# Patient Record
Sex: MALE | Race: WHITE | NOT HISPANIC OR LATINO | Employment: OTHER | URBAN - METROPOLITAN AREA
[De-identification: names, ages, dates, MRNs, and addresses within clinical notes are randomized per-mention and may not be internally consistent; named-entity substitution may affect disease eponyms.]

---

## 2018-01-10 NOTE — PROGRESS NOTES
Assessment    1  Blood tests for routine general physical examination (V72 62) (Z00 00)   2  Prostate cancer screening (V76 44) (Z12 5)   3  Annual physical exam (V70 0) (Z00 00)    Plan  Annual physical exam    · Follow-up visit in 1 year Evaluation and Treatment  Follow-up  Status: Hold For -  Scheduling  Requested for: [id number]  Blood tests for routine general physical examination    · (Q) VAP(TM) CHOLESTEROL TEST; Status:Active; Requested for:01Feb2016;   Blood tests for routine general physical examination, Prostate cancer screening    · (Q) CBC (INCLUDES DIFF/PLT) (REFL); Status:Active; Requested for:01Feb2016;    · (Q) COMPREHENSIVE METABOLIC PNL W/ADJUSTED CALCIUM; Status:Active; Requested for:01Feb2016;    · (Q) LIPID PANEL WITH DIRECT LDL; Status:Active; Requested for:01Feb2016;    · (Q) PSA, TOTAL WITH REFLEX TO PSA, FREE; Status:Active; Requested  for:01Feb2016;    · (Q) TSH, 3RD GENERATION; Status:Active; Requested for:01Feb2016;    · (Q) URINALYSIS REFLEX; Status:Active; Requested for:01Feb2016;    · *(Q) VITAMIN D, 25-HYDROXY, LC/MS/MS; Status:Active; Requested for:01Feb2016;   Colon cancer screening    · 1 - Savanna Moser MD, Eve Alejandre (Gastroenterology) Physician Referral  Consult  Status: Active   Requested for: [id number]  Care Summary provided  : Yes   · COLONOSCOPY; Status:Active; Requested for:01Feb2016;         A/P  1  Physical exam: we have conducted this for you and have given you slips for blood work  We will be in touch with you with the results  we have given you information for the colonoscopy and encourage you to get this done   Chief Complaint  Patient presents to the office today for general PE  Colonoscopy is due and patient would like a referral/order for this  Flu shot not done; but we are out of regular dose flu shots  Patient takes several health supplements but they are not listed in EMR  History of Present Illness  HPI: Here today for general pe     Is taking niacin and wants to get his labs done  Had colonoscopy at 49 and is due again this this year  requests screening labs and understands insurance may not pay             Review of Systems    Constitutional: No fever or chills, feels well, no tiredness, no recent weight gain or weight loss  ENT: no complaints of earache, no hearing loss, no nosebleeds, no nasal discharge, no sore throat, no hoarseness  Cardiovascular: No complaints of slow heart rate, no fast heart rate, no chest pain, no palpitations, no leg claudication, no lower extremity  Respiratory: No complaints of shortness of breath, no wheezing, no cough, no SOB on exertion, no orthopnea or PND  Gastrointestinal: No complaints of abdominal pain, no constipation, no nausea or vomiting, no diarrhea or bloody stools  Genitourinary: No complaints of dysuria, no incontinence, no hesitancy, no nocturia, no genital lesion, no testicular pain  Musculoskeletal: No complaints of arthralgia, no myalgias, no joint swelling or stiffness, no limb pain or swelling  Integumentary: No complaints of skin rash or skin lesions, no itching, no skin wound, no dry skin  Neurological: No compliants of headache, no confusion, no convulsions, no numbness or tingling, no dizziness or fainting, no limb weakness, no difficulty walking  Psychiatric: Is not suicidal, no sleep disturbances, no anxiety or depression, no change in personality, no emotional problems  Active Problems    1  Basal cell carcinoma of skin (173 91) (C44 91)   2   Vitamin D deficiency (268 9) (E55 9)    Surgical History    · History of Mohs Micrographic Surgery Head   · History of Rhinoplasty    Family History    · Family history of    · Family history of malignant neoplasm of breast (V16 3) (Z80 3)    · Family history of    · Family history of Myocardial infarction involving other coronary artery    Social History    · Alcohol use (V49 89) (F10 99)   · 2-3 beers weekends   · Always uses seat belt   · Daily caffeine consumption, 2-3 servings a day   · Has smoke detectors   · Never a smoker   · No drug use   · Occasional alcohol consumption (V49 89) (Z78 9)    Current Meds   1  Niacin  MG CPCR; Take 1 capsule twice daily; Therapy: (Recorded:01Feb2016) to Recorded    Allergies    1  No Known Drug Allergies    2  Shellfish    Vitals   Recorded: [id number] 08:45AM   Heart Rate 94   Respiration 16   Systolic 802   Diastolic 80   Height 5 ft 9 in   Weight 184 lb 6 08 oz   BMI Calculated 27 23   BSA Calculated 1 99   O2 Saturation 97     Physical Exam    Constitutional   General appearance: No acute distress, well appearing and well nourished  Eyes   Conjunctiva and lids: No erythema, swelling or discharge  Pupils and irises: Equal, round, reactive to light  Ears, Nose, Mouth, and Throat   Nasal mucosa, septum, and turbinates: Normal without edema or erythema  Lips, teeth, and gums: Normal, good dentition  Oropharynx: Normal with no erythema, edema, exudate or lesions  Neck   Neck: Supple, symmetric, trachea midline, no masses  Pulmonary   Auscultation of lungs: Clear to auscultation  Cardiovascular   Auscultation of heart: Normal rate and rhythm, normal S1 and S2, no murmurs  Carotid pulses: 2+ bilaterally  Abdomen   Abdomen: Non-tender, no masses  Liver and spleen: No hepatomegaly or splenomegaly  Lymphatic   Palpation of lymph nodes in neck: No lymphadenopathy  Musculoskeletal   Gait and station: Normal     Range of motion: Normal     Skin   Skin and subcutaneous tissue: Normal without rashes or lesions  Neurologic   Reflexes: 2+ and symmetric      Psychiatric   Orientation to person, place and time: Normal     Mood and affect: Normal        Results/Data  PHQ-2 Adult Depression Screening [id number] 08:50AM User, [employer]     Test Name Result Flag Reference   PHQ-2 Adult Depression Score 0     Q1: 0, Q2: 0   PHQ-2 Adult Depression Screening Negative Signatures   Electronically signed by : Shane Edge; Feb 1 2016  9:25AM EST                       (Author)    Electronically signed by : TA Cade ; Feb 1 2016  9:30AM EST                       (Author)

## 2018-10-27 ENCOUNTER — HOSPITAL ENCOUNTER (OUTPATIENT)
Dept: RADIOLOGY | Facility: HOSPITAL | Age: 63
Discharge: HOME | End: 2018-10-27
Attending: FAMILY MEDICINE
Payer: COMMERCIAL

## 2018-10-27 ENCOUNTER — APPOINTMENT (OUTPATIENT)
Dept: RADIOLOGY | Facility: CLINIC | Age: 63
End: 2018-10-27
Payer: COMMERCIAL

## 2018-10-27 ENCOUNTER — OFFICE VISIT (OUTPATIENT)
Dept: URGENT CARE | Facility: CLINIC | Age: 63
End: 2018-10-27
Payer: COMMERCIAL

## 2018-10-27 VITALS
WEIGHT: 175 LBS | DIASTOLIC BLOOD PRESSURE: 80 MMHG | HEART RATE: 90 BPM | HEIGHT: 70 IN | SYSTOLIC BLOOD PRESSURE: 128 MMHG | RESPIRATION RATE: 18 BRPM | TEMPERATURE: 98.4 F | OXYGEN SATURATION: 98 % | BODY MASS INDEX: 25.05 KG/M2

## 2018-10-27 DIAGNOSIS — M25.512 ACUTE PAIN OF LEFT SHOULDER: ICD-10-CM

## 2018-10-27 DIAGNOSIS — M25.512 ACUTE PAIN OF LEFT SHOULDER: Primary | ICD-10-CM

## 2018-10-27 PROCEDURE — G0382 LEV 3 HOSP TYPE B ED VISIT: HCPCS | Performed by: FAMILY MEDICINE

## 2018-10-27 PROCEDURE — 73030 X-RAY EXAM OF SHOULDER: CPT

## 2018-10-27 PROCEDURE — S9083 URGENT CARE CENTER GLOBAL: HCPCS | Performed by: FAMILY MEDICINE

## 2018-10-27 RX ORDER — LISINOPRIL 10 MG/1
10 TABLET ORAL
COMMUNITY
Start: 2018-10-19

## 2018-10-27 RX ORDER — CHLORTHALIDONE 25 MG/1
TABLET ORAL
COMMUNITY
Start: 2018-07-23 | End: 2019-05-13 | Stop reason: ALTCHOICE

## 2018-10-27 RX ORDER — AMLODIPINE BESYLATE 5 MG/1
5 TABLET ORAL
COMMUNITY
Start: 2018-10-19

## 2018-10-27 NOTE — PROGRESS NOTES
[facility]        NAME: Momo Hdz is a 59 y o  male  : 1955    MRN: [id number]  DATE: 2018  TIME: 9:31 AM    Assessment and Plan   Acute pain of left shoulder [M25 512]  1  Acute pain of left shoulder  XR shoulder 2+ vw left    Ambulatory referral to Physical Therapy     Left shoulder pain concerning for rotator cuff tear  X-ray without any obvious signs of fractures; no subacromial bone spur  Left shoulder sling applied  Patient instructed to apply ice to the left shoulder frequently and may continue with ibuprofen for pain control as needed  Taking vitamin C or milk may help to reduce the risk of NSAID associated dyspepsia  Referred to physical therapy for shoulder strengthening exercises  May eventually require MRI imaging with referral to Orthopedics  Of note, patient reports having an $8000 deductible on his health insurance and was apprehensive about obtaining a shoulder x-ray due to cost (especially if an MRI may be needed in the near future)  Patient Instructions     Follow up with PCP in 3-5 days  Proceed to  ER if symptoms worsen  Chief Complaint     Chief Complaint   Patient presents with    Shoulder Pain     Left shoulder pain x ~5 days s/p "hurt it golfing"         History of Present Illness     69-year-old male with pertinent childhood history of chronic shoulder subluxation who presents today due to acute onset left shoulder pain sustained after playing golf  Played a round of golf on Monday (5 days ago) and noticed some pain and stiffness in the left shoulder the next day  Since then his pain progressively worsened  Describes the pain as a sharp knife-like pain when moving the arm with an intensity of 10/10  However when sitting without moving his arm, it is described as dull and a 1/10 in intensity  Denies any fevers, chills or fingertip paresthesias    Took ibuprofen 600 mg q 6 hours times 24 hr yesterday without any pain relief  Review of Systems   Review of Systems   Constitutional: Negative for chills and fever  Respiratory: Negative for shortness of breath  Cardiovascular: Negative for chest pain  Gastrointestinal: Negative for abdominal pain  Musculoskeletal: Positive for arthralgias  Neurological: Negative for weakness and numbness  Current Medications       Current Outpatient Prescriptions:     amLODIPine (NORVASC) 5 mg tablet, , Disp: , Rfl:     aspirin 81 MG tablet, Take 81 mg by mouth daily  , Disp: , Rfl:     chlorthalidone 25 mg tablet, , Disp: , Rfl:     lisinopril (ZESTRIL) 10 mg tablet, , Disp: , Rfl:     niacin (NIASPAN) 500 mg CR tablet, Take 500 mg by mouth daily Indications: takes 500 mg bid , Disp: , Rfl:     Probiotic Product (PROBIOTIC & ACIDOPHILUS EX ST) CAPS, Take 3 capsules by mouth daily  , Disp: , Rfl:     VITAMIN D, CHOLECALCIFEROL, PO, Take 5,000 mg by mouth daily Indications: with k 2 , Disp: , Rfl:     Current Allergies     Allergies as of 10/27/2018 - Reviewed 10/27/2018   Allergen Reaction Noted    Bee venom  10/23/2016    Shellfish allergy Edema 01/06/2016    Shellfish-derived products  03/18/2016            The following portions of the patient's history were reviewed and updated as appropriate: allergies, current medications, past family history, past medical history, past social history, past surgical history and problem list      Past Medical History:   Diagnosis Date    Facial basal cell cancer     Vitamin D deficiency        Past Surgical History:   Procedure Laterality Date    COLONOSCOPY N/A 3/22/2016    Procedure: COLONOSCOPY;  Surgeon: Robbie Sullivan MD;  Location: [facility]; Service:     RHINOPLASTY         No family history on file  Medications have been verified          Objective   /80   Pulse 90   Temp 98 4 °F (36 9 °C)   Resp 18   Ht 5' 10" (1 778 m)   Wt 79 4 kg (175 lb)   SpO2 98%   BMI 25 11 kg/m²        Physical Exam Physical Exam   Constitutional: He appears well-developed and well-nourished  He appears distressed (Holding left arm close to the body to protect against shoulder pain )  HENT:   Head: Normocephalic and atraumatic  Eyes: Conjunctivae are normal    Cardiovascular: Normal rate and normal heart sounds  No murmur heard  Pulmonary/Chest: Effort normal and breath sounds normal  No respiratory distress  He has no wheezes  He has no rales  Musculoskeletal: He exhibits tenderness  He exhibits no edema or deformity  Shoulders appear symmetric while sitting  No overlying skin changes  Exquisite point tenderness over the lateral aspect of the shoulder just lateral to the acromion/superior deltoid  Unable to abduct the left shoulder  Hold arm in adduction  Passive ROM limited due to pain  Skin: Skin is warm  He is not diaphoretic  No erythema  Psychiatric: He has a normal mood and affect   His behavior is normal  Judgment and thought content normal

## 2018-10-31 ENCOUNTER — EVALUATION (OUTPATIENT)
Dept: PHYSICAL THERAPY | Facility: CLINIC | Age: 63
End: 2018-10-31
Payer: COMMERCIAL

## 2018-10-31 DIAGNOSIS — M25.512 ACUTE PAIN OF LEFT SHOULDER: ICD-10-CM

## 2018-10-31 PROCEDURE — 97162 PT EVAL MOD COMPLEX 30 MIN: CPT | Performed by: PHYSICAL THERAPIST

## 2018-10-31 PROCEDURE — G8984 CARRY CURRENT STATUS: HCPCS | Performed by: PHYSICAL THERAPIST

## 2018-10-31 PROCEDURE — G8985 CARRY GOAL STATUS: HCPCS | Performed by: PHYSICAL THERAPIST

## 2018-10-31 NOTE — PROGRESS NOTES
Physical Therapy Initial Evaluation    Today's date: 10/31/2018  Patient name: Chani Hook Page  : 1955  MRN: [id number]  Referring provider: Jeanette Deleon MD  Dx:   Encounter Diagnosis     ICD-10-CM    1  Acute pain of left shoulder M25 512 Ambulatory referral to Physical Therapy                  Assessment  Impairments: abnormal or restricted ROM, abnormal movement, activity intolerance, impaired physical strength, pain with function, poor posture  and poor body mechanics    Assessment details: Pt's problem list: c/o pain in L UE with movement, decreased ROM/MMT in L UE, positive tenderness in L shoulder RTC region, poor posture and L shoulder mm atrophies present  Understanding of Dx/Px/POC: excellent   Prognosis: good    Goals  STG's (1-3 weeks)  1  S with all HEP  2  S with postural education  3  L UE ROM increase in flexion and AB by 10-15 degrees to improve functional use with ADL's    LTG's (4-6 weeks)  1  Independent with all HEP techniques  2  Independent with all self postural correction techniques  3  L UE AROM WFL t/o - no limitations with use of UE   4  L UE MMT improve at least by 1 grade t/o  Plan  Patient would benefit from: skilled physical therapy  Planned modality interventions: low level laser therapy  Planned therapy interventions: manual therapy, joint mobilization, postural training, strengthening, stretching, therapeutic activities, therapeutic exercise, therapeutic training, home exercise program, functional ROM exercises, flexibility and body mechanics training  Frequency: 2x week  Duration in weeks: 6  Treatment plan discussed with: patient        Subjective Evaluation    History of Present Illness  Date of onset: 10/22/2018  Mechanism of injury: Pt  is 58 y/o male c/o acute L shoulder pain after playing golf and lifting heavy object on 10/22/18  Pt  noted slight improvement in pain management, but still c/o not being able to lift his L arm up above head      Hx  of B/L multiple shoulder subluxations in the past (young age) - as per pt  Pt's functional limitations at this point - reaching above head ADL's (dressing, bathing,  ), not able to sleep at night without pain meds (no position of comfort), not able to lift L UE with various ADL's at home/work  Recurrent probem    Quality of life: good    Pain  Current pain ratin  At best pain ratin  At worst pain rating: 10  Location: L shoulder region  Quality: sharp  Relieving factors: medications and relaxation  Aggravating factors: overhead activity and lifting  Progression: improved    Social Support  Steps to enter house: yes (1 step)  Stairs in house: yes (1 flight, u/l HR)   Lives in: multiple-level home  Lives with: spouse    Employment status: working (printing business, [employer] work)  Hand dominance: right      Diagnostic Tests  X-ray: abnormal  Treatments  Previous treatment: physical therapy  Current treatment: medication  Patient Goals  Patient goals for therapy: decreased pain, increased motion, increased strength and return to sport/leisure activities          Objective     Static Posture     Head  Forward  Shoulders  Rounded  Scapulae  Left protracted  Thoracic Spine  Flattened thoracic spine  Comments  L shoulder also depressed and posititive muscular atrophies noted in and deltoid mm  Postural Observations  Seated posture: poor  Standing posture: poor  Correction of posture: has no consistent effect        Palpation   Left   No palpable tenderness to the cervical paraspinals, rhomboids, triceps and upper trapezius  Tenderness of the anterior deltoid, biceps, deltoid, middle deltoid, middle trapezius, pectoralis major and pectoralis minor  Active Range of Motion   Left Shoulder   Flexion: Left shoulder active forward flexion: 0-90  with pain  Abduction: Left shoulder active abduction: 0-95   with pain  External rotation 45°: Left shoulder active external rotation at 45 degrees: 0-55  with pain  Internal rotation 45°: Left shoulder active internal rotation at 45 degrees: 0-70  with pain    Right Shoulder   Normal active range of motion  Flexion: Right shoulder active forward flexion: 0-155  Abduction: Right shoulder active abduction: 0-175  External rotation 45°: Right shoulder active external rotation at 45 degrees: 0-80  Internal rotation 45°: Right shoulder active internal rotation at 45 degrees: 0-90  Strength/Myotome Testing     Left Shoulder     Planes of Motion   Flexion: 2   Extension: 3   Abduction: 2   Adduction: 3   External rotation at 45°: 2   Internal rotation at 45°: 2     Isolated Muscles   Biceps: 2   Triceps: 2     Right Shoulder     Planes of Motion   Flexion: 5   Abduction: 4   Adduction: 5   External rotation at 45°: 5   Internal rotation at 45°: 5     Isolated Muscles   Biceps: 5   Triceps: 5     Tests     Left Shoulder   Positive drop arm, empty can and painful arc  Precautions: not any given, tx  only in pain-free range at this point      Daily Treatment Diary     Manual  10/31            PROM/stretch L UE                                                                     Exercise Diary  10/31            Pendulum circles 3'            Pendulum side-to-side 3'                                                                                                                                                                                                                                                          Modalities  10/31            DEVIKA HERNANDEZ

## 2018-11-02 ENCOUNTER — OFFICE VISIT (OUTPATIENT)
Dept: PHYSICAL THERAPY | Facility: CLINIC | Age: 63
End: 2018-11-02
Payer: COMMERCIAL

## 2018-11-02 DIAGNOSIS — M25.512 ACUTE PAIN OF LEFT SHOULDER: Primary | ICD-10-CM

## 2018-11-02 PROCEDURE — 97110 THERAPEUTIC EXERCISES: CPT | Performed by: PHYSICAL THERAPIST

## 2018-11-02 PROCEDURE — 97140 MANUAL THERAPY 1/> REGIONS: CPT | Performed by: PHYSICAL THERAPIST

## 2018-11-02 NOTE — PROGRESS NOTES
Daily Note     Today's date: 2018  Patient name: Colleen Dillon Page  : 1955  MRN: [id number]  Referring provider: Jay Cain MD  Dx:   Encounter Diagnosis     ICD-10-CM    1  Acute pain of left shoulder M25 512                   Subjective: "If I don't move my arm it doesn't hurt "      Objective: See treatment diary below        Precautions: not any given, tx  only in pain-free range at this point      Daily Treatment Diary      Manual  10/31  11/2                   PROM/stretch L UE    10'                                                                                                                         Exercise Diary  10/31  11/2                   Pendulum circles 3'  3'                   Pendulum side-to-side 3'  3'                    UBE/b'kwrd/ff    10' PROM                    Pulleys     5' PROM                    log rolling stretch flex, AB    ea 10x20"                                                                                                                                                                                                                                                                                                                                                                                                 Modalities  10/31  11/2                   CP    7'                   LA                        MH    8'                                Assessment: Tolerated treatment well  Patient is working on L UE ex in pain free range and PROM/stretch  Educated on updated HEP  Plan: Continue PT as per POC

## 2018-11-06 ENCOUNTER — OFFICE VISIT (OUTPATIENT)
Dept: PHYSICAL THERAPY | Facility: CLINIC | Age: 63
End: 2018-11-06
Payer: COMMERCIAL

## 2018-11-06 DIAGNOSIS — M25.512 ACUTE PAIN OF LEFT SHOULDER: Primary | ICD-10-CM

## 2018-11-06 PROCEDURE — 97140 MANUAL THERAPY 1/> REGIONS: CPT | Performed by: PHYSICAL THERAPIST

## 2018-11-06 PROCEDURE — 97110 THERAPEUTIC EXERCISES: CPT | Performed by: PHYSICAL THERAPIST

## 2018-11-06 NOTE — PROGRESS NOTES
Daily Note     Today's date: 2018  Patient name: Delmar Gilbert Page  : 1955  MRN: [id number]  Referring provider: Rica Carey MD  Dx:   Encounter Diagnosis     ICD-10-CM    1  Acute pain of left shoulder M25 512                   Subjective: "I dont have any pain at this moment  Maybe it is getting little better, but I can still feel it in certain positions "      Objective: See treatment diary below  Precautions: not any given, tx  only in pain-free range at this point      Daily Treatment Diary      Manual  10/31  11/2  11/6                 PROM/stretch L UE    10'  10'                                                                                                                       Exercise Diary  10/31  11/2  11/6                 Pendulum circles 3'  3'  HEP                 Pendulum side-to-side 3'  3'  HEP                  UBE/b'kwrd/ff    10' PROM  10' at 110 flex                   Pulleys     5' PROM  5' ER/IR                  log rolling stretch flex, AB    ea 10x20"                    supine wand B UE flexion (pain free)      10x2                  corner stretch      10x15"                  "sink stretch"      10x15"                                          HEP review      3'                                                                                                                                                                                                                                                                       Modalities  10/31  11/2  11/6                 CP    7'                   LA                        MH    8'  8'                                    Assessment: Tolerated treatment well  Patient is able to perform AROM in supine with a wand up to about 165 degrees  No worsening of pain after tx  Educated on importance daily HEP  Plan: Continue PT as per POC

## 2018-11-08 ENCOUNTER — OFFICE VISIT (OUTPATIENT)
Dept: PHYSICAL THERAPY | Facility: CLINIC | Age: 63
End: 2018-11-08
Payer: COMMERCIAL

## 2018-11-08 DIAGNOSIS — M25.512 ACUTE PAIN OF LEFT SHOULDER: Primary | ICD-10-CM

## 2018-11-08 PROCEDURE — 97110 THERAPEUTIC EXERCISES: CPT | Performed by: PHYSICAL THERAPIST

## 2018-11-08 PROCEDURE — 97140 MANUAL THERAPY 1/> REGIONS: CPT | Performed by: PHYSICAL THERAPIST

## 2018-11-08 NOTE — PROGRESS NOTES
Daily Note     Today's date: 2018  Patient name: Arleth Quiñones Page  : 1955  MRN: [id number]  Referring provider: Vira Serrano MD  Dx:   Encounter Diagnosis     ICD-10-CM    1  Acute pain of left shoulder M25 512                   Subjective: "I have no pain if I don't move it certain direction "    Objective: See treatment diary below    Precautions: not any given, tx  only in pain-free range at this point      Daily Treatment Diary      Manual  10/31  11/2  11/6  11/8               PROM/stretch L UE    10'  10'  10'                                                                                                                     Exercise Diary  10/31  11/2  11/6  11/8               Pendulum circles 3'  3'  HEP                 Pendulum side-to-side 3'  3'  HEP                  UBE/b'kwrd/ff    10' PROM  10' at 110 flex   10' 1120 flex                Pulleys     5' PROM  5' ER/IR  5' ER/IR                log rolling stretch flex, AB    ea 10x20"                    supine wand B UE flexion (pain free)      10x2                  corner stretch      10x15"  10x15"                "sink stretch"      10x15"  10x15"                                        HEP review      3'  3'                                        Apollo B scap retr         L1, 20x                Apollo B scap protr         L1,  20x                                        T-ball flexion stretch       10x15"                                                                                                                                             Modalities  10/31  11/2  11/6  11/8               CP    7'                   LA                        MH    8'  8'  8'                          Assessment: Tolerated treatment very well - all therex in pain free range  Patient started gentle UE strengthening in phyllis range only  Some verbal and tactile cues needed to maintain correct posture with ex  Plan: Continue PT as per POC

## 2018-11-13 ENCOUNTER — OFFICE VISIT (OUTPATIENT)
Dept: PHYSICAL THERAPY | Facility: CLINIC | Age: 63
End: 2018-11-13
Payer: COMMERCIAL

## 2018-11-13 DIAGNOSIS — M25.512 ACUTE PAIN OF LEFT SHOULDER: Primary | ICD-10-CM

## 2018-11-13 PROCEDURE — 97140 MANUAL THERAPY 1/> REGIONS: CPT | Performed by: PHYSICAL THERAPIST

## 2018-11-13 PROCEDURE — 97112 NEUROMUSCULAR REEDUCATION: CPT | Performed by: PHYSICAL THERAPIST

## 2018-11-13 PROCEDURE — 97110 THERAPEUTIC EXERCISES: CPT | Performed by: PHYSICAL THERAPIST

## 2018-11-13 NOTE — PROGRESS NOTES
Daily Note     Today's date: 2018  Patient name: Darren Alvares Page  : 1955  MRN: [id number]  Referring provider: Heidi Hatch MD  Dx:   Encounter Diagnosis     ICD-10-CM    1  Acute pain of left shoulder M25 512                   Subjective: "I can move it better, but still pain with certain movements "      Objective: See treatment diary below    Precautions: not any given, tx  only in pain-free range at this point       Precautions: not any given, tx  only in pain-free range at this point      Daily Treatment Diary      Manual  10/31  11/2  11/6  11/8 11/13               PROM/stretch L UE    10'  10'  10'  10'                                                                                                                   Exercise Diary  10/31  11/2  11/6  11/8 11/13              UBE/b'kwrd/ff    10' PROM  10' at 110 flex   10'   120 flex  10'  130 flex              Pulleys     5' PROM  5' ER/IR  5' ER/IR  5'              supine wand B UE flexion (pain free)      10x2                  corner stretch      10x15"  10x15" 10x15"              "sink stretch"      10x15"  10x15"  10x15"                                      HEP review      3'  3'  3'                                      Apollo B scap retr         L1, 20x  L2,20x              Apollo B scap protr         L1,  20x  L2 20x                                      T-ball flexion stretch       10x15"  10x15"                                      Postural edu           8'                                                                                           Modalities  10/31  11/2  11/6  11/8  11/13             CP    7'                   LA                        MH    8'  8'  8'  8'                       Assessment: Tolerated treatment well  Patient continue to work on various stretching ad strengthening ex in pain-free range  Improvement in self postural awareness with therex noted  Plan: Continue PT as phyllis

## 2018-11-15 ENCOUNTER — OFFICE VISIT (OUTPATIENT)
Dept: PHYSICAL THERAPY | Facility: CLINIC | Age: 63
End: 2018-11-15
Payer: COMMERCIAL

## 2018-11-15 DIAGNOSIS — M25.512 ACUTE PAIN OF LEFT SHOULDER: Primary | ICD-10-CM

## 2018-11-15 PROCEDURE — 97110 THERAPEUTIC EXERCISES: CPT | Performed by: PHYSICAL THERAPIST

## 2018-11-15 PROCEDURE — G8986 CARRY D/C STATUS: HCPCS | Performed by: PHYSICAL THERAPIST

## 2018-11-15 PROCEDURE — 97112 NEUROMUSCULAR REEDUCATION: CPT | Performed by: PHYSICAL THERAPIST

## 2018-11-15 PROCEDURE — G8985 CARRY GOAL STATUS: HCPCS | Performed by: PHYSICAL THERAPIST

## 2018-11-15 PROCEDURE — 97530 THERAPEUTIC ACTIVITIES: CPT | Performed by: PHYSICAL THERAPIST

## 2018-11-15 NOTE — PROGRESS NOTES
Daily Note/Discharge Note     Today's date: 11/15/2018  Patient name: Lilian Ferrer Page  : 1955  MRN: [id number]  Referring provider: Svetlana Griffith MD  Dx:   Encounter Diagnosis     ICD-10-CM    1  Acute pain of left shoulder M25 512                   Subjective: "I think I can do my exercises at home " No pain at this moment  Objective: See treatment diary below  Precautions: not any given, tx  only in pain-free range at this point        Active Range of Motion From 10/31/18  Left Shoulder   Flexion: Left shoulder active forward flexion: 0-90  with pain  Abduction: Left shoulder active abduction: 0-95  with pain  External rotation 45°: Left shoulder active external rotation at 45 degrees: 0-55  with pain  Internal rotation 45°: Left shoulder active internal rotation at 45 degrees: 0-70  with pain     From today:  Flexion: 0-175 (WFL)  AB: 0-175 (WFL)  Slight pain at 100 degrees only  IR/ER: same 0-80 [facility])      Strength/Myotome Testing     Left Shoulder      Planes of Motion   Flexion: 2   Extension: 3   Abduction: 2   Adduction: 3   External rotation at 45°: 2   Internal rotation at 45°: 2      Isolated Muscles   Biceps: 2   Triceps: 2      MMT from today: 5/5 t/o except 4/5 AD at 90 degrees    Posture:  Good self correction in sitting and standing   Some slight FF head and B shoulders noted      Daily Treatment Diary      Manual  10/31  11/2  11/6  11/8 11/13     11/15           PROM/stretch L UE    10'  10'  10'  10'                                                                                                                   Exercise Diary  10/31  11/2  11/6  11/8 11/13  11/15            UBE/b'kwrd/ff    10' PROM  10' at 110 flex   10'   120 flex  10'  130 flex              Pulleys     5' PROM  5' ER/IR  5' ER/IR  5'  3x15"            supine wand B UE flexion (pain free)      10x2                  corner stretch      10x15"  10x15" 10x15"  3x15"            "sink stretch"      10x15"  10x15"  10x15"  3x15"                                    HEP review      3'  3'  3'                                      Apollo B scap retr         L1, 20x  L2,20x              Apollo B scap protr         L1,  20x  L2 20x                                      T-ball flexion stretch       10x15"  10x15"                                      Postural edu           8'  5'            HEP review            8'            Re-assessment            10'                                         Modalities  10/31  11/2  11/6  11/8  11/13  11/15           CP    7'                   LA                       [address]    8'  8'  8'  8'  8'                 STG's (1-3 weeks) from 10/31/18  1  S with all HEP (reached)  2  S with postural education (reached)  3  L UE ROM increase in flexion and AB by 10-15 degrees to improve functional use with ADL's (reached)    LTG's (4-6 weeks)  1  Independent with all HEP techniques  (reached)  2  Independent with all self postural correction techniques  (reached)  3  L UE AROM WFL t/o - no limitations with use of UE  (reached)  4  L UE MMT improve at least by 1 grade t/o  (reached)    Assessment: Tolerated treatment very well  Patient without any major functional limitations except discomfort at 100 degrees of AB  Safe and independent with all HEP and self postural correction  Improvement made in L UE ROM/MMT and pain management  Plan: Self D/C with HEP review at this time

## 2019-06-11 PROBLEM — J38.7 VALLECULAR MASS: Status: ACTIVE | Noted: 2019-06-11

## 2019-07-03 NOTE — PRE-PROCEDURE INSTRUCTIONS
Pre-Surgery Instructions:   Medication Instructions    amLODIPine (NORVASC) 5 mg tablet Instructed patient per Anesthesia Guidelines   lisinopril (ZESTRIL) 10 mg tablet Instructed patient per Anesthesia Guidelines   multivitamin [facility]) TABS Patient was instructed by Physician and understands   NON FORMULARY Patient was instructed by Physician and understands  Pre op and bathing instructions reviewed

## 2019-07-13 ENCOUNTER — OFFICE VISIT (OUTPATIENT)
Dept: URGENT CARE | Facility: CLINIC | Age: 64
End: 2019-07-13
Payer: COMMERCIAL

## 2019-07-13 VITALS
OXYGEN SATURATION: 100 % | RESPIRATION RATE: 20 BRPM | BODY MASS INDEX: 25.37 KG/M2 | HEIGHT: 70 IN | TEMPERATURE: 98.7 F | DIASTOLIC BLOOD PRESSURE: 87 MMHG | HEART RATE: 84 BPM | WEIGHT: 177.2 LBS | SYSTOLIC BLOOD PRESSURE: 147 MMHG

## 2019-07-13 DIAGNOSIS — M54.6 ACUTE BILATERAL THORACIC BACK PAIN: ICD-10-CM

## 2019-07-13 DIAGNOSIS — R10.13 EPIGASTRIC PAIN: Primary | ICD-10-CM

## 2019-07-13 PROCEDURE — S9083 URGENT CARE CENTER GLOBAL: HCPCS | Performed by: FAMILY MEDICINE

## 2019-07-13 PROCEDURE — G0382 LEV 3 HOSP TYPE B ED VISIT: HCPCS | Performed by: FAMILY MEDICINE

## 2019-07-13 PROCEDURE — 93005 ELECTROCARDIOGRAM TRACING: CPT | Performed by: FAMILY MEDICINE

## 2019-07-13 NOTE — PATIENT INSTRUCTIONS
Vital signs are stable, patient is afebrile  Patient is symptomatic with improving  Is recommended he try Prilosec or continue Maalox if there is symptom recurrence  Follow-up with GI this week  Go to the emergency department if symptoms recur or worsen

## 2019-07-13 NOTE — PROGRESS NOTES
[address]        NAME: Pat Hdz is a 62 y o  male  : 1955    MRN: [id number]  DATE: 2019  TIME: 10:45 AM    Assessment and Plan   Epigastric pain [R10 13]  1  Epigastric pain     2  Acute bilateral thoracic back pain           Patient Instructions   Patient Instructions   Vital signs are stable, patient is afebrile  Patient is symptomatic with improving  Is recommended he try Prilosec or continue Maalox if there is symptom recurrence  Follow-up with GI this week  Go to the emergency department if symptoms recur or worsen  Proceed to  ER if symptoms worsen  Chief Complaint     Chief Complaint   Patient presents with    Abdominal Pain     patient reports he ate a baritto last night started with abdominal pain/distendion along with back pain at 1:30am, loose bowels earlier in the day  0430/0730 vomited bile,took apple cider vinegar at 0300 with no releif, maalox x 2 with no relief  denies passing gas or belching  History of Present Illness       Patient presents with complaint of primarily epigastric discomfort but is generally diffuse, and thoracic back pain described as an ache that started at approximately 1:00 a m , he did eat a burrito bowl the night before  Patient reports having issues with stomach discomfort, abdominal distention at times intermittently, but this is the 1st time he has experienced any back pain associated with  He denies having dysphagia, he does have a right-sided neck mass which will be surgically biopsied in the next week  Patient reports he had bilious vomiting x2 with most recent being approximately 7:30 a m  This morning  He did not vomit any whole food  No hematemesis, no current nausea  He did take Maalox x2 and reports that currently his symptoms are improving  He has had EGD and colonoscopy in the past but this has been quite some time  Patient denies radiating pain    He has had difficulty passing gas or belching, he did try apple cider vinegar without improvement in symptoms  He states this has improved the same symptoms in the past   No chest pain no shortness of breath no palpitations no lightheadedness no dizziness no syncope  No worsening of symptoms with positional movement  No fevers no chills no urinary symptoms  He does have a history of GERD  Review of Systems   Review of Systems   Constitutional: Negative  HENT: Negative  Respiratory: Negative  Cardiovascular: Negative  Gastrointestinal: Positive for abdominal pain, diarrhea, nausea and vomiting  Negative for blood in stool and constipation  Genitourinary: Negative  Musculoskeletal: Positive for back pain  Neurological: Negative  Current Medications       Current Outpatient Medications:     amLODIPine (NORVASC) 5 mg tablet, Take 5 mg by mouth daily , Disp: , Rfl:     lisinopril (ZESTRIL) 10 mg tablet, Take 10 mg by mouth daily , Disp: , Rfl:     multivitamin (THERAGRAN) TABS, Take 1 tablet by mouth daily, Disp: , Rfl:     NON FORMULARY, , Disp: , Rfl:     Current Allergies     Allergies as of 07/13/2019 - Reviewed 07/13/2019   Allergen Reaction Noted    Bee venom  10/23/2016    Shellfish allergy Edema 01/06/2016    Shellfish-derived products  03/18/2016            The following portions of the patient's history were reviewed and updated as appropriate: allergies, current medications, past family history, past medical history, past social history, past surgical history and problem list      Past Medical History:   Diagnosis Date    Facial basal cell cancer     GERD (gastroesophageal reflux disease)     diet controlled    Hypertension     Vitamin D deficiency        Past Surgical History:   Procedure Laterality Date    COLONOSCOPY N/A 3/22/2016    Procedure: COLONOSCOPY;  Surgeon: Radha Kim MD;  Location: [facility] GI [facility]; Service:    [facility] RHINOPLASTY      WISDOM TOOTH EXTRACTION         History reviewed   No pertinent family history  Medications have been verified  Objective   /87   Pulse 84   Temp 98 7 °F (37 1 °C)   Resp 20   Ht 5' 10" (1 778 m)   Wt 80 4 kg (177 lb 3 2 oz)   SpO2 100%   BMI 25 43 kg/m²        Physical Exam     Physical Exam   Constitutional: He appears well-developed and well-nourished  He does not appear ill  HENT:   Mouth/Throat: Oropharynx is clear and moist  No oropharyngeal exudate  Cardiovascular: Normal rate, regular rhythm and normal heart sounds  Pulmonary/Chest: Effort normal and breath sounds normal    Abdominal: Normal appearance and bowel sounds are normal    Abdomen soft nontender nondistended, mild diffuse abdominal pain with palpation worse in the epigastrium and this is mild  Neurological:   No CVA tenderness bilaterally, no reproducible back pain no bony or midline tenderness no paravertebral musculature tenderness to palpation    Full active range of motion of the thoracic spine

## 2019-07-15 LAB
ATRIAL RATE: 82 BPM
P AXIS: 1 DEGREES
PR INTERVAL: 146 MS
QRS AXIS: 6 DEGREES
QRSD INTERVAL: 102 MS
QT INTERVAL: 374 MS
QTC INTERVAL: 436 MS
T WAVE AXIS: -13 DEGREES
VENTRICULAR RATE: 82 BPM

## 2019-07-15 PROCEDURE — 93010 ELECTROCARDIOGRAM REPORT: CPT | Performed by: INTERNAL MEDICINE

## 2019-07-16 ENCOUNTER — ANESTHESIA EVENT (OUTPATIENT)
Dept: PERIOP | Facility: HOSPITAL | Age: 64
End: 2019-07-16
Payer: COMMERCIAL

## 2019-07-16 NOTE — ANESTHESIA PREPROCEDURE EVALUATION
Review of Systems/Medical History  Patient summary reviewed        Cardiovascular  EKG reviewed, Hypertension ,    Pulmonary  Negative pulmonary ROS        GI/Hepatic    GERD ,        Negative  ROS        Endo/Other  Negative endo/other ROS      GYN  Negative gynecology ROS          Hematology  Negative hematology ROS      Musculoskeletal  Negative musculoskeletal ROS        Neurology  Negative neurology ROS      Psychology   Negative psychology ROS              Physical Exam    Airway    Mallampati score: II  TM Distance: >3 FB  Neck ROM: full     Dental       Cardiovascular  Cardiovascular exam normal    Pulmonary  Pulmonary exam normal     Other Findings        Anesthesia Plan  ASA Score- 2     Anesthesia Type- general with ASA Monitors  Additional Monitors:   Airway Plan:         Plan Factors-    Induction- intravenous  Postoperative Plan-     Informed Consent- Anesthetic plan and risks discussed with patient  I personally reviewed this patient with the CRNA  Discussed and agreed on the Anesthesia Plan with the CRNA  Armando Josue

## 2019-07-17 ENCOUNTER — HOSPITAL ENCOUNTER (OUTPATIENT)
Facility: HOSPITAL | Age: 64
Setting detail: OUTPATIENT SURGERY
Discharge: HOME | End: 2019-07-17
Attending: OTOLARYNGOLOGY | Admitting: OTOLARYNGOLOGY
Payer: COMMERCIAL

## 2019-07-17 ENCOUNTER — ANESTHESIA (OUTPATIENT)
Dept: PERIOP | Facility: HOSPITAL | Age: 64
End: 2019-07-17
Payer: COMMERCIAL

## 2019-07-17 VITALS
OXYGEN SATURATION: 96 % | BODY MASS INDEX: 25.05 KG/M2 | DIASTOLIC BLOOD PRESSURE: 88 MMHG | TEMPERATURE: 98.9 F | RESPIRATION RATE: 18 BRPM | SYSTOLIC BLOOD PRESSURE: 148 MMHG | HEIGHT: 70 IN | HEART RATE: 64 BPM | WEIGHT: 175 LBS

## 2019-07-17 DIAGNOSIS — J38.7 VALLECULAR MASS: ICD-10-CM

## 2019-07-17 PROCEDURE — 31536 LARYNGOSCOPY W/BX & OP SCOPE: CPT | Performed by: OTOLARYNGOLOGY

## 2019-07-17 PROCEDURE — 88333 PATH CONSLTJ SURG CYTO XM 1: CPT | Performed by: PATHOLOGY

## 2019-07-17 PROCEDURE — 88342 IMHCHEM/IMCYTCHM 1ST ANTB: CPT | Performed by: PATHOLOGY

## 2019-07-17 PROCEDURE — 99235 HOSP IP/OBS SAME DATE MOD 70: CPT | Performed by: OTOLARYNGOLOGY

## 2019-07-17 PROCEDURE — 88184 FLOWCYTOMETRY/ TC 1 MARKER: CPT | Performed by: OTOLARYNGOLOGY

## 2019-07-17 PROCEDURE — 88307 TISSUE EXAM BY PATHOLOGIST: CPT | Performed by: PATHOLOGY

## 2019-07-17 PROCEDURE — 88185 FLOWCYTOMETRY/TC ADD-ON: CPT | Performed by: ANESTHESIOLOGY

## 2019-07-17 RX ORDER — FENTANYL CITRATE 50 UG/ML
INJECTION, SOLUTION INTRAMUSCULAR; INTRAVENOUS AS NEEDED
Status: DISCONTINUED | OUTPATIENT
Start: 2019-07-17 | End: 2019-07-17 | Stop reason: SURG

## 2019-07-17 RX ORDER — METOCLOPRAMIDE HYDROCHLORIDE 5 MG/ML
10 INJECTION INTRAMUSCULAR; INTRAVENOUS ONCE AS NEEDED
Status: DISCONTINUED | OUTPATIENT
Start: 2019-07-17 | End: 2019-07-17 | Stop reason: HOSPADM

## 2019-07-17 RX ORDER — GLYCOPYRROLATE 0.2 MG/ML
INJECTION INTRAMUSCULAR; INTRAVENOUS AS NEEDED
Status: DISCONTINUED | OUTPATIENT
Start: 2019-07-17 | End: 2019-07-17 | Stop reason: SURG

## 2019-07-17 RX ORDER — PROPOFOL 10 MG/ML
INJECTION, EMULSION INTRAVENOUS AS NEEDED
Status: DISCONTINUED | OUTPATIENT
Start: 2019-07-17 | End: 2019-07-17 | Stop reason: SURG

## 2019-07-17 RX ORDER — LIDOCAINE HYDROCHLORIDE AND EPINEPHRINE 10; 10 MG/ML; UG/ML
INJECTION, SOLUTION INFILTRATION; PERINEURAL AS NEEDED
Status: DISCONTINUED | OUTPATIENT
Start: 2019-07-17 | End: 2019-07-17 | Stop reason: HOSPADM

## 2019-07-17 RX ORDER — SODIUM CHLORIDE, SODIUM LACTATE, POTASSIUM CHLORIDE, CALCIUM CHLORIDE 600; 310; 30; 20 MG/100ML; MG/100ML; MG/100ML; MG/100ML
125 INJECTION, SOLUTION INTRAVENOUS CONTINUOUS
Status: DISCONTINUED | OUTPATIENT
Start: 2019-07-17 | End: 2019-07-17 | Stop reason: HOSPADM

## 2019-07-17 RX ORDER — LIDOCAINE HYDROCHLORIDE 10 MG/ML
INJECTION, SOLUTION INFILTRATION; PERINEURAL AS NEEDED
Status: DISCONTINUED | OUTPATIENT
Start: 2019-07-17 | End: 2019-07-17 | Stop reason: SURG

## 2019-07-17 RX ORDER — SODIUM CHLORIDE, SODIUM LACTATE, POTASSIUM CHLORIDE, CALCIUM CHLORIDE 600; 310; 30; 20 MG/100ML; MG/100ML; MG/100ML; MG/100ML
20 INJECTION, SOLUTION INTRAVENOUS CONTINUOUS
Status: DISCONTINUED | OUTPATIENT
Start: 2019-07-17 | End: 2019-07-17 | Stop reason: HOSPADM

## 2019-07-17 RX ORDER — OXYMETAZOLINE HYDROCHLORIDE 0.05 G/100ML
SPRAY NASAL AS NEEDED
Status: DISCONTINUED | OUTPATIENT
Start: 2019-07-17 | End: 2019-07-17 | Stop reason: HOSPADM

## 2019-07-17 RX ORDER — DEXAMETHASONE SODIUM PHOSPHATE 10 MG/ML
INJECTION, SOLUTION INTRAMUSCULAR; INTRAVENOUS AS NEEDED
Status: DISCONTINUED | OUTPATIENT
Start: 2019-07-17 | End: 2019-07-17 | Stop reason: SURG

## 2019-07-17 RX ORDER — MAGNESIUM HYDROXIDE 1200 MG/15ML
LIQUID ORAL AS NEEDED
Status: DISCONTINUED | OUTPATIENT
Start: 2019-07-17 | End: 2019-07-17 | Stop reason: HOSPADM

## 2019-07-17 RX ORDER — ONDANSETRON 2 MG/ML
INJECTION INTRAMUSCULAR; INTRAVENOUS AS NEEDED
Status: DISCONTINUED | OUTPATIENT
Start: 2019-07-17 | End: 2019-07-17 | Stop reason: SURG

## 2019-07-17 RX ORDER — ROCURONIUM BROMIDE 10 MG/ML
INJECTION, SOLUTION INTRAVENOUS AS NEEDED
Status: DISCONTINUED | OUTPATIENT
Start: 2019-07-17 | End: 2019-07-17 | Stop reason: SURG

## 2019-07-17 RX ORDER — HYDROCODONE BITARTRATE AND ACETAMINOPHEN 5; 325 MG/1; MG/1
1 TABLET ORAL EVERY 6 HOURS PRN
Qty: 15 TABLET | Refills: 0 | Status: SHIPPED | OUTPATIENT
Start: 2019-07-17 | End: 2019-07-27

## 2019-07-17 RX ORDER — ONDANSETRON 2 MG/ML
4 INJECTION INTRAMUSCULAR; INTRAVENOUS ONCE AS NEEDED
Status: COMPLETED | OUTPATIENT
Start: 2019-07-17 | End: 2019-07-17

## 2019-07-17 RX ORDER — FENTANYL CITRATE/PF 50 MCG/ML
25 SYRINGE (ML) INJECTION
Status: DISCONTINUED | OUTPATIENT
Start: 2019-07-17 | End: 2019-07-17 | Stop reason: HOSPADM

## 2019-07-17 RX ORDER — NEOSTIGMINE METHYLSULFATE 1 MG/ML
INJECTION INTRAVENOUS AS NEEDED
Status: DISCONTINUED | OUTPATIENT
Start: 2019-07-17 | End: 2019-07-17 | Stop reason: SURG

## 2019-07-17 RX ADMIN — FENTANYL CITRATE 25 MCG: 50 INJECTION, SOLUTION INTRAMUSCULAR; INTRAVENOUS at 12:28

## 2019-07-17 RX ADMIN — FENTANYL CITRATE 100 MCG: 50 INJECTION INTRAMUSCULAR; INTRAVENOUS at 10:45

## 2019-07-17 RX ADMIN — FENTANYL CITRATE 50 MCG: 50 INJECTION INTRAMUSCULAR; INTRAVENOUS at 11:15

## 2019-07-17 RX ADMIN — GLYCOPYRROLATE 0.4 MG: 0.2 INJECTION, SOLUTION INTRAMUSCULAR; INTRAVENOUS at 12:01

## 2019-07-17 RX ADMIN — SODIUM CHLORIDE, SODIUM LACTATE, POTASSIUM CHLORIDE, AND CALCIUM CHLORIDE: .6; .31; .03; .02 INJECTION, SOLUTION INTRAVENOUS at 10:25

## 2019-07-17 RX ADMIN — NEOSTIGMINE METHYLSULFATE 3 MG: 1 INJECTION INTRAVENOUS at 12:01

## 2019-07-17 RX ADMIN — DEXAMETHASONE SODIUM PHOSPHATE 4 MG: 10 INJECTION, SOLUTION INTRAMUSCULAR; INTRAVENOUS at 10:52

## 2019-07-17 RX ADMIN — ONDANSETRON 4 MG: 2 INJECTION INTRAMUSCULAR; INTRAVENOUS at 12:58

## 2019-07-17 RX ADMIN — ONDANSETRON 4 MG: 2 INJECTION INTRAMUSCULAR; INTRAVENOUS at 11:52

## 2019-07-17 RX ADMIN — ROCURONIUM BROMIDE 40 MG: 10 INJECTION, SOLUTION INTRAVENOUS at 10:45

## 2019-07-17 RX ADMIN — LIDOCAINE HYDROCHLORIDE ANHYDROUS 50 MG: 10 INJECTION, SOLUTION INFILTRATION at 10:45

## 2019-07-17 RX ADMIN — PROPOFOL 200 MG: 10 INJECTION, EMULSION INTRAVENOUS at 10:45

## 2019-07-17 RX ADMIN — FENTANYL CITRATE 25 MCG: 50 INJECTION, SOLUTION INTRAMUSCULAR; INTRAVENOUS at 12:33

## 2019-07-17 NOTE — H&P
[facility]  OTOLARYNGOLOGY - HEAD & NECK SURGERY    Stacy Byrd Page  [id number]  1955    HISTORY & PHYSICAL    History of Present Illness: 62year old man with a history of globus sensation and right vallecular mass  He was seen about 1 month ago for this and is here for direct laryngoscopy with biopsy/removal of the mass  He is doing about the same  No major changes in his health history  HISTORICALLY:  32-year-old man who presents for follow-up  He was seen by Dr Jean-Pierre Enriquez about a week ago for or pharyngeal dysphasia as well as a tongue mass  He states that as far back as he can remember when he was eating certain foods he felt like things would get stuck on the right side  He would push on his neck to try and dislodge them  He states that certain foods like melon acted this way  He states that he recently sought further attention for this  A CT scan was done that showed some fullness in the right vallecula/base of tongue  He is referred to me for direct laryngoscopy with biopsy  Allergies   Allergen Reactions    Bee Venom     Shellfish Allergy Edema    Shellfish-Derived Products        Past Medical History:   Diagnosis Date    Facial basal cell cancer     GERD (gastroesophageal reflux disease)     diet controlled    Hypertension     Vitamin D deficiency        Past Surgical History:   Procedure Laterality Date    COLONOSCOPY N/A 3/22/2016    Procedure: COLONOSCOPY;  Surgeon: Naveed Ellis MD;  Location: [facility]; Service:    [facility] RHINOPLASTY      WISDOM TOOTH EXTRACTION         History reviewed  No pertinent family history  Social History     Tobacco Use    Smoking status: Never Smoker    Smokeless tobacco: Never Used   Substance Use Topics    Alcohol use: Yes     Frequency: 2-3 times a week     Drinks per session: 3 or 4     Comment: social    Drug use: No       No current facility-administered medications on file prior to encounter        Current Outpatient Medications on File Prior to Encounter   Medication Sig Dispense Refill    amLODIPine (NORVASC) 5 mg tablet Take 5 mg by mouth daily       lisinopril (ZESTRIL) 10 mg tablet Take 10 mg by mouth daily       multivitamin (THERAGRAN) TABS Take 1 tablet by mouth daily      NON FORMULARY          Review of Systems:  Patient denies fevers or chills  All other systems reviewed and found to be negative unless otherwise noted in the HPI or MA note  Vitals:    07/03/19 1124   Weight: 79 4 kg (175 lb)   Height: 5' 10" (1 778 m)         General Appearance: No apparent distress    Head: Normocephalic, atraumatic  Face: Symmetric without obvious lesions  Eyes: Conjunctiva clear, extraocular movements are intact  Ears: Pinna normal shape and position  Canals are clear  TMs intact with no middle ear effusion  Nose: External pyramid midline  Mucosa appears healthy  Turbinates are normal in size  Septum relatively midline  Oral cavity/Oropharynx: No mucosal lesions, masses, or pharyngeal asymmetry  Neck: No cervical lymphadenopathy or masses appreciated    Skin: Skin warm and dry  Neurological: Cranial nerves II to XII are intact  Respiratory: No stridor or labored breathing  Cardiovascular: Good perfusion of the upper extremities  No cyanosis of the fingers or hands  Psychiatric: Alert and oriented  Data Reviewed: CT scan is reviewed on the [facility]'s PACS system (the outside images were uploaded to PACS)  This shows a fullness in the right vallecula/base of tongue       Assessment:  Patient Active Problem List    Diagnosis Date Noted    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

## 2019-07-17 NOTE — OP NOTE
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]    Post-Op Diagnosis Codes: * Vallecular mass [J38 7]    Procedure(s) (LRB):  MICROLARYNGOSCOPY DIRECT WITH BIOPSY OF VALLECULAR MASS (N/A)    Specimen(s):  ID Type Source Tests Collected by Time Destination   1 : Right Vallecular Mass Tissue Lymph Node - Lymphoma Prtocol TISSUE EXAM, LEUKEMIA/LYMPHOMA FLOW CYTOMETRY Blair Castellon MD 2019 1139    2 : Right Vallecular Mass Tissue Soft Tissue, Other TISSUE EXAM Blair Castellon MD 2019 1140        Estimated Blood Loss:   3 mL    Drains:  * No LDAs found *    Anesthesia Type:   General    Operative Indications:  Vallecular mass [J38 7]    Operative Findings:  Friable mass seen filling the right vallecula  Complications:   None    Procedure and Technique:  After the patient was allowed to ask any remaining questions in the preoperative area, the patient was escorted to the operating suite by both ENT and anesthesia  There, surgical time-out was performed to ensure the proper patient and procedure  Once this was done, general endotracheal anesthesia was induced without incident  Once given the go ahead by anesthesia the head of bed was rotated 90°  A shoulder roll was placed  The patient was then prepped and draped in normal fashion for the above procedures  A mouth guard was used to protect the patient's upper teeth  The vallecular scope was then advanced down to the vallecula  Once the vallecular mass was in view, the patient was placed into suspension  Using a combination of up biting forceps, the mass was grasped and pulled medially, this was then removed using a combination of scissors and Bovie cautery at a setting of 15  Additional specimen was taken piecemeal in the same fashion    Half the specimen was sent for lymphoma protocol as a fresh specimen, and half was sent as a permanent specimen in formalin  Bleeding was controlled with cautery as well as Afrin-soaked pledgets  The patient was taken out of suspension  I then used the laryngoscope to view the rest of the larynx which did not have any other atypical findings  The patient was then turned over to anesthesia allowed to awaken without incident       I was present for the entire procedure    Patient Disposition:  PACU     SIGNATURE: Khadar Blankenship MD  DATE: July 17, 2019  TIME: 11:59 AM

## 2019-07-17 NOTE — ANESTHESIA POSTPROCEDURE EVALUATION
Post-Op Assessment Note    CV Status:  Stable  Pain Score: 0    Pain management: adequate     Mental Status:  Awake and sleepy   Hydration Status:  Euvolemic   PONV Controlled:  Controlled   Airway Patency:  Patent   Post Op Vitals Reviewed: Yes      Staff: CRNA           BP  153/81   Temp   98 1   Pulse  60   Resp   20   SpO2   99

## 2019-07-17 NOTE — DISCHARGE INSTRUCTIONS
Direct Laryngoscopy   WHAT YOU NEED TO KNOW:   During a direct laryngoscopy, your healthcare provider places a scope into your mouth to see directly inside your throat  You may need a direct laryngoscopy to find injuries, growths, tumors, or other problems in your larynx (voice box) or vocal cords  Direct laryngoscopy helps your healthcare provider diagnose your condition and create a treatment plan  You might also have surgery or other treatments during a direct laryngoscopy  DISCHARGE INSTRUCTIONS:   Voice care: Your voice may sound hoarse after a laryngoscopy  Try to rest your voice  Speak softly, but do not whisper  Keep conversations short  Do not shout  Follow up with your healthcare provider or specialist as directed:  Write down your questions so you remember to ask them during your visits  Medicines:   · Keep a current list of your medicines:  Include the amounts, and when, how, and why you take them  Take the list or the pill bottles to follow-up visits  Carry your medicine list with you in case of an emergency  Throw away old medicine lists  Use vitamins, herbs, or food supplements only as directed  · Take your medicine as directed:  Call your healthcare provider if you think your medicine is not working as expected  Tell him about any medicine allergies, and if you want to quit taking or change your medicine  Nutrition:  Most people eat and drink as usual after a laryngoscopy  Ask your healthcare provider if you are not sure  Contact your healthcare provider if:  · You have problems breathing or talking  · You see new injuries to your teeth, lips, or tongue  · Your pain does not go away or gets worse  · You have questions about your procedure, medicine, or care  © 2017 [address] [employer] Information is for End User's use only and may not be sold, redistributed or otherwise used for commercial purposes   All illustrations and images included in CareNotes® are the copyrighted property of [employer]  or Earnest Kim  The above information is an  only  It is not intended as medical advice for individual conditions or treatments  Talk to your doctor, nurse or pharmacist before following any medical regimen to see if it is safe and effective for you  --Advance diet as tolerated, you may want to start with softer food that are not too hot or cold  --Take pain medicine as needed  --You may have some blood tinged sputum after the procedure  Of course, you should call for jose bleeding or other concerns

## 2019-07-18 LAB — SCAN RESULT: NORMAL

## 2019-07-25 PROBLEM — C01 MALIGNANT NEOPLASM OF BASE OF TONGUE (HCC): Status: ACTIVE | Noted: 2019-07-25

## 2019-07-25 NOTE — H&P (VIEW-ONLY)
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  HISTORICALLY:  78-year-old man who presents for follow-up  He was seen by Dr Blank You about a week ago for or pharyngeal dysphasia as well as a tongue mass  He states that as far back as he can remember when he was eating certain foods he felt like things would get stuck on the right side  He would push on his neck to try and dislodge them  He states that certain foods like melon acted this way  He states that he recently sought further attention for this  A CT scan was done that showed some fullness in the right vallecula/base of tongue  He is referred to me for direct laryngoscopy with biopsy  Review of Systems: Patient denies fevers or chills  All other systems reviewed and found to be negative unless otherwise noted in the HPI or MA note  Vitals:    07/25/19 1005   Weight: 79 4 kg (175 lb)   Height: 5' 10" (1 778 m)         General Appearance: No apparent distress    Head: Normocephalic, atraumatic  Face: Symmetric without obvious lesions  Eyes: Conjunctiva clear, extraocular movements are intact  Ears: Pinna normal shape and position  Canals are clear  TMs intact with no middle ear effusion  Nose: External pyramid midline  Mucosa appears healthy  Turbinates are normal in size  Septum relatively midline  Oral cavity/Oropharynx: No mucosal lesions, masses, or pharyngeal asymmetry       Neck: No cervical lymphadenopathy or masses appreciated      Procedure: After adequate anesthesia and decongestion with a mixture of Afrin and lidocaine  The scope was passed into the right nasal cavity  There are no lesions of the nasal floor or the nasopharynx  On examination of the larynx, he did have some mild to moderate postcricoid edema  Both vocal cords move well  The right tongue base/vallecula is otherwise healing well  Data Reviewed:  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  Assessment:  1  Tongue mass     2  Oropharyngeal dysphagia            Plan: He is healing well from his surgery  I do want him to see my colleague Dr Mercedes Arriola for further evaluation for possible robotic surgery  Dr Brian Cantu was able to see the patient today  He explained all the risks and benefits of the robotic transoral resection versus other treatment modalities  The patient wishes to proceed with the robotic surgery after all the risks and benefits were discussed  Khadar Blankenship MD  Otolaryngology - Head & Neck Surgery  [facility]      ** Please Note: Dictation voice to text software may have been used in the creation of this document   **

## 2019-07-31 ENCOUNTER — HOSPITAL ENCOUNTER (OUTPATIENT)
Facility: HOSPITAL | Age: 64
Setting detail: OUTPATIENT SURGERY
Discharge: HOME | End: 2019-07-31
Attending: SPECIALIST | Admitting: SPECIALIST
Payer: COMMERCIAL

## 2019-07-31 ENCOUNTER — ANESTHESIA (OUTPATIENT)
Dept: PERIOP | Facility: HOSPITAL | Age: 64
End: 2019-07-31
Payer: COMMERCIAL

## 2019-07-31 ENCOUNTER — ANESTHESIA EVENT (OUTPATIENT)
Dept: PERIOP | Facility: HOSPITAL | Age: 64
End: 2019-07-31
Payer: COMMERCIAL

## 2019-07-31 VITALS
BODY MASS INDEX: 25.05 KG/M2 | RESPIRATION RATE: 17 BRPM | WEIGHT: 175 LBS | HEIGHT: 70 IN | HEART RATE: 99 BPM | SYSTOLIC BLOOD PRESSURE: 158 MMHG | DIASTOLIC BLOOD PRESSURE: 86 MMHG | OXYGEN SATURATION: 94 % | TEMPERATURE: 98.4 F

## 2019-07-31 DIAGNOSIS — C01 MALIGNANT NEOPLASM OF BASE OF TONGUE (HCC): Primary | ICD-10-CM

## 2019-07-31 PROCEDURE — 42890 LIMITED PHARYNGECTOMY: CPT | Performed by: PHYSICIAN ASSISTANT

## 2019-07-31 PROCEDURE — 42890 LIMITED PHARYNGECTOMY: CPT | Performed by: SPECIALIST

## 2019-07-31 PROCEDURE — 41120 PARTIAL REMOVAL OF TONGUE: CPT | Performed by: PHYSICIAN ASSISTANT

## 2019-07-31 PROCEDURE — 41120 PARTIAL REMOVAL OF TONGUE: CPT | Performed by: SPECIALIST

## 2019-07-31 PROCEDURE — 88307 TISSUE EXAM BY PATHOLOGIST: CPT | Performed by: PATHOLOGY

## 2019-07-31 RX ORDER — MAGNESIUM HYDROXIDE 1200 MG/15ML
LIQUID ORAL AS NEEDED
Status: DISCONTINUED | OUTPATIENT
Start: 2019-07-31 | End: 2019-07-31 | Stop reason: HOSPADM

## 2019-07-31 RX ORDER — HYDROMORPHONE HCL/PF 1 MG/ML
SYRINGE (ML) INJECTION AS NEEDED
Status: DISCONTINUED | OUTPATIENT
Start: 2019-07-31 | End: 2019-07-31 | Stop reason: SURG

## 2019-07-31 RX ORDER — SODIUM CHLORIDE, SODIUM LACTATE, POTASSIUM CHLORIDE, CALCIUM CHLORIDE 600; 310; 30; 20 MG/100ML; MG/100ML; MG/100ML; MG/100ML
INJECTION, SOLUTION INTRAVENOUS CONTINUOUS PRN
Status: DISCONTINUED | OUTPATIENT
Start: 2019-07-31 | End: 2019-07-31 | Stop reason: SURG

## 2019-07-31 RX ORDER — PROPOFOL 10 MG/ML
INJECTION, EMULSION INTRAVENOUS AS NEEDED
Status: DISCONTINUED | OUTPATIENT
Start: 2019-07-31 | End: 2019-07-31 | Stop reason: SURG

## 2019-07-31 RX ORDER — NEOSTIGMINE METHYLSULFATE 1 MG/ML
INJECTION INTRAVENOUS AS NEEDED
Status: DISCONTINUED | OUTPATIENT
Start: 2019-07-31 | End: 2019-07-31 | Stop reason: SURG

## 2019-07-31 RX ORDER — MIDAZOLAM HYDROCHLORIDE 1 MG/ML
INJECTION INTRAMUSCULAR; INTRAVENOUS AS NEEDED
Status: DISCONTINUED | OUTPATIENT
Start: 2019-07-31 | End: 2019-07-31 | Stop reason: SURG

## 2019-07-31 RX ORDER — ONDANSETRON 2 MG/ML
4 INJECTION INTRAMUSCULAR; INTRAVENOUS ONCE AS NEEDED
Status: COMPLETED | OUTPATIENT
Start: 2019-07-31 | End: 2019-07-31

## 2019-07-31 RX ORDER — ACETAMINOPHEN 160 MG/5ML
640 SUSPENSION ORAL EVERY 6 HOURS PRN
Qty: 473 ML | Refills: 0 | Status: SHIPPED | OUTPATIENT
Start: 2019-07-31 | End: 2019-08-10

## 2019-07-31 RX ORDER — HYDROMORPHONE HCL/PF 1 MG/ML
0.5 SYRINGE (ML) INJECTION
Status: DISCONTINUED | OUTPATIENT
Start: 2019-07-31 | End: 2019-07-31 | Stop reason: HOSPADM

## 2019-07-31 RX ORDER — METOCLOPRAMIDE HYDROCHLORIDE 5 MG/ML
10 INJECTION INTRAMUSCULAR; INTRAVENOUS ONCE AS NEEDED
Status: COMPLETED | OUTPATIENT
Start: 2019-07-31 | End: 2019-07-31

## 2019-07-31 RX ORDER — FENTANYL CITRATE 50 UG/ML
INJECTION, SOLUTION INTRAMUSCULAR; INTRAVENOUS AS NEEDED
Status: DISCONTINUED | OUTPATIENT
Start: 2019-07-31 | End: 2019-07-31 | Stop reason: SURG

## 2019-07-31 RX ORDER — ROCURONIUM BROMIDE 10 MG/ML
INJECTION, SOLUTION INTRAVENOUS AS NEEDED
Status: DISCONTINUED | OUTPATIENT
Start: 2019-07-31 | End: 2019-07-31 | Stop reason: SURG

## 2019-07-31 RX ORDER — GLYCOPYRROLATE 0.2 MG/ML
INJECTION INTRAMUSCULAR; INTRAVENOUS AS NEEDED
Status: DISCONTINUED | OUTPATIENT
Start: 2019-07-31 | End: 2019-07-31 | Stop reason: SURG

## 2019-07-31 RX ORDER — LIDOCAINE HYDROCHLORIDE 10 MG/ML
INJECTION, SOLUTION INFILTRATION; PERINEURAL AS NEEDED
Status: DISCONTINUED | OUTPATIENT
Start: 2019-07-31 | End: 2019-07-31 | Stop reason: SURG

## 2019-07-31 RX ORDER — MEPERIDINE HYDROCHLORIDE 25 MG/ML
12.5 INJECTION INTRAMUSCULAR; INTRAVENOUS; SUBCUTANEOUS ONCE
Status: COMPLETED | OUTPATIENT
Start: 2019-07-31 | End: 2019-07-31

## 2019-07-31 RX ORDER — ACETAMINOPHEN 160 MG/5ML
640 SUSPENSION, ORAL (FINAL DOSE FORM) ORAL EVERY 6 HOURS PRN
Status: DISCONTINUED | OUTPATIENT
Start: 2019-07-31 | End: 2019-08-08 | Stop reason: HOSPADM

## 2019-07-31 RX ORDER — ONDANSETRON 2 MG/ML
INJECTION INTRAMUSCULAR; INTRAVENOUS AS NEEDED
Status: DISCONTINUED | OUTPATIENT
Start: 2019-07-31 | End: 2019-07-31 | Stop reason: SURG

## 2019-07-31 RX ORDER — HYDRALAZINE HYDROCHLORIDE 20 MG/ML
INJECTION INTRAMUSCULAR; INTRAVENOUS AS NEEDED
Status: DISCONTINUED | OUTPATIENT
Start: 2019-07-31 | End: 2019-07-31 | Stop reason: SURG

## 2019-07-31 RX ORDER — FENTANYL CITRATE/PF 50 MCG/ML
50 SYRINGE (ML) INJECTION
Status: DISCONTINUED | OUTPATIENT
Start: 2019-07-31 | End: 2019-07-31 | Stop reason: HOSPADM

## 2019-07-31 RX ORDER — OXYCODONE HCL 5 MG/5 ML
10 SOLUTION, ORAL ORAL EVERY 6 HOURS PRN
Qty: 200 ML | Refills: 0 | Status: SHIPPED | OUTPATIENT
Start: 2019-07-31 | End: 2019-08-10

## 2019-07-31 RX ORDER — DEXAMETHASONE SODIUM PHOSPHATE 10 MG/ML
INJECTION, SOLUTION INTRAMUSCULAR; INTRAVENOUS AS NEEDED
Status: DISCONTINUED | OUTPATIENT
Start: 2019-07-31 | End: 2019-07-31 | Stop reason: SURG

## 2019-07-31 RX ORDER — OXYCODONE HCL 5 MG/5 ML
10 SOLUTION, ORAL ORAL EVERY 4 HOURS PRN
Status: DISCONTINUED | OUTPATIENT
Start: 2019-07-31 | End: 2019-08-08 | Stop reason: HOSPADM

## 2019-07-31 RX ORDER — SODIUM CHLORIDE, SODIUM LACTATE, POTASSIUM CHLORIDE, CALCIUM CHLORIDE 600; 310; 30; 20 MG/100ML; MG/100ML; MG/100ML; MG/100ML
50 INJECTION, SOLUTION INTRAVENOUS CONTINUOUS
Status: DISCONTINUED | OUTPATIENT
Start: 2019-07-31 | End: 2019-08-08 | Stop reason: HOSPADM

## 2019-07-31 RX ADMIN — SODIUM CHLORIDE, SODIUM LACTATE, POTASSIUM CHLORIDE, AND CALCIUM CHLORIDE: .6; .31; .03; .02 INJECTION, SOLUTION INTRAVENOUS at 14:27

## 2019-07-31 RX ADMIN — FENTANYL CITRATE 50 MCG: 50 INJECTION INTRAMUSCULAR; INTRAVENOUS at 14:33

## 2019-07-31 RX ADMIN — ONDANSETRON 4 MG: 2 INJECTION INTRAMUSCULAR; INTRAVENOUS at 15:53

## 2019-07-31 RX ADMIN — OXYCODONE HYDROCHLORIDE 10 MG: 5 SOLUTION ORAL at 16:48

## 2019-07-31 RX ADMIN — MEPERIDINE HYDROCHLORIDE 12.5 MG: 25 INJECTION INTRAMUSCULAR; INTRAVENOUS; SUBCUTANEOUS at 16:50

## 2019-07-31 RX ADMIN — ONDANSETRON 4 MG: 2 INJECTION INTRAMUSCULAR; INTRAVENOUS at 14:55

## 2019-07-31 RX ADMIN — FENTANYL CITRATE 50 MCG: 50 INJECTION INTRAMUSCULAR; INTRAVENOUS at 14:48

## 2019-07-31 RX ADMIN — ROCURONIUM BROMIDE 10 MG: 10 INJECTION INTRAVENOUS at 15:09

## 2019-07-31 RX ADMIN — HYDROMORPHONE HYDROCHLORIDE 0.5 MG: 1 INJECTION, SOLUTION INTRAMUSCULAR; INTRAVENOUS; SUBCUTANEOUS at 14:48

## 2019-07-31 RX ADMIN — ACETAMINOPHEN 640 MG: 160 SUSPENSION ORAL at 17:26

## 2019-07-31 RX ADMIN — GLYCOPYRROLATE 0.4 MG: 0.2 INJECTION, SOLUTION INTRAMUSCULAR; INTRAVENOUS at 15:32

## 2019-07-31 RX ADMIN — PROPOFOL 50 MG: 10 INJECTION, EMULSION INTRAVENOUS at 14:35

## 2019-07-31 RX ADMIN — NEOSTIGMINE METHYLSULFATE 3 MG: 1 INJECTION INTRAVENOUS at 15:32

## 2019-07-31 RX ADMIN — HYDRALAZINE HYDROCHLORIDE 5 MG: 20 INJECTION, SOLUTION INTRAMUSCULAR; INTRAVENOUS at 15:00

## 2019-07-31 RX ADMIN — METOCLOPRAMIDE 10 MG: 5 INJECTION, SOLUTION INTRAMUSCULAR; INTRAVENOUS at 16:03

## 2019-07-31 RX ADMIN — HYDRALAZINE HYDROCHLORIDE 5 MG: 20 INJECTION, SOLUTION INTRAMUSCULAR; INTRAVENOUS at 15:09

## 2019-07-31 RX ADMIN — FENTANYL CITRATE 50 MCG: 50 INJECTION INTRAMUSCULAR; INTRAVENOUS at 16:19

## 2019-07-31 RX ADMIN — ROCURONIUM BROMIDE 30 MG: 10 INJECTION INTRAVENOUS at 14:35

## 2019-07-31 RX ADMIN — DEXAMETHASONE SODIUM PHOSPHATE 10 MG: 10 INJECTION, SOLUTION INTRAMUSCULAR; INTRAVENOUS at 14:37

## 2019-07-31 RX ADMIN — PROPOFOL 150 MG: 10 INJECTION, EMULSION INTRAVENOUS at 14:34

## 2019-07-31 RX ADMIN — MIDAZOLAM HYDROCHLORIDE 2 MG: 1 INJECTION, SOLUTION INTRAMUSCULAR; INTRAVENOUS at 14:27

## 2019-07-31 RX ADMIN — LIDOCAINE HYDROCHLORIDE ANHYDROUS 50 MG: 10 INJECTION, SOLUTION INFILTRATION at 14:33

## 2019-07-31 NOTE — DISCHARGE INSTRUCTIONS
Partial Glossectomy  WHAT YOU SHOULD KNOW:   You underwent partial glossectomy, a surgery where your surgeon removed a portion of your tongue to treat cancer, or a change worrisome for cancer  AFTER YOU LEAVE:   Medications    Use alternating doses of Acetaminophen (Tylenol) and Ibuprofen (Motrin) every 3 hours  Example:  9:00 am 12:00 pm 3:00 pm 6:00 pm 9:00 pm 12:00 am 3:00 am 6:00 am 9:00 am   Tylenol Motrin Tylenol Motrin Tylenol Motrin Tylenol Motrin Tylenol       Acetaminophen (Tylenol) 160mg/5ml  20 ml (640mg) by mouth every 6 hours    Alternating with:    Ibuprofen (Motrin) 100mg/5 ml  20 ml (400 mg) by mouth every 6 hours      Use ONLY as needed for breakthrough pain:    Oxycodone liquid  10 mL (of 1mg/1mL) by mouth every 6 hours as needed  (0 1mg/kg/dose)        NOTE: Do not exceed more than 2 grams of Acetaminophen in 24 hours if under 15years old, or 3-4 grams of Acetaminophen in 24 hours if over 15years old  Do not take more than 1 medication containing acetaminophen (Tylenol) at the same time  · Take your medicine as directed  Call your healthcare provider if you think your medicine is not helping or if you have side effects  Tell him if you are allergic to any medicine  Keep a list of the medicines, vitamins, and herbs you take  Include the amounts, and when and why you take them  Bring the list or the pill bottles to follow-up visits  Carry your medicine list with you in case of an emergency  Follow up with your healthcare provider as directed:  Write down your questions so you remember to ask them during your visits  What to expect after surgery:   · Pain and swelling: Your tongue may be sore up to 2 weeks after surgery  · Mild fever: You may have a low fever while your tonsil areas heal  Drink liquids often to help reduce it  Mouth care: It is normal to have mouth pain and bad breath for a few days after surgery   Care for your mouth as follows:  · Brush your teeth gently  Avoid harsh gargling or tooth brushing  This can cause bleeding  · Gently rinse your mouth as directed to remove blood and mucus  Food and drink:  You will need to follow a liquid diet or soft food diet for several days after surgery  · Drink plenty of liquids: This will help prevent fluid loss, keep your temperature down, decrease pain, and speed healing  Liquids and foods that are cool or cold, such as water, apple or grape juice, and popsicles, will help decrease pain and swelling  Do not drink orange juice or grapefruit juice  They may bother your throat  · Start with soft foods: Once you can drink liquids and your stomach is not upset, you may then have soft, plain foods  Begin with foods like applesauce, oatmeal, soft-boiled eggs, mashed potatoes, gelatin, and ice cream  Once you can eat soft food easily, you may slowly begin to eat solid foods  Avoid anything hot, spicy, or sharp, such as chips  These foods can hurt your tonsil areas  · Avoid hot food and drinks:  Avoid coffee, tea, soup, and other hot or warm foods and drinks  They can increase your risk for bleeding  Avoid milk and dairy foods if you have problems with thick mucus in your throat  This can cause you to cough, which could hurt your surgery areas  Self-care:   · Use ice:  Ice helps decrease swelling and pain  Use an ice pack or put crushed ice in a plastic bag  Cover the ice pack with a towel and place it on your throat for 15 to 20 minutes every hour for 2 days  · Use a cool humidifier: This will help moisten the air and soothe your throat  · Get plenty of rest:  Limit your activity for 7 to 10 days after surgery  It may take 2 weeks for you to recover  Ask when you can drive or return to work  · Do not smoke or go to smoky areas:  Until you heal, smoke may cause you to cough or your throat to start bleeding heavily  · Stay away from people who have colds, sore throats, or the flu:   You may get sick more easily after surgery  Contact your surgeon or primary healthcare provider if:   · You have a fever  · You have throat pain or an earache that is worse than expected  · You have pus or blood draining down your throat  · You have itchy skin or a rash  · You have any questions or concerns about your condition or care  Seek care immediately or call 911 if:   · You have bright red bleeding from your nose or mouth, or bleeding that is worse than what you were told to expect  · You feel weak, dizzy, or like you might faint when you sit up or stand  · You have severe throat pain with drooling or voice changes  · Your neck is stiff and painful  · You have swelling or pain in your face or neck  · You have back or chest pain  · You have trouble breathing or swallowing  Call Dr Norman Kelly with any questions or concerns: office ; mobile  (urgent issues)

## 2019-07-31 NOTE — ANESTHESIA PREPROCEDURE EVALUATION
Review of Systems/Medical History  Patient summary reviewed  Chart reviewed  No history of anesthetic complications     Cardiovascular  EKG reviewed, Exercise tolerance (METS): >4,  Hypertension ,    Pulmonary  Negative pulmonary ROS        GI/Hepatic    GERD ,        Negative  ROS        Endo/Other  Negative endo/other ROS      GYN       Hematology  Negative hematology ROS      Musculoskeletal  Negative musculoskeletal ROS        Neurology  Negative neurology ROS      Psychology   Negative psychology ROS              Physical Exam    Airway    Mallampati score: II  TM Distance: >3 FB  Neck ROM: full     Dental   No notable dental hx     Cardiovascular  Cardiovascular exam normal    Pulmonary  Pulmonary exam normal     Other Findings      Lab Results   Component Value Date    WBC 6 30 10/23/2016    HGB 13 2 10/23/2016     10/23/2016     Lab Results   Component Value Date    SODIUM 137 10/23/2016    K 3 1 (L) 10/23/2016    BUN 14 10/23/2016    CREATININE 0 99 10/23/2016    EGFR >60 0 10/23/2016     Anesthesia Plan  ASA Score- 2     Anesthesia Type- general with ASA Monitors  Additional Monitors:   Airway Plan: ETT  Plan Factors-    Induction- intravenous  Postoperative Plan-     Informed Consent- Anesthetic plan and risks discussed with patient  I personally reviewed this patient with the CRNA  Discussed and agreed on the Anesthesia Plan with the CRNA  Lala Clement

## 2019-07-31 NOTE — INTERIM OP NOTE
ROBOTICALLY ASSISTED PARTIAL GLOSSECTOMY, PARTIAL PHARYNGECTOMY  Postoperative Note  PATIENT NAME: Allison Shirley Page  : 1955  MRN: [id number]  [facility] OR ROOM 04    Surgery Date: 2019    Preop Diagnosis:  Malignant neoplasm of base of tongue ([facility] [address] ) [C01]    Post-Op Diagnosis Codes:     * Malignant neoplasm of base of tongue ([facility] [address] ) [C01]    Procedure(s):  ROBOTICALLY ASSISTED PARTIAL GLOSSECTOMY    Surgeon(s) and Role:     * Rissa Mark MD - Primary     * Manas Cantor PA-C - Assisting (No qualified surgical resident available for assistance)    Specimens:  ID Type Source Tests Collected by Time Destination   1 : Right tongue base Tissue Tongue TISSUE EXAM Rissa Mark MD 2019 1527        Estimated Blood Loss:   Minimal    Anesthesia Type:   General     Findings:   No defined tumor palpable or visible       Complications:   None    SIGNATURE: Rissa Mark MD   DATE: 2019   TIME: 3:52 PM

## 2019-07-31 NOTE — INTERVAL H&P NOTE
H&P reviewed  After examining the patient I find no changes in the patients condition since the H&P had been written  We will proceed with robotically assisted resection of right tongue base and partial pharyngectomy  On examination today his heart has a regular rate and rhythm, and his lungs are clear to auscultation

## 2019-07-31 NOTE — ANESTHESIA POSTPROCEDURE EVALUATION
Post-Op Assessment Note    CV Status:  Stable  Pain Score: 0    Pain management: adequate     Mental Status:  Alert and awake   Hydration Status:  Euvolemic   PONV Controlled:  Controlled   Airway Patency:  Patent   Post Op Vitals Reviewed: Yes      Staff: CRNA, Anesthesiologist           BP   155/89   Temp   96 9   Pulse  62   Resp   16   SpO2   97

## 2019-08-03 ENCOUNTER — OFFICE VISIT (OUTPATIENT)
Dept: URGENT CARE | Facility: CLINIC | Age: 64
End: 2019-08-03
Payer: COMMERCIAL

## 2019-08-03 VITALS
WEIGHT: 176 LBS | HEART RATE: 83 BPM | SYSTOLIC BLOOD PRESSURE: 138 MMHG | HEIGHT: 70 IN | TEMPERATURE: 98.6 F | RESPIRATION RATE: 18 BRPM | DIASTOLIC BLOOD PRESSURE: 82 MMHG | OXYGEN SATURATION: 96 % | BODY MASS INDEX: 25.2 KG/M2

## 2019-08-03 DIAGNOSIS — R58 ECCHYMOSIS OF FOREARM: Primary | ICD-10-CM

## 2019-08-03 PROCEDURE — S9083 URGENT CARE CENTER GLOBAL: HCPCS | Performed by: PHYSICIAN ASSISTANT

## 2019-08-03 PROCEDURE — G0382 LEV 3 HOSP TYPE B ED VISIT: HCPCS | Performed by: PHYSICIAN ASSISTANT

## 2019-08-03 NOTE — PROGRESS NOTES
NAME: Miriam Hdz is a 62 y o  male  : 1955    MRN: [id number]      Assessment and Plan   Ecchymosis of forearm [R58]  1  Ecchymosis of forearm      No discernible tick on exam   No erythema migrans  Considering exam Low suspicion thrombosis  Advised patient to use cool compress alternate with warm compress 2-3x/daily, OTC ibuprofen as needed for pain  Patient plans to follow up with PCP on Monday  Discussed plans and visit summary with patient  Patient verbalized understanding, all questions answered and patient in agreement  Educated patient that if signs and symptoms get worse go to ER  Madelaine Damon was seen today for insect bite  Diagnoses and all orders for this visit:    Ecchymosis of forearm        Patient Instructions   There are no Patient Instructions on file for this visit  Proceed to ER if symptoms worsen  Chief Complaint     Chief Complaint   Patient presents with   [address]     noticed tick in left forearm this morning         History of Present Illness     60yo Pt presents for tick bite a left forearm x 1 day  Patient reports waking up this morning with bruising and seeing tick imbedded on forearm  Patient presenting for tick removal today  Patient reports history of Lyme disease, states he had to be treated with doxycycline for 6 months  Pt thinks the tick may have been a deer tick  Patient admits to bruising of left forearm  Patient admits he did have Pharyngectomy on , states his IVs were on left hand  Patient denies any swelling, pain or bruising in hand  Denies rash or skin lesions  Denies redness, swelling, open wound, discharge  Denies itching, pain  Denies fever or fatigue  Denies headache  Denies joint pain or muscle pain  Review of Systems   Review of Systems   Constitutional: Negative for chills, fatigue and fever  Respiratory: Negative  Cardiovascular: Negative  Musculoskeletal: Negative      Skin:        Bruising on left forearm  Neurological: Negative for headaches  Current Medications     No current facility-administered medications for this visit  Current Outpatient Medications:     ibuprofen (MOTRIN) 100 mg/5 mL suspension, Take 20 mL (400 mg total) by mouth every 6 (six) hours as needed for mild pain or moderate pain, Disp: 473 mL, Rfl: 0    oxyCODONE (ROXICODONE) 5 mg/5 mL solution, Take 10 mL (10 mg total) by mouth every 6 (six) hours as needed for severe pain ((breakthrough pain)) for up to 10 daysMax Daily Amount: 40 mg, Disp: 200 mL, Rfl: 0    acetaminophen (TYLENOL) 160 mg/5 mL liquid, Take 20 mL (640 mg total) by mouth every 6 (six) hours as needed for mild pain for up to 10 days, Disp: 473 mL, Rfl: 0    Facility-Administered Medications Ordered in Other Visits:     acetaminophen (TYLENOL) oral suspension 640 mg, 640 mg, Oral, Q6H PRN, Mynor Grant MD, 640 mg at 07/31/19 1726    lactated ringers infusion, 50 mL/hr, Intravenous, Continuous, Sharion Sleek, CRNA    oxyCODONE (ROXICODONE) oral solution 10 mg, 10 mg, Oral, Q4H PRN, Mynor Grant MD, 10 mg at 07/31/19 1648    Current Allergies     Allergies as of 08/03/2019 - Reviewed 08/03/2019   Allergen Reaction Noted    Bee venom  10/23/2016    Shellfish allergy Edema 01/06/2016    Shellfish-derived products  03/18/2016              Past Medical History:   Diagnosis Date    Facial basal cell cancer     GERD (gastroesophageal reflux disease)     diet controlled    Hypertension     Vitamin D deficiency        Past Surgical History:   Procedure Laterality Date    COLONOSCOPY N/A 3/22/2016    Procedure: COLONOSCOPY;  Surgeon: Kelechi Trejo MD;  Location: [facility];   Service:     MO LARYNGOSCOPY,DIRCT,OP,BIOPSY N/A 7/17/2019    Procedure: MICROLARYNGOSCOPY DIRECT WITH BIOPSY OF VALLECULAR MASS;  Surgeon: Chacha Swain MD;  Location: [facility];  Service: ENT    RHINOPLASTY      TRANSORAL ROBOTIC SURGERY (TORS) N/A 7/31/2019 Procedure: ROBOTICALLY ASSISTED PARTIAL GLOSSECTOMY, PARTIAL PHARYNGECTOMY;  Surgeon: Nicole Trivedi MD;  Location: [facility];  Service: ENT    WISDOM TOOTH EXTRACTION         No family history on file  Medications have been verified  The following portions of the patient's history were reviewed and updated as appropriate: allergies, current medications, past family history, past medical history, past social history, past surgical history and problem list     Objective   /82   Pulse 83   Temp 98 6 °F (37 °C) (Tympanic)   Resp 18   Ht 5' 10" (1 778 m)   Wt 79 8 kg (176 lb)   SpO2 96%   BMI 25 25 kg/m²      Physical Exam     Physical Exam   Constitutional: He is oriented to person, place, and time  He appears well-developed  No distress  Cardiovascular: Normal rate, regular rhythm, normal heart sounds and intact distal pulses  Exam reveals no gallop and no friction rub  No murmur heard  Pulmonary/Chest: Effort normal and breath sounds normal  No stridor  No respiratory distress  He has no wheezes  He has no rales  He exhibits no tenderness  Musculoskeletal:        Arms:  Neurological: He is alert and oriented to person, place, and time  No cranial nerve deficit  Skin: Skin is warm  Nursing note and vitals reviewed        Karyna Woods PA-C

## 2019-08-12 PROBLEM — C77.0 SECONDARY MALIGNANT NEOPLASM OF CERVICAL LYMPH NODE (HCC): Status: ACTIVE | Noted: 2019-08-12

## 2019-08-19 NOTE — H&P (VIEW-ONLY)
Assessment/Plan:    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)  Discussed recent surgery of partial glossectomy and partial pharyngectomy 07/31/2019  Overall doing well post procedure  Reviewed surgery indicating no evidence remaining cancer tissue  DL with Biopsy completed by Dr Chelsey Ma initially indicating primary site cancer tongue base/vallecula  Reviewed prior CT scan results and images with pt indicating worrisome processes within the tongue base and cervical lymph nodes  Discussed the nature of cancer of head and neck and that site within tongue base  Reviewed options for treatment including acceptance, palliative care, CT scan, PET scan, and surgical intervention  Discussed actual surgical procedure as well as risks of infection, anesthesia, bleeding, and treatment failure  Reviewed post operative expectations  Discussed surgical intervention including elective MRND on right (level I, II, and III)  After discussion agreed to proceed with surgery  Follow up with surgical scheduling for MRND on right         Diagnoses and all orders for this visit:    Malignant neoplasm of base of tongue ([facility] [address] )    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)          Subjective:      Patient ID: Shante Hdz is a 62 y o  male  Presents today for POV due to robotically assisted partial glossectomy and partial pharyngectomy  07/31/2019  Since surgery feels has difficulty swallowing  Pills stuck at times  Voice slowly improving  Tongue with numbness but notes improved speech  No current need for narcotic level pain relief, tylenol prn pain  Occasional burning in throat  At around day 5 or 6 had minimal amount bleeding that quickly ceased  Initial symptom presentation included tickling in throat not neck mass or throat pain         The following portions of the patient's history were reviewed and updated as appropriate: allergies, current medications, past family history, past medical history, past social history, past surgical history and problem list     Review of Systems   Constitutional: Negative  HENT: Positive for sore throat  Negative for congestion, ear discharge, ear pain, hearing loss, nosebleeds, postnasal drip, rhinorrhea, sinus pressure, sinus pain, tinnitus and voice change  Eyes: Negative  Respiratory: Negative for chest tightness and shortness of breath  Cardiovascular: Negative  Gastrointestinal: Negative  Endocrine: Negative  Musculoskeletal: Positive for neck stiffness  Skin: Negative for color change  Neurological: Negative for dizziness, numbness and headaches  Psychiatric/Behavioral: Negative  Objective:      Ht 5' 10" (1 778 m)   Wt 79 8 kg (176 lb)   BMI 25 25 kg/m²          Physical Exam   Constitutional: He is oriented to person, place, and time  He appears well-developed and well-nourished  He is cooperative  HENT:   Head: Normocephalic  Right Ear: Hearing, tympanic membrane, external ear and ear canal normal  No drainage or tenderness  Tympanic membrane is not perforated and not erythematous  No decreased hearing is noted  Left Ear: Hearing, tympanic membrane, external ear and ear canal normal  No drainage or tenderness  Tympanic membrane is not perforated and not erythematous  No decreased hearing is noted  Nose: Nose normal  No sinus tenderness, nasal deformity or septal deviation  Mouth/Throat: Uvula is midline, oropharynx is clear and moist and mucous membranes are normal  Mucous membranes are not pale and not dry  No oral lesions  Normal dentition  No oropharyngeal exudate  Minimal cerumen bilaterally removed with suction, no procedure     Neck: Normal range of motion and full passive range of motion without pain  Neck supple  No tracheal deviation present  Cardiovascular: Normal rate  Pulmonary/Chest: Effort normal  No accessory muscle usage  No respiratory distress     Musculoskeletal:        Right shoulder: He exhibits normal range of motion  Lymphadenopathy:     He has no cervical adenopathy  Neurological: He is alert and oriented to person, place, and time  No cranial nerve deficit or sensory deficit  Skin: Skin is warm, dry and intact  Psychiatric: He has a normal mood and affect         Scribe Attestation    I,:   MIGUEL ANGEL Wall am acting as a scribe while in the presence of the attending physician :        I,:   Nicole Trivedi MD personally performed the services described in this documentation    as scribed in my presence :

## 2019-08-19 NOTE — OP NOTE
OPERATIVE REPORT  PATIENT NAME: Hannah Barnes Page    :  1955  MRN: [id number]  Pt Location: [facility] OR ROOM 04    SURGERY DATE: 2019    Surgeon(s) and Role:     * Klaus Martin MD - Primary     * Aftab Bhatia PA-C - Assisting    Preop Diagnosis:  Malignant neoplasm of base of tongue ([address] ) [C01]    Post-Op Diagnosis Codes:     * Malignant neoplasm of base of tongue ([address] ) [C01]    Procedure(s):  ROBOTICALLY ASSISTED RIGHT PARTIAL GLOSSECTOMY   ROBOTICALLY ASSISTED RIGHT PARTIAL PHARYNGECTOMY     Specimen(s):  ID Type Source Tests Collected by Time Destination   1 : Right tongue base Tissue Tongue TISSUE EXAM Klaus Martin MD 2019  3:27 PM        Estimated Blood Loss:   Minimal    Drains:  None    Anesthesia Type:   General    Operative Indications:  62year old man with 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, status post prior right neck dissection  Robotically assisted right partial glossectomy and pharyngectomy indicated for possible definitive treatment of the primary site  Operative Findings:  No defined tumor palpable or visible  Complications:   None    Procedure and Technique:  After positive identification, the patient was transferred onto the operating room table in the supine position  Appropriate monitoring devices were put in place, anesthesia was induced, and the patient was intubated without difficulty  The operating room table was turned 90 degrees, and the patient was draped in the usual clean fashion  Before proceeding with surgery, the time-out procedure was completed      The endotracheal tube was then stitched to the left retromolar trigone to help keep the tube clear of the surgical field on the right  The patient's oral cavity and oropharynx were then digitally palpated  No residual tumor was palpable  The [employer] pharyngeal retractor was introduced and positioned to allow exposure of the right tongue base   The arms of the [facility] robot were then positioned, and good visualization and access was accomplished  Thereafter the robotic console was entered, and the surgical assistant remained at the patient's head to help with suctioning and retraction       Using the [employer] bipolar dissector and electrocautery in the two robotic arms, surgery began by making a tranverse incision through mucosa  Medially a vertical incision was made through mucosa and underlying soft tissue to establish the medial margin, and help establish appropriate depth for resection  The tongue base on the right side was then gently reflected posteriorly, and dissection continued along the initial transverse incision through the tongue base down to underlying musculature  Attention was directed laterally, and cuts were made up onto the lateral pharyngeal wall, to include the inferior pole of the tonsillar fossa to accomplish negative margins, given the palpable and visible lateral extent of the tumor  The tongue base was again reflected posteriorly, and dissection continued inferiorly into the vallecula  Mucosa elevated off the anterior surface of the epiglottis was then divided, allowing removal of the partial glossectomy and pharyngectomy specimen containing the tumor  Hemostatis was accomplished using electrocautery, and the robot was backed away, and the [employer] retractor was let down while the specimen was taken fresh to pathology for orientation  As no visible or palpable tumor was evident, frozen section evaluation was not obtained to confirm margin status       The [employer] retractor was again opened, and the surgical site was re-examined  Good hemostasis was evident, and the pharynx was irrigated with a copious amount of water, which was suctioned free  The retractor was let down and removed, and the suture securing the endotracheal tube was removed  The operating room table was returned to it's initial position, and anesthesia was reversed   The patient was awakened, extubated, and taken to the recovery room in stable condition  All counts were correct at the end of the case, and no complications were encountered  I was present for the entire procedure, a qualified resident physician was not available and a physician assistant was required during the procedure for retraction, tissue handling and suctioning while the surgeon was in the robotic console       Patient Disposition:  PACU  and extubated and stable    SIGNATURE: Jay Dhillon MD  DATE: August 19, 2019  TIME: 8:44 AM

## 2019-09-03 ENCOUNTER — ANESTHESIA EVENT (OUTPATIENT)
Dept: PERIOP | Facility: HOSPITAL | Age: 64
DRG: 804 | End: 2019-09-03
Payer: COMMERCIAL

## 2019-09-04 ENCOUNTER — ANESTHESIA (OUTPATIENT)
Dept: PERIOP | Facility: HOSPITAL | Age: 64
DRG: 804 | End: 2019-09-04
Payer: COMMERCIAL

## 2019-09-04 ENCOUNTER — HOSPITAL ENCOUNTER (INPATIENT)
Facility: HOSPITAL | Age: 64
LOS: 1 days | Discharge: HOME | DRG: 804 | End: 2019-09-05
Attending: SPECIALIST | Admitting: SPECIALIST
Payer: COMMERCIAL

## 2019-09-04 DIAGNOSIS — C77.0 SECONDARY MALIGNANT NEOPLASM OF CERVICAL LYMPH NODE (HCC): ICD-10-CM

## 2019-09-04 DIAGNOSIS — C01 MALIGNANT NEOPLASM OF BASE OF TONGUE (HCC): Primary | ICD-10-CM

## 2019-09-04 LAB
ANION GAP SERPL CALCULATED.3IONS-SCNC: 8 MMOL/L (ref 4–13)
BUN SERPL-MCNC: 9 MG/DL (ref 5–25)
CALCIUM SERPL-MCNC: 8.2 MG/DL (ref 8.3–10.1)
CHLORIDE SERPL-SCNC: 103 MMOL/L (ref 100–108)
CO2 SERPL-SCNC: 27 MMOL/L (ref 21–32)
CREAT SERPL-MCNC: 0.73 MG/DL (ref 0.6–1.3)
GFR SERPL CREATININE-BSD FRML MDRD: 99 ML/MIN/1.73SQ M
GLUCOSE SERPL-MCNC: 138 MG/DL (ref 65–140)
PLATELET # BLD AUTO: 197 THOUSANDS/UL (ref 149–390)
PMV BLD AUTO: 9.6 FL (ref 8.9–12.7)
POTASSIUM SERPL-SCNC: 3.6 MMOL/L (ref 3.5–5.3)
SODIUM SERPL-SCNC: 138 MMOL/L (ref 136–145)

## 2019-09-04 PROCEDURE — 80048 BASIC METABOLIC PNL TOTAL CA: CPT | Performed by: SPECIALIST

## 2019-09-04 PROCEDURE — 88309 TISSUE EXAM BY PATHOLOGIST: CPT | Performed by: PATHOLOGY

## 2019-09-04 PROCEDURE — 88341 IMHCHEM/IMCYTCHM EA ADD ANTB: CPT | Performed by: PATHOLOGY

## 2019-09-04 PROCEDURE — 88342 IMHCHEM/IMCYTCHM 1ST ANTB: CPT | Performed by: PATHOLOGY

## 2019-09-04 PROCEDURE — 38724 REMOVAL OF LYMPH NODES NECK: CPT | Performed by: SPECIALIST

## 2019-09-04 PROCEDURE — 07T10ZZ RESECTION OF RIGHT NECK LYMPHATIC, OPEN APPROACH: ICD-10-PCS | Performed by: SPECIALIST

## 2019-09-04 PROCEDURE — 85049 AUTOMATED PLATELET COUNT: CPT | Performed by: SPECIALIST

## 2019-09-04 RX ORDER — FENTANYL CITRATE/PF 50 MCG/ML
25 SYRINGE (ML) INJECTION
Status: DISCONTINUED | OUTPATIENT
Start: 2019-09-04 | End: 2019-09-05 | Stop reason: HOSPADM

## 2019-09-04 RX ORDER — LISINOPRIL 10 MG/1
10 TABLET ORAL DAILY
Status: DISCONTINUED | OUTPATIENT
Start: 2019-09-04 | End: 2019-09-05 | Stop reason: HOSPADM

## 2019-09-04 RX ORDER — SODIUM CHLORIDE, SODIUM LACTATE, POTASSIUM CHLORIDE, CALCIUM CHLORIDE 600; 310; 30; 20 MG/100ML; MG/100ML; MG/100ML; MG/100ML
125 INJECTION, SOLUTION INTRAVENOUS CONTINUOUS
Status: DISCONTINUED | OUTPATIENT
Start: 2019-09-04 | End: 2019-09-04

## 2019-09-04 RX ORDER — SODIUM CHLORIDE, SODIUM LACTATE, POTASSIUM CHLORIDE, CALCIUM CHLORIDE 600; 310; 30; 20 MG/100ML; MG/100ML; MG/100ML; MG/100ML
50 INJECTION, SOLUTION INTRAVENOUS CONTINUOUS
Status: DISCONTINUED | OUTPATIENT
Start: 2019-09-04 | End: 2019-09-05 | Stop reason: HOSPADM

## 2019-09-04 RX ORDER — SUCCINYLCHOLINE/SOD CL,ISO/PF 100 MG/5ML
SYRINGE (ML) INTRAVENOUS AS NEEDED
Status: DISCONTINUED | OUTPATIENT
Start: 2019-09-04 | End: 2019-09-04 | Stop reason: SURG

## 2019-09-04 RX ORDER — GINSENG 100 MG
1 CAPSULE ORAL 2 TIMES DAILY
Status: DISCONTINUED | OUTPATIENT
Start: 2019-09-04 | End: 2019-09-05 | Stop reason: HOSPADM

## 2019-09-04 RX ORDER — LIDOCAINE HYDROCHLORIDE AND EPINEPHRINE 10; 10 MG/ML; UG/ML
INJECTION, SOLUTION INFILTRATION; PERINEURAL AS NEEDED
Status: DISCONTINUED | OUTPATIENT
Start: 2019-09-04 | End: 2019-09-04 | Stop reason: HOSPADM

## 2019-09-04 RX ORDER — ACETAMINOPHEN 325 MG/1
TABLET ORAL
Refills: 0
Start: 2019-09-04 | End: 2019-11-05

## 2019-09-04 RX ORDER — AMLODIPINE BESYLATE 5 MG/1
5 TABLET ORAL DAILY
Status: DISCONTINUED | OUTPATIENT
Start: 2019-09-04 | End: 2019-09-05 | Stop reason: HOSPADM

## 2019-09-04 RX ORDER — PROPOFOL 10 MG/ML
INJECTION, EMULSION INTRAVENOUS AS NEEDED
Status: DISCONTINUED | OUTPATIENT
Start: 2019-09-04 | End: 2019-09-04 | Stop reason: SURG

## 2019-09-04 RX ORDER — MIDAZOLAM HYDROCHLORIDE 1 MG/ML
INJECTION INTRAMUSCULAR; INTRAVENOUS AS NEEDED
Status: DISCONTINUED | OUTPATIENT
Start: 2019-09-04 | End: 2019-09-04 | Stop reason: SURG

## 2019-09-04 RX ORDER — KETAMINE HYDROCHLORIDE 50 MG/ML
INJECTION, SOLUTION, CONCENTRATE INTRAMUSCULAR; INTRAVENOUS AS NEEDED
Status: DISCONTINUED | OUTPATIENT
Start: 2019-09-04 | End: 2019-09-04 | Stop reason: SURG

## 2019-09-04 RX ORDER — LISINOPRIL 10 MG/1
10 TABLET ORAL DAILY
Status: DISCONTINUED | OUTPATIENT
Start: 2019-09-04 | End: 2019-09-04

## 2019-09-04 RX ORDER — OXYCODONE HYDROCHLORIDE AND ACETAMINOPHEN 5; 325 MG/1; MG/1
2 TABLET ORAL EVERY 6 HOURS PRN
Status: DISCONTINUED | OUTPATIENT
Start: 2019-09-04 | End: 2019-09-05 | Stop reason: HOSPADM

## 2019-09-04 RX ORDER — AMLODIPINE BESYLATE 5 MG/1
5 TABLET ORAL DAILY
Status: DISCONTINUED | OUTPATIENT
Start: 2019-09-04 | End: 2019-09-04

## 2019-09-04 RX ORDER — ONDANSETRON 2 MG/ML
4 INJECTION INTRAMUSCULAR; INTRAVENOUS EVERY 4 HOURS PRN
Status: DISCONTINUED | OUTPATIENT
Start: 2019-09-04 | End: 2019-09-04

## 2019-09-04 RX ORDER — SODIUM CHLORIDE, SODIUM LACTATE, POTASSIUM CHLORIDE, CALCIUM CHLORIDE 600; 310; 30; 20 MG/100ML; MG/100ML; MG/100ML; MG/100ML
75 INJECTION, SOLUTION INTRAVENOUS CONTINUOUS
Status: DISCONTINUED | OUTPATIENT
Start: 2019-09-04 | End: 2019-09-05 | Stop reason: HOSPADM

## 2019-09-04 RX ORDER — MEPERIDINE HYDROCHLORIDE 25 MG/ML
12.5 INJECTION INTRAMUSCULAR; INTRAVENOUS; SUBCUTANEOUS
Status: DISCONTINUED | OUTPATIENT
Start: 2019-09-04 | End: 2019-09-05 | Stop reason: HOSPADM

## 2019-09-04 RX ORDER — MAGNESIUM HYDROXIDE 1200 MG/15ML
LIQUID ORAL AS NEEDED
Status: DISCONTINUED | OUTPATIENT
Start: 2019-09-04 | End: 2019-09-04 | Stop reason: HOSPADM

## 2019-09-04 RX ORDER — FENTANYL CITRATE 50 UG/ML
INJECTION, SOLUTION INTRAMUSCULAR; INTRAVENOUS AS NEEDED
Status: DISCONTINUED | OUTPATIENT
Start: 2019-09-04 | End: 2019-09-04 | Stop reason: SURG

## 2019-09-04 RX ORDER — HYDROMORPHONE HCL/PF 1 MG/ML
0.2 SYRINGE (ML) INJECTION
Status: DISCONTINUED | OUTPATIENT
Start: 2019-09-04 | End: 2019-09-05 | Stop reason: HOSPADM

## 2019-09-04 RX ORDER — HYDROMORPHONE HYDROCHLORIDE 2 MG/ML
INJECTION, SOLUTION INTRAMUSCULAR; INTRAVENOUS; SUBCUTANEOUS AS NEEDED
Status: DISCONTINUED | OUTPATIENT
Start: 2019-09-04 | End: 2019-09-04 | Stop reason: SURG

## 2019-09-04 RX ORDER — OXYCODONE HYDROCHLORIDE AND ACETAMINOPHEN 5; 325 MG/1; MG/1
2 TABLET ORAL EVERY 6 HOURS PRN
Qty: 12 TABLET | Refills: 0 | Status: SHIPPED | OUTPATIENT
Start: 2019-09-04 | End: 2019-10-11

## 2019-09-04 RX ORDER — ACETAMINOPHEN 325 MG/1
650 TABLET ORAL EVERY 6 HOURS PRN
Status: DISCONTINUED | OUTPATIENT
Start: 2019-09-04 | End: 2019-09-05 | Stop reason: HOSPADM

## 2019-09-04 RX ORDER — PROCHLORPERAZINE MALEATE 10 MG
5 TABLET ORAL EVERY 6 HOURS PRN
Status: DISCONTINUED | OUTPATIENT
Start: 2019-09-04 | End: 2019-09-05 | Stop reason: HOSPADM

## 2019-09-04 RX ORDER — ONDANSETRON 2 MG/ML
INJECTION INTRAMUSCULAR; INTRAVENOUS AS NEEDED
Status: DISCONTINUED | OUTPATIENT
Start: 2019-09-04 | End: 2019-09-04 | Stop reason: SURG

## 2019-09-04 RX ORDER — ONDANSETRON 2 MG/ML
4 INJECTION INTRAMUSCULAR; INTRAVENOUS EVERY 6 HOURS PRN
Status: DISCONTINUED | OUTPATIENT
Start: 2019-09-04 | End: 2019-09-05 | Stop reason: HOSPADM

## 2019-09-04 RX ORDER — OXYCODONE HYDROCHLORIDE AND ACETAMINOPHEN 5; 325 MG/1; MG/1
1 TABLET ORAL EVERY 6 HOURS PRN
Status: DISCONTINUED | OUTPATIENT
Start: 2019-09-04 | End: 2019-09-05 | Stop reason: HOSPADM

## 2019-09-04 RX ORDER — ONDANSETRON 2 MG/ML
4 INJECTION INTRAMUSCULAR; INTRAVENOUS ONCE AS NEEDED
Status: DISCONTINUED | OUTPATIENT
Start: 2019-09-04 | End: 2019-09-04

## 2019-09-04 RX ORDER — ROCURONIUM BROMIDE 10 MG/ML
INJECTION, SOLUTION INTRAVENOUS AS NEEDED
Status: DISCONTINUED | OUTPATIENT
Start: 2019-09-04 | End: 2019-09-04 | Stop reason: SURG

## 2019-09-04 RX ORDER — NEOSTIGMINE METHYLSULFATE 1 MG/ML
INJECTION INTRAVENOUS AS NEEDED
Status: DISCONTINUED | OUTPATIENT
Start: 2019-09-04 | End: 2019-09-04 | Stop reason: SURG

## 2019-09-04 RX ORDER — SODIUM CHLORIDE 9 MG/ML
INJECTION, SOLUTION INTRAVENOUS CONTINUOUS PRN
Status: DISCONTINUED | OUTPATIENT
Start: 2019-09-04 | End: 2019-09-04 | Stop reason: SURG

## 2019-09-04 RX ORDER — PROMETHAZINE HYDROCHLORIDE 25 MG/ML
12.5 INJECTION, SOLUTION INTRAMUSCULAR; INTRAVENOUS ONCE
Status: DISCONTINUED | OUTPATIENT
Start: 2019-09-04 | End: 2019-09-05 | Stop reason: HOSPADM

## 2019-09-04 RX ORDER — GLYCOPYRROLATE 0.2 MG/ML
INJECTION INTRAMUSCULAR; INTRAVENOUS AS NEEDED
Status: DISCONTINUED | OUTPATIENT
Start: 2019-09-04 | End: 2019-09-04 | Stop reason: SURG

## 2019-09-04 RX ORDER — LIDOCAINE HYDROCHLORIDE 10 MG/ML
0.5 INJECTION, SOLUTION EPIDURAL; INFILTRATION; INTRACAUDAL; PERINEURAL ONCE AS NEEDED
Status: DISCONTINUED | OUTPATIENT
Start: 2019-09-04 | End: 2019-09-04 | Stop reason: HOSPADM

## 2019-09-04 RX ORDER — DEXAMETHASONE SODIUM PHOSPHATE 4 MG/ML
INJECTION, SOLUTION INTRA-ARTICULAR; INTRALESIONAL; INTRAMUSCULAR; INTRAVENOUS; SOFT TISSUE AS NEEDED
Status: DISCONTINUED | OUTPATIENT
Start: 2019-09-04 | End: 2019-09-04 | Stop reason: SURG

## 2019-09-04 RX ORDER — METOCLOPRAMIDE HYDROCHLORIDE 5 MG/ML
INJECTION INTRAMUSCULAR; INTRAVENOUS AS NEEDED
Status: DISCONTINUED | OUTPATIENT
Start: 2019-09-04 | End: 2019-09-04 | Stop reason: SURG

## 2019-09-04 RX ADMIN — KETAMINE HYDROCHLORIDE 15 MG: 50 INJECTION INTRAMUSCULAR; INTRAVENOUS at 10:22

## 2019-09-04 RX ADMIN — PHENYLEPHRINE HYDROCHLORIDE 100 MCG: 10 INJECTION INTRAVENOUS at 10:35

## 2019-09-04 RX ADMIN — NEOSTIGMINE METHYLSULFATE 3 MG: 1 INJECTION INTRAVENOUS at 11:47

## 2019-09-04 RX ADMIN — PHENYLEPHRINE HYDROCHLORIDE 100 MCG: 10 INJECTION INTRAVENOUS at 11:43

## 2019-09-04 RX ADMIN — PHENYLEPHRINE HYDROCHLORIDE 100 MCG: 10 INJECTION INTRAVENOUS at 11:26

## 2019-09-04 RX ADMIN — AMLODIPINE BESYLATE 5 MG: 5 TABLET ORAL at 23:01

## 2019-09-04 RX ADMIN — ROCURONIUM BROMIDE 15 MG: 10 INJECTION INTRAVENOUS at 10:44

## 2019-09-04 RX ADMIN — GLYCOPYRROLATE 0.4 MG: 0.2 INJECTION, SOLUTION INTRAMUSCULAR; INTRAVENOUS at 11:47

## 2019-09-04 RX ADMIN — ROCURONIUM BROMIDE 20 MG: 10 INJECTION INTRAVENOUS at 10:22

## 2019-09-04 RX ADMIN — OXYCODONE AND ACETAMINOPHEN 1 TABLET: 5; 325 TABLET ORAL at 14:19

## 2019-09-04 RX ADMIN — KETAMINE HYDROCHLORIDE 5 MG: 50 INJECTION INTRAMUSCULAR; INTRAVENOUS at 10:45

## 2019-09-04 RX ADMIN — ROCURONIUM BROMIDE 40 MG: 10 INJECTION INTRAVENOUS at 10:12

## 2019-09-04 RX ADMIN — SODIUM CHLORIDE, SODIUM LACTATE, POTASSIUM CHLORIDE, AND CALCIUM CHLORIDE 125 ML/HR: .6; .31; .03; .02 INJECTION, SOLUTION INTRAVENOUS at 09:41

## 2019-09-04 RX ADMIN — METOCLOPRAMIDE HYDROCHLORIDE 10 MG: 5 INJECTION INTRAMUSCULAR; INTRAVENOUS at 10:16

## 2019-09-04 RX ADMIN — KETAMINE HYDROCHLORIDE 10 MG: 50 INJECTION INTRAMUSCULAR; INTRAVENOUS at 10:06

## 2019-09-04 RX ADMIN — SODIUM CHLORIDE, SODIUM LACTATE, POTASSIUM CHLORIDE, AND CALCIUM CHLORIDE: .6; .31; .03; .02 INJECTION, SOLUTION INTRAVENOUS at 09:55

## 2019-09-04 RX ADMIN — PHENYLEPHRINE HYDROCHLORIDE 100 MCG: 10 INJECTION INTRAVENOUS at 10:24

## 2019-09-04 RX ADMIN — MIDAZOLAM HYDROCHLORIDE 1 MG: 1 INJECTION, SOLUTION INTRAMUSCULAR; INTRAVENOUS at 09:55

## 2019-09-04 RX ADMIN — PHENYLEPHRINE HYDROCHLORIDE 100 MCG: 10 INJECTION INTRAVENOUS at 11:47

## 2019-09-04 RX ADMIN — ENOXAPARIN SODIUM 40 MG: 40 INJECTION SUBCUTANEOUS at 16:47

## 2019-09-04 RX ADMIN — SODIUM CHLORIDE, SODIUM LACTATE, POTASSIUM CHLORIDE, AND CALCIUM CHLORIDE 75 ML/HR: .6; .31; .03; .02 INJECTION, SOLUTION INTRAVENOUS at 14:05

## 2019-09-04 RX ADMIN — ONDANSETRON 4 MG: 2 INJECTION INTRAMUSCULAR; INTRAVENOUS at 11:30

## 2019-09-04 RX ADMIN — NEOSTIGMINE METHYLSULFATE 1 MG: 1 INJECTION INTRAVENOUS at 11:55

## 2019-09-04 RX ADMIN — SODIUM CHLORIDE: 9 INJECTION, SOLUTION INTRAVENOUS at 10:02

## 2019-09-04 RX ADMIN — DEXAMETHASONE SODIUM PHOSPHATE 8 MG: 4 INJECTION, SOLUTION INTRAMUSCULAR; INTRAVENOUS at 10:13

## 2019-09-04 RX ADMIN — GLYCOPYRROLATE 0.2 MG: 0.2 INJECTION, SOLUTION INTRAMUSCULAR; INTRAVENOUS at 11:55

## 2019-09-04 RX ADMIN — GLYCOPYRROLATE 0.2 MG: 0.2 INJECTION, SOLUTION INTRAMUSCULAR; INTRAVENOUS at 11:50

## 2019-09-04 RX ADMIN — OXYCODONE AND ACETAMINOPHEN 1 TABLET: 5; 325 TABLET ORAL at 23:01

## 2019-09-04 RX ADMIN — BACITRACIN ZINC 1 LARGE APPLICATION: 500 OINTMENT TOPICAL at 18:44

## 2019-09-04 RX ADMIN — FENTANYL CITRATE 50 MCG: 50 INJECTION INTRAMUSCULAR; INTRAVENOUS at 12:03

## 2019-09-04 RX ADMIN — ROCURONIUM BROMIDE 5 MG: 10 INJECTION INTRAVENOUS at 10:00

## 2019-09-04 RX ADMIN — Medication 100 MG: at 10:00

## 2019-09-04 RX ADMIN — HYDROMORPHONE HYDROCHLORIDE 0.5 MG: 2 INJECTION, SOLUTION INTRAMUSCULAR; INTRAVENOUS; SUBCUTANEOUS at 10:31

## 2019-09-04 RX ADMIN — PROPOFOL 200 MG: 10 INJECTION, EMULSION INTRAVENOUS at 10:00

## 2019-09-04 RX ADMIN — LISINOPRIL 10 MG: 10 TABLET ORAL at 23:01

## 2019-09-04 RX ADMIN — PHENYLEPHRINE HYDROCHLORIDE 100 MCG: 10 INJECTION INTRAVENOUS at 11:20

## 2019-09-04 RX ADMIN — PHENYLEPHRINE HYDROCHLORIDE 100 MCG: 10 INJECTION INTRAVENOUS at 10:31

## 2019-09-04 RX ADMIN — FENTANYL CITRATE 50 MCG: 50 INJECTION INTRAMUSCULAR; INTRAVENOUS at 10:00

## 2019-09-04 RX ADMIN — PHENYLEPHRINE HYDROCHLORIDE 100 MCG: 10 INJECTION INTRAVENOUS at 10:56

## 2019-09-04 NOTE — INTERVAL H&P NOTE
H&P reviewed  After examining the patient I find no changes in the patients condition since the H&P had been written      Vitals:    09/04/19 0827   BP: 134/77   Pulse: 74   Resp: 20   Temp: 97 6 °F (36 4 °C)   SpO2: 97%

## 2019-09-04 NOTE — ANESTHESIA POSTPROCEDURE EVALUATION
Post-Op Assessment Note    CV Status:  Stable  Pain Score: 0    Pain management: adequate     Mental Status:  Alert and awake   Hydration Status:  Euvolemic   PONV Controlled:  Controlled   Airway Patency:  Patent   Post Op Vitals Reviewed: Yes      Staff: AnesthesiologistSABA           /72 (09/04/19 1208)    Temp 97 6 °F (36 4 °C) (09/04/19 1208)    Pulse 60 (09/04/19 1208)   Resp 18 (09/04/19 1208)    SpO2 98 % (09/04/19 1208)

## 2019-09-04 NOTE — PLAN OF CARE
Problem: Potential for Falls  Goal: Patient will remain free of falls  Description  INTERVENTIONS:  - Assess patient frequently for physical needs  -  Identify cognitive and physical deficits and behaviors that affect risk of falls    -  [place] fall precautions as indicated by assessment   - Educate patient/family on patient safety including physical limitations  - Instruct patient to call for assistance with activity based on assessment  - Modify environment to reduce risk of injury  - Consider OT/PT consult to assist with strengthening/mobility  Outcome: Progressing     Problem: PAIN - ADULT  Goal: Verbalizes/displays adequate comfort level or baseline comfort level  Description  Interventions:  - Encourage patient to monitor pain and request assistance  - Assess pain using appropriate pain scale  - Administer analgesics based on type and severity of pain and evaluate response  - Implement non-pharmacological measures as appropriate and evaluate response  - Consider cultural and social influences on pain and pain management  - Notify physician/advanced practitioner if interventions unsuccessful or patient reports new pain  Outcome: Progressing     Problem: INFECTION - ADULT  Goal: Absence or prevention of progression during hospitalization  Description  INTERVENTIONS:  - Assess and monitor for signs and symptoms of infection  - Monitor lab/diagnostic results  - Monitor all insertion sites, i e  indwelling lines, tubes, and drains  - Monitor endotracheal if appropriate and nasal secretions for changes in amount and color  - [place] appropriate cooling/warming therapies per order  - Administer medications as ordered  - Instruct and encourage patient and family to use good hand hygiene technique  - Identify and instruct in appropriate isolation precautions for identified infection/condition  Outcome: Progressing     Problem: SAFETY ADULT  Goal: Patient will remain free of falls  Description  INTERVENTIONS:  - Assess patient frequently for physical needs  -  Identify cognitive and physical deficits and behaviors that affect risk of falls  -  [place] fall precautions as indicated by assessment   - Educate patient/family on patient safety including physical limitations  - Instruct patient to call for assistance with activity based on assessment  - Modify environment to reduce risk of injury  - Consider OT/PT consult to assist with strengthening/mobility  Outcome: Progressing     Problem: DISCHARGE PLANNING  Goal: Discharge to home or other facility with appropriate resources  Description  INTERVENTIONS:  - Identify barriers to discharge w/patient and caregiver  - Arrange for needed discharge resources and transportation as appropriate  - Identify discharge learning needs (meds, wound care, etc )  - Arrange for interpretive services to assist at discharge as needed  - Refer to Case Management Department for coordinating discharge planning if the patient needs post-hospital services based on physician/advanced practitioner order or complex needs related to functional status, cognitive ability, or social support system  Outcome: Progressing     Problem: Knowledge Deficit  Goal: Patient/family/caregiver demonstrates understanding of disease process, treatment plan, medications, and discharge instructions  Description  Complete learning assessment and assess knowledge base    Interventions:  - Provide teaching at level of understanding  - Provide teaching via preferred learning methods  Outcome: Progressing     Problem: SKIN/TISSUE INTEGRITY - ADULT  Goal: Incision(s), wounds(s) or drain site(s) healing without S/S of infection  Description  INTERVENTIONS  - Assess and document risk factors for skin impairment   - Assess and document dressing, incision, wound bed, drain sites and surrounding tissue  - Consider nutrition services referral as needed  - Oral mucous membranes remain intact  - Provide patient/ family education  Outcome: Progressing

## 2019-09-04 NOTE — DISCHARGE INSTRUCTIONS
Neck Dissection    WHAT YOU SHOULD KNOW:    You underwent neck dissection, a surgery to remove lymph nodes from your neck  AFTER YOU LEAVE:    Medicines: The following medicines may  be ordered by your primary healthcare provider:  · Pain medicine  can help take away or decrease pain  Do not wait until the pain is severe before you take your medicine  · Take your medicine as directed  Call your healthcare provider if you think your medicine is not helping or if you have side effects  Tell him if you are allergic to any medicine  Keep a list of the medicines, vitamins, and herbs you take  Include the amounts, and when and why you take them  Bring the list or the pill bottles to follow-up visits  Carry your medicine list with you in case of an emergency  Follow up with your or surgeon as directed: You will need to return to have your wound checked and possiblye have stitches removed  Write down your questions so you remember to ask them during your visits  Self-care:   · Rest when you need to while you heal after surgery  Slowly start to do more each day  Return to your daily activities as directed  · Wound care: You may shower or bathe, but do not rub or scrub the surgical site  Please leave the surgical adhesive dressing in place  · Swallowing and voice changes: You may have a sore throat, hoarse voice, or difficulty swallowing after surgery  These symptoms should go away after a few days  · Empty your wound drain as instructed  Contact your surgeon if:   · You have a fever or chills     · You vomit several times in a row  · Your incision is red, swollen, or draining pus  · You have new voice weakness or hoarseness, or it is getting worse  · You have questions or concerns about your condition or care  Seek care immediately or call 911 if: [place] · Blood soaks through your bandage  · Your stitches come apart  · You have sudden swelling in your neck or difficulty swallowing      · You have trouble breathing  · You have a seizure  © 2014 [address] is for End User's use only and may not be sold, redistributed or otherwise used for commercial purposes  All illustrations and images included in CareNotes® are the copyrighted property of [employer]  or Earnest Kim  The above information is an  only  It is not intended as medical advice for individual conditions or treatments  Talk to your doctor, nurse or pharmacist before following any medical regimen to see if it is safe and effective for you  Call Dr Melania Powers with any questions or concerns: office ; mobile  (urgent issues)  Eitan-Hayes Drain Care   WHAT YOU NEED TO KNOW:   A Eitan-Hayes (JUANY) drain is used to remove fluids that build up in an area of your body after surgery  The JUANY drain is a bulb shaped device connected to a tube  One end of the tube is placed inside you during surgery  The other end comes out through a small cut in your skin  The bulb is connected to this end  You may have a stitch to hold the tube in place  DISCHARGE INSTRUCTIONS:   Return to the emergency department if:   · Your JUANY drain breaks or comes out  · You have cloudy yellow or brown drainage from your JUANY drain site, or the drainage smells bad  Contact your healthcare provider if:   · You drain less than 30 milliliters (2 tablespoons) in 24 hours  This may mean your drain can be removed  · You suddenly stop draining fluid or think your JUANY drain is blocked  · You have a fever higher than 101 5°F (38 6°C)  · You have increased pain, redness, or swelling around the drain site  · You have questions about your JUANY drain care  How a Eitan-Hayes drain works: The JUANY drain removes fluids by creating suction in the tube  The bulb is squeezed flat and connected to the tube that sticks out of your body  The bulb expands as it fills with fluid     How to change the bandage around your Eitan-Hayes drain:  If you have a bandage, change it once a day  You may need to change your bandage more than once a day if it gets completely wet  · Wash your hands with soap and water  · Loosen the tape and gently remove the old bandage  Throw the old bandage into a plastic trash bag  · Use soap and water or saline solution to clean your JUANY drain site  Dip a cotton swab or gauze pad in the solution and gently clean your skin  · Pat the area dry  · Place a new bandage on your JUANY drain site and secure it to your skin with surgical tape  · Wash your hands  How to empty the Eitan-Hayes drain:  Empty the bulb when it is half full or every 8 to 12 hours  · Wash your hands with soap and water  · Remove the plug from the bulb  · Pour the fluid into a measuring cup  · Clean the plug with an alcohol swab or a cotton ball dipped in rubbing alcohol  · Squeeze the bulb flat and put the plug back in  The bulb should stay flat until it starts to fill with fluid again  · Measure the amount of fluid you pour out  Write down how much fluid you empty from the JUANY drain and the date and time you collected it  · Flush the fluid down the toilet  Wash your hands  Clear clogged tubing:  Use the following steps to clear your Eitan-Hayes tubing:  · Hold the tubing between your thumb and first finger at the place closest to your skin  This hand will prevent the tube from being pulled out of your skin  · Use your other thumb and first finger to slide the clog down the tubing toward the bulb  You may have to repeat the sliding until the tubing is unclogged  Eitan-Hayes drain removal:  The amount of fluid that you drain will decrease as your wound heals  The JUANY drain usually is removed when less than 30 milliliters (2 tablespoons) is collected in 24 hours  Ask your healthcare provider when and how your JUANY drain will be removed    Follow up with your healthcare provider as directed:  Write down your questions so you remember to ask them during your visits  © 2017 [facility] Information is for End User's use only and may not be sold, redistributed or otherwise used for commercial purposes  All illustrations and images included in CareNotes® are the copyrighted property of [employer]  [employer]  or Earnest Kim  The above information is an  only  It is not intended as medical advice for individual conditions or treatments  Talk to your doctor, nurse or pharmacist before following any medical regimen to see if it is safe and effective for you

## 2019-09-04 NOTE — OP NOTE
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]    Procedure(s):  MODIFIED RADICAL NECK DISSECTION (Right)    Specimen(s):  ID Type Source Tests Collected by Time Destination   1 : Right modified neck dissection- levels 1, 2, 3 as marked Tissue Neck TISSUE EXAM Jay Dhillon MD 2019 [id number]        Estimated Blood Loss:   Minimal    Drains:  Closed/Suction Drain Right Neck Bulb 7 Fr  (Active)   Number of days: 0       Anesthesia Type:   General    Operative Indications:  62year old man with squamous cell carcinoma right tongue base, clean margins after robotically assisted right tongue base resection  Right modified neck dissection indicated to remove lymph nodes at risk for possible metastasis  Operative Findings:  No grossly pathologic nodes or evidence of extranodal extension in right neck  Complications:   None    Procedure and Technique:  The patient was positively identified and transferred onto the operating table in the supine position  Appropriate monitoring devices were put in place, anesthesia was induced and the patient was intubated without difficulty  A shoulder roll was placed, and the patients right neck was examined  An appropriate site for skin incision was demarcated in a transverse fashion across the right neck in a natural skin crease  The time-out procedure was completed  Local anesthesia in the form of 1% lidocaine with 1/100,000 epinephrine was then injected to the marked site  The patients neck was then prepped and draped in the usual sterile fashion  Skin incision was then made at the marked site in a transverse fashion using a 15 blade    Dissection continued through subcutaneous tissues and platysma using the 15 blade and Bovie cautery  Superficial flaps were then elevated in the subplatysmal plane using Bovie cautery  Dissection then continued using Bovie cautery along the anterior belly of the digastric muscle, then posteriorly along the inferior aspect of the submandibular gland  The facial vein was divided and ligated with a 3-0 silk stitch  Dissection then continued along the course of the digastric muscle  Overlying tissue, including some parotid tissue, was divided using Bovie and bipolar cautery  This dissection was continued back to the sternocleidomastoid muscle  Fascia overlying the sternocleidomastoid muscle was then divided using Bovie cautery, inferiorly down to the level of the omohyoid muscle  Fascia was grasped using multiple Allis clamps, and dissection continued around the anterior aspect of the sternocleidomastoid muscle onto it's medial surface using Bovie cautery  With continued dissection the spinal accessory nerve was encountered  Tissue overlying the nerve was divided using Bipolar cautery  Inferior to the course of the nerve dissection then continued down to the deep cervical fascia along the posterior border of the sternocleidomastoid muscle  This dissection was carried inferiorly to the level of the omohyoid muscle  Fibrofatty tissue was then grasped using multiple Allis clamps and retracted anteriorly, and dissection continued anteriorly toward the vascular bundle using blunt dissection and Bovie cautery  Care was taken to leave exiting cervical rootlets and the phrenic nerve undisturbed  Fibrofatty tissue was then elevated off the carotid artery, vagus nerve and jugular vein using [place]quito forceps and Bovie cautery  Some branches entering the jugular vein anteriorly were divided and ligated using 3-0 silk stitches    Dissection continued anterior to the vascular bundle, dividing tissue overlying the hypoglossal nerve superiorly  Remaining tissue attachments anteriorly were divided along the course of the omohyoid muscle, allowing removal of the neck dissection specimen  The specimen was oriented and sent to pathology for inking of levels 1, 2 and 3, and permanent section evaluation  The surgical site was irrigated with saline, and hemostasis was ensured using Bovie and Bipolar cautery  A  7 mm Joanne Lions drain was placed through a separate stab incision  Tony hemostatic agent was applied  The surgical site was then closed in multiple layers  Platysma and tissue at the level of the platysma was reapproximated using 3-0 Vicryl stitches in an interrupted fashion  Skin itself was closed using a 4-0 nylon stitch running in multiple segments, and the same stitch was used to secure the J-P drain  Bacitracin ointment was then applied  Anesthesia was then reversed, and drapes were removed  The patient was awakened, extubated and taken to the recovery room in stable condition  All counts were correct at the end of the case, and no complications were encountered       I was present for the entire procedure    Patient Disposition:  PACU  and extubated and stable    SIGNATURE: Mor Cardona MD  DATE: September 4, 2019  TIME: 11:37 AM

## 2019-09-05 VITALS
OXYGEN SATURATION: 98 % | BODY MASS INDEX: 27.49 KG/M2 | SYSTOLIC BLOOD PRESSURE: 126 MMHG | TEMPERATURE: 97.5 F | DIASTOLIC BLOOD PRESSURE: 60 MMHG | HEIGHT: 65 IN | RESPIRATION RATE: 20 BRPM | WEIGHT: 165 LBS | HEART RATE: 72 BPM

## 2019-09-05 RX ORDER — OXYCODONE HYDROCHLORIDE AND ACETAMINOPHEN 5; 325 MG/1; MG/1
1 TABLET ORAL EVERY 6 HOURS PRN
Qty: 16 TABLET | Refills: 0 | Status: SHIPPED | OUTPATIENT
Start: 2019-09-05 | End: 2019-09-15

## 2019-09-05 RX ADMIN — OXYCODONE AND ACETAMINOPHEN 1 TABLET: 5; 325 TABLET ORAL at 08:31

## 2019-09-05 RX ADMIN — BACITRACIN ZINC 1 LARGE APPLICATION: 500 OINTMENT TOPICAL at 08:24

## 2019-09-05 RX ADMIN — ENOXAPARIN SODIUM 40 MG: 40 INJECTION SUBCUTANEOUS at 08:24

## 2019-09-05 NOTE — PROGRESS NOTES
Post Op Check Note -Surgery SURI Ferrari Page 62 y o  male MRN: [id number]  Unit/Bed#: -01 Encounter: [id number]    ASSESSMENT/PLAN:  Problem List     * (Principal) Malignant neoplasm of base of tongue ([address] )    Vallecular mass   61 yo POD 0 s/p R radical neck  He is doing well and has tolerated a diet  JUANY drain is clearing-80 cc serosanguinous  · PO pain medications   · Diet as tolerated  · Anticipate DC in AM  · Drain may be removed prior to DC        Subjective/Objective     Subjective: no complaints  Tolerating diet    Objective/Physical Exam:   Blood pressure 127/56, pulse 76, temperature 97 7 °F (36 5 °C), temperature source Oral, resp  rate 18, height 5' 5" (1 651 m), weight 74 8 kg (165 lb), SpO2 94 %  ,Body mass index is 27 46 kg/m²  General appearance: alert and oriented, in no acute distress  Head: R neck incision with drain  Flat and soft  No ecchymosis  JUANY in place       Current Facility-Administered Medications:     acetaminophen (TYLENOL) tablet 650 mg, 650 mg, Oral, Q6H PRN, Antony Ochoa MD    amLODIPine [facility]) tablet 5 mg, 5 mg, Oral, Daily, Antony Ochoa MD    bacitracin topical ointment 1 large application, 1 large application, Topical, BID, Antony Ochoa MD, 1 large application at 41/22/38 1844    enoxaparin (LOVENOX) subcutaneous injection 40 mg, 40 mg, Subcutaneous, Daily, Antony Ochoa MD, 40 mg at 09/04/19 1647    fentaNYL (SUBLIMAZE) injection 25 mcg, 25 mcg, Intravenous, Q3 min PRN, Blanquita Barone CRNA    HYDROmorphone (DILAUDID) injection 0 2 mg, 0 2 mg, Intravenous, Q5 Min PRN, Blanquita Barone CRNA    lactated ringers infusion, 50 mL/hr, Intravenous, Continuous, Donna Urena CRNA    lactated ringers infusion, 75 mL/hr, Intravenous, Continuous, Antony Ochoa MD, Stopped at 09/04/19 1801    lisinopril (ZESTRIL) tablet 10 mg, 10 mg, Oral, Daily, Antony Ochoa MD    meperidine (DEMEROL) injection 12 5 mg, 12 5 mg, Intravenous, Q10 Min PRN, Blanquita Barone CRNA    ondansetron [facility]) injection 4 mg, 4 mg, Intravenous, Q6H PRN, Kourtney Seaman MD    oxyCODONE-acetaminophen (PERCOCET) 5-325 mg per tablet 1 tablet, 1 tablet, Oral, Q6H PRN, Kourtney Seaman MD, 1 tablet at 09/04/19 1419    oxyCODONE-acetaminophen (PERCOCET) 5-325 mg per tablet 2 tablet, 2 tablet, Oral, Q6H PRN, Kourtney Seaman MD    prochlorperazine (COMPAZINE) tablet 5 mg, 5 mg, Oral, Q6H PRN, Luma Huang PA-C    promethazine (PHENERGAN) injection 12 5 mg, 12 5 mg, Intravenous, Once, Leah Chapman CRNA      Intake/Output Summary (Last 24 hours) at 9/4/2019 2205  Last data filed at 9/4/2019 1844  Gross per 24 hour   Intake 2935 ml   Output 830 ml   Net 2105 ml       VTE Pharmacologic Prophylaxis: Sequential compression device (Venodyne)  and Enoxaparin (Lovenox)

## 2019-09-05 NOTE — PROGRESS NOTES
Progress Note - General Surgery   Mago Cabello Page 62 y o  male MRN: [id number]  Unit/Bed#: -01 Encounter: [id number]    Assessment/plan:  Malignant neoplasm of base of tongue ([address] )  -POD1 radical neck dissection - Dr Norman Kelly  -PO pain medication  -Full diet as tolerated  -Discuss DC planning/drain removal with Dr Norman Kelly this AM      Subjective/Objective   Chief Complaint: POD1 radical neck dissection    Subjective: Doing well overall, minimal neck discomfort, only complaint of episode of reflux overnight  He is tolerating a full diet  Objective: Well appearing, incision CDI with no surrounding erythema or swelling, JUANY drain with 15cc serosang output overnight  Blood pressure 145/95, pulse 90, temperature 98 1 °F (36 7 °C), temperature source Oral, resp  rate 18, height 5' 5" (1 651 m), weight 74 8 kg (165 lb), SpO2 94 %  ,Body mass index is 27 46 kg/m²  Intake/Output Summary (Last 24 hours) at 9/5/2019 0737  Last data filed at 9/5/2019 0245  Gross per 24 hour   Intake 3175 ml   Output 2245 ml   Net 930 ml       Invasive Devices     Peripheral Intravenous Line            Peripheral IV 07/31/19 Right Hand 35 days    Peripheral IV 09/04/19 Left Hand less than 1 day    Peripheral IV 09/04/19 Right Hand less than 1 day          Drain            Closed/Suction Drain Right Neck Bulb 7 Fr  less than 1 day                Physical Exam: General appearance: alert and oriented, in no acute distress  Head: Normocephalic, without obvious abnormality, atraumatic  Neck: supple, symmetrical, trachea midline and R neck incision with drain, CDI   Flat and soft, no ecchymosis or erythema   Lungs: clear to auscultation bilaterally  Heart: regular rate and rhythm, S1, S2 normal, no murmur, click, rub or gallop      VTE Pharmacologic Prophylaxis: Enoxaparin (Lovenox)  VTE Mechanical Prophylaxis: sequential compression device

## 2019-09-05 NOTE — UTILIZATION REVIEW
Initial Clinical Review    Elective inpatient  surgical procedure    Age/Sex: 62 y o  male     Surgery Date: 9/4/2019    Procedure: MODIFIED RADICAL NECK DISSECTION (Right)    Anesthesia: general     Operative Findings: No grossly pathologic nodes or evidence of extranodal extension in right neck    Admission Orders: Date/Time/Statement: Inpatient Admission Orders (From admission, onward)     Ordered        09/04/19 1152  Inpatient Admission  Once                   Orders Placed This Encounter   Procedures    Inpatient Admission     Standing Status:   Standing     Number of Occurrences:   1     Order Specific Question:   Admitting Physician     Answer:   Trinh Johnson [[id number]]     Order Specific Question:   Level of Care     Answer:   Med Surg [[id number]]     Order Specific Question:   Estimated length of stay     Answer:   Inpatient Only Surgery     Vital Signs: /60 (BP Location: Left arm)   Pulse 72   Temp 97 5 °F (36 4 °C) (Oral)   Resp 20   Ht 5' 5" (1 651 m)   Wt 74 8 kg (165 lb)   SpO2 98%   BMI 27 46 kg/m²      Diet: Regular    Mobility: up as tolerated    DVT Prophylaxis: Bilateral scds    Medications/Pain Control:   Current Facility-Administered Medications:  acetaminophen 650 mg Oral Q6H PRN    amLODIPine 5 mg Oral Daily    bacitracin 1 large application Topical BID    enoxaparin 40 mg Subcutaneous Daily    fentaNYL 25 mcg Intravenous Q3 min PRN    HYDROmorphone 0 2 mg Intravenous Q5 Min PRN    lactated ringers 50 mL/hr Intravenous Continuous    lactated ringers 75 mL/hr Intravenous Continuous Last Rate: Stopped (09/04/19 1801)   lisinopril 10 mg Oral Daily    meperidine 12 5 mg Intravenous Q10 Min PRN    ondansetron 4 mg Intravenous Q6H PRN    oxyCODONE-acetaminophen - used x 3  1 tablet Oral Q6H PRN    oxyCODONE-acetaminophen 2 tablet Oral Q6H PRN    prochlorperazine 5 mg Oral Q6H PRN    promethazine 12 5 mg Intravenous Once        Network Utilization Review Department  Phone: [phone];  Fax [phone]  [email or web address]  ATTENTION: Please call with any questions or concerns to [phone]  and carefully listen to the prompts so that you are directed to the right person  Send all requests for admission clinical reviews, approved or denied determinations and any other requests to fax [phone]   All voicemails are confidential

## 2019-09-12 PROBLEM — K21.9 LARYNGOPHARYNGEAL REFLUX (LPR): Status: ACTIVE | Noted: 2019-09-12

## 2019-09-12 PROBLEM — Z98.890 STATUS POST RADICAL DISSECTION OF NECK: Status: ACTIVE | Noted: 2019-09-12

## 2019-09-23 PROBLEM — K14.6 TONGUE PAIN: Status: ACTIVE | Noted: 2019-09-23

## 2019-10-11 ENCOUNTER — ANESTHESIA EVENT (OUTPATIENT)
Dept: GASTROENTEROLOGY | Facility: HOSPITAL | Age: 64
End: 2019-10-11

## 2019-10-11 ENCOUNTER — ANESTHESIA (OUTPATIENT)
Dept: GASTROENTEROLOGY | Facility: HOSPITAL | Age: 64
End: 2019-10-11

## 2019-10-11 ENCOUNTER — HOSPITAL ENCOUNTER (OUTPATIENT)
Dept: GASTROENTEROLOGY | Facility: HOSPITAL | Age: 64
Setting detail: OUTPATIENT SURGERY
Discharge: HOME | End: 2019-10-11
Attending: INTERNAL MEDICINE | Admitting: INTERNAL MEDICINE
Payer: COMMERCIAL

## 2019-10-11 VITALS
TEMPERATURE: 98.4 F | HEIGHT: 70 IN | DIASTOLIC BLOOD PRESSURE: 74 MMHG | BODY MASS INDEX: 25.05 KG/M2 | WEIGHT: 175 LBS | OXYGEN SATURATION: 95 % | HEART RATE: 54 BPM | RESPIRATION RATE: 16 BRPM | SYSTOLIC BLOOD PRESSURE: 121 MMHG

## 2019-10-11 DIAGNOSIS — K21.9 GASTRO-ESOPHAGEAL REFLUX DISEASE WITHOUT ESOPHAGITIS: ICD-10-CM

## 2019-10-11 RX ORDER — CLOTRIMAZOLE 10 MG/1
10 LOZENGE ORAL; TOPICAL
COMMUNITY
End: 2019-10-28 | Stop reason: ALTCHOICE

## 2019-10-11 RX ORDER — PROPOFOL 10 MG/ML
INJECTION, EMULSION INTRAVENOUS AS NEEDED
Status: DISCONTINUED | OUTPATIENT
Start: 2019-10-11 | End: 2019-10-11 | Stop reason: SURG

## 2019-10-11 RX ORDER — OMEPRAZOLE 40 MG/1
40 CAPSULE, DELAYED RELEASE ORAL DAILY
COMMUNITY
End: 2019-10-28 | Stop reason: ALTCHOICE

## 2019-10-11 RX ORDER — SODIUM CHLORIDE 9 MG/ML
INJECTION, SOLUTION INTRAVENOUS CONTINUOUS PRN
Status: DISCONTINUED | OUTPATIENT
Start: 2019-10-11 | End: 2019-10-11 | Stop reason: SURG

## 2019-10-11 RX ADMIN — SODIUM CHLORIDE: 9 INJECTION, SOLUTION INTRAVENOUS at 14:20

## 2019-10-11 RX ADMIN — PROPOFOL 100 MG: 10 INJECTION, EMULSION INTRAVENOUS at 14:29

## 2019-10-11 NOTE — ANESTHESIA PREPROCEDURE EVALUATION
Review of Systems/Medical History  Patient summary reviewed  Chart reviewed  No history of anesthetic complications     Cardiovascular  Exercise tolerance (METS): >4,  Hypertension ,    Pulmonary  Negative pulmonary ROS        GI/Hepatic    GERD ,        Negative  ROS        Endo/Other  Negative endo/other ROS      GYN  Negative gynecology ROS          Hematology  Negative hematology ROS      Musculoskeletal  Negative musculoskeletal ROS        Neurology    Headaches,    Psychology   Negative psychology ROS              Physical Exam    Airway    Mallampati score: III  TM Distance: >3 FB  Neck ROM: full     Dental   No notable dental hx     Cardiovascular  Rhythm: regular, Rate: normal, Cardiovascular exam normal    Pulmonary  Pulmonary exam normal Breath sounds clear to auscultation,     Other Findings        Anesthesia Plan  ASA Score- 2     Anesthesia Type- IV sedation with anesthesia with ASA Monitors  Additional Monitors:   Airway Plan:         Plan Factors-  Patient did not smoke on day of surgery  Induction- intravenous  Postoperative Plan-     Informed Consent- Anesthetic plan and risks discussed with patient  I personally reviewed this patient with the CRNA  Discussed and agreed on the Anesthesia Plan with the CRNA  Reji Diego

## 2019-10-22 PROBLEM — K80.10 CALCULUS OF GALLBLADDER WITH CHRONIC CHOLECYSTITIS WITHOUT OBSTRUCTION: Status: ACTIVE | Noted: 2019-10-22

## 2019-10-22 NOTE — H&P (VIEW-ONLY)
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  Voice quality is normal     Diagnoses and all orders for this visit:    Calculus of gallbladder with chronic cholecystitis without obstruction        Subjective:      Patient ID: Marva Hdz is a 62 y o  male  Presents for gall bladder consult  Multiple attacks recently with severe epigastric /RUQ pain  Complaints of abdominal pain, bloating and belching with attacks  Has been eating bland diet  Ct 09/27/2019 - Gallstones      The following portions of the patient's history were reviewed and updated as appropriate:     He  has a past medical history of Facial basal cell cancer, GERD (gastroesophageal reflux disease), Hypertension, and Vitamin D deficiency  He  has a past surgical history that includes Rhinoplasty; Colonoscopy (N/A, 3/22/2016); [place] tooth extraction; pr laryngoscopy,dirct,op,biopsy (N/A, 7/17/2019); TRANSORAL ROBOTIC SURGERY (TORS) (N/A, 7/31/2019); and pr removal nodes, neck,cerv mod rad (Right, 9/4/2019)  His family history is not on file  He  reports that he has never smoked  He has never used smokeless tobacco  He reports that he drank about 9 0 standard drinks of alcohol per week  He reports that he does not use drugs  Current Outpatient Medications   Medication Sig Dispense Refill    acetaminophen (TYLENOL) 325 mg tablet 2, by mouth, every 6 hours as needed for mild to moderate pain    0    amLODIPine (NORVASC) 5 mg tablet Take 5 mg by mouth daily       clotrimazole (MYCELEX) 10 mg nisha Take 10 mg by mouth 5 (five) times a day      lisinopril (ZESTRIL) 10 mg tablet Take 10 mg by mouth daily       NON FORMULARY       omeprazole (PriLOSEC) 40 MG capsule Take 40 mg by mouth daily       No current facility-administered medications for this visit  He is allergic to bee venom; shellfish allergy; and shellfish-derived products       Review of Systems   Constitutional: Negative  Negative for activity change  HENT: Negative  Eyes: Negative  Respiratory: Negative  Cardiovascular: Negative  Gastrointestinal: Positive for abdominal distention, abdominal pain and nausea  Endocrine: Negative  Genitourinary: Negative  Musculoskeletal: Negative  Skin: Negative  Allergic/Immunologic: Negative  Neurological: Negative  Psychiatric/Behavioral: Negative for agitation, behavioral problems and confusion  The patient is not nervous/anxious  Objective    Ht 5' 10" (1 778 m)   Wt 77 1 kg (170 lb)   BMI 24 39 kg/m²     Vital signs are stable     Physical Exam   Constitutional: He is oriented to person, place, and time  He appears well-developed and well-nourished  HENT:   Head: Normocephalic and atraumatic  Right Ear: External ear normal    Left Ear: External ear normal    Mouth/Throat: Oropharynx is clear and moist    Eyes: Pupils are equal, round, and reactive to light  Conjunctivae and EOM are normal  Right eye exhibits no discharge  Left eye exhibits no discharge  No scleral icterus  Neck: Normal range of motion  Neck supple  No tracheal deviation present  No thyromegaly present  Cardiovascular: Normal rate, regular rhythm and normal heart sounds  Exam reveals no friction rub  No murmur heard  Pulmonary/Chest: Effort normal and breath sounds normal  No stridor  No respiratory distress  He has no wheezes  He exhibits no tenderness  Abdominal: Soft  Bowel sounds are normal  He exhibits no distension and no mass  There is no tenderness  There is no rebound and no guarding  Musculoskeletal: Normal range of motion   He exhibits no tenderness  Lymphadenopathy:     He has no cervical adenopathy  Neurological: He is alert and oriented to person, place, and time  No cranial nerve deficit  Coordination normal    Skin: Skin is warm and dry  No rash noted  He is not diaphoretic  No erythema  Psychiatric: He has a normal mood and affect   His behavior is normal  Judgment normal

## 2019-10-28 ENCOUNTER — ANESTHESIA EVENT (OUTPATIENT)
Dept: PERIOP | Facility: HOSPITAL | Age: 64
End: 2019-10-28
Payer: COMMERCIAL

## 2019-10-28 DIAGNOSIS — Z91.89 AT RISK FOR OBSTRUCTIVE SLEEP APNEA: Primary | ICD-10-CM

## 2019-10-28 NOTE — PRE-PROCEDURE INSTRUCTIONS
Pre-Surgery Instructions:   Medication Instructions    acetaminophen (TYLENOL) 325 mg tablet Instructed patient per Anesthesia Guidelines   amLODIPine (NORVASC) 5 mg tablet Instructed patient per Anesthesia Guidelines   lisinopril (ZESTRIL) 10 mg tablet Instructed patient per Anesthesia Guidelines  BOTH MEDS ARE STEPHEN BEFORE MEDS  INSTR  ON ASC LOC  ,BRING PHOTO ID/MED LIST/INS  INFO , SHOWER REV , NO AEducation Index     Med Instructions Troubleshoot   ACE/ARB Med Class     Continue this medication up to the evening before surgery/procedure, but do not take the morning of the day of surgery  Acetaminophen Med Class     Continue to take this medication on your normal schedule  If this is an oral medication and you take it in the morning, then you may take this medicine with a sip of water  Calcium Channel Blocker Med Class     Continue to take this heart medication on your normal schedule  If this is an oral medication and you take it in the morning, then you may take this medicine with a sip of water  SA/NSAIDS/VIT 1 WEEK PREOP

## 2019-10-30 ENCOUNTER — HOSPITAL ENCOUNTER (OUTPATIENT)
Facility: HOSPITAL | Age: 64
Setting detail: OUTPATIENT SURGERY
Discharge: HOME | End: 2019-10-30
Attending: SURGERY | Admitting: SURGERY
Payer: COMMERCIAL

## 2019-10-30 ENCOUNTER — ANESTHESIA (OUTPATIENT)
Dept: PERIOP | Facility: HOSPITAL | Age: 64
End: 2019-10-30
Payer: COMMERCIAL

## 2019-10-30 VITALS
TEMPERATURE: 98 F | RESPIRATION RATE: 16 BRPM | HEART RATE: 60 BPM | OXYGEN SATURATION: 97 % | DIASTOLIC BLOOD PRESSURE: 64 MMHG | SYSTOLIC BLOOD PRESSURE: 150 MMHG

## 2019-10-30 DIAGNOSIS — K80.10 CALCULUS OF GALLBLADDER WITH CHRONIC CHOLECYSTITIS WITHOUT OBSTRUCTION: ICD-10-CM

## 2019-10-30 LAB — GLUCOSE SERPL-MCNC: 136 MG/DL (ref 65–140)

## 2019-10-30 PROCEDURE — 88304 TISSUE EXAM BY PATHOLOGIST: CPT | Performed by: PATHOLOGY

## 2019-10-30 PROCEDURE — 47562 LAPAROSCOPIC CHOLECYSTECTOMY: CPT | Performed by: SURGERY

## 2019-10-30 PROCEDURE — 82948 REAGENT STRIP/BLOOD GLUCOSE: CPT

## 2019-10-30 RX ORDER — METOCLOPRAMIDE HYDROCHLORIDE 5 MG/ML
5 INJECTION INTRAMUSCULAR; INTRAVENOUS ONCE AS NEEDED
Status: COMPLETED | OUTPATIENT
Start: 2019-10-30 | End: 2019-10-30

## 2019-10-30 RX ORDER — SODIUM CHLORIDE, SODIUM LACTATE, POTASSIUM CHLORIDE, CALCIUM CHLORIDE 600; 310; 30; 20 MG/100ML; MG/100ML; MG/100ML; MG/100ML
INJECTION, SOLUTION INTRAVENOUS CONTINUOUS PRN
Status: DISCONTINUED | OUTPATIENT
Start: 2019-10-30 | End: 2019-10-30 | Stop reason: SURG

## 2019-10-30 RX ORDER — MIDAZOLAM HYDROCHLORIDE 2 MG/2ML
INJECTION, SOLUTION INTRAMUSCULAR; INTRAVENOUS AS NEEDED
Status: DISCONTINUED | OUTPATIENT
Start: 2019-10-30 | End: 2019-10-30 | Stop reason: SURG

## 2019-10-30 RX ORDER — ONDANSETRON 2 MG/ML
4 INJECTION INTRAMUSCULAR; INTRAVENOUS EVERY 6 HOURS PRN
Status: DISCONTINUED | OUTPATIENT
Start: 2019-10-30 | End: 2019-10-30 | Stop reason: HOSPADM

## 2019-10-30 RX ORDER — SODIUM CHLORIDE, SODIUM LACTATE, POTASSIUM CHLORIDE, CALCIUM CHLORIDE 600; 310; 30; 20 MG/100ML; MG/100ML; MG/100ML; MG/100ML
20 INJECTION, SOLUTION INTRAVENOUS CONTINUOUS
Status: DISCONTINUED | OUTPATIENT
Start: 2019-10-30 | End: 2019-10-30 | Stop reason: HOSPADM

## 2019-10-30 RX ORDER — MAGNESIUM HYDROXIDE 1200 MG/15ML
LIQUID ORAL AS NEEDED
Status: DISCONTINUED | OUTPATIENT
Start: 2019-10-30 | End: 2019-10-30 | Stop reason: HOSPADM

## 2019-10-30 RX ORDER — DEXAMETHASONE SODIUM PHOSPHATE 10 MG/ML
INJECTION, SOLUTION INTRAMUSCULAR; INTRAVENOUS AS NEEDED
Status: DISCONTINUED | OUTPATIENT
Start: 2019-10-30 | End: 2019-10-30 | Stop reason: SURG

## 2019-10-30 RX ORDER — LIDOCAINE HYDROCHLORIDE 10 MG/ML
INJECTION, SOLUTION INFILTRATION; PERINEURAL AS NEEDED
Status: DISCONTINUED | OUTPATIENT
Start: 2019-10-30 | End: 2019-10-30 | Stop reason: SURG

## 2019-10-30 RX ORDER — NEOSTIGMINE METHYLSULFATE 1 MG/ML
INJECTION INTRAVENOUS AS NEEDED
Status: DISCONTINUED | OUTPATIENT
Start: 2019-10-30 | End: 2019-10-30 | Stop reason: SURG

## 2019-10-30 RX ORDER — SODIUM CHLORIDE, SODIUM LACTATE, POTASSIUM CHLORIDE, CALCIUM CHLORIDE 600; 310; 30; 20 MG/100ML; MG/100ML; MG/100ML; MG/100ML
125 INJECTION, SOLUTION INTRAVENOUS CONTINUOUS
Status: DISCONTINUED | OUTPATIENT
Start: 2019-10-30 | End: 2019-10-30 | Stop reason: HOSPADM

## 2019-10-30 RX ORDER — ROCURONIUM BROMIDE 10 MG/ML
INJECTION, SOLUTION INTRAVENOUS AS NEEDED
Status: DISCONTINUED | OUTPATIENT
Start: 2019-10-30 | End: 2019-10-30 | Stop reason: SURG

## 2019-10-30 RX ORDER — ONDANSETRON 2 MG/ML
4 INJECTION INTRAMUSCULAR; INTRAVENOUS ONCE AS NEEDED
Status: COMPLETED | OUTPATIENT
Start: 2019-10-30 | End: 2019-10-30

## 2019-10-30 RX ORDER — CEFAZOLIN SODIUM 1 G/50ML
1000 SOLUTION INTRAVENOUS ONCE
Status: COMPLETED | OUTPATIENT
Start: 2019-10-30 | End: 2019-10-30

## 2019-10-30 RX ORDER — PROPOFOL 10 MG/ML
INJECTION, EMULSION INTRAVENOUS AS NEEDED
Status: DISCONTINUED | OUTPATIENT
Start: 2019-10-30 | End: 2019-10-30 | Stop reason: SURG

## 2019-10-30 RX ORDER — ONDANSETRON 2 MG/ML
INJECTION INTRAMUSCULAR; INTRAVENOUS AS NEEDED
Status: DISCONTINUED | OUTPATIENT
Start: 2019-10-30 | End: 2019-10-30 | Stop reason: SURG

## 2019-10-30 RX ORDER — FENTANYL CITRATE 50 UG/ML
INJECTION, SOLUTION INTRAMUSCULAR; INTRAVENOUS AS NEEDED
Status: DISCONTINUED | OUTPATIENT
Start: 2019-10-30 | End: 2019-10-30 | Stop reason: SURG

## 2019-10-30 RX ORDER — OXYCODONE HYDROCHLORIDE AND ACETAMINOPHEN 5; 325 MG/1; MG/1
1 TABLET ORAL EVERY 4 HOURS PRN
Status: DISCONTINUED | OUTPATIENT
Start: 2019-10-30 | End: 2019-10-30 | Stop reason: HOSPADM

## 2019-10-30 RX ORDER — HYDROMORPHONE HCL/PF 1 MG/ML
0.4 SYRINGE (ML) INJECTION
Status: DISCONTINUED | OUTPATIENT
Start: 2019-10-30 | End: 2019-10-30 | Stop reason: HOSPADM

## 2019-10-30 RX ORDER — FENTANYL CITRATE/PF 50 MCG/ML
25 SYRINGE (ML) INJECTION
Status: DISCONTINUED | OUTPATIENT
Start: 2019-10-30 | End: 2019-10-30 | Stop reason: HOSPADM

## 2019-10-30 RX ORDER — BUPIVACAINE HYDROCHLORIDE AND EPINEPHRINE 2.5; 5 MG/ML; UG/ML
INJECTION, SOLUTION EPIDURAL; INFILTRATION; INTRACAUDAL; PERINEURAL AS NEEDED
Status: DISCONTINUED | OUTPATIENT
Start: 2019-10-30 | End: 2019-10-30 | Stop reason: HOSPADM

## 2019-10-30 RX ORDER — GLYCOPYRROLATE 0.2 MG/ML
INJECTION INTRAMUSCULAR; INTRAVENOUS AS NEEDED
Status: DISCONTINUED | OUTPATIENT
Start: 2019-10-30 | End: 2019-10-30 | Stop reason: SURG

## 2019-10-30 RX ORDER — LIDOCAINE HYDROCHLORIDE 10 MG/ML
0.5 INJECTION, SOLUTION EPIDURAL; INFILTRATION; INTRACAUDAL; PERINEURAL ONCE
Status: COMPLETED | OUTPATIENT
Start: 2019-10-30 | End: 2019-10-30

## 2019-10-30 RX ADMIN — SODIUM CHLORIDE, SODIUM LACTATE, POTASSIUM CHLORIDE, AND CALCIUM CHLORIDE 125 ML/HR: .6; .31; .03; .02 INJECTION, SOLUTION INTRAVENOUS at 10:13

## 2019-10-30 RX ADMIN — FENTANYL CITRATE 50 MCG: 50 INJECTION, SOLUTION INTRAMUSCULAR; INTRAVENOUS at 10:57

## 2019-10-30 RX ADMIN — FENTANYL CITRATE 50 MCG: 50 INJECTION, SOLUTION INTRAMUSCULAR; INTRAVENOUS at 11:07

## 2019-10-30 RX ADMIN — SODIUM CHLORIDE, SODIUM LACTATE, POTASSIUM CHLORIDE, AND CALCIUM CHLORIDE: .6; .31; .03; .02 INJECTION, SOLUTION INTRAVENOUS at 12:28

## 2019-10-30 RX ADMIN — PHENYLEPHRINE HYDROCHLORIDE 150 MCG: 10 INJECTION INTRAVENOUS at 11:49

## 2019-10-30 RX ADMIN — NEOSTIGMINE METHYLSULFATE 3 MG: 1 INJECTION, SOLUTION INTRAVENOUS at 12:29

## 2019-10-30 RX ADMIN — SODIUM CHLORIDE, SODIUM LACTATE, POTASSIUM CHLORIDE, AND CALCIUM CHLORIDE: .6; .31; .03; .02 INJECTION, SOLUTION INTRAVENOUS at 10:05

## 2019-10-30 RX ADMIN — LIDOCAINE HYDROCHLORIDE 50 MG: 10 INJECTION, SOLUTION INFILTRATION; PERINEURAL at 10:37

## 2019-10-30 RX ADMIN — ROCURONIUM BROMIDE 30 MG: 50 INJECTION, SOLUTION INTRAVENOUS at 10:37

## 2019-10-30 RX ADMIN — MIDAZOLAM 2 MG: 1 INJECTION INTRAMUSCULAR; INTRAVENOUS at 10:29

## 2019-10-30 RX ADMIN — FENTANYL CITRATE 25 MCG: 50 INJECTION, SOLUTION INTRAMUSCULAR; INTRAVENOUS at 13:45

## 2019-10-30 RX ADMIN — DEXAMETHASONE SODIUM PHOSPHATE 5 MG: 10 INJECTION, SOLUTION INTRAMUSCULAR; INTRAVENOUS at 10:42

## 2019-10-30 RX ADMIN — ONDANSETRON 4 MG: 2 INJECTION INTRAMUSCULAR; INTRAVENOUS at 13:16

## 2019-10-30 RX ADMIN — FENTANYL CITRATE 50 MCG: 50 INJECTION, SOLUTION INTRAMUSCULAR; INTRAVENOUS at 10:37

## 2019-10-30 RX ADMIN — PROPOFOL 180 MG: 10 INJECTION, EMULSION INTRAVENOUS at 10:37

## 2019-10-30 RX ADMIN — METOCLOPRAMIDE 5 MG: 5 INJECTION, SOLUTION INTRAMUSCULAR; INTRAVENOUS at 13:36

## 2019-10-30 RX ADMIN — LIDOCAINE HYDROCHLORIDE 0.5 ML: 10 INJECTION, SOLUTION EPIDURAL; INFILTRATION; INTRACAUDAL; PERINEURAL at 10:13

## 2019-10-30 RX ADMIN — FENTANYL CITRATE 25 MCG: 50 INJECTION, SOLUTION INTRAMUSCULAR; INTRAVENOUS at 13:33

## 2019-10-30 RX ADMIN — ONDANSETRON 4 MG: 2 INJECTION INTRAMUSCULAR; INTRAVENOUS at 11:33

## 2019-10-30 RX ADMIN — GLYCOPYRROLATE 0.4 MG: 0.2 INJECTION, SOLUTION INTRAMUSCULAR; INTRAVENOUS at 12:29

## 2019-10-30 RX ADMIN — FENTANYL CITRATE 25 MCG: 50 INJECTION, SOLUTION INTRAMUSCULAR; INTRAVENOUS at 13:13

## 2019-10-30 RX ADMIN — HYDROMORPHONE HYDROCHLORIDE 0.4 MG: 1 INJECTION, SOLUTION INTRAMUSCULAR; INTRAVENOUS; SUBCUTANEOUS at 13:56

## 2019-10-30 RX ADMIN — FENTANYL CITRATE 25 MCG: 50 INJECTION, SOLUTION INTRAMUSCULAR; INTRAVENOUS at 13:41

## 2019-10-30 RX ADMIN — ROCURONIUM BROMIDE 10 MG: 50 INJECTION, SOLUTION INTRAVENOUS at 11:09

## 2019-10-30 RX ADMIN — CEFAZOLIN SODIUM 1000 MG: 1 SOLUTION INTRAVENOUS at 10:40

## 2019-10-30 RX ADMIN — OXYCODONE HYDROCHLORIDE AND ACETAMINOPHEN 1 TABLET: 5; 325 TABLET ORAL at 15:42

## 2019-10-30 NOTE — DISCHARGE INSTRUCTIONS
Diet and activity as tolerated  May shower  May drive when not taking pain medication  Milk the magnesia as needed order to help prevent constipation  Please call [phone] for a follow-up office visit for 2 weeks  Eitan-Hayes Drain Care   WHAT YOU NEED TO KNOW:   A Eitan-Hayes (JUANY) drain is used to remove fluids that build up in an area of your body after surgery  The JUANY drain is a bulb shaped device connected to a tube  One end of the tube is placed inside you during surgery  The other end comes out through a small cut in your skin  The bulb is connected to this end  You may have a stitch to hold the tube in place  DISCHARGE INSTRUCTIONS:   Seek care immediately if:   · Your JUANY drain breaks or comes out  · You have cloudy yellow or brown drainage from your JUANY drain site, or the drainage smells bad  Contact your healthcare provider if:   · You drain less than 30 milliliters (2 tablespoons) in 24 hours  This may mean your drain can be removed  · You suddenly stop draining fluid or think your JUANY drain is blocked  · You have a fever higher than 101 5°F (38 6°C)  · You have increased pain, redness, or swelling around the drain site  · You have questions about your JUANY drain care  How a Eitan-Hayes drain works: The JUANY drain removes fluids by creating suction in the tube  The bulb is squeezed flat and connected to the tube that sticks out of your body  The bulb expands as it fills with fluid  How to change the bandage around your Eitan-Hayes drain:  If you have a bandage, change it once a day  You may need to change your bandage more than once a day if it gets completely wet  · Wash your hands with soap and water  · Loosen the tape and gently remove the old bandage  Throw the old bandage into a plastic trash bag  · Use soap and water or saline (salt water) solution to clean your JUANY drain site   Dip a cotton swab or gauze pad in the solution and gently clean your skin      · Pat the area dry  · Place a new bandage on your JUANY drain site and secure it to your skin with medical tape  · Wash your hands  How to empty the Eitan-Hayes drain:  Empty the bulb when it is half full or every 8 to 12 hours  · Wash your hands with soap and water  · Remove the plug from the bulb  · Pour the fluid into a measuring cup  · Clean the plug with an alcohol swab or a cotton ball dipped in rubbing alcohol  · Squeeze the bulb flat and put the plug back in  The bulb should stay flat until it starts to fill with fluid again  · Measure the amount of fluid you pour out  Write down how much fluid you empty from the JUANY drain and the date and time you collected it  · Flush the fluid down the toilet  Wash your hands  Clear clogged tubing: Use the following steps to clear your Eitan-Hayes tubing:  · Hold the tubing between your thumb and first finger at the place closest to your skin  This hand will prevent the tube from being pulled out of your skin  · Use your other thumb and first finger to slide the clog down the tubing toward the bulb  You may have to repeat the sliding until the tubing is unclogged  Eitan-Hayes drain removal:  The amount of fluid that you drain will decrease as your wound heals  The JUANY drain usually is removed when less than 30 milliliters (2 tablespoons) is collected in 24 hours  Ask your healthcare provider when and how your JUANY drain will be removed  Follow up with your healthcare provider as directed:  Write down your questions so you remember to ask them during your visits  © 2017 [address] [employer] Information is for End User's use only and may not be sold, redistributed or otherwise used for commercial purposes  All illustrations and images included in CareNotes® are the copyrighted property of A D [employer] M [employer]  or Earnest Kim  The above information is an  only   It is not intended as medical advice for individual conditions or treatments  Talk to your doctor, nurse or pharmacist before following any medical regimen to see if it is safe and effective for you

## 2019-10-30 NOTE — ANESTHESIA PREPROCEDURE EVALUATION
Review of Systems/Medical History  Patient summary reviewed  Chart reviewed  No history of anesthetic complications     Cardiovascular  EKG reviewed, Exercise tolerance (METS): >4,  Hypertension ,    Pulmonary  Negative pulmonary ROS        GI/Hepatic  Dysphagia (mild),          Negative  ROS        Endo/Other  Negative endo/other ROS      GYN  Negative gynecology ROS          Hematology  Negative hematology ROS      Musculoskeletal  Negative musculoskeletal ROS        Neurology   Psychology   Negative psychology ROS              Physical Exam    Airway    Mallampati score: III  TM Distance: >3 FB  Neck ROM: full     Dental   No notable dental hx     Cardiovascular  Rhythm: regular, Rate: normal, Cardiovascular exam normal    Pulmonary  Pulmonary exam normal Breath sounds clear to auscultation,     Other Findings        Anesthesia Plan  ASA Score- 2     Anesthesia Type- general with ASA Monitors  Additional Monitors:   Airway Plan: ETT  Comment: Patient has history of partial glossectomy and pharyngectomy  Discussed potential risks and alternatives associated with general anesthesia  Will treat as potential difficult airway  Patient aware and agrees with plan        Plan Factors-  Patient did not smoke on day of surgery  Induction- intravenous  Postoperative Plan- Plan for postoperative opioid use  Planned trial extubation    Informed Consent- Anesthetic plan and risks discussed with patient  I personally reviewed this patient with the CRNA  Discussed and agreed on the Anesthesia Plan with the SABA Castillo

## 2019-10-30 NOTE — OP NOTE
OPERATIVE REPORT  PATIENT NAME: Yolanda Hdz    :  1955  MRN: [id number]  Pt Location: [facility] OR ROOM 07    SURGERY DATE: 10/30/2019    Surgeon(s) and Role:     * Shyann Schafer MD - Primary     * Alleen Meigs, MD - Assisting    Preop Diagnosis:  Calculus of gallbladder with chronic cholecystitis without obstruction [K80 10]    Post-Op Diagnosis Codes:     * Calculus of gallbladder with chronic cholecystitis without obstruction [K80 10]    Procedure(s) (LRB):  CHOLECYSTECTOMY LAPAROSCOPIC (N/A)    Specimen(s):  ID Type Source Tests Collected by Time Destination   1 :  Tissue Gallbladder TISSUE EXAM Shyann Schafer MD 10/30/2019 1138        Estimated Blood Loss:   Minimal    Drains:  Closed/Suction Drain Right RUQ Bulb 10 Fr  (Active)   Site Description Unable to view 10/30/2019  2:00 PM   Dressing Status Clean;Dry; Intact 10/30/2019  3:47 PM   Drainage Appearance Bloody 10/30/2019  3:47 PM   Status To bulb suction 10/30/2019  3:47 PM   Output (mL) 15 mL 10/30/2019  1:59 PM   Number of days: 0       Anesthesia Type:   General    Operative Indications:  Calculus of gallbladder with chronic cholecystitis without obstruction [K80 10]      Operative Findings:  Independent, nonsmoker, ASA 2, wound class 2, BMI 28, weight 170, height 70    Cardiovascular  EKG reviewed, Exercise tolerance (METS): >4,  Hypertension ,     Pulmonary  Negative pulmonary ROS          GI/Hepatic  Dysphagia (mild),             Negative  ROS          Endo/Other  Negative endo/other ROS        GYN  Negative gynecology ROS             Hematology  Negative hematology ROS        Musculoskeletal  Negative musculoskeletal ROS          Neurology    Psychology   Negative psychology ROS                Complications:   None    Procedure and Technique:  Yolanda Hdz is a 62 y o  male was brought into the operative suite and identified visually and by arm band  The patient was placed in the supine position   After sterile prep and drape a timeout was completed  Antibiotics were provided  Athrombic pumps in place  An incision was made at the umbilicus after instillation of local analgesia  With blunt dissection the peritoneum of the peritoneum was identified  A trocar was then inserted  CO2 was then insufflated with a back pressure of 15  The direct vision additional trochars are placed in the upper aspect of the abdomen  Laparoscopic visualization revealed a normal liver and normal stomach no excess peritoneal fluid  The gallbladder was markedly distended with chronic inflammation  Thickening of the gallbladder was notable  This was aspirated of white bile consistent with hydrops cholecystitis  Dome down technique was approached  The gallbladder was pulled on lateral traction and a dome down technique was completed with electrocautery  This was carried down to the level of Mason's pouch  The cystic duct was then dissected free  Careful attention was made towards the critical anatomy at this region  The cystic duct was identified inserting into the base of the gallbladder  It was then clipped ×3 and divided  There was a redundant right hepatic artery present  There is a foreshortened cystic artery due to the chronic inflammation associated with contraction of the cystic plate  The cystic artery was clipped ×2 and divided  The gallbladder was then removed from the liver bed  A portion of the gallbladder that was adjacent to the right hepatic artery was preserved as dissection against the right hepatic artery was felt to be tenuous  A    The area was copiously irrigated  Hemostasis was assured  A JUANY drain was introduced into the right upper quadrant and affixed to the skin  The gallbladder was then removed through the umbilical incision  Fascia was closed with 0 Vicryl suture  Subcuticular incisions were then closed  Histacryl was then applied   Patient was awakened from general anesthesia and transferred to the recovery room in stable condition  Sponge and instrument count correct ×2       I was present for the entire procedure    Patient Disposition:  PACU     SIGNATURE: Neetu Heard MD  DATE: October 30, 2019  TIME: 3:49 PM

## 2019-10-30 NOTE — INTERVAL H&P NOTE
H&P reviewed  After examining the patient I find no changes in the patients condition since the H&P had been written      Vitals:    10/30/19 0949   BP: 122/78   Pulse: 57   Resp: 16   Temp: (!) 96 8 °F (36 °C)   SpO2: 98%

## 2019-10-30 NOTE — ANESTHESIA POSTPROCEDURE EVALUATION
Post-Op Assessment Note    CV Status:  Stable    Pain management: adequate     Mental Status:  Alert and awake   Hydration Status:  Euvolemic   PONV Controlled:  Controlled   Airway Patency:  Patent   Post Op Vitals Reviewed: Yes      Staff: CRNA           BP   140/72   Temp  98 3   Pulse 74   Resp   18   SpO2   98

## 2019-11-18 PROBLEM — K80.10 CALCULUS OF GALLBLADDER WITH CHRONIC CHOLECYSTITIS WITHOUT OBSTRUCTION: Status: RESOLVED | Noted: 2019-10-22 | Resolved: 2019-11-18

## 2019-12-03 ENCOUNTER — HOSPITAL ENCOUNTER (OUTPATIENT)
Dept: RADIOLOGY | Age: 64
Discharge: HOME | End: 2019-12-03
Payer: COMMERCIAL

## 2019-12-03 DIAGNOSIS — C01 MALIGNANT NEOPLASM OF BASE OF TONGUE (HCC): ICD-10-CM

## 2019-12-03 LAB — GLUCOSE SERPL-MCNC: 106 MG/DL (ref 65–140)

## 2019-12-03 PROCEDURE — A9552 F18 FDG: HCPCS

## 2019-12-03 PROCEDURE — 82948 REAGENT STRIP/BLOOD GLUCOSE: CPT

## 2019-12-03 PROCEDURE — 78815 PET IMAGE W/CT SKULL-THIGH: CPT

## 2019-12-03 RX ORDER — LORAZEPAM 2 MG/ML
1 INJECTION INTRAMUSCULAR ONCE
Status: COMPLETED | OUTPATIENT
Start: 2019-12-03 | End: 2019-12-03

## 2019-12-03 RX ADMIN — LORAZEPAM 1 MG: 2 INJECTION INTRAMUSCULAR; INTRAVENOUS at 08:20

## 2020-06-04 PROBLEM — H61.21 CERUMEN DEBRIS ON TYMPANIC MEMBRANE OF RIGHT EAR: Status: ACTIVE | Noted: 2020-06-04

## 2020-06-04 PROBLEM — R13.10 DYSPHAGIA: Status: ACTIVE | Noted: 2020-06-04

## 2020-06-26 ENCOUNTER — APPOINTMENT (OUTPATIENT)
Dept: LAB | Facility: HOSPITAL | Age: 65
End: 2020-06-26
Payer: COMMERCIAL

## 2020-06-26 ENCOUNTER — HOSPITAL ENCOUNTER (OUTPATIENT)
Dept: CT IMAGING | Facility: HOSPITAL | Age: 65
Discharge: HOME | End: 2020-06-26
Payer: COMMERCIAL

## 2020-06-26 ENCOUNTER — TRANSCRIBE ORDERS (OUTPATIENT)
Dept: ADMINISTRATIVE | Facility: HOSPITAL | Age: 65
End: 2020-06-26

## 2020-06-26 DIAGNOSIS — C01 MALIGNANT NEOPLASM OF BASE OF TONGUE (HCC): ICD-10-CM

## 2020-06-26 DIAGNOSIS — R79.9 ABNORMAL BLOOD FINDING: Primary | ICD-10-CM

## 2020-06-26 DIAGNOSIS — R79.9 ABNORMAL BLOOD FINDING: ICD-10-CM

## 2020-06-26 LAB
BUN SERPL-MCNC: 15 MG/DL (ref 5–25)
CREAT SERPL-MCNC: 0.77 MG/DL (ref 0.6–1.3)
GFR SERPL CREATININE-BSD FRML MDRD: 96 ML/MIN/1.73SQ M

## 2020-06-26 PROCEDURE — 36415 COLL VENOUS BLD VENIPUNCTURE: CPT

## 2020-06-26 PROCEDURE — 82565 ASSAY OF CREATININE: CPT

## 2020-06-26 PROCEDURE — 70491 CT SOFT TISSUE NECK W/DYE: CPT

## 2020-06-26 PROCEDURE — 84520 ASSAY OF UREA NITROGEN: CPT

## 2020-06-26 PROCEDURE — 71260 CT THORAX DX C+: CPT

## 2020-06-26 RX ADMIN — IOHEXOL 80 ML: 350 INJECTION, SOLUTION INTRAVENOUS at 12:15

## 2021-03-10 DIAGNOSIS — Z23 ENCOUNTER FOR IMMUNIZATION: ICD-10-CM

## 2021-07-15 ENCOUNTER — HOSPITAL ENCOUNTER (OUTPATIENT)
Dept: CT IMAGING | Facility: HOSPITAL | Age: 66
Discharge: HOME | End: 2021-07-15
Payer: MEDICARE

## 2021-07-15 DIAGNOSIS — C01 MALIGNANT NEOPLASM OF BASE OF TONGUE (HCC): ICD-10-CM

## 2021-07-15 DIAGNOSIS — K14.6 TONGUE PAIN: ICD-10-CM

## 2021-07-15 PROCEDURE — G1004 CDSM NDSC: HCPCS

## 2021-07-15 PROCEDURE — 71260 CT THORAX DX C+: CPT

## 2021-07-15 PROCEDURE — 70491 CT SOFT TISSUE NECK W/DYE: CPT

## 2021-07-15 RX ADMIN — IOHEXOL 100 ML: 350 INJECTION, SOLUTION INTRAVENOUS at 18:04

## 2021-08-23 ENCOUNTER — OFFICE VISIT (OUTPATIENT)
Dept: OBGYN CLINIC | Facility: CLINIC | Age: 66
End: 2021-08-23
Payer: MEDICARE

## 2021-08-23 ENCOUNTER — APPOINTMENT (OUTPATIENT)
Dept: RADIOLOGY | Facility: CLINIC | Age: 66
End: 2021-08-23
Payer: MEDICARE

## 2021-08-23 VITALS
SYSTOLIC BLOOD PRESSURE: 120 MMHG | HEIGHT: 70 IN | BODY MASS INDEX: 24.91 KG/M2 | DIASTOLIC BLOOD PRESSURE: 63 MMHG | WEIGHT: 174 LBS

## 2021-08-23 DIAGNOSIS — G56.21 NEURITIS OF RIGHT ULNAR NERVE: ICD-10-CM

## 2021-08-23 DIAGNOSIS — M25.531 PAIN IN RIGHT WRIST: ICD-10-CM

## 2021-08-23 DIAGNOSIS — G56.21 CUBITAL TUNNEL SYNDROME ON RIGHT: Primary | ICD-10-CM

## 2021-08-23 PROCEDURE — 73110 X-RAY EXAM OF WRIST: CPT

## 2021-08-23 PROCEDURE — 99203 OFFICE O/P NEW LOW 30 MIN: CPT | Performed by: ORTHOPAEDIC SURGERY

## 2021-08-23 RX ORDER — DEXAMETHASONE SODIUM PHOSPHATE 4 MG/ML
4 INJECTION, SOLUTION INTRA-ARTICULAR; INTRALESIONAL; INTRAMUSCULAR; INTRAVENOUS; SOFT TISSUE EVERY 6 HOURS SCHEDULED
Qty: 30 ML | Refills: 0 | Status: SHIPPED | OUTPATIENT
Start: 2021-08-23 | End: 2021-11-08 | Stop reason: HOSPADM

## 2021-08-23 NOTE — PROGRESS NOTES
ASSESSMENT/PLAN:    Assessment:   Cubital Tunnel Syndrome Right  Right ulnar nerve neuritis    Plan:   Hand therapy referral for exercises and iontophoresis  Dexamethasone injection prescription was sent to the pharmacy, to be administered by therapist    Follow Up:  8  week(s)    To Do Next Visit:  Re-evaluation    General Discussions:  Cubital Tunnel Syndrome: The anatomy and physiology of cubital tunnel syndrome were discussed with the patient today in the office  Typically, increased elbow flexion activities decrease blood flow within the intraneural spaces, resulting in a feeling of numbness, tingling, weakness, or clumsiness within the hand and fingers  Occasionally, anatomic structures such as medial elbow osteophytes, the medial head of the triceps, were subluxing ulnar nerve may result in increased pressure or aggravation at the cubital tunnel  Typical signs and symptoms usually include numbness and tingling within the ring and small finger, weakness with , and weakness with pinch  Conservative treatment and includes nocturnal bracing to keep the elbow in a semi-extended position, activity modification, therapy, and avoiding excessive elbow flexion activities  A majority of patients typically respond to conservative treatment over a period of approximately 3-6 months  EMG/NCV testing of the ulnar nerve at the elbow is not as reliable as carpal tunnel syndrome    Surgical intervention in the form of in situ release of the ulnar nerve at the elbow or ulnar nerve transposition may be required in up to 20% of patients      _____________________________________________________  CHIEF COMPLAINT:  Chief Complaint   Patient presents with    Right Wrist - Pain    Right Hand - Pain    Right Elbow - Pain         SUBJECTIVE:  Elie Hdz is a 73 y o  male who presents with pain in the right wrist and elbow describes as moderate intensity, sharp and dull 1 present, localized to the lateral elbow going down the forearm to the ulnar side of the wrist   States that he has been experiencing this pain for multiple years  Radiation: Yes to the  wrist, ring and small finger  Previous Treatments: NSAIDs with only partial relief  Associated symptoms: None  Handedness: right  Work status:  Printer business    PAST MEDICAL HISTORY:  Past Medical History:   Diagnosis Date    Facial basal cell cancer     GERD (gastroesophageal reflux disease)     diet controlled    Hypertension     Vitamin D deficiency        PAST SURGICAL HISTORY:  Past Surgical History:   Procedure Laterality Date    COLONOSCOPY N/A 3/22/2016    Procedure: COLONOSCOPY;  Surgeon: Bozena Jacobo MD;  Location: [facility]; Service:     MI LAP,CHOLECYSTECTOMY N/A 10/30/2019    Procedure: CHOLECYSTECTOMY LAPAROSCOPIC;  Surgeon: Daisy Allen MD;  Location: [facility];  Service: General    MI LARYNGOSCOPY,DIRCT,OP,BIOPSY N/A 7/17/2019    Procedure: MICROLARYNGOSCOPY DIRECT WITH BIOPSY OF VALLECULAR MASS;  Surgeon: Khadar Blankenship MD;  Location: [facility];  Service: ENT    MI REMOVAL Pamla Odor MOD RAD Right 9/4/2019    Procedure: MODIFIED RADICAL NECK DISSECTION;  Surgeon: Mercedes Arriola MD;  [facility];  Service: ENT    RHINOPLASTY      TRANSORAL ROBOTIC SURGERY (TORS) N/A 7/31/2019    Procedure: ROBOTICALLY ASSISTED PARTIAL GLOSSECTOMY, PARTIAL PHARYNGECTOMY;  Surgeon: Mercedes Arriola MD;  Location[facility];  Service: ENT    WISDOM TOOTH EXTRACTION         FAMILY HISTORY:  History reviewed  No pertinent family history      SOCIAL HISTORY:  Social History     Tobacco Use    Smoking status: Never Smoker    Smokeless tobacco: Never Used   Substance Use Topics    Alcohol use: Not Currently     Alcohol/week: 9 0 standard drinks     Types: 9 Cans of beer per week     Comment: has stopped for about a month (today is 10/30/19)    Drug use: Not Currently     Types: Marijuana       MEDICATIONS:    Current Outpatient Medications:    amLODIPine (NORVASC) 5 mg tablet, Take 5 mg by mouth daily at bedtime , Disp: , Rfl:     lisinopril (ZESTRIL) 10 mg tablet, Take 10 mg by mouth daily at bedtime , Disp: , Rfl:     ALLERGIES:  Allergies   Allergen Reactions    Shellfish Allergy - Food Allergy Edema     HANDS, LIPS    Bee Venom Swelling     AT BITE SITE       REVIEW OF SYSTEMS:  Pertinent items are noted in HPI  A comprehensive review of systems was negative  LABS:  HgA1c: No results found for: HGBA1C  BMP:   Lab Results   Component Value Date    CALCIUM 9 2 07/08/2021     03/03/2016    K 3 9 07/08/2021    CO2 25 07/08/2021     07/08/2021    BUN 13 07/08/2021    CREATININE 0 87 07/08/2021         _____________________________________________________  PHYSICAL EXAMINATION:  Vital signs: /63   Ht 5' 10" (1 778 m)   Wt 78 9 kg (174 lb)   BMI 24 97 kg/m²   General: well developed and well nourished, alert, oriented times 3 and appears comfortable  Psychiatric: Normal  HEENT: Trachea Midline, No torticollis  Cardiovascular: No discernable arrhythmia  Pulmonary: No wheezing or stridor  Abdomen: No rebound or guarding  Extremities: No peripheral edema  Skin: No masses, erythema, lacerations, fluctation, ulcerations  Neurovascular: Sensation Intact to the Median, Ulnar, Radial Nerve, Motor Intact to the Median, Ulnar, Radial Nerve and Pulses Intact    MUSCULOSKELETAL EXAMINATION:  RIGHT SIDE:  Cubital Tunnel:  No weakness ulnar nerve, No ulnar nerve subluxation, No atrophy, Negative clawing and Tenderness to palpation to the ulnar nerve at the level of the elbow and Wrist:  Full Motion, No tenderness, No instability    _____________________________________________________  STUDIES REVIEWED:  No Studies to review      PROCEDURES PERFORMED:  Procedures  No Procedures performed today      I interviewed, took the history and examined the patient  I discuss the case with the resident and reviewed the resident's note    I supervised the resident and I agree with the resident management plan as it was presented to me  I was present in the clinic and examined the patient

## 2021-08-25 ENCOUNTER — EVALUATION (OUTPATIENT)
Dept: OCCUPATIONAL THERAPY | Facility: CLINIC | Age: 66
End: 2021-08-25
Payer: MEDICARE

## 2021-08-25 DIAGNOSIS — G56.21 CUBITAL TUNNEL SYNDROME ON RIGHT: ICD-10-CM

## 2021-08-25 DIAGNOSIS — G56.21 NEURITIS OF RIGHT ULNAR NERVE: ICD-10-CM

## 2021-08-25 PROCEDURE — 97165 OT EVAL LOW COMPLEX 30 MIN: CPT | Performed by: OCCUPATIONAL THERAPIST

## 2021-08-25 NOTE — PROGRESS NOTES
OT Evaluation     Today's date: 2021  Patient name: Tyesha Marrufo Page  : 1955  MRN: [id number]  Referring provider: Shari Yan MD  Dx:   Encounter Diagnosis     ICD-10-CM    1  Cubital tunnel syndrome on right  G56 21 Ambulatory referral to PT/OT hand therapy   2  Neuritis of right ulnar nerve  G56 21 Ambulatory referral to PT/OT hand therapy                  Assessment  Assessment details: Marques Rothman presents with c/o R wrist pain that was dx as ulnar neuritis   He has occasional wrist pain following a provocative incident ; golf or lifting  He has local tenderness medial elbow   He has good strength   He has intact sensation  He works in sales   He golfs   He lives with his wife  Impairments: lacks appropriate home exercise program and pain with function  Functional limitations: pain and limitation with lifting   Symptom irritability: lowUnderstanding of Dx/Px/POC: good   Prognosis: good    Goals  STG- 1 wk  1- I with HEP  2- compliant with splint at night    LTG- 4 wks  1- No pain with golf , lifting   2- modify work station and lifting technique  3- Meet expected [facility] outcomes    Plan  Patient would benefit from: custom splinting, OT eval and skilled occupational therapy  Planned modality interventions: cryotherapy and thermotherapy: hydrocollator packs  Planned therapy interventions: activity modification, manual therapy, joint mobilization, massage, orthotic fitting/training, home exercise program, stretching, graded motor, graded exercise, graded activity and flexibility  Frequency: 2x week  Duration in visits: 4  Plan of Care beginning date: 2021  Treatment plan discussed with: patient        Subjective Evaluation    History of Present Illness  Date of onset: 2016  Mechanism of injury: Marques Rothman reports onset of R hand pain that started several years ago   He has occasional flares  He flared last week     Quality of life: good    Pain  Current pain ratin  At best pain ratin  At worst pain rating: 10  Location: R hand   Quality: needle-like and radiating  Relieving factors: change in position and rest  Aggravating factors: lifting  Progression: no change    Social Support  Steps to enter house: yes  Stairs in house: yes   Lives in: multiple-level home  Lives with: spouse    Employment status: working  Hand dominance: right    Treatments  No previous or current treatments  Patient Goals  Patient goals for therapy: decreased pain, increased strength and independence with ADLs/IADLs  Patient goal: no pain         Objective     Palpation     Additional Palpation Details  Tender medial elbow flexor/pronator mass      Neurological Testing     Additional Neurological Details  2 point 5mm all    Active Range of Motion     Right Elbow   Normal active range of motion    Right Wrist   Normal active range of motion    Additional Active Range of Motion Details  Digit ROM WNL     Strength/Myotome Testing     Right Elbow   Normal strength    Left Wrist/Hand      (2nd hand position)     Trial 1: 94    Thumb Strength  Key/Lateral Pinch     Trial 1: 21  Palmar/Three-Point Pinch     Trial 1: 13    Right Wrist/Hand   Normal wrist strength     (2nd hand position)     Trial 1: 85    Thumb Strength   Key/Lateral Pinch     Trial 1: 22  Palmar/Three-Point Pinch     Trial 1: 15    Additional Strength Details  Digit ABD 5/5    Tests     Right Elbow   Negative Tinel's sign (cubital tunnel)       General Comments:      Shoulder Comments   Shoulder ROM and strength WNL              Precautions: Hx of cancer      Manuals 8/25            graston med elbow/wrist 4m            Butle MOB ulnar/medial 4m            MFR medial elbow 2m                         Night elbow orthosis 14m            HEP 5x day             Behavior MOD 2m                                                                             Ther Ex Ther Activity                                       Gait Training                                       Modalities                          CP  5m

## 2021-08-27 ENCOUNTER — OFFICE VISIT (OUTPATIENT)
Dept: OCCUPATIONAL THERAPY | Facility: CLINIC | Age: 66
End: 2021-08-27
Payer: MEDICARE

## 2021-08-27 DIAGNOSIS — G56.21 NEURITIS OF RIGHT ULNAR NERVE: Primary | ICD-10-CM

## 2021-08-27 DIAGNOSIS — G56.21 CUBITAL TUNNEL SYNDROME ON RIGHT: ICD-10-CM

## 2021-08-27 PROCEDURE — 97110 THERAPEUTIC EXERCISES: CPT | Performed by: OCCUPATIONAL THERAPIST

## 2021-08-27 PROCEDURE — 97140 MANUAL THERAPY 1/> REGIONS: CPT | Performed by: OCCUPATIONAL THERAPIST

## 2021-08-27 NOTE — PROGRESS NOTES
Daily Note     Today's date: 2021  Patient name: Norma Martino Page  : 1955  MRN: [id number]  Referring provider: Sylvia Maldonado MD  Dx:   Encounter Diagnosis     ICD-10-CM    1  Neuritis of right ulnar nerve  G56 21    2  Cubital tunnel syndrome on right  G56 21                   Subjective: I am feeling better, the medicine did not come in    Objective: See treatment diary below      Assessment: Tolerated treatment well  Patient has improved symptoms   Plan: Continue per plan of care        Precautions: Hx of cancer      Manuals            graston med elbow/wrist 4m 4m           Butle MOB ulnar/medial 4m 4m           MFR medial elbow 2m 2m                        Night elbow orthosis 14m            HEP 5x day             Behavior MOD 2m                                                                             Ther Ex             A/PROM R UE  2m           ER/IR/PRO  2#  3x10           Wrist ext   2#  3x10           flexgrip ext   Yellow  3x10                                                               Ther Activity                                       Gait Training                                       Modalities             MH  8m           CP  5m 5m

## 2021-08-30 ENCOUNTER — OFFICE VISIT (OUTPATIENT)
Dept: OCCUPATIONAL THERAPY | Facility: CLINIC | Age: 66
End: 2021-08-30
Payer: MEDICARE

## 2021-08-30 DIAGNOSIS — G56.21 CUBITAL TUNNEL SYNDROME ON RIGHT: ICD-10-CM

## 2021-08-30 DIAGNOSIS — G56.21 NEURITIS OF RIGHT ULNAR NERVE: Primary | ICD-10-CM

## 2021-08-30 PROCEDURE — 97110 THERAPEUTIC EXERCISES: CPT | Performed by: OCCUPATIONAL THERAPIST

## 2021-08-30 PROCEDURE — 97140 MANUAL THERAPY 1/> REGIONS: CPT | Performed by: OCCUPATIONAL THERAPIST

## 2021-08-30 NOTE — PROGRESS NOTES
Daily Note     Today's date: 2021  Patient name: Juanis Wilkersno Page  : 1955  MRN: [id number]  Referring provider: Swathi Corbett MD  Dx:   Encounter Diagnosis     ICD-10-CM    1  Neuritis of right ulnar nerve  G56 21    2  Cubital tunnel syndrome on right  G56 21                   Subjective: No pain        Objective: See treatment diary below      Assessment: Tolerated treatment well  Patient has improved symptoms  Plan: Continue per plan of care        Precautions: Hx of cancer      Manuals           graston med elbow/wrist 4m 4m 4m          Butle MOB ulnar/medial 4m 4m 4m          MFR medial elbow 2m 2m 2m                       Night elbow orthosis 14m            HEP 5x day             Behavior MOD 2m                                                                             Ther Ex             A/PROM R UE  2m 2m          ER/IR/PRO  2#  3x10 3#  3x10          Wrist ext   2#  3x10 2#  3x10          flexgrip ext   Yellow  3x10 Yellow  3x10                                                              Ther Activity                                       Gait Training                                       Modalities             MH  8m 8m          CP  5m 5m 5m

## 2021-09-02 ENCOUNTER — APPOINTMENT (OUTPATIENT)
Dept: OCCUPATIONAL THERAPY | Facility: CLINIC | Age: 66
End: 2021-09-02
Payer: MEDICARE

## 2021-09-13 ENCOUNTER — OFFICE VISIT (OUTPATIENT)
Dept: OCCUPATIONAL THERAPY | Facility: CLINIC | Age: 66
End: 2021-09-13
Payer: MEDICARE

## 2021-09-13 DIAGNOSIS — G56.21 NEURITIS OF RIGHT ULNAR NERVE: Primary | ICD-10-CM

## 2021-09-13 PROCEDURE — 97140 MANUAL THERAPY 1/> REGIONS: CPT | Performed by: OCCUPATIONAL THERAPIST

## 2021-09-13 PROCEDURE — 97110 THERAPEUTIC EXERCISES: CPT | Performed by: OCCUPATIONAL THERAPIST

## 2021-09-13 NOTE — PROGRESS NOTES
Daily Note     Today's date: 2021  Patient name: Nick Ramsey Page  : 1955  MRN: [id number]  Referring provider: Katharine Wilkerson MD  Dx:   Encounter Diagnosis     ICD-10-CM    1  Neuritis of right ulnar nerve  G56 21                   Subjective: I have not had any symptoms       Objective: See treatment diary below      Assessment: Tolerated treatment well  Patient has improved symptoms      Plan: Continue per plan of care  Precautions: Hx of cancer      Manuals          graston med elbow/wrist 4m 4m 4m 4m         Butle MOB ulnar/medial 4m 4m 4m 4m         MFR medial elbow 2m 2m 2m 2m                      Night elbow orthosis 14m            HEP 5x day             Behavior MOD 2m                                                                             Ther Ex             A/PROM R UE  2m 2m 2m         ER/IR/PRO  2#  3x10 3#  3x10 3#  4x10         Wrist ext   2#  3x10 2#  3x10 3#           flexgrip ext   Yellow  3x10 Yellow  3x10 Red  3x10                                                             Ther Activity                                       Gait Training                                       Modalities             MH  8m 8m Cont US     8w         CP  5m 5m 5m 5m

## 2021-09-16 ENCOUNTER — OFFICE VISIT (OUTPATIENT)
Dept: OCCUPATIONAL THERAPY | Facility: CLINIC | Age: 66
End: 2021-09-16
Payer: MEDICARE

## 2021-09-16 ENCOUNTER — TELEPHONE (OUTPATIENT)
Dept: OBGYN CLINIC | Facility: HOSPITAL | Age: 66
End: 2021-09-16

## 2021-09-16 DIAGNOSIS — G56.21 CUBITAL TUNNEL SYNDROME ON RIGHT: ICD-10-CM

## 2021-09-16 DIAGNOSIS — G56.21 NEURITIS OF RIGHT ULNAR NERVE: Primary | ICD-10-CM

## 2021-09-16 PROCEDURE — 97110 THERAPEUTIC EXERCISES: CPT | Performed by: OCCUPATIONAL THERAPIST

## 2021-09-16 PROCEDURE — 97530 THERAPEUTIC ACTIVITIES: CPT | Performed by: OCCUPATIONAL THERAPIST

## 2021-09-16 NOTE — TELEPHONE ENCOUNTER
Patient sees   [facility]  He called and left a message stating that he had his last OT appointment today and the medication that he was prescribed to be injected into his elbow was still on back order  He is asking if that script can be cancelled or would he need it?

## 2021-09-16 NOTE — PROGRESS NOTES
Daily Note     Today's date: 2021  Patient name: Jose Jaeger Page  : 1955  MRN: [id number]  Referring provider: Kathy Ahumada MD  Dx:   Encounter Diagnosis     ICD-10-CM    1  Neuritis of right ulnar nerve  G56 21    2  Cubital tunnel syndrome on right  G56 21                   Subjective: I had some pain in the elbow golfing       Objective: See treatment diary below      Assessment: Tolerated treatment well  Patient has improved strength   Mild discomfort with golf  Plan: as needed   Precautions: Hx of cancer      Manuals         graston med elbow/wrist 4m 4m 4m 4m 4m        Butle MOB ulnar/medial 4m 4m 4m 4m 4m        MFR medial elbow 2m 2m 2m 2m 2m                     Night elbow orthosis 14m            HEP 5x day             Behavior MOD 2m            HEP - Tband     Row  ER                                                            Ther Ex             A/PROM R UE  2m 2m 2m 2m        ER/IR/PRO  2#  3x10 3#  3x10 3#  4x10 3#  4x10        Wrist ext   2#  3x10 2#  3x10 3#   3#  3x10        flexgrip ext   Yellow  3x10 Yellow  3x10 Red  3x10 Red  3x10                                                            Ther Activity                                       Gait Training                                       Modalities             MH  8m 8m Cont US     8w MH   8m        CP  5m 5m 5m 5m 5m
all other ROS negative except as per HPI

## 2021-11-08 ENCOUNTER — OFFICE VISIT (OUTPATIENT)
Dept: OBGYN CLINIC | Facility: CLINIC | Age: 66
End: 2021-11-08
Payer: MEDICARE

## 2021-11-08 VITALS
DIASTOLIC BLOOD PRESSURE: 80 MMHG | HEIGHT: 69 IN | SYSTOLIC BLOOD PRESSURE: 122 MMHG | WEIGHT: 179 LBS | BODY MASS INDEX: 26.51 KG/M2

## 2021-11-08 DIAGNOSIS — G56.21 CUBITAL TUNNEL SYNDROME ON RIGHT: Primary | ICD-10-CM

## 2021-11-08 PROCEDURE — 99213 OFFICE O/P EST LOW 20 MIN: CPT | Performed by: ORTHOPAEDIC SURGERY

## 2022-01-03 ENCOUNTER — PREP FOR PROCEDURE (OUTPATIENT)
Dept: GASTROENTEROLOGY | Facility: CLINIC | Age: 67
End: 2022-01-03

## 2022-01-03 DIAGNOSIS — R13.19 ESOPHAGEAL DYSPHAGIA: Primary | ICD-10-CM

## 2022-01-04 ENCOUNTER — TELEPHONE (OUTPATIENT)
Dept: GASTROENTEROLOGY | Facility: CLINIC | Age: 67
End: 2022-01-04

## 2022-01-04 NOTE — TELEPHONE ENCOUNTER
Scheduled date of EGD(as of today): 1/6/22  Physician performing EGD: Dr Montana  Location of EGD: [facility]  Instructions reviewed with patient by: Juan Diego Walter to [email or web address]  Clearances: N/A    Per Alf curry'ed to schedule in  4 at 11:45

## 2022-01-05 ENCOUNTER — TELEPHONE (OUTPATIENT)
Dept: GASTROENTEROLOGY | Facility: HOSPITAL | Age: 67
End: 2022-01-05

## 2022-01-06 ENCOUNTER — HOSPITAL ENCOUNTER (OUTPATIENT)
Dept: GASTROENTEROLOGY | Facility: HOSPITAL | Age: 67
Setting detail: OUTPATIENT SURGERY
Discharge: HOME | End: 2022-01-06
Attending: INTERNAL MEDICINE | Admitting: INTERNAL MEDICINE
Payer: MEDICARE

## 2022-01-06 ENCOUNTER — ANESTHESIA (OUTPATIENT)
Dept: GASTROENTEROLOGY | Facility: HOSPITAL | Age: 67
End: 2022-01-06

## 2022-01-06 ENCOUNTER — ANESTHESIA EVENT (OUTPATIENT)
Dept: GASTROENTEROLOGY | Facility: HOSPITAL | Age: 67
End: 2022-01-06

## 2022-01-06 VITALS
TEMPERATURE: 97.9 F | HEART RATE: 68 BPM | OXYGEN SATURATION: 97 % | RESPIRATION RATE: 16 BRPM | SYSTOLIC BLOOD PRESSURE: 119 MMHG | DIASTOLIC BLOOD PRESSURE: 74 MMHG

## 2022-01-06 DIAGNOSIS — R13.19 ESOPHAGEAL DYSPHAGIA: ICD-10-CM

## 2022-01-06 PROBLEM — I10 HYPERTENSION: Status: ACTIVE | Noted: 2022-01-06

## 2022-01-06 PROCEDURE — 88305 TISSUE EXAM BY PATHOLOGIST: CPT | Performed by: PATHOLOGY

## 2022-01-06 PROCEDURE — 88313 SPECIAL STAINS GROUP 2: CPT | Performed by: PATHOLOGY

## 2022-01-06 PROCEDURE — 88342 IMHCHEM/IMCYTCHM 1ST ANTB: CPT | Performed by: PATHOLOGY

## 2022-01-06 PROCEDURE — 43239 EGD BIOPSY SINGLE/MULTIPLE: CPT | Performed by: INTERNAL MEDICINE

## 2022-01-06 RX ORDER — SODIUM CHLORIDE 9 MG/ML
INJECTION, SOLUTION INTRAVENOUS CONTINUOUS PRN
Status: DISCONTINUED | OUTPATIENT
Start: 2022-01-06 | End: 2022-01-06

## 2022-01-06 RX ORDER — PROPOFOL 10 MG/ML
INJECTION, EMULSION INTRAVENOUS AS NEEDED
Status: DISCONTINUED | OUTPATIENT
Start: 2022-01-06 | End: 2022-01-06

## 2022-01-06 RX ORDER — LIDOCAINE HYDROCHLORIDE 10 MG/ML
INJECTION, SOLUTION EPIDURAL; INFILTRATION; INTRACAUDAL; PERINEURAL AS NEEDED
Status: DISCONTINUED | OUTPATIENT
Start: 2022-01-06 | End: 2022-01-06

## 2022-01-06 RX ORDER — SODIUM CHLORIDE 9 MG/ML
20 INJECTION, SOLUTION INTRAVENOUS CONTINUOUS
Status: CANCELLED | OUTPATIENT
Start: 2022-01-06

## 2022-01-06 RX ORDER — SODIUM CHLORIDE 9 MG/ML
125 INJECTION, SOLUTION INTRAVENOUS CONTINUOUS
Status: CANCELLED | OUTPATIENT
Start: 2022-01-06

## 2022-01-06 RX ORDER — FENTANYL CITRATE 50 UG/ML
INJECTION, SOLUTION INTRAMUSCULAR; INTRAVENOUS AS NEEDED
Status: DISCONTINUED | OUTPATIENT
Start: 2022-01-06 | End: 2022-01-06

## 2022-01-06 RX ORDER — ONDANSETRON 2 MG/ML
4 INJECTION INTRAMUSCULAR; INTRAVENOUS ONCE AS NEEDED
Status: CANCELLED | OUTPATIENT
Start: 2022-01-06

## 2022-01-06 RX ADMIN — PROPOFOL 100 MG: 10 INJECTION, EMULSION INTRAVENOUS at 11:17

## 2022-01-06 RX ADMIN — SODIUM CHLORIDE: 0.9 INJECTION, SOLUTION INTRAVENOUS at 11:13

## 2022-01-06 RX ADMIN — PROPOFOL 20 MG: 10 INJECTION, EMULSION INTRAVENOUS at 11:25

## 2022-01-06 RX ADMIN — FENTANYL CITRATE 25 MCG: 50 INJECTION INTRAMUSCULAR; INTRAVENOUS at 11:17

## 2022-01-06 RX ADMIN — PROPOFOL 20 MG: 10 INJECTION, EMULSION INTRAVENOUS at 11:18

## 2022-01-06 RX ADMIN — LIDOCAINE HYDROCHLORIDE 50 MG: 10 INJECTION, SOLUTION EPIDURAL; INFILTRATION; INTRACAUDAL; PERINEURAL at 11:17

## 2022-01-06 RX ADMIN — PROPOFOL 20 MG: 10 INJECTION, EMULSION INTRAVENOUS at 11:21

## 2022-01-06 NOTE — H&P
History and Physical - [facility]  Deann Hdz 73 y o  male MRN: [id number]    HPI: Deann Hdz is a 73y o  year old male who presents for EGD  Patient with history of tongue cancer complaining of dysphagia symptoms with pills and food  REVIEW OF SYSTEMS: Per the HPI, and otherwise unremarkable  Historical Information   Past Medical History:   Diagnosis Date    Facial basal cell cancer     GERD (gastroesophageal reflux disease)     diet controlled    Hypertension     Vitamin D deficiency      Past Surgical History:   Procedure Laterality Date    COLONOSCOPY N/A 3/22/2016    Procedure: COLONOSCOPY;  Surgeon: Daniel Hutchinson MD;  Location: [facility]; Service:     [place] LAP,CHOLECYSTECTOMY N/A 10/30/2019    Procedure: CHOLECYSTECTOMY LAPAROSCOPIC;  Surgeon: Monika Interiano MD;  [facility];  Service: General    [place] LARYNGOSCOPY,DIRCT,OP,BIOPSY N/A 7/17/2019    Procedure: MICROLARYNGOSCOPY DIRECT WITH BIOPSY OF VALLECULAR MASS;  Surgeon: Gretta Danielle MD;  [facility];  Service: ENT    [place] REMOVAL Myrtie Joey MOD RAD Right 9/4/2019    Procedure: MODIFIED RADICAL NECK DISSECTION;  Surgeon: Julian Bhakta MD;  [facility];  Service: ENT    RHINOPLASTY      TRANSORAL ROBOTIC SURGERY (TORS) N/A 7/31/2019    Procedure: ROBOTICALLY ASSISTED PARTIAL GLOSSECTOMY, PARTIAL PHARYNGECTOMY;  Surgeon: Julian Bhakta MD;  [facility];  Service: ENT    WISDOM TOOTH EXTRACTION       Social History   Social History     Substance and Sexual Activity   Alcohol Use Not Currently    Alcohol/week: 5 0 standard drinks    Types: 5 Cans of beer per week    Comment: weekend     Social History     Substance and Sexual Activity   Drug Use Not Currently     Social History     Tobacco Use   Smoking Status Never Smoker   Smokeless Tobacco Never Used     History reviewed  No pertinent family history      Meds/Allergies     (Not in a hospital admission)      Allergies   Allergen Reactions    Shellfish Allergy - Food Allergy Edema     HANDS, LIPS    Bee Venom Swelling     AT BITE SITE       Objective     There were no vitals taken for this visit  PHYSICAL EXAM    Gen: NAD  CV: RRR  CHEST: Clear  ABD: soft, NT/ND  EXT: no edema      ASSESSMENT/PLAN:  This is a 73y o  year old male here for EGD, and he is stable and optimized for his procedure

## 2022-01-06 NOTE — ANESTHESIA PREPROCEDURE EVALUATION
Procedure:  EGD    Relevant Problems   CARDIO   (+) Hypertension      GI/HEPATIC   (+) Dysphagia      Other   (+) Laryngopharyngeal reflux (LPR)        Physical Exam    Airway    Mallampati score: III  TM Distance: >3 FB  Neck ROM: full     Dental   No notable dental hx     Cardiovascular      Pulmonary      Other Findings        Anesthesia Plan  ASA Score- 2     Anesthesia Type- IV sedation with anesthesia with ASA Monitors  Additional Monitors:   Airway Plan:           Plan Factors-    Chart reviewed  Existing labs reviewed  Patient summary reviewed  Induction- intravenous  Postoperative Plan-     Informed Consent- Anesthetic plan and risks discussed with patient  I personally reviewed this patient with the CRNA  Discussed and agreed on the Anesthesia Plan with the CRNA  John Lema

## 2022-01-06 NOTE — ANESTHESIA POSTPROCEDURE EVALUATION
Post-Op Assessment Note    CV Status:  Stable    Pain management: adequate     Mental Status:  Alert and awake   Hydration Status:  Stable   PONV Controlled:  None   Airway Patency:  Patent      Post Op Vitals Reviewed: Yes      Staff: Anesthesiologist, CRNA         No complications documented      /74 (01/06/22 1132)    Temp 97 9 °F (36 6 °C) (01/06/22 1132)    Pulse 72 (01/06/22 1132)   Resp 16 (01/06/22 1132)    SpO2 97 % (01/06/22 1132)

## 2022-04-05 ENCOUNTER — OFFICE VISIT (OUTPATIENT)
Dept: GASTROENTEROLOGY | Facility: CLINIC | Age: 67
End: 2022-04-05
Payer: MEDICARE

## 2022-04-05 VITALS
RESPIRATION RATE: 18 BRPM | HEIGHT: 69 IN | OXYGEN SATURATION: 98 % | TEMPERATURE: 97.8 F | DIASTOLIC BLOOD PRESSURE: 70 MMHG | HEART RATE: 70 BPM | WEIGHT: 179 LBS | BODY MASS INDEX: 26.51 KG/M2 | SYSTOLIC BLOOD PRESSURE: 130 MMHG

## 2022-04-05 DIAGNOSIS — Z12.11 COLON CANCER SCREENING: ICD-10-CM

## 2022-04-05 DIAGNOSIS — K22.10 ULCER OF ESOPHAGUS WITHOUT BLEEDING: ICD-10-CM

## 2022-04-05 DIAGNOSIS — R13.19 ESOPHAGEAL DYSPHAGIA: Primary | ICD-10-CM

## 2022-04-05 PROBLEM — K21.9 LARYNGOPHARYNGEAL REFLUX (LPR): Status: RESOLVED | Noted: 2019-09-12 | Resolved: 2022-04-05

## 2022-04-05 PROCEDURE — 99214 OFFICE O/P EST MOD 30 MIN: CPT | Performed by: INTERNAL MEDICINE

## 2022-04-07 NOTE — PROGRESS NOTES
[facility] - Outpatient Follow-up Note  Munir Ewing Page 78 y o  male MRN: [id number]  Encounter: [id number]          ASSESSMENT AND PLAN:      1  Esophageal dysphagia  2  Ulcer of esophagus without bleeding  His distal esophagus ulcer could have been due to reflux or pill induced  He feels confident it was most likely the herbal supplements since his symptoms resolved when he stopped taking them  Fortunately the biopsies were all negative and his symptoms have completely resolved  I suggested repeat endoscopy in three months to ensure healing of the ulcer but he declined since he feels his symptoms completely resolved when he stopped the or herbal supplements  I asked him to let me know if he has any recurrence of symptoms  3  Colon cancer screening  He is due for his next screening colonoscopy in 2026     ______________________________________________________________________    SUBJECTIVE:  He presents for follow-up after his upper endoscopy because of dysphagia  He was found to have an ulcer in the distal esophagus but all the biopsies were negative  He feels his dysphagia has completely resolved after stopping his herbal supplements  He denies any nausea, vomiting, reflux, bleeding, and weight loss  His last colonoscopy was negative in 2016 as it only showed diverticulosis but no polyps  REVIEW OF SYSTEMS IS OTHERWISE NEGATIVE  Historical Information   Past Medical History:   Diagnosis Date    Facial basal cell cancer     GERD (gastroesophageal reflux disease)     diet controlled    Hypertension     Vitamin D deficiency      Past Surgical History:   Procedure Laterality Date    COLONOSCOPY N/A 3/22/2016    Procedure: COLONOSCOPY;  Surgeon: Juan Daniel Jones MD;  Location: [facility];   Service:    Beni GOODEN,CHOLECYSTECTOMY N/A 10/30/2019    Procedure: CHOLECYSTECTOMY LAPAROSCOPIC;  Surgeon: Samella Najjar, MD;  Location: [facility] OR;  Service: Ezekiel SANDERS LARYNGOSCOPY,DIRCT,OP,BIOPSY N/A 7/17/2019    Procedure: MICROLARYNGOSCOPY DIRECT WITH BIOPSY OF VALLECULAR MASS;  Surgeon: Francia Aldrich MD;  [facility];  Service: ENT    [place] REMOVAL Sebeka Solar MOD RAD Right 9/4/2019    Procedure: MODIFIED RADICAL NECK DISSECTION;  Surgeon: Nilda Li MD;  [facility];  Service: ENT    RHINOPLASTY      TRANSORAL ROBOTIC SURGERY (TORS) N/A 7/31/2019    Procedure: ROBOTICALLY ASSISTED PARTIAL GLOSSECTOMY, PARTIAL PHARYNGECTOMY;  Surgeon: Nilda Li MD;  [facility];  Service: ENT    WISDOM TOOTH EXTRACTION       Social History   Social History     Substance and Sexual Activity   Alcohol Use Not Currently    Alcohol/week: 5 0 standard drinks    Types: 5 Cans of beer per week    Comment: weekend     Social History     Substance and Sexual Activity   Drug Use Not Currently     Social History     Tobacco Use   Smoking Status Never Smoker   Smokeless Tobacco Never Used     History reviewed  No pertinent family history  Meds/Allergies       Current Outpatient Medications:     amLODIPine (NORVASC) 5 mg tablet    lisinopril (ZESTRIL) 10 mg tablet    Allergies   Allergen Reactions    Shellfish Allergy - Food Allergy Edema     HANDS, LIPS    Bee Venom Swelling     AT BITE SITE           Objective     Blood pressure 130/70, pulse 70, temperature 97 8 °F (36 6 °C), temperature source Tympanic, resp  rate 18, height 5' 9" (1 753 m), weight 81 2 kg (179 lb), SpO2 98 %  Body mass index is 26 43 kg/m²  PHYSICAL EXAM:      General Appearance:   Alert, cooperative, no distress   HEENT:   Normocephalic, atraumatic, anicteric      Neck:  Supple, symmetrical, trachea midline   Lungs:   Clear to auscultation bilaterally; no rales, rhonchi or wheezing; respirations unlabored    Heart[de-identified]   Regular rate and rhythm; no murmur, rub, or gallop     Abdomen:   Soft, non-tender, non-distended; normal bowel sounds; no masses, no organomegaly    Genitalia:   Deferred    Rectal:   Deferred    Extremities:  No cyanosis, clubbing or edema    Pulses:  2+ and symmetric    Skin:  No jaundice, rashes, or lesions    Lymph nodes:  No palpable cervical lymphadenopathy        Lab Results:   No visits with results within 1 Day(s) from this visit  Latest known visit with results is:   Hospital Outpatient Visit on 01/06/2022   Component Date Value    Case Report 01/06/2022                      Value:Surgical Pathology Report                         Case: [id number]                                   Authorizing Provider:  Frantz Montes MD          Collected:           01/06/2022 1123              Ordering Location:     [address] [place] [address]      Received:            01/06/2022 [address] [place] [address] Endoscopy                                                           Pathologist:           Onur Dudley MD                                                           Specimens:   A) - Esophagus, esophageal ulcers - R/O malignancy                                                  B) - Esophagus, distal - R/O EOE                                                                    C) - Esophagus, proximal - R/O EOE                                                         Final Diagnosis 01/06/2022                      Value: This result contains rich text formatting which cannot be displayed here   Note 01/06/2022                      Value: This result contains rich text formatting which cannot be displayed here   Additional Information 01/06/2022                      Value: This result contains rich text formatting which cannot be displayed here  Niesha Vigil Gross Description 01/06/2022                      Value: This result contains rich text formatting which cannot be displayed here  Radiology Results:   No results found    Answers for HPI/ROS submitted by the patient on 3/29/2022  Chronicity: new  Onset: more than 1 year ago  Onset quality: undetermined  Frequency: rarely  Episode duration: 1 hours  Progression since onset: resolved  Pain - numeric: 0/10  frequency: Yes  Aggravated by: nothing  Relieved by: nothing

## 2022-06-08 ENCOUNTER — TELEPHONE (OUTPATIENT)
Dept: UROLOGY | Facility: MEDICAL CENTER | Age: 67
End: 2022-06-08

## 2022-06-08 NOTE — TELEPHONE ENCOUNTER
New patient called and scheduled and appt with Dr Alfonso to discuss Uro lift on 8/25/2022  Patient is requesting a virtual visit instead of office visit  Please advise     Pt can be reached at [phone]

## 2022-08-19 ENCOUNTER — TELEPHONE (OUTPATIENT)
Dept: OBGYN CLINIC | Facility: HOSPITAL | Age: 67
End: 2022-08-19

## 2022-08-19 NOTE — TELEPHONE ENCOUNTER
Hello,  Please advise if the following patient can be forced onto the schedule:    Patient: Frank Lama     : 1955    MRN:  [id number]    Call back #: [phone]    Insurance: [facility]    Reason for appointment: n/i L elbow pain    Requested doctor/location: Pablo/Chris      Thank you

## 2022-08-24 NOTE — PROGRESS NOTES
Referring Physician: No primary care provider on file  A copy of this note was sent to the referring physician  Diagnoses and all orders for this visit:    Benign prostatic hyperplasia with urinary frequency    Other orders  -     doxycycline (ADOXA) 100 MG tablet; Take 100 mg by mouth 2 (two) times a day            Assessment and plan:       1  BPH with urinary frequency and nocturia  -patient has declined medical management  -interested in minimally invasive surgical options    2  Prostate cancer screening  -recent PSA 1 7    Today, we discussed a stepwise approach in treating lower urinary tract symptoms associated with BPH  Lower urinary tract symptoms (LUTS) can be sub-divided into those that result from failure of the bladder to store urine normally ("storage symptoms"), those that result from difficulties in emptying ("voiding symptoms"), or those that follow micturition ("post-micturition symptoms")  Obstructive symptoms such as hesitancy, weak stream, and pushing to urinate are classified as voiding symptoms  OAB is due to the inability of the bladder to store urine normally  The symptoms of frequency, urgency, nocturia, and urge incontinence are classified as storage symptoms  I discussed the mainstays of therapy for voiding symptoms including alpha blockers, 5-alpha reductase inhibitors, and surgical management including TURP (laser, monopolar, bipolar, button electrode), TUIP, and prostatectomy  I had a candid discussion about the risks of each of these medications and the mechanism of action  I also discussed the use of PD5 inhibitors for LUTS  Patient wishes to be evaluated for surgical options  He declines prescription for medical management  I recommended flexible cystoscopy for further evaluation  Discussed the risks benefits and alternatives to this diagnostic procedure    Risks include but are not limited to hematuria, pain, urinary tract infection, stricture formation, possible need for hospitalization  Patient verbalized understanding and has elected to proceed  Cystoscopy, transrectal ultrasound, uroflow, PVR will be scheduled in the near future  Nirmal Shah MD      Chief Complaint     BPH evaluation      History of Present Illness     Nba Hdz is a 78 y o  male referred in consultation BPH    This is a very pleasant 15-year-old male with a history of urinary frequency urgency and nocturia between 3 and 5 times per night  He has not tried any medical management the past   He states he is not a pill person   His brother was recently evaluated for surgical options and ultimately underwent the Uro lift procedure with success  Patient states that his brother was diagnosed incidentally with bladder cancer at the time of his Uro lift evaluation  Patient wishes to discuss minimally invasive surgical management as options    Past medical history includes 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, dysphagia, hypertension    No hematuria or urinary tract infections    Detailed Urologic History     - please refer to HPI    Review of Systems     Review of Systems   Constitutional: Negative for activity change and fatigue  HENT: Negative for congestion  Eyes: Negative for visual disturbance  Respiratory: Negative for shortness of breath and wheezing  Cardiovascular: Negative for chest pain and leg swelling  Gastrointestinal: Negative for abdominal pain  Endocrine: Negative for polyuria  Genitourinary: Negative for dysuria, flank pain, hematuria and urgency  Musculoskeletal: Negative for back pain  Allergic/Immunologic: Negative for immunocompromised state  Neurological: Negative for dizziness and numbness  Psychiatric/Behavioral: Negative for dysphoric mood  All other systems reviewed and are negative      AUA SYMPTOM SCORE    Flowsheet Row Most Recent Value   AUA SYMPTOM SCORE    How often have you had a sensation of not emptying your bladder completely after you finished urinating? 0 (P)     How often have you had to urinate again less than two hours after you finished urinating? 5 (P)     How often have you found you stopped and started again several times when you urinate? 4 (P)     How often have you found it difficult to postpone urination? 5 (P)     How often have you had a weak urinary stream? 3 (P)     How often have you had to push or strain to begin urination? 2 (P)     How many times did you most typically get up to urinate from the time you went to bed at night until the time you got up in the morning? 5 (P)     Quality of Life: If you were to spend the rest of your life with your urinary condition just the way it is now, how would you feel about that? 5 (P)     AUA SYMPTOM SCORE 24 (P)             Allergies     Allergies   Allergen Reactions    Shellfish Allergy - Food Allergy Edema     HANDS, LIPS    Bee Venom Swelling     AT BITE SITE       Physical Exam       Physical Exam  Constitutional:       General: He is not in acute distress  Appearance: He is well-developed  HENT:      Head: Normocephalic and atraumatic  Cardiovascular:      Comments: Negative lower extremity edema  Pulmonary:      Effort: Pulmonary effort is normal       Breath sounds: Normal breath sounds  Abdominal:      Palpations: Abdomen is soft  Musculoskeletal:         General: Normal range of motion  Cervical back: Normal range of motion  Skin:     General: Skin is warm  Neurological:      Mental Status: He is alert and oriented to person, place, and time  Psychiatric:         Behavior: Behavior normal            Vital Signs  There were no vitals filed for this visit        Current Medications       Current Outpatient Medications:     amLODIPine (NORVASC) 5 mg tablet, Take 5 mg by mouth daily at bedtime , Disp: , Rfl:     lisinopril (ZESTRIL) 10 mg tablet, Take 10 mg by mouth daily at bedtime , Disp: , Rfl:       Active Problems     Patient Active Problem List   Diagnosis    Vallecular mass    Malignant neoplasm of base of tongue ([address] )    Status post radical dissection of neck    Tongue pain    Cerumen debris on tympanic membrane of right ear    Dysphagia    Hypertension         Past Medical History     Past Medical History:   Diagnosis Date    Facial basal cell cancer     GERD (gastroesophageal reflux disease)     diet controlled    Hypertension     Vitamin D deficiency          Surgical History     Past Surgical History:   Procedure Laterality Date    COLONOSCOPY N/A 3/22/2016    Procedure: COLONOSCOPY;  Surgeon: Roque Moreno MD;  Location: [facility]; Service:     KS LAP,CHOLECYSTECTOMY N/A 10/30/2019    Procedure: CHOLECYSTECTOMY LAPAROSCOPIC;  Surgeon: Sarah Scanlon MD;  [facility];  Service: General    KS LARYNGOSCOPY,DIRCT,OP,BIOPSY N/A 7/17/2019    Procedure: MICROLARYNGOSCOPY DIRECT WITH BIOPSY OF VALLECULAR MASS;  Surgeon: Mateusz Mar MD;  [facility];  Service: ENT    KS REMOVAL [facility] MOD RAD Right 9/4/2019    Procedure: MODIFIED RADICAL NECK DISSECTION;  Surgeon: Lauren Salcedo MD;  [facility];  Service: ENT    RHINOPLASTY      TRANSORAL ROBOTIC SURGERY (TORS) N/A 7/31/2019    Procedure: ROBOTICALLY ASSISTED PARTIAL GLOSSECTOMY, PARTIAL PHARYNGECTOMY;  Surgeon: Lauren Salcedo MD;  [facility];  Service: ENT    WISDOM TOOTH EXTRACTION           Family History     No family history on file  Social History     Social History     Social History     Tobacco Use   Smoking Status Never Smoker   Smokeless Tobacco Never Used         Pertinent Lab Values     Lab Results   Component Value Date    CREATININE 0 87 07/08/2021       No results found for: PSA    @RESULTRCNT(1H])@      Pertinent Imaging      - n/a    Portions of the record may have been created with voice recognition software    Occasional wrong word or "sound a like" substitutions may have occurred due to the inherent limitations of voice recognition software  Read the chart carefully and recognize, using context, where substitutions have occurred

## 2022-08-25 ENCOUNTER — OFFICE VISIT (OUTPATIENT)
Dept: UROLOGY | Facility: CLINIC | Age: 67
End: 2022-08-25
Payer: MEDICARE

## 2022-08-25 VITALS
BODY MASS INDEX: 26.66 KG/M2 | HEART RATE: 73 BPM | OXYGEN SATURATION: 98 % | WEIGHT: 180 LBS | DIASTOLIC BLOOD PRESSURE: 84 MMHG | SYSTOLIC BLOOD PRESSURE: 120 MMHG | HEIGHT: 69 IN

## 2022-08-25 DIAGNOSIS — R35.0 BENIGN PROSTATIC HYPERPLASIA WITH URINARY FREQUENCY: Primary | ICD-10-CM

## 2022-08-25 DIAGNOSIS — N40.1 BENIGN PROSTATIC HYPERPLASIA WITH URINARY FREQUENCY: Primary | ICD-10-CM

## 2022-08-25 PROCEDURE — 99204 OFFICE O/P NEW MOD 45 MIN: CPT | Performed by: UROLOGY

## 2022-08-25 RX ORDER — DOXYCYCLINE 100 MG/1
100 TABLET ORAL 2 TIMES DAILY
COMMUNITY
Start: 2022-08-22

## 2022-08-29 ENCOUNTER — APPOINTMENT (OUTPATIENT)
Dept: RADIOLOGY | Facility: CLINIC | Age: 67
End: 2022-08-29
Payer: MEDICARE

## 2022-08-29 ENCOUNTER — OFFICE VISIT (OUTPATIENT)
Dept: OBGYN CLINIC | Facility: CLINIC | Age: 67
End: 2022-08-29
Payer: MEDICARE

## 2022-08-29 VITALS
SYSTOLIC BLOOD PRESSURE: 128 MMHG | HEIGHT: 69 IN | WEIGHT: 180 LBS | BODY MASS INDEX: 26.66 KG/M2 | DIASTOLIC BLOOD PRESSURE: 80 MMHG

## 2022-08-29 DIAGNOSIS — M70.22 OLECRANON BURSITIS OF LEFT ELBOW: ICD-10-CM

## 2022-08-29 DIAGNOSIS — M25.522 PAIN IN LEFT ELBOW: ICD-10-CM

## 2022-08-29 DIAGNOSIS — M25.729 OLECRANON BONE SPUR: ICD-10-CM

## 2022-08-29 DIAGNOSIS — M25.522 PAIN IN LEFT ELBOW: Primary | ICD-10-CM

## 2022-08-29 PROCEDURE — 99213 OFFICE O/P EST LOW 20 MIN: CPT | Performed by: PHYSICIAN ASSISTANT

## 2022-08-29 PROCEDURE — 73080 X-RAY EXAM OF ELBOW: CPT

## 2022-08-29 NOTE — PROGRESS NOTES
Orthopaedic Surgery - Office Note  Marshall Hdz (89 y o  male)   : 1955   MRN: [id number]  Encounter Date: 2022    Chief Complaint   Patient presents with    Left Elbow - Pain         Assessment/Plan  Diagnoses and all orders for this visit:    Pain in left elbow  -     XR elbow 3+ vw left; Future    Olecranon bursitis of left elbow    Olecranon bone spur    I reviewed with the patient he has an olecranon spur and a mild olecranon bursitis  The diagnosis as well as treatment options were reviewed  I would not recommend any surgical consideration at this time due to lack of regular symptoms  He was educated on the wrist of the olecranon bursitis swelling larger and getting infected appropriate education was provided on next steps  I did recommend an elbow sleeve to avoid direct pressure and trauma to the elbow and help decrease his symptoms  He may ice for comfort 20 minutes on 1 hour off 3 times a day and he will call if any symptoms worsen or he wishes to seek further treatment  Return if symptoms worsen or fail to improve  History of Present Illness  This is a new patient with intermittent left elbow discomfort  He reports that for 4-6 weeks he has noticed a slight pain in his left olecranon region when leaning on the elbow  He relates that when he leans on the elbow to get out of bed he will occasionally get a sharp pain in this area  He denies any swelling to the area, he has not noticed any redness swelling or fever  No trauma is noted  He is right-hand dominant  No paresthesias are reported  He has not had problems like this in the past   He denies any weakness in the left upper extremity  He denies any contributing medical history  He is currently being treated for a possible tick bite with doxycycline  Review of Systems  Pertinent items are noted in HPI  All other systems were reviewed and are negative      Physical Exam  /80   Ht 5' 9" (1 753 m) Wt 81 6 kg (180 lb)   BMI 26 58 kg/m²   Cons: Appears well  No apparent distress  Psych: Alert  Oriented x3  Mood and affect normal   Eyes: PERRLA, EOMI  Resp: Normal effort  No audible wheezing or stridor  CV: Palpable pulse  No discernable arrhythmia  Lymph:  No palpable cervical, axillary, or inguinal lymphadenopathy  Skin: Warm  No palpable masses  No visible lesions  Neuro: Normal muscle tone  Normal and symmetric DTR's  Patient's left elbow is nontender to palpation at the olecranon process in the elbow today  He is nontender in the mediolateral epicondyle  He has no radial head tenderness  He has full elbow extension and flexion  Wrist supination and pronation are within normal limits  He has no pain against resistance to flexion and extension  Patient's elbow strength is 5/5 throughout  He has no tenderness to palpation at the distal triceps  No reproducible symptoms are encounter today in the office  He is neurovascularly intact in the left upper extremity        Studies Reviewed  X-rays performed in the office today three views of the left elbow do show on olecranon spur without any evidence of acute fractures or dislocations  No sailboat sign is noted  These read from an orthopedic standpoint will await official radiologist interpretation    Procedures  No procedures today  Medical, Surgical, Family, and Social History  The patient's medical history, family history, and social history, were reviewed and updated as appropriate  Past Medical History:   Diagnosis Date    Facial basal cell cancer     GERD (gastroesophageal reflux disease)     diet controlled    Hypertension     Vitamin D deficiency        Past Surgical History:   Procedure Laterality Date    COLONOSCOPY N/A 3/22/2016    Procedure: COLONOSCOPY;  Surgeon: Wendi George MD;  Location: [facility];   Service:     [place] LAP,CHOLECYSTECTOMY N/A 10/30/2019    Procedure: CHOLECYSTECTOMY LAPAROSCOPIC;  Surgeon: Maribel Damon MD;  [facility];  Service: General    WA LARYNGOSCOPY,DIRCT,OP,BIOPSY N/A 7/17/2019    Procedure: MICROLARYNGOSCOPY DIRECT WITH BIOPSY OF VALLECULAR MASS;  Surgeon: Ghazal Herrera MD;  [facility];  Service: ENT    WA REMOVAL [facility] MOD RAD Right 9/4/2019    Procedure: MODIFIED RADICAL NECK DISSECTION;  Surgeon: Laura Baugh MD;  [facility];  Service: ENT    RHINOPLASTY      TRANSORAL ROBOTIC SURGERY (TORS) N/A 7/31/2019    Procedure: ROBOTICALLY ASSISTED PARTIAL GLOSSECTOMY, PARTIAL PHARYNGECTOMY;  Surgeon: Laura Baugh MD;  [facility];  Service: ENT    WISDOM TOOTH EXTRACTION         History reviewed  No pertinent family history      Social History     Occupational History    Not on file   Tobacco Use    Smoking status: Never Smoker    Smokeless tobacco: Never Used   Vaping Use    Vaping Use: Never used   Substance and Sexual Activity    Alcohol use: Not Currently     Alcohol/week: 5 0 standard drinks     Types: 5 Cans of beer per week     Comment: weekend    Drug use: Not Currently    Sexual activity: Not on file       Allergies   Allergen Reactions    Shellfish Allergy - Food Allergy Edema     HANDS, LIPS    Bee Venom Swelling     AT BITE SITE         Current Outpatient Medications:     amLODIPine (NORVASC) 5 mg tablet, Take 5 mg by mouth daily at bedtime , Disp: , Rfl:     doxycycline (ADOXA) 100 MG tablet, Take 100 mg by mouth 2 (two) times a day, Disp: , Rfl:     lisinopril (ZESTRIL) 10 mg tablet, Take 10 mg by mouth daily at bedtime , Disp: , Rfl:       Benjamin Rich PA-C

## 2022-10-19 ENCOUNTER — OFFICE VISIT (OUTPATIENT)
Dept: GASTROENTEROLOGY | Facility: CLINIC | Age: 67
End: 2022-10-19
Payer: MEDICARE

## 2022-10-19 VITALS
TEMPERATURE: 98.2 F | DIASTOLIC BLOOD PRESSURE: 76 MMHG | SYSTOLIC BLOOD PRESSURE: 140 MMHG | WEIGHT: 185.2 LBS | BODY MASS INDEX: 27.43 KG/M2 | HEIGHT: 69 IN

## 2022-10-19 DIAGNOSIS — Z12.11 COLON CANCER SCREENING: ICD-10-CM

## 2022-10-19 DIAGNOSIS — R13.19 ESOPHAGEAL DYSPHAGIA: Primary | ICD-10-CM

## 2022-10-19 DIAGNOSIS — R10.11 RIGHT UPPER QUADRANT ABDOMINAL PAIN: ICD-10-CM

## 2022-10-19 PROCEDURE — 99214 OFFICE O/P EST MOD 30 MIN: CPT | Performed by: INTERNAL MEDICINE

## 2022-10-19 NOTE — PROGRESS NOTES
[facility] - Outpatient Follow-up Note  Marzena Garcia Page 78 y o  male MRN: [id number]  Encounter: [id number]          ASSESSMENT AND PLAN:      1  Esophageal dysphagia  Fortunately his dysphagia symptoms have resolved and they were likely due to pill induced esophagitis based on his upper endoscopy findings a few years ago  I asked him to let me know if the symptoms recur  2  Right upper quadrant abdominal pain  His right upper quadrant pain may be due to sphincter of OD dysfunction since it felt just like his previous gallbladder pain  I gave him a lab slip to check his liver enzymes and lipase the next time he has pain like this  I also suggested he could try Pepcid or Tums in case this is a dyspeptic pain  I also asked him to follow up with me in the office if his pain recurs  - Hepatic function panel; Future  - Lipase; Future    3  Colon cancer screening  He is due for his next screening colonoscopy in 2026     ______________________________________________________________________    SUBJECTIVE:  He presents for evaluation because of right upper quadrant pain  He said this felt just like his previous gallbladder-related pain but he has not had it in a few years since his gallbladder was removed  This one episode resolved on its own  He denies any nausea, vomiting, reflux, or dysphagia  He also denies diarrhea, constipation, bleeding, and weight loss      Answers for HPI/ROS submitted by the patient on 10/17/2022  Chronicity: new  Onset: in the past 7 days  Onset quality: gradual  Frequency: intermittently  Episode duration: 1 hours  Progression since onset: gradually improving  Pain location: epigastric region  Pain - numeric: 10/10  Pain quality: cramping  Radiates to: does not radiate  anorexia: No  arthralgias: No  belching: Yes  constipation: No  diarrhea: Yes  dysuria: No  fever: No  flatus: Yes  frequency: No  headaches: No  hematochezia: No  hematuria: No  melena: No  myalgias: No  nausea: Yes  weight loss: No  vomiting: No  Aggravated by: drinking alcohol  Relieved by: belching, movement        REVIEW OF SYSTEMS IS OTHERWISE NEGATIVE  Historical Information   Past Medical History:   Diagnosis Date   • Facial basal cell cancer    • GERD (gastroesophageal reflux disease)     diet controlled   • Hypertension    • Vitamin D deficiency      Past Surgical History:   Procedure Laterality Date   • COLONOSCOPY N/A 03/22/2016    Procedure: COLONOSCOPY;  Surgeon: Roque Moreno MD;  Location: [facility]; Service:    • IA LAP,CHOLECYSTECTOMY N/A 10/30/2019    Procedure: CHOLECYSTECTOMY LAPAROSCOPIC;  Surgeon: Sarah Scanlon MD;  [facility];  Service: General   • IA LARYNGOSCOPY,DIRCT,OP,BIOPSY N/A 07/17/2019    Procedure: MICROLARYNGOSCOPY DIRECT WITH BIOPSY OF VALLECULAR MASS;  Surgeon: Mateusz Mar MD;  [facility];  Service: ENT   • IA REMOVAL NODES, NECK,CERV MOD RAD Right 09/04/2019    Procedure: MODIFIED RADICAL NECK DISSECTION;  Surgeon: Lauren Salcedo MD;  [facility];  Service: ENT   • RHINOPLASTY     • TRANSORAL ROBOTIC SURGERY (TORS) N/A 07/31/2019    Procedure: ROBOTICALLY ASSISTED PARTIAL GLOSSECTOMY, PARTIAL PHARYNGECTOMY;  Surgeon: Lauren Salcedo MD;  [facility];  Service: ENT   • UPPER GASTROINTESTINAL ENDOSCOPY     • WISDOM TOOTH EXTRACTION       Social History   Social History     Substance and Sexual Activity   Alcohol Use Not Currently   • Alcohol/week: 5 0 standard drinks   • Types: 5 Cans of beer per week    Comment: weekend     Social History     Substance and Sexual Activity   Drug Use Not Currently     Social History     Tobacco Use   Smoking Status Never Smoker   Smokeless Tobacco Never Used     History reviewed  No pertinent family history      Meds/Allergies       Current Outpatient Medications:   •  amLODIPine (NORVASC) 5 mg tablet  •  lisinopril (ZESTRIL) 10 mg tablet  •  doxycycline (ADOXA) 100 MG tablet    Allergies   Allergen Reactions   • Shellfish Allergy - Food Allergy Edema     HANDS, LIPS   • Bee Venom Swelling     AT BITE SITE           Objective     Blood pressure 140/76, temperature 98 2 °F (36 8 °C), temperature source Tympanic, height 5' 9" (1 753 m), weight 84 kg (185 lb 3 2 oz)  Body mass index is 27 35 kg/m²  PHYSICAL EXAM:      General Appearance:   Alert, cooperative, no distress   HEENT:   Normocephalic, atraumatic, anicteric      Neck:  Supple, symmetrical, trachea midline   Lungs:   Clear to auscultation bilaterally; no rales, rhonchi or wheezing; respirations unlabored    Heart[de-identified]   Regular rate and rhythm; no murmur, rub, or gallop  Abdomen:   Soft, non-tender, non-distended; normal bowel sounds; no masses, no organomegaly    Genitalia:   Deferred    Rectal:   Deferred    Extremities:  No cyanosis, clubbing or edema    Pulses:  2+ and symmetric    Skin:  No jaundice, rashes, or lesions    Lymph nodes:  No palpable cervical lymphadenopathy        Lab Results:   No visits with results within 1 Day(s) from this visit     Latest known visit with results is:   Hospital Outpatient Visit on 01/06/2022   Component Date Value   • Case Report 01/06/2022                      Value:Surgical Pathology Report                         Case: [id number]                                   Authorizing Provider:  Juliette Rosario MD          Collected:           01/06/2022 1123              Ordering Location:     [facility]      Received:            01/06/2022 [address] [place] [address] Endoscopy                                                           Pathologist:           Sang Padilla MD                                                           Specimens:   A) - Esophagus, esophageal ulcers - R/O malignancy                                                  B) - Esophagus, distal - R/O EOE                                                                    C) - Esophagus, proximal - R/O EOE                                                        • Final Diagnosis 01/06/2022                      Value: This result contains rich text formatting which cannot be displayed here  • Note 01/06/2022                      Value: This result contains rich text formatting which cannot be displayed here  • Additional Information 01/06/2022                      Value: This result contains rich text formatting which cannot be displayed here  • Gross Description 01/06/2022                      Value: This result contains rich text formatting which cannot be displayed here  Radiology Results:   No results found

## 2022-11-17 ENCOUNTER — PROCEDURE VISIT (OUTPATIENT)
Dept: UROLOGY | Facility: CLINIC | Age: 67
End: 2022-11-17

## 2022-11-17 VITALS
WEIGHT: 183.4 LBS | HEART RATE: 89 BPM | SYSTOLIC BLOOD PRESSURE: 120 MMHG | OXYGEN SATURATION: 97 % | BODY MASS INDEX: 27.16 KG/M2 | HEIGHT: 69 IN | DIASTOLIC BLOOD PRESSURE: 80 MMHG

## 2022-11-17 DIAGNOSIS — R35.0 BENIGN PROSTATIC HYPERPLASIA WITH URINARY FREQUENCY: ICD-10-CM

## 2022-11-17 DIAGNOSIS — Z12.5 SCREENING FOR PROSTATE CANCER: Primary | ICD-10-CM

## 2022-11-17 DIAGNOSIS — N40.1 BENIGN PROSTATIC HYPERPLASIA WITH URINARY FREQUENCY: ICD-10-CM

## 2022-11-17 LAB — POST-VOID RESIDUAL VOLUME, ML POC: 61 ML

## 2022-11-17 NOTE — PROGRESS NOTES
Referring Physician: Danica Montana MD  A copy of this note was sent to the referring physician  Diagnoses and all orders for this visit:    Screening for prostate cancer  -     PSA, Total Screen; Future    Benign prostatic hyperplasia with urinary frequency  -     Cystoscopy  -     POCT Measure PVR  -     Urine culture  -     Case request operating room: [address] [place] [address]; Standing  -     Case request operating room: CYSTOSCOPY WITH INSERTION UROLIFT    Other orders  -     Diet NPO; Sips with meds; Standing  -     Place sequential compression device; Standing  -     ceFAZolin (ANCEF) 2,000 mg in dextrose 5 % 100 mL IVPB            Assessment and plan:       1  BPH with urinary frequency and nocturia  -patient has declined medical management  -interested in minimally invasive surgical options    2  Prostate cancer screening  -recent PSA 1 7      We reviewed the options for treating BPH/LUTS which include but are not limited to expectant management, medical therapy, transurethral resection of prostate (TURP)  We also discussed minimally invasive options  Compared and contrasted the differential effectiveness in terms of AUA symptom score, symptom complex improvement, as well as the data regarding longevity of each surgical option  We also discussed the differential recovery time between each modality  At this point, the patient wishes to proceed with Urolift    This is a great option for the patient based on the following criteria:    - cystoscopy revealed pure bilobar hyperplasia  - estimated gland volume 35  - uroflow with maximum flow 5 9 mL/sec  - PVR with 16  - AUA SS 24  - Bother index:  5  - normal renal function  - no active urinary tract infection  - PSA:  Pending  - no documented allergy to nickel    The risks of Urolift include but are not limited to bleeding, infection, reaction to anesthesia such as heart attack, stroke, DVT/PE, hyponatremia, bladder neck contracture, urethral stricture, injury to surrounding structures (ureters, rectum, etc), complications from implants including capsular tap perforation, development of bladder calculi  We discussed that additional risks of trans urethral resection procedures such as incontinence, retrograde ejaculation, and erectile dysfunction have been only rarely reported with the Uro lift procedure  We did discuss that this is a new were procedure and a permanent implant  We discussed that there may be some long-term implications that her on for seen with this newer technology, such as perhaps complicating treatment for prostate cancer if indicated down the line  Finally, I told him that he may require additional procedures secondary to some of these complications  Informed consent was obtained for the Uro lift procedure  This will be scheduled in the near future to be performed under IV sedation  Mojgan Gutierrez      Chief Complaint     BPH workup      History of Present Illness     Tomi Hdz is a 78 y o  male referred in consultation BPH    This is a very pleasant 81-year-old male with a history of urinary frequency urgency and nocturia between 3 and 5 times per night  He has not tried any medical management the past   He states he is “not a pill person “  His brother was recently evaluated for surgical options and ultimately underwent the Uro lift procedure with success  Patient states that his brother was diagnosed incidentally with bladder cancer at the time of his Uro lift evaluation  Patient wishes to discuss minimally invasive surgical management as options    Past medical history includes 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, dysphagia, hypertension    No hematuria or urinary tract infections    He returns today for cystoscopy and transrectal ultrasound for a surgical evaluation    Done some independent research in minimally invasive surgical options and comes repair today with a number of well informed questions particularly about the Uro lift with which his brother was treated with success  Detailed Urologic History     - please refer to HPI    Review of Systems     Review of Systems   Constitutional: Negative for activity change and fatigue  HENT: Negative for congestion  Eyes: Negative for visual disturbance  Respiratory: Negative for shortness of breath and wheezing  Cardiovascular: Negative for chest pain and leg swelling  Gastrointestinal: Negative for abdominal pain  Endocrine: Negative for polyuria  Genitourinary: Negative for dysuria, flank pain, hematuria and urgency  Musculoskeletal: Negative for back pain  Allergic/Immunologic: Negative for immunocompromised state  Neurological: Negative for dizziness and numbness  Psychiatric/Behavioral: Negative for dysphoric mood  All other systems reviewed and are negative      AUA SYMPTOM SCORE    Flowsheet Row Most Recent Value   AUA SYMPTOM SCORE    How often have you had a sensation of not emptying your bladder completely after you finished urinating? 0 (P)     How often have you had to urinate again less than two hours after you finished urinating? 5 (P)     How often have you found you stopped and started again several times when you urinate? 4 (P)     How often have you found it difficult to postpone urination? 5 (P)     How often have you had a weak urinary stream? 3 (P)     How often have you had to push or strain to begin urination? 2 (P)     How many times did you most typically get up to urinate from the time you went to bed at night until the time you got up in the morning? 5 (P)     Quality of Life: If you were to spend the rest of your life with your urinary condition just the way it is now, how would you feel about that? 5 (P)     AUA SYMPTOM SCORE 24 (P)             Allergies     Allergies   Allergen Reactions   • Shellfish Allergy - Food Allergy Edema     HANDS, LIPS   • Bee Venom Swelling     AT BITE SITE       Physical Exam       Physical Exam  Constitutional:       General: He is not in acute distress  Appearance: He is well-developed  HENT:      Head: Normocephalic and atraumatic  Cardiovascular:      Comments: Negative lower extremity edema  Pulmonary:      Effort: Pulmonary effort is normal       Breath sounds: Normal breath sounds  Abdominal:      Palpations: Abdomen is soft  Musculoskeletal:         General: Normal range of motion  Cervical back: Normal range of motion  Skin:     General: Skin is warm  Neurological:      Mental Status: He is alert and oriented to person, place, and time  Psychiatric:         Behavior: Behavior normal      Rectal exam reveals a 30 g prostate no nodules or induration        Vital Signs  Vitals:    11/17/22 0903   BP: 120/80   BP Location: Left arm   Patient Position: Sitting   Cuff Size: Standard   Pulse: 89   SpO2: 97%   Weight: 83 2 kg (183 lb 6 4 oz)   Height: 5' 9" (1 753 m)         Current Medications       Current Outpatient Medications:   •  amLODIPine (NORVASC) 5 mg tablet, Take 5 mg by mouth daily at bedtime , Disp: , Rfl:   •  lisinopril (ZESTRIL) 10 mg tablet, Take 10 mg by mouth daily at bedtime , Disp: , Rfl:   •  doxycycline (ADOXA) 100 MG tablet, Take 100 mg by mouth 2 (two) times a day, Disp: , Rfl:       Active Problems     Patient Active Problem List   Diagnosis   • Vallecular mass   • 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)   • Status post radical dissection of neck   • Tongue pain   • Cerumen debris on tympanic membrane of right ear   • Dysphagia   • Hypertension         Past Medical History     Past Medical History:   Diagnosis Date   • Facial basal cell cancer    • GERD (gastroesophageal reflux disease)     diet controlled   • Hypertension    • Vitamin D deficiency          Surgical History     Past Surgical History:   Procedure Laterality Date   • COLONOSCOPY N/A 03/22/2016    Procedure: COLONOSCOPY; Surgeon: Royce Waller MD;  Location: [facility]; Service:    • TN LAP,CHOLECYSTECTOMY N/A 10/30/2019    Procedure: CHOLECYSTECTOMY LAPAROSCOPIC;  Surgeon: Christina Lozano MD;  [facility];  Service: General   • TN LARYNGOSCOPY,DIRCT,OP,BIOPSY N/A 07/17/2019    Procedure: MICROLARYNGOSCOPY DIRECT WITH BIOPSY OF VALLECULAR MASS;  Surgeon: Wayne Chua MD;  [facility];  Service: ENT   • TN REMOVAL NODES, NECK,CERV MOD RAD Right 09/04/2019    Procedure: MODIFIED RADICAL NECK DISSECTION;  Surgeon: Sammy Mena MD;  [facility];  Service: ENT   • RHINOPLASTY     • TRANSORAL ROBOTIC SURGERY (TORS) N/A 07/31/2019    Procedure: ROBOTICALLY ASSISTED PARTIAL GLOSSECTOMY, PARTIAL PHARYNGECTOMY;  Surgeon: Sammy Mena MD;  [facility];  Service: ENT   • UPPER GASTROINTESTINAL ENDOSCOPY     • WISDOM TOOTH EXTRACTION           Family History     History reviewed  No pertinent family history  Social History     Social History     Social History     Tobacco Use   Smoking Status Never   Smokeless Tobacco Never         Pertinent Lab Values     Lab Results   Component Value Date    CREATININE 0 87 07/08/2021       No results found for: PSA    @RESULTRCNT(1H])@      Pertinent Imaging      - n/a    Portions of the record may have been created with voice recognition software  Occasional wrong word or "sound a like" substitutions may have occurred due to the inherent limitations of voice recognition software  Read the chart carefully and recognize, using context, where substitutions have occurred

## 2022-11-17 NOTE — PROGRESS NOTES
Cystoscopy     Date/Time 11/17/2022 9:06 AM     Performed by  Sabine Cortes MD     Authorized by Sabine Cortes MD          Procedure Details:  Procedure type: cystoscopy      Office Cystoscopy Procedure Note    Indication:    Medically refractory lower urinary tract symptoms    Informed consent   The risks, benefits, complications, treatment options, and expected outcomes were discussed with the patient  The patient concurred with the proposed plan and provided informed consent  Anesthesia  Lidocaine jelly 2%    Procedure  The patient was placed in the supineposition, was prepped and draped in the usual manner using sterile technique, and 2% lidocaine jelly instilled into the urethra  A 17 F flexible cystoscope was then inserted into the urethra and the urethra and bladder carefully examined  The following findings were noted:    Findings:  Urethra:  Normal  Prostate:  Pure bilobar hyperplasia, short prostatic fossa, normal bladder neck, no median lobe  Anatomy highly amenable to Uro lift  Bladder:  Normal  Ureteral orifices:  Normal  Other findings:  None       Office TRUS    Indication    Prostate volumetrics for surgical planning    Transrectal ultrasonography  After completing the cystoscopy, the patient was placed in the left lateral decubitus position  After an attentive digital rectal examination, a 7 5 mHz sidefire ultrasound probe was gently inserted into the rectum and biplanar imaging of the prostate was done with the findings noted below  Images were taken of any abnormal findings and also to document prostate size      Bladder  The bladder base appeared normal     Prostate      Ultrasound size measurements:  -Volume:  35 cm3    Ultrasound findings:  -Cysts: None  -Masses: None  -Median lobe: absent        Specimens:  sterile urine culture collected through cystoscope                 Complications:    None; patient tolerated the procedure well           Disposition: To home after 30 minute observation             Condition: Stable

## 2022-11-18 LAB — BACTERIA UR CULT: NORMAL

## 2022-11-29 ENCOUNTER — TELEPHONE (OUTPATIENT)
Dept: UROLOGY | Facility: CLINIC | Age: 67
End: 2022-11-29

## 2022-11-30 NOTE — TELEPHONE ENCOUNTER
Spoke w patient and 3/31 at the [facility] was the best date that worked for him  E is aware he needs a  and the hospital will contact him day prior w time of arrival  I did inform him that he will need a PSA and EKG thst can be done anytime prior to surgery and UCX to be done 2-3 wks prior  Advised 7 day hold of products, multivitamins and fish oils  He is aware we will touch base in February to mail surgical packet and to confirm 3/31 date  He will call us if he should need anything further

## 2023-01-05 ENCOUNTER — OFFICE VISIT (OUTPATIENT)
Dept: URGENT CARE | Facility: CLINIC | Age: 68
End: 2023-01-05

## 2023-01-05 VITALS
HEART RATE: 88 BPM | RESPIRATION RATE: 18 BRPM | HEIGHT: 70 IN | BODY MASS INDEX: 25.91 KG/M2 | OXYGEN SATURATION: 96 % | WEIGHT: 181 LBS | TEMPERATURE: 98 F

## 2023-01-05 DIAGNOSIS — T14.8XXA SUPERFICIAL LACERATION: Primary | ICD-10-CM

## 2023-01-05 DIAGNOSIS — S50.812A ABRASION OF LEFT FOREARM, INITIAL ENCOUNTER: ICD-10-CM

## 2023-01-05 NOTE — PROGRESS NOTES
[facility]        NAME: Bhaskar Hdz is a 78 y o  male  : 1955    MRN: [id number]  DATE: 2023  TIME: 5:12 PM    Assessment and Orders   Superficial laceration [T14  8XXA]  1  Superficial laceration  Tdap Vaccine greater than or equal to 6yo    DISCONTINUED: tetanus-diphtheria-acellular pertussis (BOOSTRIX) injection      2  Abrasion of left forearm, initial encounter  Tdap Vaccine greater than or equal to 6yo            Plan and Discussion      Superficial laceration was cleaned thoroughly with saline lavage and wound cleanser  Wrapped with pressure dressing  Infection precautions reviewed  Patient to change dressings daily  Discussed ED precautions including (but not limited to)  • Difficultly breathing or shortness of breath  • Chest pain  • Acutely worsening symptoms  Risks and benefits discussed  Patient understands and agrees with the plan  Follow up with PCP  Chief Complaint     Chief Complaint   Patient presents with   • Abrasion     Pt presents with abrasion to the left forearm following an encounter with a drill press at home  HE states he was wearing a glove at the time and the glove snagged onto the drill press and pulled his arm in  History of Present Illness       HPI     Patient is a 77 yo m who presents with a left forearm laceration  He was using a drill press at home  Drill press caught the end part of his glove and the drill was pulled up his arm, scratching his left forearm and ripping the left sleeve of his shirt  He states that he washed the wounds immediately  He denies any foreign bodies having contact with his skin  Review of Systems   Review of Systems   Skin: Positive for wound           Current Medications       Current Outpatient Medications:   •  amLODIPine (NORVASC) 5 mg tablet, Take 5 mg by mouth daily at bedtime , Disp: , Rfl:   •  lisinopril (ZESTRIL) 10 mg tablet, Take 10 mg by mouth daily at bedtime , Disp: , Rfl:   • doxycycline (ADOXA) 100 MG tablet, Take 100 mg by mouth 2 (two) times a day, Disp: , Rfl:     Current Allergies     Allergies as of 01/05/2023 - Reviewed 01/05/2023   Allergen Reaction Noted   • Shellfish allergy - food allergy Edema 01/06/2016   • Bee venom Swelling 10/23/2016            The following portions of the patient's history were reviewed and updated as appropriate: allergies, current medications, past family history, past medical history, past social history, past surgical history and problem list      Past Medical History:   Diagnosis Date   • Facial basal cell cancer    • GERD (gastroesophageal reflux disease)     diet controlled   • Hypertension    • Vitamin D deficiency        Past Surgical History:   Procedure Laterality Date   • COLONOSCOPY N/A 03/22/2016    Procedure: COLONOSCOPY;  Surgeon: Deandra Cantu MD;  Location: [facility]; Service:    • CA CERVICAL LYMPHADEC MODIFIED RADICAL NECK DSJ Right 09/04/2019    Procedure: MODIFIED RADICAL NECK DISSECTION;  Surgeon: oJyce Smith MD;  [facility];  Service: ENT   • CA LAPAROSCOPY SURG CHOLECYSTECTOMY N/A 10/30/2019    Procedure: Angela Maldonado;  Surgeon: Teresa Irvin MD;  [facility];  Service: General   • CA LARYNGOSCOPY DIRECT OPERATIVE W/BIOPSY N/A 07/17/2019    Procedure: MICROLARYNGOSCOPY DIRECT WITH BIOPSY OF VALLECULAR MASS;  Surgeon: Macie Glover MD;  [facility];  Service: ENT   • RHINOPLASTY     • TRANSORAL ROBOTIC SURGERY (TORS) N/A 07/31/2019    Procedure: ROBOTICALLY ASSISTED PARTIAL GLOSSECTOMY, PARTIAL PHARYNGECTOMY;  Surgeon: Joyce Smith MD;  [facility];  Service: ENT   • UPPER GASTROINTESTINAL ENDOSCOPY     • WISDOM TOOTH EXTRACTION         No family history on file  Medications have been verified  Objective   Pulse 88   Temp 98 °F (36 7 °C)   Resp 18   Ht 5' 10" (1 778 m)   Wt 82 1 kg (181 lb)   SpO2 96%   BMI 25 97 kg/m²   No LMP for male patient  Physical Exam     Physical Exam  Constitutional:       Appearance: Normal appearance  Pulmonary:      Effort: No respiratory distress  Skin:            Comments: There are several hematomas surround the lacerations  No bony tenderness  Wrist and elbow are unaffected   Neurological:      General: No focal deficit present  Mental Status: He is alert and oriented to person, place, and time     Psychiatric:         Mood and Affect: Mood normal          Behavior: Behavior normal                Katherine Levin DO

## 2023-01-16 ENCOUNTER — PREP FOR PROCEDURE (OUTPATIENT)
Dept: UROLOGY | Facility: AMBULATORY SURGERY CENTER | Age: 68
End: 2023-01-16

## 2023-01-16 ENCOUNTER — APPOINTMENT (OUTPATIENT)
Dept: RADIOLOGY | Facility: CLINIC | Age: 68
End: 2023-01-16

## 2023-01-16 DIAGNOSIS — R39.89 SUSPECTED UTI: ICD-10-CM

## 2023-01-16 DIAGNOSIS — N40.1 BENIGN PROSTATIC HYPERPLASIA WITH URINARY FREQUENCY: Primary | ICD-10-CM

## 2023-01-16 DIAGNOSIS — R35.0 BENIGN PROSTATIC HYPERPLASIA WITH URINARY FREQUENCY: Primary | ICD-10-CM

## 2023-01-16 DIAGNOSIS — M54.50 LOW BACK PAIN, UNSPECIFIED BACK PAIN LATERALITY, UNSPECIFIED CHRONICITY, UNSPECIFIED WHETHER SCIATICA PRESENT: ICD-10-CM

## 2023-01-16 DIAGNOSIS — Z01.810 PRE-OPERATIVE CARDIOVASCULAR EXAMINATION: ICD-10-CM

## 2023-04-03 ENCOUNTER — APPOINTMENT (OUTPATIENT)
Dept: LAB | Facility: CLINIC | Age: 68
End: 2023-04-03

## 2023-04-03 DIAGNOSIS — Z12.5 SCREENING FOR PROSTATE CANCER: ICD-10-CM

## 2023-04-03 DIAGNOSIS — R39.89 SUSPECTED UTI: ICD-10-CM

## 2023-04-03 DIAGNOSIS — N40.1 BENIGN PROSTATIC HYPERPLASIA WITH URINARY FREQUENCY: ICD-10-CM

## 2023-04-03 DIAGNOSIS — R35.0 BENIGN PROSTATIC HYPERPLASIA WITH URINARY FREQUENCY: ICD-10-CM

## 2023-04-03 LAB — PSA SERPL-MCNC: 1.8 NG/ML (ref 0–4)

## 2023-04-06 LAB
BACTERIA UR CULT: ABNORMAL
BACTERIA UR CULT: ABNORMAL

## 2023-05-17 ENCOUNTER — HOSPITAL ENCOUNTER (OUTPATIENT)
Dept: CT IMAGING | Facility: HOSPITAL | Age: 68
Discharge: HOME | End: 2023-05-17

## 2023-05-17 DIAGNOSIS — E78.2 MIXED HYPERLIPIDEMIA: ICD-10-CM

## 2023-05-19 ENCOUNTER — OFFICE VISIT (OUTPATIENT)
Dept: UROLOGY | Facility: CLINIC | Age: 68
End: 2023-05-19

## 2023-05-19 VITALS
SYSTOLIC BLOOD PRESSURE: 132 MMHG | HEART RATE: 68 BPM | DIASTOLIC BLOOD PRESSURE: 82 MMHG | OXYGEN SATURATION: 98 % | RESPIRATION RATE: 18 BRPM | BODY MASS INDEX: 25.91 KG/M2 | HEIGHT: 70 IN | WEIGHT: 181 LBS

## 2023-05-19 DIAGNOSIS — N40.1 BENIGN PROSTATIC HYPERPLASIA WITH URINARY FREQUENCY: Primary | ICD-10-CM

## 2023-05-19 DIAGNOSIS — R35.0 BENIGN PROSTATIC HYPERPLASIA WITH URINARY FREQUENCY: Primary | ICD-10-CM

## 2023-05-19 LAB — POST-VOID RESIDUAL VOLUME, ML POC: 13 ML

## 2023-05-19 NOTE — PROGRESS NOTES
UROLOGY PROGRESS NOTE   Patient Identifiers: Bhakti Contreras (MRN [id number])  Date of Service: 5/19/2023    Subjective:   62-year-old man history of BPH  He had a UroLift procedure April 14  PVR 13 mL  He reports no discernible change in his symptoms including nocturia  No burning no hematuria      Reason for visit:/UroLift follow-up    Objective:     VITALS:    Vitals:    05/19/23 1128   BP: 132/82   Pulse: 68   Resp: 18   SpO2: 98%     AUA SYMPTOM SCORE    Flowsheet Row Most Recent Value   AUA SYMPTOM SCORE    How often have you had a sensation of not emptying your bladder completely after you finished urinating? 4 (P)     How often have you had to urinate again less than two hours after you finished urinating? 5 (P)     How often have you found you stopped and started again several times when you urinate? 4 (P)     How often have you found it difficult to postpone urination? 5 (P)     How often have you had a weak urinary stream? 2 (P)     How often have you had to push or strain to begin urination? 0 (P)     How many times did you most typically get up to urinate from the time you went to bed at night until the time you got up in the morning? 4 (P)     Quality of Life: If you were to spend the rest of your life with your urinary condition just the way it is now, how would you feel about that? 4 (P)     AUA SYMPTOM SCORE 24 (P)             LABS:  Lab Results   Component Value Date    HGB 13 2 10/23/2016    HCT 39 4 10/23/2016    WBC 6 30 10/23/2016     09/04/2019   ]    Lab Results   Component Value Date     03/03/2016    K 3 9 07/08/2021     07/08/2021    CO2 25 07/08/2021    BUN 13 07/08/2021    CREATININE 0 87 07/08/2021    CALCIUM 9 2 07/08/2021   ]        INPATIENT MEDS:    Current Outpatient Medications:   •  amLODIPine (NORVASC) 5 mg tablet, Take 5 mg by mouth daily at bedtime , Disp: , Rfl:   •  lisinopril (ZESTRIL) 10 mg tablet, Take 10 mg by mouth daily at bedtime , Disp: , Rfl: "  •  acetaminophen (TYLENOL) 325 mg tablet, Take 2 tablets (650 mg total) by mouth every 4 (four) hours as needed for mild pain (Patient not taking: Reported on 5/19/2023), Disp: 30 tablet, Rfl: 0  •  diclofenac sodium (VOLTAREN) 50 mg EC tablet, Take 1 tablet (50 mg total) by mouth 3 (three) times a day for 7 days, Disp: 21 tablet, Rfl: 0  •  docusate sodium (COLACE) 100 mg capsule, Take 1 capsule (100 mg total) by mouth 2 (two) times a day for 15 days, Disp: 30 capsule, Rfl: 0      Physical Exam:   /82 (BP Location: Right arm, Patient Position: Sitting, Cuff Size: Standard)   Pulse 68   Resp 18   Ht 5' 10\" (1 778 m)   Wt 82 1 kg (181 lb)   SpO2 98%   BMI 25 97 kg/m²   GEN: no acute distress    RESP: breathing comfortably with no accessory muscle use    ABD: soft, non-tender, non-distended   INCISION:    EXT: no significant peripheral edema       RADIOLOGY:   None    Assessment:   #1    BPH/UroLift    Plan:   -He will follow-up in 3 months and recheck his PVR  -  -  -          "

## 2023-07-07 ENCOUNTER — OFFICE VISIT (OUTPATIENT)
Dept: URGENT CARE | Facility: CLINIC | Age: 68
End: 2023-07-07
Payer: MEDICARE

## 2023-07-07 VITALS
TEMPERATURE: 97.6 F | OXYGEN SATURATION: 97 % | DIASTOLIC BLOOD PRESSURE: 84 MMHG | RESPIRATION RATE: 16 BRPM | HEART RATE: 68 BPM | SYSTOLIC BLOOD PRESSURE: 139 MMHG

## 2023-07-07 DIAGNOSIS — L23.7 POISON IVY DERMATITIS: Primary | ICD-10-CM

## 2023-07-07 PROCEDURE — 99213 OFFICE O/P EST LOW 20 MIN: CPT

## 2023-07-07 PROCEDURE — G0463 HOSPITAL OUTPT CLINIC VISIT: HCPCS

## 2023-07-07 RX ORDER — ATOVAQUONE AND PROGUANIL HYDROCHLORIDE 250; 100 MG/1; MG/1
TABLET, FILM COATED ORAL
COMMUNITY
Start: 2023-06-23

## 2023-07-07 RX ORDER — HYDROCORTISONE 25 MG/ML
LOTION TOPICAL 2 TIMES DAILY
Qty: 59 ML | Refills: 0 | Status: SHIPPED | OUTPATIENT
Start: 2023-07-07

## 2023-07-07 RX ORDER — ATOVAQUONE AND PROGUANIL HYDROCHLORIDE 250; 100 MG/1; MG/1
TABLET, FILM COATED ORAL
COMMUNITY
Start: 2023-06-20

## 2023-07-07 RX ORDER — METHYLPREDNISOLONE 4 MG/1
TABLET ORAL
Qty: 1 EACH | Refills: 0 | Status: SHIPPED | OUTPATIENT
Start: 2023-07-07

## 2023-07-07 RX ORDER — AZITHROMYCIN 250 MG/1
TABLET, FILM COATED ORAL
COMMUNITY
Start: 2023-06-23

## 2023-07-07 NOTE — PROGRESS NOTES
[facility]        NAME: Gt Hdz is a 80 y.o. male  : 1955    MRN: [id number]  DATE: 2023  TIME: 6:28 PM    Assessment and Plan   Poison ivy dermatitis [L23.7]  1. Poison ivy dermatitis  methylPREDNISolone 4 MG tablet therapy pack    hydrocortisone 2.5 % lotion            Patient Instructions   - Take steroid taper as directed  - Use steroid cream as directed  - Do not apply above collar bones  - Sleep with long clothing on  Follow up with PCP in 3-5 days. Proceed to  ER if symptoms worsen. Chief Complaint     Chief Complaint   Patient presents with   • Rash     Pt reports poison ivy rash on right arm x3 days. History of Present Illness       79 y/o M presents for poison ivy x 3 days. Pt admits to working outside and contacting it 3 days ago. Today, patient admits to weeping and worsening of itching. Using Calamine lotion PRN      Review of Systems   Review of Systems   Skin: Positive for rash.          Current Medications       Current Outpatient Medications:   •  hydrocortisone 2.5 % lotion, Apply topically 2 (two) times a day, Disp: 59 mL, Rfl: 0  •  methylPREDNISolone 4 MG tablet therapy pack, Use as directed on package, Disp: 1 each, Rfl: 0  •  acetaminophen (TYLENOL) 325 mg tablet, Take 2 tablets (650 mg total) by mouth every 4 (four) hours as needed for mild pain (Patient not taking: Reported on 2023), Disp: 30 tablet, Rfl: 0  •  amLODIPine (NORVASC) 5 mg tablet, Take 5 mg by mouth daily at bedtime , Disp: , Rfl:   •  atovaquone-proguanil (MALARONE) 250-100 mg, TAKE ONE TABLET BY MOUTH EVERY DAY, START DAY  BEFORE TRAVEL, DAILY WHILE TRAVELING, AND FOR 7 DAYS AFTER RETURN (Patient not taking: Reported on 2023), Disp: , Rfl:   •  atovaquone-proguanil (MALARONE) 250-100 mg, Take 1 po daily, start day before travel, daily while traveling, and for 7 days after return (Patient not taking: Reported on 2023), Disp: , Rfl:   •  azithromycin (ZITHROMAX) 250 mg tablet, TAKE 2 TABLETS BY MOUTH EVERY DAY FOR 3 DAYS (Patient not taking: Reported on 7/7/2023), Disp: , Rfl:   •  diclofenac sodium (VOLTAREN) 50 mg EC tablet, Take 1 tablet (50 mg total) by mouth 3 (three) times a day for 7 days, Disp: 21 tablet, Rfl: 0  •  docusate sodium (COLACE) 100 mg capsule, Take 1 capsule (100 mg total) by mouth 2 (two) times a day for 15 days, Disp: 30 capsule, Rfl: 0  •  lisinopril (ZESTRIL) 10 mg tablet, Take 10 mg by mouth daily at bedtime , Disp: , Rfl:     Current Allergies     Allergies as of 07/07/2023 - Reviewed 07/07/2023   Allergen Reaction Noted   • Shellfish allergy - food allergy Edema 01/06/2016   • Bee venom Swelling 10/23/2016            The following portions of the patient's history were reviewed and updated as appropriate: allergies, current medications, past family history, past medical history, past social history, past surgical history and problem list.     Past Medical History:   Diagnosis Date   • Facial basal cell cancer    • GERD (gastroesophageal reflux disease)     diet controlled   • Hypertension    • Vitamin D deficiency        Past Surgical History:   Procedure Laterality Date   • CHOLECYSTECTOMY     • COLONOSCOPY N/A 03/22/2016    Procedure: COLONOSCOPY;  Surgeon: José Manuel Jorgensen MD;  Location: [facility];   Service:    • GA CERVICAL LYMPHADEC MODIFIED RADICAL NECK DSJ Right 09/04/2019    Procedure: MODIFIED RADICAL NECK DISSECTION;  Surgeon: Luis Adan MD;  [facility];  Service: ENT   • GA CYSTO INSERTION TRANSPROSTATIC IMPLANT SINGLE N/A 4/14/2023    Procedure: Othel Prey WITH INSERTION Brooks Alamin;  Surgeon: Man Gonzales MD;  [facility];  Service: Urology   • GA LAPAROSCOPY SURG CHOLECYSTECTOMY N/A 10/30/2019    Procedure: Shaileshillia Shante;  Surgeon: Loren Mejia MD;  Location: [facility];  Service: General   • GA LARYNGOSCOPY DIRECT OPERATIVE W/BIOPSY N/A 07/17/2019    Procedure: MICROLARYNGOSCOPY DIRECT WITH BIOPSY OF VALLECULAR MASS;  Surgeon: Nikki Fleming MD;  [facility];  Service: ENT   • RHINOPLASTY     • TRANSORAL ROBOTIC SURGERY (TORS) N/A 07/31/2019    Procedure: ROBOTICALLY ASSISTED PARTIAL GLOSSECTOMY, PARTIAL PHARYNGECTOMY;  Surgeon: Suzanne Mora MD;  Location: [facility];  Service: ENT   • UPPER GASTROINTESTINAL ENDOSCOPY     • WISDOM TOOTH EXTRACTION         No family history on file. Medications have been verified. Objective   /84   Pulse 68   Temp 97.6 °F (36.4 °C)   Resp 16   SpO2 97%   No LMP for male patient. Physical Exam     Physical Exam  Vitals and nursing note reviewed. Constitutional:       General: He is not in acute distress. Appearance: He is not toxic-appearing. HENT:      Head: Normocephalic and atraumatic. Eyes:      Conjunctiva/sclera: Conjunctivae normal.   Pulmonary:      Effort: Pulmonary effort is normal.   Skin:     Comments: Poison ivy dermatitis of R forearm. Weeping with clear discharge. Without weeping of L arm and neck   Neurological:      Mental Status: He is alert.    Psychiatric:         Mood and Affect: Mood normal.         Behavior: Behavior normal.

## 2023-08-28 ENCOUNTER — OFFICE VISIT (OUTPATIENT)
Dept: UROLOGY | Facility: CLINIC | Age: 68
End: 2023-08-28
Payer: MEDICARE

## 2023-08-28 VITALS
WEIGHT: 178 LBS | DIASTOLIC BLOOD PRESSURE: 82 MMHG | HEIGHT: 70 IN | BODY MASS INDEX: 25.48 KG/M2 | SYSTOLIC BLOOD PRESSURE: 130 MMHG

## 2023-08-28 DIAGNOSIS — R35.0 BENIGN PROSTATIC HYPERPLASIA WITH URINARY FREQUENCY: Primary | ICD-10-CM

## 2023-08-28 DIAGNOSIS — N32.81 OAB (OVERACTIVE BLADDER): ICD-10-CM

## 2023-08-28 DIAGNOSIS — N40.1 BENIGN PROSTATIC HYPERPLASIA WITH URINARY FREQUENCY: Primary | ICD-10-CM

## 2023-08-28 LAB — POST-VOID RESIDUAL VOLUME, ML POC: 9 ML

## 2023-08-28 PROCEDURE — 99213 OFFICE O/P EST LOW 20 MIN: CPT | Performed by: PHYSICIAN ASSISTANT

## 2023-08-28 PROCEDURE — 51798 US URINE CAPACITY MEASURE: CPT | Performed by: PHYSICIAN ASSISTANT

## 2023-08-28 NOTE — PROGRESS NOTES
UROLOGY PROGRESS NOTE   Patient Identifiers: Governor Jorgensen (MRN [id number])  Date of Service: 8/28/2023    Subjective:   72-year-old male with history of BPH. He had a UroLift in April. He still has urinary frequency and urgency including nocturia. He is resistant to using any medications. No burning incontinence or hematuria. Reason for visit: BPH/UroLift follow-up  Objective:     VITALS:    There were no vitals filed for this visit.   AUA SYMPTOM SCORE    Flowsheet Row Most Recent Value   AUA SYMPTOM SCORE    How often have you had a sensation of not emptying your bladder completely after you finished urinating? 4 (P)     How often have you had to urinate again less than two hours after you finished urinating? 5 (P)     How often have you found you stopped and started again several times when you urinate? 4 (P)     How often have you found it difficult to postpone urination? 5 (P)     How often have you had a weak urinary stream? 1 (P)     How often have you had to push or strain to begin urination? 1 (P)     How many times did you most typically get up to urinate from the time you went to bed at night until the time you got up in the morning? 5 (P)     Quality of Life: If you were to spend the rest of your life with your urinary condition just the way it is now, how would you feel about that? 5 (P)     AUA SYMPTOM SCORE 25 (P)             LABS:  Lab Results   Component Value Date    HGB 13.2 10/23/2016    HCT 39.4 10/23/2016    WBC 6.30 10/23/2016     09/04/2019   ]    Lab Results   Component Value Date     03/03/2016    K 3.9 07/08/2021     07/08/2021    CO2 25 07/08/2021    BUN 13 07/08/2021    CREATININE 0.87 07/08/2021    CALCIUM 9.2 07/08/2021   ]        INPATIENT MEDS:    Current Outpatient Medications:   •  acetaminophen (TYLENOL) 325 mg tablet, Take 2 tablets (650 mg total) by mouth every 4 (four) hours as needed for mild pain (Patient not taking: Reported on 5/19/2023), Disp: 30 tablet, Rfl: 0  •  amLODIPine (NORVASC) 5 mg tablet, Take 5 mg by mouth daily at bedtime , Disp: , Rfl:   •  atovaquone-proguanil (MALARONE) 250-100 mg, TAKE ONE TABLET BY MOUTH EVERY DAY, START DAY  BEFORE TRAVEL, DAILY WHILE TRAVELING, AND FOR 7 DAYS AFTER RETURN (Patient not taking: Reported on 7/7/2023), Disp: , Rfl:   •  atovaquone-proguanil (MALARONE) 250-100 mg, Take 1 po daily, start day before travel, daily while traveling, and for 7 days after return (Patient not taking: Reported on 7/7/2023), Disp: , Rfl:   •  azithromycin (ZITHROMAX) 250 mg tablet, TAKE 2 TABLETS BY MOUTH EVERY DAY FOR 3 DAYS (Patient not taking: Reported on 7/7/2023), Disp: , Rfl:   •  diclofenac sodium (VOLTAREN) 50 mg EC tablet, Take 1 tablet (50 mg total) by mouth 3 (three) times a day for 7 days, Disp: 21 tablet, Rfl: 0  •  docusate sodium (COLACE) 100 mg capsule, Take 1 capsule (100 mg total) by mouth 2 (two) times a day for 15 days, Disp: 30 capsule, Rfl: 0  •  hydrocortisone 2.5 % lotion, Apply topically 2 (two) times a day, Disp: 59 mL, Rfl: 0  •  lisinopril (ZESTRIL) 10 mg tablet, Take 10 mg by mouth daily at bedtime , Disp: , Rfl:   •  methylPREDNISolone 4 MG tablet therapy pack, Use as directed on package, Disp: 1 each, Rfl: 0      Physical Exam:   There were no vitals taken for this visit. GEN: no acute distress    RESP: breathing comfortably with no accessory muscle use    ABD: soft, non-tender, non-distended   INCISION:    EXT: no significant peripheral edema       RADIOLOGY:   None    Assessment:   #1.   BPH/UroLift    Plan:   -I offered him a trial of Myrbetriq's which he declined  -We will follow-up in 1 year with PSA prior to visit  -  -

## 2024-01-10 ENCOUNTER — APPOINTMENT (OUTPATIENT)
Dept: RADIOLOGY | Facility: CLINIC | Age: 69
End: 2024-01-10
Payer: MEDICARE

## 2024-01-10 DIAGNOSIS — M53.2X7 INSTABILITY OF LUMBOSACRAL JOINT: ICD-10-CM

## 2024-01-10 PROCEDURE — 72110 X-RAY EXAM L-2 SPINE 4/>VWS: CPT

## 2024-01-22 ENCOUNTER — OFFICE VISIT (OUTPATIENT)
Dept: UROLOGY | Facility: CLINIC | Age: 69
End: 2024-01-22
Payer: MEDICARE

## 2024-01-22 VITALS
BODY MASS INDEX: 25.91 KG/M2 | HEIGHT: 70 IN | SYSTOLIC BLOOD PRESSURE: 148 MMHG | OXYGEN SATURATION: 97 % | RESPIRATION RATE: 14 BRPM | HEART RATE: 85 BPM | WEIGHT: 181 LBS | DIASTOLIC BLOOD PRESSURE: 86 MMHG

## 2024-01-22 DIAGNOSIS — N40.1 BENIGN PROSTATIC HYPERPLASIA WITH URINARY FREQUENCY: Primary | ICD-10-CM

## 2024-01-22 DIAGNOSIS — R35.0 BENIGN PROSTATIC HYPERPLASIA WITH URINARY FREQUENCY: Primary | ICD-10-CM

## 2024-01-22 LAB
POST-VOID RESIDUAL VOLUME, ML POC: 34 ML
SL AMB  POCT GLUCOSE, UA: NORMAL
SL AMB LEUKOCYTE ESTERASE,UA: NORMAL
SL AMB POCT BILIRUBIN,UA: NORMAL
SL AMB POCT BLOOD,UA: NORMAL
SL AMB POCT CLARITY,UA: CLEAR
SL AMB POCT COLOR,UA: YELLOW
SL AMB POCT KETONES,UA: NORMAL
SL AMB POCT NITRITE,UA: NORMAL
SL AMB POCT PH,UA: 6.5
SL AMB POCT SPECIFIC GRAVITY,UA: 1
SL AMB POCT URINE PROTEIN: NORMAL
SL AMB POCT UROBILINOGEN: NORMAL

## 2024-01-22 PROCEDURE — 99213 OFFICE O/P EST LOW 20 MIN: CPT | Performed by: PHYSICIAN ASSISTANT

## 2024-01-22 PROCEDURE — 51798 US URINE CAPACITY MEASURE: CPT | Performed by: PHYSICIAN ASSISTANT

## 2024-01-22 PROCEDURE — 81002 URINALYSIS NONAUTO W/O SCOPE: CPT | Performed by: PHYSICIAN ASSISTANT

## 2024-01-22 RX ORDER — CYCLOBENZAPRINE HCL 10 MG
10 TABLET ORAL 3 TIMES DAILY PRN
COMMUNITY
Start: 2024-01-10

## 2024-01-22 NOTE — PROGRESS NOTES
UROLOGY PROGRESS NOTE   Patient Identifiers: Jesus Hdz (MRN [id number])  Date of Service: 1/22/2024    Subjective:   68-year-old man history of BPH.  He had a UroLift procedure in April.  He complains of urinary frequency and urgency.  He has been resistant to using any medications.  Complaining of urinary frequency.  Urine clear.  PVR 34 mL.  No burning no hematuria.    Reason for visit: BPH follow-up    Objective:     VITALS:    There were no vitals filed for this visit.  AUA SYMPTOM SCORE      Flowsheet Row Most Recent Value   AUA SYMPTOM SCORE    How often have you had a sensation of not emptying your bladder completely after you finished urinating? 5 (P)    How often have you had to urinate again less than two hours after you finished urinating? 5 (P)    How often have you found you stopped and started again several times when you urinate? 5 (P)    How often have you found it difficult to postpone urination? 5 (P)    How often have you had a weak urinary stream? 5 (P)    How often have you had to push or strain to begin urination? 1 (P)    How many times did you most typically get up to urinate from the time you went to bed at night until the time you got up in the morning? 5 (P)    Quality of Life: If you were to spend the rest of your life with your urinary condition just the way it is now, how would you feel about that? 3 (P)    AUA SYMPTOM SCORE 31 (P)               LABS:  Lab Results   Component Value Date    HGB 13.2 10/23/2016    HCT 39.4 10/23/2016    WBC 6.30 10/23/2016     09/04/2019   ]    Lab Results   Component Value Date     03/03/2016    K 3.9 07/08/2021     07/08/2021    CO2 25 07/08/2021    BUN 13 07/08/2021    CREATININE 0.87 07/08/2021    CALCIUM 9.2 07/08/2021   ]        INPATIENT MEDS:    Current Outpatient Medications:     amLODIPine (NORVASC) 5 mg tablet, Take 5 mg by mouth daily at bedtime , Disp: , Rfl:     Hydrocortisone 2.5 % KIT, Apply 1 application.  topically 2 (two) times a day, Disp: , Rfl:     lisinopril (ZESTRIL) 10 mg tablet, Take 10 mg by mouth daily at bedtime , Disp: , Rfl:       Physical Exam:   There were no vitals taken for this visit.  GEN: no acute distress    RESP: breathing comfortably with no accessory muscle use    ABD: soft, non-tender, non-distended   INCISION:    EXT: no significant peripheral edema       RADIOLOGY:   None    Assessment:   1.  BPH/UroLift    Plan:   -Complaining of persistent lower urinary tract symptoms and frequency  -Will schedule uroflow and cystoscopy in the office  -Offered him trial of alpha-blocker which he declined  -

## 2024-02-21 PROBLEM — H61.21 CERUMEN DEBRIS ON TYMPANIC MEMBRANE OF RIGHT EAR: Status: RESOLVED | Noted: 2020-06-04 | Resolved: 2024-02-21

## 2024-04-09 ENCOUNTER — TELEPHONE (OUTPATIENT)
Dept: OTOLARYNGOLOGY | Facility: CLINIC | Age: 69
End: 2024-04-09

## 2024-04-09 ENCOUNTER — TELEPHONE (OUTPATIENT)
Age: 69
End: 2024-04-09

## 2024-04-09 NOTE — TELEPHONE ENCOUNTER
Pt returning call. Pt stated can't take appt offered for the 04/11. Pt will keep the one already has.

## 2024-04-18 PROBLEM — R22.1 MASS OF RIGHT SIDE OF NECK: Status: ACTIVE | Noted: 2019-06-11

## 2024-04-24 ENCOUNTER — HOSPITAL ENCOUNTER (OUTPATIENT)
Dept: CT IMAGING | Facility: HOSPITAL | Age: 69
Discharge: HOME | End: 2024-04-24
Payer: MEDICARE

## 2024-04-24 DIAGNOSIS — R22.1 MASS OF RIGHT SIDE OF NECK: ICD-10-CM

## 2024-04-24 DIAGNOSIS — C01 MALIGNANT NEOPLASM OF BASE OF TONGUE (HCC): ICD-10-CM

## 2024-04-24 PROCEDURE — 70491 CT SOFT TISSUE NECK W/DYE: CPT

## 2024-04-24 RX ADMIN — IOHEXOL 80 ML: 350 INJECTION, SOLUTION INTRAVENOUS at 13:45

## 2024-05-06 ENCOUNTER — HOSPITAL ENCOUNTER (OUTPATIENT)
Dept: RADIOLOGY | Facility: HOSPITAL | Age: 69
Discharge: HOME | End: 2024-05-06
Payer: MEDICARE

## 2024-05-06 DIAGNOSIS — R22.1 MASS OF RIGHT SIDE OF NECK: ICD-10-CM

## 2024-05-06 DIAGNOSIS — C01 MALIGNANT NEOPLASM OF BASE OF TONGUE (HCC): ICD-10-CM

## 2024-05-06 PROCEDURE — 88172 CYTP DX EVAL FNA 1ST EA SITE: CPT | Performed by: PATHOLOGY

## 2024-05-06 PROCEDURE — 10005 FNA BX W/US GDN 1ST LES: CPT

## 2024-05-06 PROCEDURE — 88305 TISSUE EXAM BY PATHOLOGIST: CPT | Performed by: PATHOLOGY

## 2024-05-06 PROCEDURE — 88173 CYTOPATH EVAL FNA REPORT: CPT | Performed by: PATHOLOGY

## 2024-05-06 RX ORDER — LIDOCAINE HYDROCHLORIDE 10 MG/ML
2 INJECTION, SOLUTION EPIDURAL; INFILTRATION; INTRACAUDAL; PERINEURAL ONCE
Status: COMPLETED | OUTPATIENT
Start: 2024-05-06 | End: 2024-05-06

## 2024-05-06 RX ADMIN — LIDOCAINE HYDROCHLORIDE 2 ML: 10 INJECTION, SOLUTION EPIDURAL; INFILTRATION; INTRACAUDAL; PERINEURAL at 11:02

## 2024-05-10 ENCOUNTER — DOCUMENTATION (OUTPATIENT)
Dept: HEMATOLOGY ONCOLOGY | Facility: CLINIC | Age: 69
End: 2024-05-10

## 2024-05-10 PROCEDURE — 88305 TISSUE EXAM BY PATHOLOGIST: CPT | Performed by: PATHOLOGY

## 2024-05-10 PROCEDURE — 88173 CYTOPATH EVAL FNA REPORT: CPT | Performed by: PATHOLOGY

## 2024-05-10 PROCEDURE — 88112 CYTOPATH CELL ENHANCE TECH: CPT | Performed by: PATHOLOGY

## 2024-05-10 PROCEDURE — 88172 CYTP DX EVAL FNA 1ST EA SITE: CPT | Performed by: PATHOLOGY

## 2024-05-10 NOTE — PROGRESS NOTES
In-basket message received from Mandy Orozco RN to add patient to the head and neck [facility] on 5/13/2024. Chart reviewed and prep completed.

## 2024-05-13 ENCOUNTER — DOCUMENTATION (OUTPATIENT)
Dept: HEMATOLOGY ONCOLOGY | Facility: CLINIC | Age: 69
End: 2024-05-13

## 2024-05-13 NOTE — PROGRESS NOTES
Chart reviewed in preparation of Multidisciplinary Head and Neck Tumor Conference presentation by Dr. Montana on 5/13/24.  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  He had MRND on 9/4/19 with all nodes negative.  On recent physical exam, he was found to have a sub centimeter lump on right side of neck.  Biopsy was positive for squamous cell carcinoma.

## 2024-05-13 NOTE — PROGRESS NOTES
Patient was not presented today 5/13/2024 at the head and neck [facility]. Per Dr. Montana move case to the next available [facility] on 6/3/2024. Chart reviewed and prep completed.

## 2024-05-16 ENCOUNTER — PATIENT OUTREACH (OUTPATIENT)
Dept: HEMATOLOGY ONCOLOGY | Facility: CLINIC | Age: 69
End: 2024-05-16

## 2024-05-16 ENCOUNTER — TELEPHONE (OUTPATIENT)
Age: 69
End: 2024-05-16

## 2024-05-16 NOTE — TELEPHONE ENCOUNTER
PT is scheduled for a PET on the day of his Cysto.    Oncology asking if this will affect his cysto having it done after the PET.    Please advise and call [phone] or reply on encounter and she will see it

## 2024-05-16 NOTE — PROGRESS NOTES
I contacted Brian to review upcoming scheduling of PET. Currently scheduled for the first available slot on 6/5/24. The ordering provider would like this sooner. I spoke with central scheduling and requested he be added to the wait list. They have also sent a message to [facility] regarding insurance auth.     Brian was in a meeting when I called and asked that I call him back later on today. I will do so and provide the above info at that time.

## 2024-05-16 NOTE — PROGRESS NOTES
Returned yovani to Brian as requested. I provided him the info and instructions for PET currently scheduled on 6/5/24. I made him aware that he is currently on a wait list for a sooner appnt and that the PET dept or central scheduling will reach out to him directly to offer when available. He was appreciative.

## 2024-05-17 NOTE — PROGRESS NOTES
Received in-basket message from the PET/CT ordering office, They state that Jesus's insurance will not require prior auth and asked that I get his PET moved up sooner.       I contacted the PET/CT dept and requested sooner appnt per ENT request. They just need final approval from [facility] and they will reach out to offer him a sooner appnt on Thursday of next week.

## 2024-05-22 ENCOUNTER — HOSPITAL ENCOUNTER (OUTPATIENT)
Dept: RADIOLOGY | Age: 69
Discharge: HOME | End: 2024-05-22
Payer: MEDICARE

## 2024-05-22 DIAGNOSIS — C01 MALIGNANT NEOPLASM OF BASE OF TONGUE (HCC): ICD-10-CM

## 2024-05-22 DIAGNOSIS — R22.1 MASS OF RIGHT SIDE OF NECK: ICD-10-CM

## 2024-05-22 LAB — GLUCOSE SERPL-MCNC: 99 MG/DL (ref 65–140)

## 2024-05-22 PROCEDURE — 78815 PET IMAGE W/CT SKULL-THIGH: CPT

## 2024-05-22 PROCEDURE — 82948 REAGENT STRIP/BLOOD GLUCOSE: CPT

## 2024-05-22 PROCEDURE — A9552 F18 FDG: HCPCS

## 2024-06-03 ENCOUNTER — DOCUMENTATION (OUTPATIENT)
Dept: HEMATOLOGY ONCOLOGY | Facility: CLINIC | Age: 69
End: 2024-06-03

## 2024-06-03 NOTE — PROGRESS NOTES
HEAD AND NECK MULTIDISCIPLINARY CASE REVIEW    DATE:  6/3/24      PRESENTING DOCTOR:  Dr. Montana    DIAGNOSIS:  Hx of p16+ squamous cell carcinoma BOT    STAGING:  Hx of N0     Jesus Hdz is a 69 y.o. male who was presented at the Head and Neck Oncology Multidisciplinary Tumor Conference today.  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 He had MRND on 9/4/19 with all nodes negative. On recent physical exam, he was found to have a sub centimeter lump on right side of neck. Biopsy was positive for squamous cell carcinoma.       PHYSICIAN RECOMMENDED PLAN:  -DL for additional tissue to determine if HPV recurrence vs new process.     Imaging reviewed:  5/22/24 PET/CT  5/6/24 US  4/24/24 CT soft tissue neck     Pathology reviewed:   5/6/24 Lymph Node, right level 2:  Conclusive evidence of malignancy.  Carcinoma with keratinization, compatible with known squamous cell carcinoma    Referrals:  None needed at this time.     Procedures:  DL with biopsy    Team agreed to plan.  NCCN guidelines were readily available for review at this discussion    The final treatment plan will be left to the discretion of the patient and the treating physician.     DISCLAIMERS:  TO THE TREATING PHYSICIAN:  This conference is a meeting of clinicians from various specialty areas who evaluate and discuss patients for whom a multidisciplinary treatment approach is being considered. Please note that the above opinion was a consensus of the conference attendees and is intended only to assist in quality care of the discussed patient.  The responsibility for follow up on the input given during the conference, along with any final decisions regarding plan of care, is that of the patient and the patient's provider. Accordingly, appointments have only been recommended based on this information and have NOT been scheduled unless otherwise noted.      TO THE PATIENT:  This  summary is a brief record of major aspects of your cancer treatment. You may choose to share a copy with any of your doctors or nurses. However, this is not a detailed or comprehensive record of your care.

## 2024-06-13 ENCOUNTER — APPOINTMENT (OUTPATIENT)
Dept: LAB | Facility: CLINIC | Age: 69
End: 2024-06-13
Payer: MEDICARE

## 2024-06-13 DIAGNOSIS — C01 MALIGNANT NEOPLASM OF BASE OF TONGUE (HCC): ICD-10-CM

## 2024-06-13 DIAGNOSIS — R22.1 MASS OF RIGHT SIDE OF NECK: ICD-10-CM

## 2024-06-13 LAB
ANION GAP SERPL CALCULATED.3IONS-SCNC: 11 MMOL/L (ref 4–13)
BASOPHILS # BLD AUTO: 0.03 THOUSANDS/ÂΜL (ref 0–0.1)
BASOPHILS NFR BLD AUTO: 0 % (ref 0–1)
BUN SERPL-MCNC: 15 MG/DL (ref 5–25)
CALCIUM SERPL-MCNC: 9 MG/DL (ref 8.4–10.2)
CHLORIDE SERPL-SCNC: 104 MMOL/L (ref 96–108)
CO2 SERPL-SCNC: 24 MMOL/L (ref 21–32)
CREAT SERPL-MCNC: 0.81 MG/DL (ref 0.6–1.3)
EOSINOPHIL # BLD AUTO: 0.29 THOUSAND/ÂΜL (ref 0–0.61)
EOSINOPHIL NFR BLD AUTO: 4 % (ref 0–6)
ERYTHROCYTE [DISTWIDTH] IN BLOOD BY AUTOMATED COUNT: 12.9 % (ref 11.6–15.1)
GFR SERPL CREATININE-BSD FRML MDRD: 91 ML/MIN/1.73SQ M
GLUCOSE SERPL-MCNC: 81 MG/DL (ref 65–140)
HCT VFR BLD AUTO: 44.2 % (ref 36.5–49.3)
HGB BLD-MCNC: 14.5 G/DL (ref 12–17)
IMM GRANULOCYTES # BLD AUTO: 0.02 THOUSAND/UL (ref 0–0.2)
IMM GRANULOCYTES NFR BLD AUTO: 0 % (ref 0–2)
LYMPHOCYTES # BLD AUTO: 1.63 THOUSANDS/ÂΜL (ref 0.6–4.47)
LYMPHOCYTES NFR BLD AUTO: 24 % (ref 14–44)
MCH RBC QN AUTO: 28.4 PG (ref 26.8–34.3)
MCHC RBC AUTO-ENTMCNC: 32.8 G/DL (ref 31.4–37.4)
MCV RBC AUTO: 87 FL (ref 82–98)
MONOCYTES # BLD AUTO: 0.44 THOUSAND/ÂΜL (ref 0.17–1.22)
MONOCYTES NFR BLD AUTO: 7 % (ref 4–12)
NEUTROPHILS # BLD AUTO: 4.34 THOUSANDS/ÂΜL (ref 1.85–7.62)
NEUTS SEG NFR BLD AUTO: 65 % (ref 43–75)
NRBC BLD AUTO-RTO: 0 /100 WBCS
PLATELET # BLD AUTO: 230 THOUSANDS/UL (ref 149–390)
PMV BLD AUTO: 10.3 FL (ref 8.9–12.7)
POTASSIUM SERPL-SCNC: 3.9 MMOL/L (ref 3.5–5.3)
RBC # BLD AUTO: 5.11 MILLION/UL (ref 3.88–5.62)
SODIUM SERPL-SCNC: 139 MMOL/L (ref 135–147)
WBC # BLD AUTO: 6.75 THOUSAND/UL (ref 4.31–10.16)

## 2024-06-13 PROCEDURE — 36415 COLL VENOUS BLD VENIPUNCTURE: CPT

## 2024-06-13 PROCEDURE — 80048 BASIC METABOLIC PNL TOTAL CA: CPT

## 2024-06-13 PROCEDURE — 93005 ELECTROCARDIOGRAM TRACING: CPT

## 2024-06-13 PROCEDURE — 85025 COMPLETE CBC W/AUTO DIFF WBC: CPT

## 2024-06-14 LAB
ATRIAL RATE: 75 BPM
P AXIS: 75 DEGREES
PR INTERVAL: 140 MS
QRS AXIS: 72 DEGREES
QRSD INTERVAL: 100 MS
QT INTERVAL: 392 MS
QTC INTERVAL: 437 MS
T WAVE AXIS: 71 DEGREES
VENTRICULAR RATE: 75 BPM

## 2024-06-14 PROCEDURE — 93010 ELECTROCARDIOGRAM REPORT: CPT | Performed by: INTERNAL MEDICINE

## 2024-06-19 ENCOUNTER — ANESTHESIA EVENT (OUTPATIENT)
Dept: PERIOP | Facility: HOSPITAL | Age: 69
DRG: 822 | End: 2024-06-19
Payer: MEDICARE

## 2024-06-19 ENCOUNTER — ANESTHESIA (OUTPATIENT)
Dept: PERIOP | Facility: HOSPITAL | Age: 69
DRG: 822 | End: 2024-06-19
Payer: MEDICARE

## 2024-06-19 ENCOUNTER — HOSPITAL ENCOUNTER (INPATIENT)
Facility: HOSPITAL | Age: 69
LOS: 1 days | Discharge: HOME | DRG: 822 | End: 2024-06-20
Attending: SPECIALIST | Admitting: SPECIALIST
Payer: MEDICARE

## 2024-06-19 DIAGNOSIS — C01 MALIGNANT NEOPLASM OF BASE OF TONGUE (HCC): ICD-10-CM

## 2024-06-19 DIAGNOSIS — R22.1 MASS OF RIGHT SIDE OF NECK: ICD-10-CM

## 2024-06-19 PROCEDURE — 0CBM8ZX EXCISION OF PHARYNX, VIA NATURAL OR ARTIFICIAL OPENING ENDOSCOPIC, DIAGNOSTIC: ICD-10-PCS | Performed by: SPECIALIST

## 2024-06-19 PROCEDURE — 88342 IMHCHEM/IMCYTCHM 1ST ANTB: CPT | Performed by: PATHOLOGY

## 2024-06-19 PROCEDURE — 31535 LARYNGOSCOPY W/BIOPSY: CPT | Performed by: SPECIALIST

## 2024-06-19 PROCEDURE — 88305 TISSUE EXAM BY PATHOLOGIST: CPT | Performed by: PATHOLOGY

## 2024-06-19 PROCEDURE — 38724 REMOVAL OF LYMPH NODES NECK: CPT | Performed by: SPECIALIST

## 2024-06-19 PROCEDURE — 88331 PATH CONSLTJ SURG 1 BLK 1SPC: CPT | Performed by: PATHOLOGY

## 2024-06-19 PROCEDURE — 88307 TISSUE EXAM BY PATHOLOGIST: CPT | Performed by: PATHOLOGY

## 2024-06-19 PROCEDURE — 07T10ZZ RESECTION OF RIGHT NECK LYMPHATIC, OPEN APPROACH: ICD-10-PCS | Performed by: SPECIALIST

## 2024-06-19 PROCEDURE — 88341 IMHCHEM/IMCYTCHM EA ADD ANTB: CPT | Performed by: PATHOLOGY

## 2024-06-19 PROCEDURE — 88365 INSITU HYBRIDIZATION (FISH): CPT | Performed by: PATHOLOGY

## 2024-06-19 RX ORDER — SODIUM CHLORIDE, SODIUM LACTATE, POTASSIUM CHLORIDE, CALCIUM CHLORIDE 600; 310; 30; 20 MG/100ML; MG/100ML; MG/100ML; MG/100ML
75 INJECTION, SOLUTION INTRAVENOUS CONTINUOUS
Status: DISCONTINUED | OUTPATIENT
Start: 2024-06-19 | End: 2024-06-19

## 2024-06-19 RX ORDER — ONDANSETRON 2 MG/ML
INJECTION INTRAMUSCULAR; INTRAVENOUS AS NEEDED
Status: DISCONTINUED | OUTPATIENT
Start: 2024-06-19 | End: 2024-06-19

## 2024-06-19 RX ORDER — FENTANYL CITRATE 50 UG/ML
INJECTION, SOLUTION INTRAMUSCULAR; INTRAVENOUS AS NEEDED
Status: DISCONTINUED | OUTPATIENT
Start: 2024-06-19 | End: 2024-06-19

## 2024-06-19 RX ORDER — AMLODIPINE BESYLATE 5 MG/1
5 TABLET ORAL
Status: DISCONTINUED | OUTPATIENT
Start: 2024-06-19 | End: 2024-06-20 | Stop reason: HOSPADM

## 2024-06-19 RX ORDER — FENTANYL CITRATE/PF 50 MCG/ML
50 SYRINGE (ML) INJECTION
Status: DISCONTINUED | OUTPATIENT
Start: 2024-06-19 | End: 2024-06-19 | Stop reason: HOSPADM

## 2024-06-19 RX ORDER — MAGNESIUM HYDROXIDE 1200 MG/15ML
LIQUID ORAL AS NEEDED
Status: DISCONTINUED | OUTPATIENT
Start: 2024-06-19 | End: 2024-06-19 | Stop reason: HOSPADM

## 2024-06-19 RX ORDER — DIPHENHYDRAMINE HYDROCHLORIDE 50 MG/ML
12.5 INJECTION INTRAMUSCULAR; INTRAVENOUS ONCE AS NEEDED
Status: DISCONTINUED | OUTPATIENT
Start: 2024-06-19 | End: 2024-06-19 | Stop reason: HOSPADM

## 2024-06-19 RX ORDER — SODIUM CHLORIDE 9 MG/ML
INJECTION, SOLUTION INTRAVENOUS CONTINUOUS PRN
Status: DISCONTINUED | OUTPATIENT
Start: 2024-06-19 | End: 2024-06-19

## 2024-06-19 RX ORDER — ONDANSETRON 2 MG/ML
4 INJECTION INTRAMUSCULAR; INTRAVENOUS ONCE AS NEEDED
Status: DISCONTINUED | OUTPATIENT
Start: 2024-06-19 | End: 2024-06-19 | Stop reason: HOSPADM

## 2024-06-19 RX ORDER — OXYCODONE HYDROCHLORIDE 5 MG/1
5 TABLET ORAL EVERY 6 HOURS PRN
Status: DISCONTINUED | OUTPATIENT
Start: 2024-06-19 | End: 2024-06-20 | Stop reason: HOSPADM

## 2024-06-19 RX ORDER — ROCURONIUM BROMIDE 10 MG/ML
INJECTION, SOLUTION INTRAVENOUS AS NEEDED
Status: DISCONTINUED | OUTPATIENT
Start: 2024-06-19 | End: 2024-06-19

## 2024-06-19 RX ORDER — ONDANSETRON 2 MG/ML
4 INJECTION INTRAMUSCULAR; INTRAVENOUS EVERY 6 HOURS PRN
Status: DISCONTINUED | OUTPATIENT
Start: 2024-06-19 | End: 2024-06-20 | Stop reason: HOSPADM

## 2024-06-19 RX ORDER — GINSENG 100 MG
CAPSULE ORAL AS NEEDED
Status: DISCONTINUED | OUTPATIENT
Start: 2024-06-19 | End: 2024-06-19 | Stop reason: HOSPADM

## 2024-06-19 RX ORDER — ACETAMINOPHEN 325 MG/1
650 TABLET ORAL EVERY 6 HOURS PRN
Status: DISCONTINUED | OUTPATIENT
Start: 2024-06-19 | End: 2024-06-20 | Stop reason: HOSPADM

## 2024-06-19 RX ORDER — LIDOCAINE HYDROCHLORIDE 20 MG/ML
INJECTION, SOLUTION EPIDURAL; INFILTRATION; INTRACAUDAL; PERINEURAL AS NEEDED
Status: DISCONTINUED | OUTPATIENT
Start: 2024-06-19 | End: 2024-06-19

## 2024-06-19 RX ORDER — LISINOPRIL 10 MG/1
10 TABLET ORAL
Status: DISCONTINUED | OUTPATIENT
Start: 2024-06-19 | End: 2024-06-20 | Stop reason: HOSPADM

## 2024-06-19 RX ORDER — HYDROMORPHONE HCL/PF 1 MG/ML
0.5 SYRINGE (ML) INJECTION
Status: DISCONTINUED | OUTPATIENT
Start: 2024-06-19 | End: 2024-06-19 | Stop reason: HOSPADM

## 2024-06-19 RX ORDER — HYDROMORPHONE HCL/PF 1 MG/ML
SYRINGE (ML) INJECTION AS NEEDED
Status: DISCONTINUED | OUTPATIENT
Start: 2024-06-19 | End: 2024-06-19

## 2024-06-19 RX ORDER — PROPOFOL 10 MG/ML
INJECTION, EMULSION INTRAVENOUS AS NEEDED
Status: DISCONTINUED | OUTPATIENT
Start: 2024-06-19 | End: 2024-06-19

## 2024-06-19 RX ORDER — IBUPROFEN 600 MG/1
600 TABLET, FILM COATED ORAL EVERY 6 HOURS PRN
Status: DISCONTINUED | OUTPATIENT
Start: 2024-06-19 | End: 2024-06-20 | Stop reason: HOSPADM

## 2024-06-19 RX ORDER — DEXAMETHASONE SODIUM PHOSPHATE 10 MG/ML
INJECTION, SOLUTION INTRAMUSCULAR; INTRAVENOUS AS NEEDED
Status: DISCONTINUED | OUTPATIENT
Start: 2024-06-19 | End: 2024-06-19

## 2024-06-19 RX ORDER — HEPARIN SODIUM 5000 [USP'U]/ML
5000 INJECTION, SOLUTION INTRAVENOUS; SUBCUTANEOUS EVERY 8 HOURS SCHEDULED
Status: DISCONTINUED | OUTPATIENT
Start: 2024-06-19 | End: 2024-06-20 | Stop reason: HOSPADM

## 2024-06-19 RX ORDER — CEFAZOLIN SODIUM 1 G/3ML
INJECTION, POWDER, FOR SOLUTION INTRAMUSCULAR; INTRAVENOUS AS NEEDED
Status: DISCONTINUED | OUTPATIENT
Start: 2024-06-19 | End: 2024-06-19

## 2024-06-19 RX ORDER — SODIUM CHLORIDE, SODIUM LACTATE, POTASSIUM CHLORIDE, CALCIUM CHLORIDE 600; 310; 30; 20 MG/100ML; MG/100ML; MG/100ML; MG/100ML
INJECTION, SOLUTION INTRAVENOUS CONTINUOUS PRN
Status: DISCONTINUED | OUTPATIENT
Start: 2024-06-19 | End: 2024-06-19

## 2024-06-19 RX ORDER — EPHEDRINE SULFATE 50 MG/ML
INJECTION INTRAVENOUS AS NEEDED
Status: DISCONTINUED | OUTPATIENT
Start: 2024-06-19 | End: 2024-06-19

## 2024-06-19 RX ORDER — LIDOCAINE HYDROCHLORIDE AND EPINEPHRINE 10; 10 MG/ML; UG/ML
INJECTION, SOLUTION INFILTRATION; PERINEURAL AS NEEDED
Status: DISCONTINUED | OUTPATIENT
Start: 2024-06-19 | End: 2024-06-19 | Stop reason: HOSPADM

## 2024-06-19 RX ADMIN — LIDOCAINE HYDROCHLORIDE 100 MG: 20 INJECTION, SOLUTION EPIDURAL; INFILTRATION; INTRACAUDAL; PERINEURAL at 10:57

## 2024-06-19 RX ADMIN — ROCURONIUM BROMIDE 50 MG: 50 INJECTION INTRAVENOUS at 10:58

## 2024-06-19 RX ADMIN — HEPARIN SODIUM 5000 UNITS: 5000 INJECTION INTRAVENOUS; SUBCUTANEOUS at 18:04

## 2024-06-19 RX ADMIN — FENTANYL CITRATE 50 MCG: 50 INJECTION INTRAMUSCULAR; INTRAVENOUS at 11:15

## 2024-06-19 RX ADMIN — DEXAMETHASONE SODIUM PHOSPHATE 10 MG: 10 INJECTION INTRAMUSCULAR; INTRAVENOUS at 10:58

## 2024-06-19 RX ADMIN — FENTANYL CITRATE 50 MCG: 50 INJECTION INTRAMUSCULAR; INTRAVENOUS at 10:57

## 2024-06-19 RX ADMIN — LISINOPRIL 10 MG: 10 TABLET ORAL at 21:15

## 2024-06-19 RX ADMIN — SODIUM CHLORIDE, SODIUM LACTATE, POTASSIUM CHLORIDE, AND CALCIUM CHLORIDE: .6; .31; .03; .02 INJECTION, SOLUTION INTRAVENOUS at 13:00

## 2024-06-19 RX ADMIN — HYDROMORPHONE HYDROCHLORIDE 0.5 MG: 1 INJECTION, SOLUTION INTRAMUSCULAR; INTRAVENOUS; SUBCUTANEOUS at 11:26

## 2024-06-19 RX ADMIN — IBUPROFEN 600 MG: 600 TABLET, FILM COATED ORAL at 16:41

## 2024-06-19 RX ADMIN — OXYCODONE HYDROCHLORIDE 5 MG: 5 TABLET ORAL at 21:15

## 2024-06-19 RX ADMIN — SUGAMMADEX 200 MG: 100 INJECTION, SOLUTION INTRAVENOUS at 13:00

## 2024-06-19 RX ADMIN — FENTANYL CITRATE 50 MCG: 50 INJECTION INTRAMUSCULAR; INTRAVENOUS at 14:07

## 2024-06-19 RX ADMIN — PROPOFOL 150 MG: 10 INJECTION, EMULSION INTRAVENOUS at 10:57

## 2024-06-19 RX ADMIN — EPHEDRINE SULFATE 10 MG: 50 INJECTION INTRAVENOUS at 11:43

## 2024-06-19 RX ADMIN — SODIUM CHLORIDE: 0.9 INJECTION, SOLUTION INTRAVENOUS at 11:00

## 2024-06-19 RX ADMIN — AMLODIPINE BESYLATE 5 MG: 5 TABLET ORAL at 21:16

## 2024-06-19 RX ADMIN — HEPARIN SODIUM 5000 UNITS: 5000 INJECTION INTRAVENOUS; SUBCUTANEOUS at 21:15

## 2024-06-19 RX ADMIN — CEFAZOLIN 2000 MG: 1 INJECTION, POWDER, FOR SOLUTION INTRAMUSCULAR; INTRAVENOUS at 11:10

## 2024-06-19 RX ADMIN — ONDANSETRON 4 MG: 2 INJECTION INTRAMUSCULAR; INTRAVENOUS at 12:43

## 2024-06-19 RX ADMIN — SODIUM CHLORIDE, SODIUM LACTATE, POTASSIUM CHLORIDE, AND CALCIUM CHLORIDE: .6; .31; .03; .02 INJECTION, SOLUTION INTRAVENOUS at 09:04

## 2024-06-19 NOTE — H&P
Surgery Pre-op note/Updated History and Physical    Date of service: 6/19/202410:16 AM      No changes from most recent clinic H&P note. Patient to OR for DL w bx, possible right neck dissection.      Pmh: Reviewed  Pshx: Reviewed  Social history: Reviewed  Medications: Reviewed  ROS: as above      Vitals:    06/19/24 0902   BP: 124/85   Pulse: 73   Resp: 20   Temp: (!) 97.4 °F (36.3 °C)   SpO2: 97%       ENT: oral cavity patent  Chest/Heart/Lungs: Breathing, perfused, unremarkable  Abd: Unremarkable  Ext: Unremarkable        The procedure was discussed with the patient, including risks, benefits, and alternatives and all questions were answered. Consent signed and in the chart.    Juan Pablo Kirby MD PGY-3  [facility] Otolaryngology - Head and Neck Surgery    6/19/2024 10:16 AM

## 2024-06-19 NOTE — INTERIM OP NOTE
DIRECT LARYNGOSCOPY WITH BIOPSY WITH RIGHT NECK DISSECTION LEVEL TWO AND THREE, DISSECTION NECK RADICAL  Postoperative Note  PATIENT NAME: Jesus Hdz  : 1955  MRN: [id number]  [facility] OR ROOM 02    Surgery Date: 2024    Preop Diagnosis:  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) [C01]  Mass of right side of neck [R22.1]    Post-Op Diagnosis Codes:     * 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) [C01]     * Mass of right side of neck [R22.1]    Procedure(s) (LRB):  DIRECT LARYNGOSCOPY WITH BIOPSY WITH RIGHT NECK DISSECTION LEVEL TWO AND THREE (N/A)  DISSECTION NECK RADICAL (Right)    Surgeons and Role:     * Fady Montana MD - Primary     * Juan Pablo Kirby MD - Assisting    Specimens:  ID Type Source Tests Collected by Time Destination   1 : RIGHT PHARYNX Tissue Soft Tissue, Other TISSUE EXAM Fady Montana MD 2024 1124    2 : Level 2 lymph nodes, see comments Tissue Lymph Node, Regional Resection TISSUE EXAM Fady Montana MD 2024 1223    3 : Additional level 2 lymph nodes Tissue Neck TISSUE EXAM Fady Montana MD 2024 1230    4 : Level 3 lymph nodes Tissue Neck TISSUE EXAM Fady Montana MD 2024 1241        Estimated Blood Loss:   Minimal    Anesthesia Type:   General     Findings:   Small palpable prominence in left pharynx near BOT; this was biopsied. No other palpable or mucosal abnormalities visualized in the oral cavity, oropharynx, hypopharynx, or larynx.  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

## 2024-06-19 NOTE — ANESTHESIA PREPROCEDURE EVALUATION
Procedure:  DIRECT LARYNGOSCOPY WITH BIOPSY, POSSIBLE RIGHT NECK DISSECTION (Throat)  DISSECTION NECK RADICAL (Right: Neck)    Relevant Problems   CARDIO   (+) Hypertension      GI/HEPATIC   (+) Dysphagia      Oncology   (+) 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)      Surgery/Wound/Pain   (+) Status post radical dissection of neck      Other   (+) Mass of right side of neck      CT scan image and report reviewed   NPO  (-) URI, (+) non-productive cough         Physical Exam    Airway    Mallampati score: II  TM Distance: >3 FB  Neck ROM: full     Dental   Comment: Had tooth extracted 2 weeks ago     Cardiovascular      Pulmonary      Other Findings        Anesthesia Plan  ASA Score- 2     Anesthesia Type- general with ASA Monitors.         Additional Monitors:     Airway Plan: ETT.           Plan Factors-    Chart reviewed.    Patient summary reviewed.                  Induction- intravenous.    Postoperative Plan-     Perioperative Resuscitation Plan - Level 1 - Full Code.       Informed Consent- Anesthetic plan and risks discussed with patient.  I personally reviewed this patient with the CRNA. Discussed and agreed on the Anesthesia Plan with the CRNA..

## 2024-06-19 NOTE — ANESTHESIA POSTPROCEDURE EVALUATION
Post-Op Assessment Note    CV Status:  Stable    Pain management: adequate       Mental Status:  Awake and sleepy   Hydration Status:  Euvolemic   PONV Controlled:  Controlled   Airway Patency:  Patent  Airway: intubated     Post Op Vitals Reviewed: Yes    No anethesia notable event occurred.    Staff: CRNA               BP   191/99   Temp 97   Pulse 102   Resp 15   SpO2 99

## 2024-06-19 NOTE — OP NOTE
OPERATIVE REPORT  PATIENT NAME: Jesus Hdz    :  1955  MRN: [id number]  Pt Location: AN OR ROOM 02    SURGERY DATE: 2024    Surgeons and Role:     * Fady Montana MD - Primary     * Juan Pablo Kirby MD - Assisting    Preop Diagnosis:  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) [C01]  Mass of right side of neck [R22.1]    Post-Op Diagnosis Codes:     * 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) [C01]     * Mass of right side of neck [R22.1]    Procedure(s):  DIRECT LARYNGOSCOPY WITH BIOPSY WITH RIGHT NECK DISSECTION LEVEL TWO AND THREE  Right - DISSECTION NECK RADICAL    Specimen(s):  ID Type Source Tests Collected by Time Destination   1 : RIGHT PHARYNX Tissue Soft Tissue, Other TISSUE EXAM Fady Montana MD 2024 1124    2 : Level 2 lymph nodes, see comments Tissue Lymph Node, Regional Resection TISSUE EXAM Fady Montana MD 2024 1223    3 : Additional level 2 lymph nodes Tissue Neck TISSUE EXAM Fady Montana MD 2024 1230    4 : Level 3 lymph nodes Tissue Neck TISSUE EXAM Fady Montana MD 2024 1241        Estimated Blood Loss:   < 100 cc    Drains:  Closed/Suction Drain Right RUQ Bulb 10 Fr. (Active)   Number of days: 1694       Closed/Suction Drain Right Neck Bulb (Active)   Dressing Status Clean;Dry;Intact 24 1311   Drainage Appearance Bloody 24 1311   Status To bulb suction 24 1311   Number of days: 0       Anesthesia Type:   General    Operative Indications:  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) [C01]  Mass of right side of neck [R22.1]    Operative Findings:  Small palpable prominence in left pharynx near BOT; this was biopsied. No other palpable or mucosal abnormalities visualized in the oral cavity, oropharynx, hypopharynx, or larynx.  Extensive scar apparent in levels II - IV from prior surgery  Multiple pathologic lymph nodes; jose keratin exuding from the nodes  Frozen section pathology confirmed metastatic keratinizing  SCC  Levels II and III lymph nodes sent for permanent histopathologic section  Marginal mandibular nerve identified and preserved  Posterior branch of greater auricular nerve identified and preserved  Internal jugular, vagus, and spinal accessory all identified and preserved      Complications:   None    Procedure and Technique:  The patient was positively identified and transferred onto the operating table in the supine position. Appropriate monitoring devices were put in place, anesthesia was induced and the patient was intubated without difficulty.      DIRECT LARYNGOSCOPY WITH BIOPSY WAS PERFORMED AS FOLLOWS:  The oral cavity and oropharynx were then digitally palpated.  Small prominence was palpated in the left pharynx near the base of tongue.  A dental guard was put in place and a laryngoscope was then carefully introduced and use to examine the patient's oral cavity, oropharynx, hypopharynx and larynx. Biopsies of the suspicious area were obtained using cup forceps. The laryngoscope and dental guard were then removed.  All counts were correct at the end of the case, and no complications were encountered.      SELECTIVE NECK DISSECTION WAS PERFORMED AS FOLLOWS:  A shoulder roll was placed, and the patient’s neck was examined.  An appropriate site for skin incision was demarcated in a hockey stick fashion extending down from the mastoid tip along the course of the sternocleidomastoid muscle, than across transversely through the prior neck incision scar.  Local anesthesia in the form of 1% lidocaine with 1/100,000 epinephrine was then injected to the marked site.  The patient’s neck was then prepped and draped in the usual sterile fashion.    Skin incision was then made at the marked site in a transverse fashion using a 15 blade.  Dissection continued through subcutaneous tissues and platysma using the 15 blade and Bovie cautery.  Care was taken to leave platysma attached to the superficial pathologic lymph node  near the incision. Superficial flaps were then elevated in the subplatysmal plane using Bovie cautery and mosquito hemostats due to the extent of scar tissue.    Dissection then continued using Bovie cautery until the inferior aspect of the submandibular gland was visualized. Care was taken to preserve the marginal mandibular nerve. Dissection then continued back to the sternocleidomastoid muscle.  Fascia overlying the sternocleidomastoid muscle was then divided using Bovie cautery, and the external jugular vein was also divided and ligated using a 3-0 silk stitch.  Fascia was grasped using multiple Allis clamps, and dissection continued around the anterior aspect of the sternocleidomastoid muscle onto it's medial surface using Bovie cautery.  With continued dissection the spinal accessory nerve was encountered.      Tissue overlying the nerve was divided using Bipolar cautery, and the nerve was freed along it's course proximally to the level of the digastric using Bipolar cautery.  Pathologic appearing nodes inferior to the CN XI and in the submuscular recess were grasped using an Allis clamp.    Tissue was elevated off the deep fascia using Bovie cautery and passed beneath the spinal accessory nerve and the specimen was sent for histopathologic analysis.  Dissection then continued down to the deep cervical fascia along the posterior border of the sternocleidomastoid muscle.  This dissection was carried inferiorly beyond the level of the omohyoid muscle.  Fibrofatty tissue was then grasped using multiple Allis clamps and retracted anteriorly, and dissection continued anteriorly toward the vascular bundle using blunt dissection and Bovie cautery.  Care was taken to divide exiting cervical rootlets distally, and only when necessary, and care was also taken to leave the phrenic nerve undisturbed.      Fibrofatty tissue was then elevated off the carotid artery, vagus nerve and jugular vein using Mosquito forceps and  Bovie cautery.  Remaining tissue attachments anteriorly were divided along the course of the omohyoid muscle, allowing removal of the neck dissection specimen.  The specimen was oriented and sent to pathology for inking and permanent section evaluation.     The surgical site was irrigated with saline, and hemostasis was ensured using Bovie and Bipolar cautery.  A  7 mm Eitan Hayes drain was place through a separate stab incision. The surgical site was then closed in multiple layers.  Platysma and tissue at the level of the platysma was reapproximated using 3-0 Vicryl stitches in an interrupted fashion.  Skin itself was closed using a 4-0 nylon stitch running in multiple segments, and the same stitch was used to secure the J-P drain.  Bacitracin ointment was then applied.     Anesthesia was then reversed, and drapes were removed.  The patient was awakened, extubated and taken to the recovery room in stable condition. All counts were correct at the end of the case, and no complications were encountered.     Patient Disposition:  PACU       SIGNATURE: Juan Pablo Kirby MD  DATE: June 19, 2024  TIME: 2:11 PM

## 2024-06-20 VITALS
BODY MASS INDEX: 24.34 KG/M2 | HEART RATE: 71 BPM | HEIGHT: 70 IN | SYSTOLIC BLOOD PRESSURE: 127 MMHG | WEIGHT: 170 LBS | TEMPERATURE: 98.2 F | OXYGEN SATURATION: 95 % | RESPIRATION RATE: 17 BRPM | DIASTOLIC BLOOD PRESSURE: 77 MMHG

## 2024-06-20 RX ADMIN — HEPARIN SODIUM 5000 UNITS: 5000 INJECTION INTRAVENOUS; SUBCUTANEOUS at 06:34

## 2024-06-20 NOTE — PROGRESS NOTES
"OTOLARYNGOLOGY PROGRESS NOTE    Date of Service: 6/20/2024 5:50 AM    HPI  Patient is a 69 y.o. male with PMH of partial glossectomy and partial pharyngectomy 7/31/19 and right MRND 9/4/19. Now s/p DL with biopsy and selective right neck dissection of Level II and III on 6/19/24. 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     Overnight Events: NAOE, AVSS, tolerating PO    Drain: 70 cc, serosanguinous. JUANY drain removed at bedside rounds.    PHYSICAL EXAM:  Vitals:    06/19/24 2217   BP: 142/81   Pulse: 79   Resp: 18   Temp: 98.1 °F (36.7 °C)   SpO2: 95%        General: No acute distress  HEENT: Neck incision. CDI. JUANY in place.  Neurology: No focal deficits  Lungs: Breathing easy, unlabored and even on room air  Cardio: RRR, well perfused       Intake/Output Summary (Last 24 hours) at 6/20/2024 0550  Last data filed at 6/19/2024 1800  Gross per 24 hour   Intake 1600 ml   Output 1970 ml   Net -370 ml         LABORATORY    No results for input(s): \"WBC\", \"HGB\", \"HCT\", \"PLT\" in the last 72 hours.    No results for input(s): \"NA\", \"K\", \"CL\", \"BUN\", \"CREAT\", \"PHOS\", \"MG\" in the last 72 hours.    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 male w/ acute and chronic problems as above, now s/p DL with biopsy and right selective neck dissection of Level II and III, doing well.    PLAN  - Remove JUANY drain  - Tentative plan for discharge home today   "

## 2024-06-20 NOTE — DISCHARGE INSTR - AVS FIRST PAGE
Neck Dissection    WHAT YOU SHOULD KNOW:    You underwent neck dissection, a surgery to remove lymph nodes from your neck.    AFTER YOU LEAVE:    Medicines:  The following medicines may  be ordered by your primary healthcare provider:  Pain medicine  can help take away or decrease pain. Do not wait until the pain is severe before you take your medicine.  Take your medicine as directed.  Call your healthcare provider if you think your medicine is not helping or if you have side effects. Tell him if you are allergic to any medicine. Keep a list of the medicines, vitamins, and herbs you take. Include the amounts, and when and why you take them. Bring the list or the pill bottles to follow-up visits. Carry your medicine list with you in case of an emergency.  Follow up with your or surgeon as directed:  You will need to return to have your wound checked and possiblye have stitches removed. Write down your questions so you remember to ask them during your visits.  Self-care:   Rest when you need to while you heal after surgery.  Slowly start to do more each day. Return to your daily activities as directed.    Wound care:  You may shower or bathe, but do not rub or scrub the surgical site. Please leave the surgical adhesive dressing in place.   Swallowing and voice changes:  You may have a sore throat, hoarse voice, or difficulty swallowing after surgery. These symptoms should go away after a few days.    Empty your wound drain as instructed.  Contact your surgeon if:   You have a fever or chills.    You vomit several times in a row.    Your incision is red, swollen, or draining pus.   You have new voice weakness or hoarseness, or it is getting worse.    You have questions or concerns about your condition or care.  Seek care immediately or call 911 if: .    Blood soaks through your bandage.   Your stitches come apart.   You have sudden swelling in your neck or difficulty swallowing.    You have trouble breathing.   You  have a seizure.  © 2014 [employer]. Information is for End User's use only and may not be sold, redistributed or otherwise used for commercial purposes. All illustrations and images included in CareNotes® are the copyrighted property of [employer] or [employer].  The above information is an  only. It is not intended as medical advice for individual conditions or treatments. Talk to your doctor, nurse or pharmacist before following any medical regimen to see if it is safe and effective for you.  Call Dr. Montana with any questions or concerns: office [phone]; mobile [phone] (urgent issues).

## 2024-06-25 DIAGNOSIS — C01 MALIGNANT NEOPLASM OF BASE OF TONGUE (HCC): Primary | ICD-10-CM

## 2024-06-25 DIAGNOSIS — C77.0 MALIGNANT NEOPLASM METASTATIC TO LYMPH NODE OF NECK (HCC): ICD-10-CM

## 2024-06-25 PROCEDURE — 88307 TISSUE EXAM BY PATHOLOGIST: CPT | Performed by: PATHOLOGY

## 2024-06-25 PROCEDURE — 88342 IMHCHEM/IMCYTCHM 1ST ANTB: CPT | Performed by: PATHOLOGY

## 2024-06-25 PROCEDURE — 88305 TISSUE EXAM BY PATHOLOGIST: CPT | Performed by: PATHOLOGY

## 2024-06-25 PROCEDURE — 88341 IMHCHEM/IMCYTCHM EA ADD ANTB: CPT | Performed by: PATHOLOGY

## 2024-06-25 NOTE — PROGRESS NOTES
----- Message from Shaylee Paz sent at 3/14/2022 11:25 AM EDT -----  Regarding: DM EYE EXAM  03/14/22 11:26 AM    Hello, our patient Priyank Sullivan has had Diabetic Eye Exam completed/performed  Please assist in updating the patient chart by pulling the document from the Media Tab  The date of service is 3/9/22       Thank you,  Poly Aguila   [address] Referral to oncology and radiation oncology for pt to discuss treatment options

## 2024-06-26 ENCOUNTER — DOCUMENTATION (OUTPATIENT)
Dept: HEMATOLOGY ONCOLOGY | Facility: CLINIC | Age: 69
End: 2024-06-26

## 2024-06-26 ENCOUNTER — PATIENT OUTREACH (OUTPATIENT)
Dept: HEMATOLOGY ONCOLOGY | Facility: CLINIC | Age: 69
End: 2024-06-26

## 2024-06-26 NOTE — PROGRESS NOTES
Oncology Nurse Navigator    Called patient for initial outreach from nurse navigator.  Left voicemail message with contact information.  Requested a call back.     Referral for med/onc and rad/onc  reviewed and message sent to [facility] to schedule.

## 2024-06-26 NOTE — PROGRESS NOTES
Referral received/ Chart reviewed for work up completed     Imaging completed:  05/22/24 PET CT, 04/24/24 CT soft tissue neck w contrast completed at [facility]    Pathology completed:  06/19/24 at [facility]    All records needed are in patients chart. No records retrieval needed at this time.

## 2024-06-27 ENCOUNTER — PATIENT OUTREACH (OUTPATIENT)
Dept: HEMATOLOGY ONCOLOGY | Facility: CLINIC | Age: 69
End: 2024-06-27

## 2024-06-27 ENCOUNTER — DOCUMENTATION (OUTPATIENT)
Dept: HEMATOLOGY ONCOLOGY | Facility: CLINIC | Age: 69
End: 2024-06-27

## 2024-06-27 ENCOUNTER — DOCUMENTATION (OUTPATIENT)
Dept: NUTRITION | Facility: HOSPITAL | Age: 69
End: 2024-06-27

## 2024-06-27 NOTE — PROGRESS NOTES
In-basket message received from MIGUEL ANGEL Freeman to add patient to the head and neck [facility] on 7/8/2024. Chart reviewed and prep completed.

## 2024-06-27 NOTE — PROGRESS NOTES
.Called patient for initial outreach from nurse navigator. Introduced myself and my role. Advised patient I received an referral from Dr. Montana for radiation and medical oncology. He is aware of these referral. He has all his testing complete. We briefly discussed PEG tube. Verbalized understanding.   Denies any transportation issues at this time.     Do yo have transportation for initial appts? Yes    Will someone be coming with you? Wife    Review of Communication consent and update as needed.     How do you prefer to receive information? Verbal both written    Family history of cancer? Hx 5 years ago - Mother hx of breast cancer, brother bladder cancer   Interested in speaking with oncology Genetics?    Systems Assessment     Denies any history of smoking.      [facility]: [facility]  Fax #: - [phone]  Last Visit:   [facility] appointment for radiation clearance:  07/03/24  Left message on voicemail for fax number 06/27/24 [phone]    Are you having any pain ? Mild discomfort at incision site.     Any unexplained weight loss or decrease in appetite? No - referral placed to dietitian.     Nausea/vomiting? No    Difficulty swallowing? No    Painful swallowing? No    Changes in voice? No    Difficulty breathing? No    Any fatigue or weakness? No    Can you get around independently? Yes    Assistance with ADLs?  No      Are you experiencing any anxiety/depression that is new or worsening? No    Do you have a PCP that you are established with? Yes     Information provided on [facility] along with my contact information and information on patient navigator. Patient  is aware they can reach out with any questions.  General assessment complete. Patient expresses appreciation of phone call.

## 2024-06-27 NOTE — PROGRESS NOTES
Received notification by Pao Lubin RN on 6/26 that pt is appropriate for oncology nutrition care (reason for referral: HNC dx).     Chart review complete. Patient has not yet scheduled appts with Med Onc or RadOnc. Will establish care accordingly once appts are scheduled and tx plan in place.

## 2024-06-28 NOTE — PROGRESS NOTES
Spoke with patient states he has an appointment with Dr. Talbot and Dr. Morrow on 7/3/24.  Advised he may not be able to make it to his appointment with Dr. Morrow on time. States if he can push out appointment with Odalys he will do that. But he is will be away on Friday so that date is not available to him.  Advise I will call him Monday and we will further discuss. Verbalized understanding expressed appreciation of phone call.

## 2024-06-30 NOTE — PROGRESS NOTES
HEMATOLOGY / ONCOLOGY CLINIC FOLLOW UP NOTE    Patient Jesus Hdz  MRN: [id number]  : 1955  Date of Encounter 7/3/2024      Referring Provider: Mile Adams    Reason for Encounter: Consult recurrent vs new primary RBOT    Need PD1 status    Send to [facility] stat    MRI face/neck/orbit assess primary PET negative  6/7 nodes positive with ASHLEY recent mod RND    Discussion     Te definitive management of SCCHN in terms of oral cavity vs oropharynx vs larynx vs hypopharynx as well as pure tobacco related vs HPV with or without a prior/current smoking history had been surgery/RT.   In the past, the  role of chemotherapy was reserved for metastatic disease using Cisplatin at fairly high doses as well as infusional 5FU x 96 hours.  However there have been many trials which have changed the landscape of how locoregional and metastatic disease are now managed with chemotherapy, CRT or IO       It is unclear in this case, as had prior RBOT Stage 1 HPV driven s/p resection with no high risk features 0/34 nodes positive at the time and no adjuvant therapy    Based on the DL/biopsy there is SCC in the pharynx but location unclear; 6/7 nodes were positive with ASHLEY and thus the below pertains in terms of combined therapy.  The issues is 6 or 7 weeks of treatment and this appears to be a new second primary based on the biology      In general the prognosis for HPV driven SCCHN is better than that of tobacco related disease and in fact the AJCC staging system reflects this.  However when tobacco is a factor with a patient being a long standing smoker that favorable prognosis is no longer applicable;  however this patient smoked a pack a day for years but quit 30 years ago and so he has more favorable disease     The OS/PFS/FRANCO responses were better in those patients treated on the  study who were HPV positive; induction was followed by CRT and in that study Carboplatin AUC 1.5 only rather than 40 mg/m2 Cisplatin  due to perceived toxicities being worse with cisplatin after induction therapy.  That was the case for this patient who got Cisplatin        However,  CRT per RTOG 91-11 as become SOC.  In the modern era, weekly Cisplatin 40 mg/m2 is used for 7 weeks rather than the high dose Cisplatin in that trial with similar efficacy.  Other radiosensitizers include weekly Carboplatin/Taxol.  Based on the side effect profile of Cisplatin, een in lower doses, renal clearance is an issue.  Both Carboplatin and Cisplatin are renally cleared but Carboplatin is dosed based on renal function and may be more tolerable.  Other agents used as radiosensitizers include Cetuximab per the Garcia study.       Side effects, and  toxicities of chemotherapy alone and in combination with radiation had been discussed at [place].  The side effects specific to radiation include loss of taste, xerostomia, mucositis, dysphagia , nausea, radiation induced dermatis and fatigue.  Fatigue is also common with chemotherapy and is cumulative.  Copious ropey secretions were discussed as well as nutrition.  In terms of the effects of Cisplatin or Carboplatin/Taxol weekly alone and in combination with radiation are neutropenia/leucopenia, anemia, thrombocytopenia, nausea, GERD, mucositis, fungal infections, renal insufficiency/dehydration, electrolyte imbalances including platinum induced SIADH, neuropathy, hearing loss, minor hair loss.  Taxol can also cause an infusion reaction /premedications are needed.  As chemotherapy is being used as a radiosensitizer, the effects of radiation are magnified.       Nutrition and hydration are concerning in the combined modality setting and we discussed the use of a PEG.  Based on both internal scatter of radiation and with extensive radiation there is potential for fibrosis/strictures as well as mucositis, fungal infections, being hypermetabolic with swallowing issues.  Repletion as the caloric needs increase is improved via  a PEG as well as IV fluids.  Nutrition generally follows these patients.       There are also chronic issues which can occur; the secretions generally improve in 3-6 months, taste returns within 1-2 months, xerostomia may be a chronic issue, fibrosis, lymphedema, thyroid issues with checks every 3 months after RT completion, length of PEG use of about 3-6 months.     Restaging would be 3 months after therapy with CT PET and then every 3 months with CT neck/chest in the first year, eery 4-6 months thereafter.  HPV driven disease tends to have a latent distant mets period.       PDL1 status as well as TMB, MMR, MSI should be ascertained via [facility] for future reverence      Thus the recommendation would be weekly Cisplatin 40 mg/m2 weekly with RT to 70 Gy and thus 7 weeks     Assessment / Plan:    Recurrent right level 2 node s/p partial glossectomy/pharyngectomy 7/31/2029 with no adjuvant CRT 0/34 nodes at the time and no high risk features    PET with no primary recurrence/ nodes only    MRI face/neck/orbit    Discussion regarding CRT with weekly Cisplatin 40 mg/m2 for 6-7 weeks    PEG placement    Dental evaluation    Nutrition evaluation       Follow up    TBD     History:   7/3/2024     69 year old with HTN with prior RBOT cancer;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 S/p partial glossectomy and partial pharyngectomy on 07/31/2019, pathology negative for malignancy. S/p MRND right neck 09/04/2019 returned 0/34 nodes. Staged as T1N0 M0      PET scan was done 12/03/19 which showed mild asymmetry at the tongue base, likely post operative. Otherwise no distant metastasis. Prior imaging from June 2019 was clear with no evidence of disease.     There were no high risk features to speak of and he did not receive any adjuvant therapies.      He then did well in follow-up until approximately 3 to 4 months ago when he noticed a palpable lump on the right neck.  Subsequent imaging with CT and PET/CT  "revealed multiple hypermetabolic lymph nodes in the right neck but no obvious primary recurrence, contralateral or distant disease.  On 6/19/2024 he was taken to the operating room for direct laryngoscopy as well as repeat right neck dissection.  7 lymph nodes were removed, 6 of which were positive for metastatic squamous cell carcinoma, keratinizing, p16 positive, with extranodal extension identified.  The largest lymph node measured 1.1 cm.  On the pathology report there is a specimen labeled right pharynx which also contained squamous cell carcinoma.  Looking at the operative report, they describe a small palpable prominence in the \"left pharynx near base of tongue.\"  This is in contradiction to the pathology report which described the specimen as from the right pharynx.           Ct neck 04/24/2024     New rounded lymph node in the palpable region of interest superficial to the sternocleidomastoid muscle measuring 9 x 6 mm. Differential diagnosis should include recurrent disease versus reactive lymphadenopathy.     Small cluster of right level 2B cervical lymph nodes also noted as described above with otherwise no suspicious adenopathy identified in the remainder of the neck.     Status post right-sided partial glossectomy without definite nodular enhancement in the operative bed.     05/06/2024 FNAB under US  Lymph Node, right level 2 (ThinPrep, smears and cell block preparations ):  Conclusive evidence of malignancy.  Carcinoma with keratinization, compatible with known squamous cell carcinoma     PET scan 05/22/2024     1. Scattered FDG-avid right cervical chain lymph nodes compatible with disease recurrence.  2. No suspicious uptake in the oropharynx or hypopharynx.  3. No findings for hypermetabolic metastasis to the chest, abdomen or pelvis.  \  No prior XRT or chemotherapy.     The patient is seeing Rad Onc later today    He is very pleased with the RND healing.  We had a long discussion as to second " primary vs recurrence; treatment with CRT either 6 or 7 weeks.  As this is likely a second primary, would do 7 weeks but again, not definitively shown on PET.  Will get MRI face/neck/orbit to further assess.  He has been doing well post op.  Weight stable 169 pounds.  Has altered diet, eating healthier.  Has no issues, denies any SOB/KOHLI, CP change in bowel/bladder, blood stool/urine          REVIEW OF SYSTEMS:  Please note that a 14-point review of systems was performed to include Constitutional, HEENT, Respiratory, CVS, GI, , Musculoskeletal, Integumentary, Neurologic, Rheumatologic, Endocrinologic, Psychiatric, Lymphatic, and Hematologic/Oncologic systems were reviewed and are negative unless otherwise stated in HPI. Positive and negative findings pertinent to this evaluation are incorporated into the history of present illness.      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    PAST SURGICAL HISTORY:   has a past surgical history that includes Rhinoplasty; Colonoscopy (N/A, 03/22/2016); [place] tooth extraction; pr laryngoscopy direct operative w/biopsy (N/A, 07/17/2019); TRANSORAL ROBOTIC SURGERY (TORS) (N/A, 07/31/2019); pr cervical lymphadec modified radical neck dsj (Right, 09/04/2019); pr laparoscopy surg cholecystectomy (N/A, 10/30/2019); Upper gastrointestinal endoscopy; Cholecystectomy; pr cysto insertion transprostatic implant single (N/A, 04/14/2023); Sinus surgery (1994); US guided lymph node biopsy right (5/6/2024); pr laryngoscopy direct operative w/biopsy (N/A, 6/19/2024); and Radical neck dissection (Right, 6/19/2024).    CURRENT  MEDICATIONS  Current Outpatient Medications   Medication Sig Dispense Refill    amLODIPine (NORVASC) 5 mg tablet Take 5 mg by mouth daily at bedtime       lisinopril (ZESTRIL) 10 mg tablet Take 10 mg by mouth daily at bedtime        No current facility-administered medications for this visit.     [unfilled]    SOCIAL HISTORY:   reports that he has never smoked. He has never used smokeless tobacco. He reports current alcohol use of about 5.0 standard drinks of alcohol per week. He reports that he does not currently use drugs.     FAMILY HISTORY:  family history includes Cancer in his mother; Diabetes in his mother; Heart failure in his father; Seizures in his father.     ALLERGIES:  is allergic to shellfish allergy - food allergy and bee venom.      Physical Exam:  Vital Signs:   Visit Vitals  Smoking Status Never     There is no height or weight on file to calculate BMI.  There is no height or weight on file to calculate BSA.    GEN: Alert, awake oriented x3, in no acute distress  HEENT- No pallor, icterus, cyanosis, no oral mucosal lesions,   LAD - no palpable cervical, clavicle, axillary, inguinal LAD s/p RND healing   Heart- normal S1 S2, regular rate and rhythm, No murmur, rubs.   Lungs- clear breathing sound bilateral.   Abdomen- soft, Non tender, bowel sounds present  Extremities- No cyanosis, clubbing, edema  Neuro- No focal neurological deficit    Labs:  Lab Results   Component Value Date    WBC 6.75 06/13/2024    HGB 14.5 06/13/2024    HCT 44.2 06/13/2024    MCV 87 06/13/2024     06/13/2024     Lab Results   Component Value Date     03/03/2016    SODIUM 139 06/13/2024    K 3.9 06/13/2024     06/13/2024    CO2 24 06/13/2024    AGAP 11 06/13/2024    BUN 15 06/13/2024    CREATININE 0.81 06/13/2024    GLUC 81 06/13/2024    CALCIUM 9.0 06/13/2024    AST 19 04/02/2024    ALT 20 04/02/2024    ALKPHOS 54 04/02/2024    PROT 6.7 03/03/2016    TP 6.8 04/02/2024    BILITOT 0.6 03/03/2016    TBILI 0.8  04/02/2024    EGFR 91 06/13/2024           I spent 60 minutes on chart review, face to face counseling time, coordination of care and documentation.    Magali Talbot MD PhD

## 2024-07-01 NOTE — PROGRESS NOTES
Called patient advised to keep appointment with Dr. Morrow on Wedniesday 7/3/24. Verbalized understanding.

## 2024-07-03 ENCOUNTER — OFFICE VISIT (OUTPATIENT)
Age: 69
End: 2024-07-03
Payer: MEDICARE

## 2024-07-03 ENCOUNTER — CONSULT (OUTPATIENT)
Dept: RADIATION ONCOLOGY | Facility: HOSPITAL | Age: 69
End: 2024-07-03
Attending: RADIOLOGY
Payer: MEDICARE

## 2024-07-03 VITALS
TEMPERATURE: 98.9 F | SYSTOLIC BLOOD PRESSURE: 122 MMHG | DIASTOLIC BLOOD PRESSURE: 82 MMHG | OXYGEN SATURATION: 98 % | RESPIRATION RATE: 18 BRPM | HEART RATE: 71 BPM

## 2024-07-03 VITALS
SYSTOLIC BLOOD PRESSURE: 124 MMHG | WEIGHT: 169 LBS | HEART RATE: 78 BPM | RESPIRATION RATE: 18 BRPM | DIASTOLIC BLOOD PRESSURE: 73 MMHG | OXYGEN SATURATION: 98 % | BODY MASS INDEX: 24.2 KG/M2 | TEMPERATURE: 98.6 F | HEIGHT: 70 IN

## 2024-07-03 DIAGNOSIS — C01 MALIGNANT NEOPLASM OF BASE OF TONGUE (HCC): ICD-10-CM

## 2024-07-03 DIAGNOSIS — C77.0 MALIGNANT NEOPLASM METASTATIC TO LYMPH NODE OF NECK (HCC): ICD-10-CM

## 2024-07-03 DIAGNOSIS — C01 MALIGNANT NEOPLASM OF BASE OF TONGUE (HCC): Primary | ICD-10-CM

## 2024-07-03 PROCEDURE — 99205 OFFICE O/P NEW HI 60 MIN: CPT | Performed by: RADIOLOGY

## 2024-07-03 PROCEDURE — 99205 OFFICE O/P NEW HI 60 MIN: CPT | Performed by: INTERNAL MEDICINE

## 2024-07-03 PROCEDURE — 99211 OFF/OP EST MAY X REQ PHY/QHP: CPT | Performed by: RADIOLOGY

## 2024-07-03 NOTE — PROGRESS NOTES
"Consultation - Radiation Oncology      Patient Name: Jesus Hdz MRN:[id number] : 1955  Encounter: [id number]  Referring Provider: Mile Adams CRNP    Cancer Staging   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)  Staging form: Pharynx - Oropharynx, AJCC 8th Edition  - Clinical stage from 7/3/2024: Stage I (cT1, cN1, cM0, p16+) - Signed by Fady Morrow MD on 7/3/2024  Stage prefix: Initial diagnosis    ASSESSMENT & PLAN  Jesus Hdz is a 69 y.o. 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  At that time, he underwent staged base of tongue resection followed by modified right neck dissection with 0 of 34 lymph nodes identified.  There were no high risk features to speak of and he did not receive any adjuvant therapies.  He then did well in follow-up until approximately 3 to 4 months ago when he noticed a palpable lump on the right neck.  Subsequent imaging with CT and PET/CT revealed multiple hypermetabolic lymph nodes in the right neck but no obvious primary recurrence, contralateral or distant disease.  On 2024 he was taken to the operating room for direct laryngoscopy as well as repeat right neck dissection.  7 lymph nodes were removed, 6 of which were positive for metastatic squamous cell carcinoma, keratinizing, p16 positive, with extranodal extension identified.  The largest lymph node measured 1.1 cm.  On the pathology report there is a specimen labeled right pharynx which also contained squamous cell carcinoma.  Looking at the operative report, they describe a small palpable prominence in the \"left pharynx near base of tongue.\"  This is in contradiction to the pathology report which described the specimen as from the right pharynx.    At this point, it is somewhat unclear if this is a second primary metastatic to the right neck, or a local recurrence with involved nodes.  We will speak with ENT regarding the " discrepancy in the operative and pathology reports.  Medical oncology will be ordering an MRI of soft tissue of the neck which may better visualize any residual gross disease in the pharynx.  Given the multiple involved nodes and extracapsular extension present, we would recommend a course of chemoradiation therapy.  The specifics of the treatment will be somewhat affected by our discussion with ENT and the results of the MRI, but treatment would be at least 6-1/2 weeks, potentially 7.  Treatment would be directed at the oropharynx and right neck, and we will likely include the left neck as well.    The associated risks and toxicities of treatment were discussed with the patient in detail, including, but not limited to, fatigue, erythema, hyperpigmentation, desquamation, subcutaneous fibrosis, dysgeusia, xerostomia, odynophagia, dysphagia, possible PEG tube dependence, partial alopecia, dental complications, hypothyroidism, and radionecrosis.    The patient has a trip planned to [facility] in early August and prefers to hold off on starting treatment until he returns.  He also plans on obtaining a second opinion, likely at the [facility].  He has been given an appointment for tentative CT planning the week he returns from his trip.    The patient agreed to proceed with radiation therapy, and informed consent was signed. CT simulation to be scheduled at [place].    Thank you for the opportunity to participate in the care of this patient.    Fady Morrow MD  Department of Radiation Oncology  [facility]    No orders of the defined types were placed in this encounter.    1. 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)  Ambulatory Referral to Radiation Oncology      2. 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    CHIEF COMPLAINT  Chief Complaint   Patient presents with    Consult     Radiation Oncology       History of Present Illness  Jesus ALFRED Page 1955 is a 69 y.o. 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 He had a biopsy done on 7/31/19 that was negative. Most recent biopsy on 6/19/24 confirmed this. Referred by MIGUEL ANGEL Adams.     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   He has a h/o facial basal cell cancer, GERD, hypertension, and cholecystectomy.    He presents today to discuss RT to lymph nodes in right side of neck.     7/31/19 Tissue Exam   A. Tongue, Right tongue base, partial glossectomy/pharyngectomy:  - No residual squamous cell carcinoma identified; negative for dysplasia and malignancy.    -- Multiple levels of entire specimen histologically examined.    -- No lymphovascular or perineural invasion identified.  - Foci of surface epithelial denudation and changes consistent with prior surgical site.   - Scattered reactive follicular lymphoid hyperplasia and squamous metaplasia of ductal epithelium.     7/15/21 CT soft tissue neck with contrast  IMPRESSION:     Small focus of nodular asymmetric enhancement at the left tongue base encroaching on the ventral aspect of the vallecula (49, 2).  This may simply represent asymmetric lymphoid hyperplasia given the right-sided partial glossectomy and pharyngectomy.    Attention on follow-up imaging versus direct visualization with laryngoscopy is recommended to exclude a developing mucosal lesion.       Postsurgical changes of right modified radical neck dissection.     No cervical adenopathy.       4/18/24  Otolaryngology   Laryngoscopy done today, no presence of mass or lesion, Asymmetry of tongue base, had prior R partial glossectomy. No mucosal lesions present. Physical exam showed sub centimeter  "lump on right, mobile, cervical level III below prior incision line. Recommend CT of neck and US FNAB.     4/24/24 CT soft tissue neck with contrast  IMPRESSION:  New rounded lymph node in the palpable region of interest superficial to the sternocleidomastoid muscle measuring 9 x 6 mm. Differential diagnosis should include recurrent disease versus reactive lymphadenopathy.     Small cluster of right level 2B cervical lymph nodes also noted as described above with otherwise no suspicious adenopathy identified in the remainder of the neck.     Status post right-sided partial glossectomy without definite nodular enhancement in the operative bed.    5/6/24 Us guided lymph node biopsy right  FINDINGS:  On prior CT of the soft tissue neck with contrast from 4/24/2024, there was a right 0.9 x 0.6 cm inferior level 2 lymph node located superficial to the sternocleidomastoid muscle. There is also a right level 2B cluster of lymph nodes measuring up to 1.5   x 0.5 cm.     The patient presented today with a prescription to undergo FNA of the \"right subcentimeter neck mass located near the carotid.\" On today's limited ultrasound the right level 2 lymph node measured 1.2 x 0.6 x 1.0 cm. Today's ultrasound and the prior CAT   scan were reviewed by myself and Dr. Magallanes. After review of the patient's prior imaging the right level 2 lymph node anterior to the carotid was targeted for FNA.     The procedure was explained to the patient, including risks of hemorrhage, infection and local injury. Informed consent was freely obtained. The patient verbalized expressed understanding of the above risks and wished to proceed with the procedure.     Final standard \"time-out\" procedure performed.     PROCEDURE: The neck was prepped and draped in normal sterile fashion. Under real-time ultrasound guidance and local anesthesia 3 passes with a 25-gauge needle were made through the right level 2 lymph node.     Cytopathology was present and " deemed the specimens adequate for evaluation. At the request of the pathologist, 3 additional passes with a 25-gauge needle were made through the right level 2 lymph node and placed into CytoLyt.     On post FNA ultrasound there was no evidence of hematoma formation.     The patient tolerated the procedure well. Postprocedure instructions were provided for the patient. I asked the patient to call us with any questions, concerns, or acute problems. The patient expressed understanding of the above.     IMPRESSION:  Status post successful ultrasound-guided right level 2 lymph node biopsy.  I reviewed the above findings and procedure with Dr. Magallanes.    5/22/24 NM PET CT skull base to mid thigh  IMPRESSION:     1. Scattered FDG-avid right cervical chain lymph nodes compatible with disease recurrence.  2. No suspicious uptake in the oropharynx or hypopharynx.  3. No findings for hypermetabolic metastasis to the chest, abdomen or pelvis.     6/3/24 Head and Neck Mult disciplinary Board   Recommend DL for additional tissue to determine if HPV recurrence vs new process     6/13/24   PET scan showed recurrence in right cervical chain lymph nodes. Patient had no RT or chemotherapy. Patient is considered either SCC tongue base reoccurrence vs SCC skin. The choices include:surgical interventions including excision of right neck lump vs revision MRND VS DL. Results depend on surgery, may need chemotherapy, or RT.     6/19/24 Direct laryngoscopy with right neck dissection level two and three  Right dissection neck radical   Tissue Exam   Final Diagnosis   A. 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     See Note.     -- Confirmed by positive Pankeratin (CKAE1/3), p40 and normal wild-type expression       on p53 immunostains.    -- p16 immunostain positive (>70% diffuse and strong nuclear and cytoplasmic        staining); high-risk HPV EH pending;  result will be reported in an addendum.      B. Lymph Node, Regional Resection, Level 2 lymph nodes, see comments:  - Metastatic squamous cell carcinoma, keratinizing, involving at least two lymph nodes (2/2).     -- Present in 1.0 cm lymph node and additional detached lymph node(s)/fragments.     -- Largest metastatic focus: 0.3 cm; Extranodal extension: Not identified.    -- Confirmed by positive Pankeratin (CKAE1/3) and p40 immunostains.    -- p16 immunostain positive (>70% diffuse and strong nuclear and cytoplasmic        staining).     C. Lymph Node, Regional Resection, Additional level 2 lymph nodes:  - Metastatic squamous cell carcinoma, keratinizing, involving 3 of 4 lymph     nodes/fragments (3/4).     -- Largest metastatic focus: 1 cm; Extranodal extension: Present, extranodal         lymphovascular invasion identified.    -- Confirmed by positive Pankeratin (CKAE1/3) and p40 immunostains.    -- p16 immunostain positive (>70% diffuse and strong nuclear and cytoplasmic        staining); high-risk HPV EH pending; result will be reported in an addendum. See Note.      D. Lymph Node, Level 3 lymph nodes, lymph adenectomy:  - One lymph node positive for metastatic squamous cell carcinoma, keratinizing (1/1).     -- Largest metastatic focus: 1.1 cm; Extranodal extension: Not identified.    -- Confirmed by positive Pankeratin (CKAE1/3) and p40 immunostains.    -- p16 immunostain positive (>70% diffuse and strong nuclear and cytoplasmic        staining).  - Portion(s) of benign salivary gland with adjacent detached small (< 0.2 cm) portion      of carcinoma.   - Traumatic neuroma.          Comments:   This is an appended report. These results have been appended to a previously preliminary verified report.           6/26/24 Otolaryngology  Incision healing well, and suture removed. Discussed that patient may feel numbness, tingling and discomfort on tongue. MIGUEL ANGEL Adams talked with patient about pathology. Patient  will follow with med onc and rad onc. Also with .        7/3/24 Dr. Talbot    Upcoming   7/16/24     Today, the patient presents accompanied by his wife.  Overall he feels well and has been doing well since his recent surgery.  He recently had a tooth extracted in anticipation of radiation.  He denies any significant dysphagia.    Oncology History   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)   7/25/2019 Initial Diagnosis    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)     6/19/2024 Biopsy    Final Diagnosis   A. 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     See Note.     -- Confirmed by positive Pankeratin (CKAE1/3), p40 and normal wild-type expression       on p53 immunostains.    -- p16 immunostain positive (>70% diffuse and strong nuclear and cytoplasmic        staining); high-risk HPV EH pending; result will be reported in an addendum.      B. Lymph Node, Regional Resection, Level 2 lymph nodes, see comments:  - Metastatic squamous cell carcinoma, keratinizing, involving at least two lymph nodes (2/2).     -- Present in 1.0 cm lymph node and additional detached lymph node(s)/fragments.     -- Largest metastatic focus: 0.3 cm; Extranodal extension: Not identified.    -- Confirmed by positive Pankeratin (CKAE1/3) and p40 immunostains.    -- p16 immunostain positive (>70% diffuse and strong nuclear and cytoplasmic        staining).     C. Lymph Node, Regional Resection, Additional level 2 lymph nodes:  - Metastatic squamous cell carcinoma, keratinizing, involving 3 of 4 lymph     nodes/fragments (3/4).     -- Largest metastatic focus: 1 cm; Extranodal extension: Present, extranodal         lymphovascular invasion identified.    -- Confirmed by positive Pankeratin (CKAE1/3) and p40 immunostains.    -- p16 immunostain positive (>70% diffuse and strong nuclear and cytoplasmic        staining); high-risk HPV EH pending; result  will be reported in an addendum. See Note.      D. Lymph Node, Level 3 lymph nodes, lymph adenectomy:  - One lymph node positive for metastatic squamous cell carcinoma, keratinizing (1/1).     -- Largest metastatic focus: 1.1 cm; Extranodal extension: Not identified.    -- Confirmed by positive Pankeratin (CKAE1/3) and p40 immunostains.    -- p16 immunostain positive (>70% diffuse and strong nuclear and cytoplasmic        staining).  - Portion(s) of benign salivary gland with adjacent detached small (< 0.2 cm) portion      of carcinoma.   - Traumatic neuroma.          Comments:   This is an appended report. These results have been appended to a previously preliminary verified report.           7/3/2024 -  Cancer Staged    Staging form: Pharynx - Oropharynx, AJCC 8th Edition  - Clinical stage from 7/3/2024: Stage I (cT1, cN1, cM0, p16+) - Signed by Fady Morrow MD on 7/3/2024  Stage prefix: Initial diagnosis         Historical Information   Past Medical History:   Diagnosis Date    Facial basal cell cancer     GERD (gastroesophageal reflux disease)     diet controlled    Hypertension     Vitamin D deficiency      Past Surgical History:   Procedure Laterality Date    CHOLECYSTECTOMY      COLONOSCOPY N/A 03/22/2016    Procedure: COLONOSCOPY;  Surgeon: Daren Montana MD;  Location: [facility];  Service:     CA CERVICAL LYMPHADEC MODIFIED RADICAL NECK DSJ Right 09/04/2019    Procedure: MODIFIED RADICAL NECK DISSECTION;  Surgeon: Fady Montana MD;  [facility];  Service: ENT    CA CYSTO INSERTION TRANSPROSTATIC IMPLANT SINGLE N/A 04/14/2023    Procedure: CYSTOSCOPY WITH INSERTION UROLIFT;  Surgeon: Rubin Alfonso MD;  [facility];  Service: Urology    CA LAPAROSCOPY SURG CHOLECYSTECTOMY N/A 10/30/2019    Procedure: CHOLECYSTECTOMY LAPAROSCOPIC;  Surgeon: Christiano Cornejo MD;  Location: [facility];  Service: General    CA LARYNGOSCOPY DIRECT OPERATIVE W/BIOPSY N/A 07/17/2019    Procedure:  MICROLARYNGOSCOPY DIRECT WITH BIOPSY OF VALLECULAR MASS;  Surgeon: Amol Cordova MD;  Location[facility];  Service: ENT    ID LARYNGOSCOPY DIRECT OPERATIVE W/BIOPSY N/A 6/19/2024    Procedure: DIRECT LARYNGOSCOPY WITH BIOPSY WITH RIGHT NECK DISSECTION LEVEL TWO AND THREE;  Surgeon: Fady Montana MD;  [facility];  Service: ENT    RADICAL NECK DISSECTION Right 6/19/2024    Procedure: DISSECTION NECK RADICAL;  Surgeon: Fady Montana MD;  [facility];  Service: ENT    RHINOPLASTY      SINUS SURGERY  1994    TRANSORAL ROBOTIC SURGERY (TORS) N/A 07/31/2019    Procedure: ROBOTICALLY ASSISTED PARTIAL GLOSSECTOMY, PARTIAL PHARYNGECTOMY;  Surgeon: Fady Montana MD;  Location[facility];  Service: ENT    UPPER GASTROINTESTINAL ENDOSCOPY      US GUIDED LYMPH NODE BIOPSY RIGHT  5/6/2024    WISDOM TOOTH EXTRACTION       Family History   Problem Relation Age of Onset    Cancer Mother     Diabetes Mother     Heart failure Father     Seizures Father      Social History   Social History     Substance and Sexual Activity   Alcohol Use Yes    Alcohol/week: 5.0 standard drinks of alcohol    Types: 5 Cans of beer per week    Comment: on occ     Social History     Substance and Sexual Activity   Drug Use Not Currently     Social History     Tobacco Use   Smoking Status Never   Smokeless Tobacco Never     Meds/Allergies     Current Outpatient Medications:     amLODIPine (NORVASC) 5 mg tablet, Take 5 mg by mouth daily at bedtime , Disp: , Rfl:     lisinopril (ZESTRIL) 10 mg tablet, Take 10 mg by mouth daily at bedtime , Disp: , Rfl:   Allergies   Allergen Reactions    Shellfish Allergy - Food Allergy Edema     HANDS, LIPS    Bee Venom Swelling     AT BITE SITE       Pathology:  See above.    Review of Systems  Review of Systems   Constitutional: Negative.    HENT: Negative.     Eyes: Negative.    Respiratory: Negative.     Cardiovascular: Negative.    Gastrointestinal: Negative.    Endocrine: Negative.   "  Genitourinary: Negative.    Musculoskeletal: Negative.    Skin: Negative.    Allergic/Immunologic: Negative.    Neurological: Negative.    Hematological: Negative.    Psychiatric/Behavioral: Negative.       OBJECTIVE:   /82 (BP Location: Left arm, Patient Position: Sitting, Cuff Size: Standard)   Pulse 71   Temp 98.9 °F (37.2 °C) (Temporal)   Resp 18   SpO2 98%   Pain Assessment:  0  Performance Status: ECO - Asymptomatic    Physical Exam  General Appearance:  Alert, cooperative, no distress, appears stated age  HEENT: Oral cavity is clear moist mucous membranes.  Dentition in fair repair.  No visible or palpable lesions.  Tongue protrudes midline.  Well-healed right neck dissection scar.  No palpable cervical or supraclavicular lymphadenopathy.  Voice within normal limits.  Cardiovascular:  Extremities warm and well perfused  Lungs: Respirations unlabored, no cyanosis, able to speak in complete sentences without dyspnea.  Abdomen: Non-distended  Skin: No generalized rash or dermatitis  Neurologic: AAOx3, speech and cognition intact.    Portions of the record may have been created with voice recognition software.  Occasional wrong word or \"sound a like\" substitutions may have occurred due to the inherent limitations of voice recognition software.  Read the chart carefully and recognize, using context, where substitutions have occurred.  "

## 2024-07-03 NOTE — PROGRESS NOTES
Jesus ALFRED Page 1955 is a 69 y.o. 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 He had a biopsy done on 7/31/19 that was negative. Most recent biopsy on 6/19/24 confirmed this. Referred by MIGUEL ANGEL Adams.     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   He has a h/o facial basal cell cancer, GERD, hypertension, and cholecystectomy.    He presents today to discuss RT to lymph nodes in right side of neck.     7/31/19 Tissue Exam   A. Tongue, Right tongue base, partial glossectomy/pharyngectomy:  - No residual squamous cell carcinoma identified; negative for dysplasia and malignancy.    -- Multiple levels of entire specimen histologically examined.    -- No lymphovascular or perineural invasion identified.  - Foci of surface epithelial denudation and changes consistent with prior surgical site.   - Scattered reactive follicular lymphoid hyperplasia and squamous metaplasia of ductal epithelium.     7/15/21 CT soft tissue neck with contrast  IMPRESSION:     Small focus of nodular asymmetric enhancement at the left tongue base encroaching on the ventral aspect of the vallecula (49, 2).  This may simply represent asymmetric lymphoid hyperplasia given the right-sided partial glossectomy and pharyngectomy.    Attention on follow-up imaging versus direct visualization with laryngoscopy is recommended to exclude a developing mucosal lesion.       Postsurgical changes of right modified radical neck dissection.     No cervical adenopathy.       4/18/24  Otolaryngology   Laryngoscopy done today, no presence of mass or lesion, Asymmetry of tongue base, had prior R partial glossectomy. No mucosal lesions present. Physical exam showed sub centimeter lump on right, mobile, cervical level III below prior incision line. Recommend CT of neck and US FNAB.     4/24/24 CT soft tissue neck with contrast  IMPRESSION:  New  "rounded lymph node in the palpable region of interest superficial to the sternocleidomastoid muscle measuring 9 x 6 mm. Differential diagnosis should include recurrent disease versus reactive lymphadenopathy.     Small cluster of right level 2B cervical lymph nodes also noted as described above with otherwise no suspicious adenopathy identified in the remainder of the neck.     Status post right-sided partial glossectomy without definite nodular enhancement in the operative bed.    5/6/24 Us guided lymph node biopsy right  FINDINGS:  On prior CT of the soft tissue neck with contrast from 4/24/2024, there was a right 0.9 x 0.6 cm inferior level 2 lymph node located superficial to the sternocleidomastoid muscle. There is also a right level 2B cluster of lymph nodes measuring up to 1.5   x 0.5 cm.     The patient presented today with a prescription to undergo FNA of the \"right subcentimeter neck mass located near the carotid.\" On today's limited ultrasound the right level 2 lymph node measured 1.2 x 0.6 x 1.0 cm. Today's ultrasound and the prior CAT   scan were reviewed by myself and Dr. Magallanes. After review of the patient's prior imaging the right level 2 lymph node anterior to the carotid was targeted for FNA.     The procedure was explained to the patient, including risks of hemorrhage, infection and local injury. Informed consent was freely obtained. The patient verbalized expressed understanding of the above risks and wished to proceed with the procedure.     Final standard \"time-out\" procedure performed.     PROCEDURE: The neck was prepped and draped in normal sterile fashion. Under real-time ultrasound guidance and local anesthesia 3 passes with a 25-gauge needle were made through the right level 2 lymph node.     Cytopathology was present and deemed the specimens adequate for evaluation. At the request of the pathologist, 3 additional passes with a 25-gauge needle were made through the right level 2 lymph node " and placed into CytoLyt.     On post FNA ultrasound there was no evidence of hematoma formation.     The patient tolerated the procedure well. Postprocedure instructions were provided for the patient. I asked the patient to call us with any questions, concerns, or acute problems. The patient expressed understanding of the above.     IMPRESSION:  Status post successful ultrasound-guided right level 2 lymph node biopsy.  I reviewed the above findings and procedure with Dr. Magallanes.    5/22/24 NM PET CT skull base to mid thigh  IMPRESSION:     1. Scattered FDG-avid right cervical chain lymph nodes compatible with disease recurrence.  2. No suspicious uptake in the oropharynx or hypopharynx.  3. No findings for hypermetabolic metastasis to the chest, abdomen or pelvis.     6/3/24 Head and Neck Mult disciplinary Board   Recommend DL for additional tissue to determine if HPV recurrence vs new process     6/13/24   PET scan showed recurrence in right cervical chain lymph nodes. Patient had no RT or chemotherapy. Patient is considered either SCC tongue base reoccurrence vs SCC skin. The choices include:surgical interventions including excision of right neck lump vs revision MRND VS DL. Results depend on surgery, may need chemotherapy, or RT.     6/19/24 Direct laryngoscopy with right neck dissection level two and three  Right dissection neck radical   Tissue Exam   Final Diagnosis   A. 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     See Note.     -- Confirmed by positive Pankeratin (CKAE1/3), p40 and normal wild-type expression       on p53 immunostains.    -- p16 immunostain positive (>70% diffuse and strong nuclear and cytoplasmic        staining); high-risk HPV EH pending; result will be reported in an addendum.      B. Lymph Node, Regional Resection, Level 2 lymph nodes, see comments:  - Metastatic squamous cell carcinoma, keratinizing,  involving at least two lymph nodes (2/2).     -- Present in 1.0 cm lymph node and additional detached lymph node(s)/fragments.     -- Largest metastatic focus: 0.3 cm; Extranodal extension: Not identified.    -- Confirmed by positive Pankeratin (CKAE1/3) and p40 immunostains.    -- p16 immunostain positive (>70% diffuse and strong nuclear and cytoplasmic        staining).     C. Lymph Node, Regional Resection, Additional level 2 lymph nodes:  - Metastatic squamous cell carcinoma, keratinizing, involving 3 of 4 lymph     nodes/fragments (3/4).     -- Largest metastatic focus: 1 cm; Extranodal extension: Present, extranodal         lymphovascular invasion identified.    -- Confirmed by positive Pankeratin (CKAE1/3) and p40 immunostains.    -- p16 immunostain positive (>70% diffuse and strong nuclear and cytoplasmic        staining); high-risk HPV EH pending; result will be reported in an addendum. See Note.      D. Lymph Node, Level 3 lymph nodes, lymph adenectomy:  - One lymph node positive for metastatic squamous cell carcinoma, keratinizing (1/1).     -- Largest metastatic focus: 1.1 cm; Extranodal extension: Not identified.    -- Confirmed by positive Pankeratin (CKAE1/3) and p40 immunostains.    -- p16 immunostain positive (>70% diffuse and strong nuclear and cytoplasmic        staining).  - Portion(s) of benign salivary gland with adjacent detached small (< 0.2 cm) portion      of carcinoma.   - Traumatic neuroma.          Comments:   This is an appended report. These results have been appended to a previously preliminary verified report.           6/26/24 Otolaryngology  Incision healing well, and suture removed. Discussed that patient may feel numbness, tingling and discomfort on tongue. MIGUEL ANGEL Adams talked with patient about pathology. Patient will follow with med onc and rad onc. Also with .        7/3/24 Dr. Talbot    Upcoming   7/16/24                  Oncology History   Malignant neoplasm of  base of tongue (HCC)   7/25/2019 Initial Diagnosis    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)     6/19/2024 Biopsy    Final Diagnosis   A. 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     See Note.     -- Confirmed by positive Pankeratin (CKAE1/3), p40 and normal wild-type expression       on p53 immunostains.    -- p16 immunostain positive (>70% diffuse and strong nuclear and cytoplasmic        staining); high-risk HPV EH pending; result will be reported in an addendum.      B. Lymph Node, Regional Resection, Level 2 lymph nodes, see comments:  - Metastatic squamous cell carcinoma, keratinizing, involving at least two lymph nodes (2/2).     -- Present in 1.0 cm lymph node and additional detached lymph node(s)/fragments.     -- Largest metastatic focus: 0.3 cm; Extranodal extension: Not identified.    -- Confirmed by positive Pankeratin (CKAE1/3) and p40 immunostains.    -- p16 immunostain positive (>70% diffuse and strong nuclear and cytoplasmic        staining).     C. Lymph Node, Regional Resection, Additional level 2 lymph nodes:  - Metastatic squamous cell carcinoma, keratinizing, involving 3 of 4 lymph     nodes/fragments (3/4).     -- Largest metastatic focus: 1 cm; Extranodal extension: Present, extranodal         lymphovascular invasion identified.    -- Confirmed by positive Pankeratin (CKAE1/3) and p40 immunostains.    -- p16 immunostain positive (>70% diffuse and strong nuclear and cytoplasmic        staining); high-risk HPV EH pending; result will be reported in an addendum. See Note.      D. Lymph Node, Level 3 lymph nodes, lymph adenectomy:  - One lymph node positive for metastatic squamous cell carcinoma, keratinizing (1/1).     -- Largest metastatic focus: 1.1 cm; Extranodal extension: Not identified.    -- Confirmed by positive Pankeratin (CKAE1/3) and p40 immunostains.    -- p16 immunostain positive (>70% diffuse and  strong nuclear and cytoplasmic        staining).  - Portion(s) of benign salivary gland with adjacent detached small (< 0.2 cm) portion      of carcinoma.   - Traumatic neuroma.          Comments:   This is an appended report. These results have been appended to a previously preliminary verified report.               Review of Systems:  Review of Systems   Constitutional: Negative.    HENT: Negative.     Eyes: Negative.    Respiratory: Negative.     Cardiovascular: Negative.    Gastrointestinal: Negative.    Endocrine: Negative.    Genitourinary: Negative.    Musculoskeletal: Negative.    Skin: Negative.    Allergic/Immunologic: Negative.    Neurological: Negative.    Hematological: Negative.    Psychiatric/Behavioral: Negative.         Clinical Trial: no    Pain assessment: 0    Prior Radiation: No    Teaching: NCI radiation packet    MST: Completed     Implantable Devices (Port, pacemaker, pain stimulator): No    Hip Replacement: No    Health Maintenance   Topic Date Due    Hepatitis C Screening  Never done    Medicare Annual Wellness Visit (AWV)  Never done    Pneumococcal Vaccine: 65+ Years (1 of 2 - PCV) Never done    RSV Vaccine Age 60+ Years (1 - 1-dose 60+ series) Never done    Zoster Vaccine (1 of 2) 09/05/2017    PT PLAN OF CARE  11/30/2018    OT PLAN OF CARE  09/24/2021    Fall Risk  08/25/2022    COVID-19 Vaccine (6 - 2023-24 season) 09/01/2023    Influenza Vaccine (1) 09/01/2024    Depression Screening  07/03/2025    BMI: Adult  07/03/2025    Colorectal Cancer Screening  03/22/2026    RSV Vaccine age 0-20 Months  Aged Out    HIB Vaccine  Aged Out    IPV Vaccine  Aged Out    Hepatitis A Vaccine  Aged Out    Meningococcal ACWY Vaccine  Aged Out    HPV Vaccine  Aged Out       Past Medical History:   Diagnosis Date    Facial basal cell cancer     GERD (gastroesophageal reflux disease)     diet controlled    Hypertension     Vitamin D deficiency        Past Surgical History:   Procedure Laterality Date     CHOLECYSTECTOMY      COLONOSCOPY N/A 03/22/2016    Procedure: COLONOSCOPY;  Surgeon: Daren Montana MD;  Location: [facility];  Service:     GA CERVICAL LYMPHADEC MODIFIED RADICAL NECK DSJ Right 09/04/2019    Procedure: MODIFIED RADICAL NECK DISSECTION;  Surgeon: Fady Montana MD;  [facility];  Service: ENT    GA CYSTO INSERTION TRANSPROSTATIC IMPLANT SINGLE N/A 04/14/2023    Procedure: CYSTOSCOPY WITH INSERTION UROLIFT;  Surgeon: Rubin Alfonso MD;  [facility];  Service: Urology    GA LAPAROSCOPY SURG CHOLECYSTECTOMY N/A 10/30/2019    Procedure: CHOLECYSTECTOMY LAPAROSCOPIC;  Surgeon: Christiano Cornejo MD;  Location: [facility];  Service: General    GA LARYNGOSCOPY DIRECT OPERATIVE W/BIOPSY N/A 07/17/2019    Procedure: MICROLARYNGOSCOPY DIRECT WITH BIOPSY OF VALLECULAR MASS;  Surgeon: Amol Cordova MD;  [facility];  Service: ENT    GA LARYNGOSCOPY DIRECT OPERATIVE W/BIOPSY N/A 6/19/2024    Procedure: DIRECT LARYNGOSCOPY WITH BIOPSY WITH RIGHT NECK DISSECTION LEVEL TWO AND THREE;  Surgeon: Fady Montana MD;  [facility];  Service: ENT    RADICAL NECK DISSECTION Right 6/19/2024    Procedure: DISSECTION NECK RADICAL;  Surgeon: Fady Montana MD;  [facility];  Service: ENT    RHINOPLASTY      SINUS SURGERY  1994    TRANSORAL ROBOTIC SURGERY (TORS) N/A 07/31/2019    Procedure: ROBOTICALLY ASSISTED PARTIAL GLOSSECTOMY, PARTIAL PHARYNGECTOMY;  Surgeon: Fady Montana MD;  [facility];  Service: ENT    UPPER GASTROINTESTINAL ENDOSCOPY      US GUIDED LYMPH NODE BIOPSY RIGHT  5/6/2024    WISDOM TOOTH EXTRACTION         Family History   Problem Relation Age of Onset    Cancer Mother     Diabetes Mother     Heart failure Father     Seizures Father        Social History     Tobacco Use    Smoking status: Never    Smokeless tobacco: Never   Vaping Use    Vaping status: Never Used   Substance Use Topics    Alcohol use: Yes     Alcohol/week: 5.0 standard  drinks of alcohol     Types: 5 Cans of beer per week     Comment: on occ    Drug use: Not Currently          Current Outpatient Medications:     amLODIPine (NORVASC) 5 mg tablet, Take 5 mg by mouth daily at bedtime , Disp: , Rfl:     lisinopril (ZESTRIL) 10 mg tablet, Take 10 mg by mouth daily at bedtime , Disp: , Rfl:     Allergies   Allergen Reactions    Shellfish Allergy - Food Allergy Edema     HANDS, LIPS    Bee Venom Swelling     AT BITE SITE        Vitals:    07/03/24 1420   BP: 122/82   BP Location: Left arm   Patient Position: Sitting   Cuff Size: Standard   Pulse: 71   Resp: 18   Temp: 98.9 °F (37.2 °C)   TempSrc: Temporal   SpO2: 98%       Pain Score: 0-No pain

## 2024-07-05 ENCOUNTER — DOCUMENTATION (OUTPATIENT)
Dept: HEMATOLOGY ONCOLOGY | Facility: CLINIC | Age: 69
End: 2024-07-05

## 2024-07-05 ENCOUNTER — PATIENT OUTREACH (OUTPATIENT)
Dept: HEMATOLOGY ONCOLOGY | Facility: CLINIC | Age: 69
End: 2024-07-05

## 2024-07-05 NOTE — PROGRESS NOTES
I reached out to Brian to complete the Distress Thermometer, review for any barriers to care and to offer any supportive services that may be needed. I left  with the reason for my call including my direct phone number [phone].

## 2024-07-05 NOTE — PROGRESS NOTES
Chart reviewed in preparation of Multidisciplinary Head and Neck Tumor Conference presentation by Dr. Montana on 7/8/24.  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 He had MRND on 9/4/19 with all nodes negative. On recent physical exam, he was found to have a sub centimeter lump on right side of neck. Biopsy was positive for squamous cell carcinoma.  On 6/19/2024 he underwent DL as well as repeat right neck dissection. 7 lymph nodes were removed, 6 of which were positive for metastatic squamous cell carcinoma, keratinizing, p16 positive, with extranodal extension identified.

## 2024-07-08 ENCOUNTER — TELEPHONE (OUTPATIENT)
Dept: NUTRITION | Facility: CLINIC | Age: 69
End: 2024-07-08

## 2024-07-08 ENCOUNTER — DOCUMENTATION (OUTPATIENT)
Dept: HEMATOLOGY ONCOLOGY | Facility: CLINIC | Age: 69
End: 2024-07-08

## 2024-07-08 ENCOUNTER — PATIENT OUTREACH (OUTPATIENT)
Dept: HEMATOLOGY ONCOLOGY | Facility: CLINIC | Age: 69
End: 2024-07-08

## 2024-07-08 NOTE — PROGRESS NOTES
HEAD AND NECK MULTIDISCIPLINARY CASE REVIEW    DATE:  7/8/24      PRESENTING DOCTOR:  Dr. Montana     DIAGNOSIS:  Hx of p16+ squamous cell carcinoma of BOT    STAGING:     Jesus Hdz is a 69 y.o. male who was presented at the Head and Neck Oncology Multidisciplinary Tumor Conference today.  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 He had MRND on 9/4/19 with all nodes negative. On recent physical exam, he was found to have a sub centimeter lump on right side of neck. Biopsy was positive for squamous cell carcinoma.  On 6/19/2024 he underwent DL as well as repeat right neck dissection. 7 lymph nodes were removed, 6 of which were positive for metastatic squamous cell carcinoma, keratinizing, p16 positive, with extranodal extension identified.      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  p16 immunostain positive.      Lymph Node, Regional Resection, Level 2 lymph nodes:  - Metastatic squamous cell carcinoma, keratinizing, involving at least two lymph nodes (5/6).  p16 immunostain positive      Lymph Node, Level 3 lymph nodes, lymph adenectomy:  - One lymph node positive for metastatic squamous cell carcinoma, keratinizing (1/1).  p16 immunostain positive     Referrals:  N/A      Procedures:  N/A     Team agreed to plan.  NCCN guidelines were readily available for review at this discussion    The final treatment plan will be left to the discretion of the patient and the treating physician.     DISCLAIMERS:  TO THE TREATING PHYSICIAN:  This conference is a meeting of clinicians from various specialty areas who evaluate and discuss patients for whom a multidisciplinary treatment approach is being considered. Please note that the above  opinion was a consensus of the conference attendees and is intended only to assist in quality care of the discussed patient.  The responsibility for follow up on the input given during the conference, along with any final decisions regarding plan of care, is that of the patient and the patient's provider. Accordingly, appointments have only been recommended based on this information and have NOT been scheduled unless otherwise noted.      TO THE PATIENT:  This summary is a brief record of major aspects of your cancer treatment. You may choose to share a copy with any of your doctors or nurses. However, this is not a detailed or comprehensive record of your care.

## 2024-07-08 NOTE — PROGRESS NOTES
I reached out ,2nd attempt,  to Brian to complete the Distress Thermometer, review for any barriers to care and to offer any supportive services that may be needed. I left  with the reason for my call including my direct phone number [phone].

## 2024-07-08 NOTE — TELEPHONE ENCOUNTER
Received notification by radiation RN (Jaky RYAN) on 7/3/24 that pt has triggered for oncology nutrition care (reason for referral: HNC dx and Malnutrition Screening Tool (MST) Triggers: scored a 1 indicating 2-13# (0.9-6 kg) recent wt loss and is not eating poorly due to a decreased appetite. (Date of MST: 7/3/24)).    Per chart review, pt is planning a trip to [facility] in early August and wants to hold off on starting tx until after his trip.  He has a simulation scheduled on 8/28/24.    Contacted Jesus and introduced self and explained the reason for today's call.      Spoke with Jesus today who reports that he spoke with his doctor about gaining wt and fasting prior to chemotherapy.  He says he found a nutritionist (who is also a doctor) that works with pts who are fasting for chemotherapy.  He is also considering feeding tube placement prior to tx.  Discussed the role of and my experience with enteral nutrition and HNC.  Pt states he is unsure what his final tx plan is going to be at this time.  He states that he eats healthfully and eats mainly vegetarian.   He asked what he can do to support wt gain leading up to tx and I suggested adding ~250-500 kcal per day to support a slow, intended wt gain leading up to tx. Whole foods were encouraged when possible, and calorie/protein oral nutrition supplements prn.  Briefly discussed a cancer-preventative eating pattern as well as calorie dense whole foods..  Discussed that I do not recommend fasting during cancer tx and rationale for same.    Discussed oncology nutrition services available and the benefits of meeting for a consultation.  Pt states he is not interested in setting up an appt at this time but would like me to reach out to him closer to his tx start.    Provided this RD’s contact information asking that Jesus reach out prn.  All questions/concerns addressed at this time.

## 2024-07-09 ENCOUNTER — HOSPITAL ENCOUNTER (OUTPATIENT)
Dept: RADIOLOGY | Facility: HOSPITAL | Age: 69
Discharge: HOME | End: 2024-07-09
Payer: MEDICARE

## 2024-07-09 DIAGNOSIS — C01 MALIGNANT NEOPLASM OF BASE OF TONGUE (HCC): ICD-10-CM

## 2024-07-09 PROCEDURE — 70543 MRI ORBT/FAC/NCK W/O &W/DYE: CPT

## 2024-07-09 PROCEDURE — A9585 GADOBUTROL INJECTION: HCPCS | Performed by: INTERNAL MEDICINE

## 2024-07-09 RX ORDER — GADOBUTROL 604.72 MG/ML
7 INJECTION INTRAVENOUS
Status: COMPLETED | OUTPATIENT
Start: 2024-07-09 | End: 2024-07-09

## 2024-07-09 RX ADMIN — GADOBUTROL 7 ML: 604.72 INJECTION INTRAVENOUS at 15:48

## 2024-07-11 ENCOUNTER — PATIENT OUTREACH (OUTPATIENT)
Dept: HEMATOLOGY ONCOLOGY | Facility: CLINIC | Age: 69
End: 2024-07-11

## 2024-07-11 DIAGNOSIS — R22.1 MASS OF RIGHT SIDE OF NECK: ICD-10-CM

## 2024-07-11 DIAGNOSIS — C01 MALIGNANT NEOPLASM OF BASE OF TONGUE (HCC): Primary | ICD-10-CM

## 2024-07-11 DIAGNOSIS — Z98.890 STATUS POST RADICAL DISSECTION OF NECK: ICD-10-CM

## 2024-07-11 NOTE — PROGRESS NOTES
I reached out and spoke with Brian now that consults have been completed with the oncology teams to complete the Distress Thermometer, review for any barriers to care and offer supportive services as needed. I reviewed and updated the members assigned to the care team in [facility].   He knows the members of the care team as well as how and when to contact them with any needs.   He verbalizes managing the schedules well.   He is currently able to drive and denies any transportation needs.    He denies any uncontrolled symptoms. Discussed role of Palliative Care in symptom and side effect management. Declined referral at this time.  Patients states that he is eating and drinking as per usual with no unintentional weight loss.     Patient does not smoke.   Patient states he is well supported by family and friends.    Patient feels he has adequate insurance coverage and denies any financial concerns at this time.       Patient's only question was regarding the 2nd opinion to [place]. Would like to have this coordinated prior to his follow up with Dr. Montana on 7/16. Advised I will contact Dr. Montana's team to help coordinate.     I have provided my direct contact information and welcome them to contact me if their needs as discussed above change. He confirmed he also have PN Candelaria Lyles's phone number. They were appreciative for the call.

## 2024-07-11 NOTE — PROGRESS NOTES
Call placed to patient to complete DT and assess for barriers to care. Patient requested call back in a little bit. I will call back in 30 minutes.

## 2024-07-15 NOTE — PROGRESS NOTES
ADRIANNA Sevilla  Contact info is [phone] for main office. Dr. Toney Montana states they'll see him next Wednesday or the following Monday.        Called patient and provided information. He confirmed he will call to coordinate/discuss 2nd opinion appt.   During phone conversation patient did state he has been having difficulty with swallowing due to previous sx  and requested an appt for eval prior to starting radiation. Advised to patient I will place a referral for speech pathology. Patient was thankful for my call and assistance.

## 2024-07-16 ENCOUNTER — PATIENT OUTREACH (OUTPATIENT)
Dept: HEMATOLOGY ONCOLOGY | Facility: CLINIC | Age: 69
End: 2024-07-16

## 2024-07-16 NOTE — PROGRESS NOTES
"Voicemail received from patient    \"Inés Page, this is Brian Page calling back. I'm going to leave a rather large long message for you just in case I don't connect with you. So I have an appointment with Doctor Ziegler on Monday the 22nd, next Monday at 9:15 down in [place] at their [facility]. office. Their phone number in case you need it is  and their fax number in case you need it is [phone]. They are rather when I talked to somebody, I can't remember your or Brian or whoever they're apparently there was some of the reports that could not be sent to Doctor dixon that had to be. We had to pin down where I was going before that information could be sent. Some of the other stuff could be sent electronically infecting the gal that I talked to. Doctor Ziegler's office said that if it was all done at [facility], she could actually request it on her end and she was going to get the wheels in motion to do that, maybe like the PET scan and the MRI and pathology reports or whatever. But I do remember somebody saying that that there was a piece the of the information that was not available or could not be sent to Doctor dixon and then given to me or forwarded it to Doctor Ziegler, that it had to be sent directly to Doctor Ziegler who, you know, and we were waiting to find out who I'm going to see and whatever. So I'm coming. The reason I'm saying that I'm coming tomorrow, Doctor, again, and if there is anything that I need to take with me, it would be great if I could get it tomorrow just to save me a trip from having to come back up a different day. So anyway, that's the scoop. Next Monday, the 22nd at 9:15, Doctor Ziegler phone number [phone]. Fax number [phone]. My number, if you need to reach me, is [phone]. Thanks a lot, Zeina. I appreciate all your help. Rao sultana.\"  "

## 2024-07-17 NOTE — PROGRESS NOTES
Per chart review [facility] requested records. Patient is scheduled on 7/22 with . speech pathology scheduled 7/29. PN will follow up with patient in about 4-6 weeks

## 2024-07-18 DIAGNOSIS — C01 MALIGNANT NEOPLASM OF BASE OF TONGUE (HCC): Primary | ICD-10-CM

## 2024-07-18 RX ORDER — SODIUM CHLORIDE 9 MG/ML
20 INJECTION, SOLUTION INTRAVENOUS ONCE
OUTPATIENT
Start: 2024-07-18

## 2024-07-29 ENCOUNTER — EVALUATION (OUTPATIENT)
Dept: SPEECH THERAPY | Facility: CLINIC | Age: 69
End: 2024-07-29
Payer: MEDICARE

## 2024-07-29 DIAGNOSIS — R22.1 MASS OF RIGHT SIDE OF NECK: ICD-10-CM

## 2024-07-29 DIAGNOSIS — Z98.890 STATUS POST RADICAL DISSECTION OF NECK: ICD-10-CM

## 2024-07-29 DIAGNOSIS — R13.10 DYSPHAGIA, UNSPECIFIED TYPE: Primary | ICD-10-CM

## 2024-07-29 DIAGNOSIS — C01 MALIGNANT NEOPLASM OF BASE OF TONGUE (HCC): ICD-10-CM

## 2024-07-29 PROCEDURE — 92610 EVALUATE SWALLOWING FUNCTION: CPT

## 2024-07-29 PROCEDURE — 92526 ORAL FUNCTION THERAPY: CPT

## 2024-07-29 NOTE — PROGRESS NOTES
Speech-Language Pathology Initial Evaluation    Today's date: 2024   Patient’s name: Jesus Hdz  : 1955  MRN: [id number]  Safety measures:   Referring provider: Magali Talbot MD      Assessment:  Patient presents with mild oral/pharyngeal dysphagia characterized by oral  / pharyngeal discoordination and suspected decreased oral / pharyngeal musculature with + soft signs of penetration / pharyngeal residuals.  Patient would benefit from video fluoroscopic swallow study for objective assessment. Patient would also benefit from dysphagia therapy focused on education of strengthening and maximizing oral and pharyngeal musculature particularly prior to radiation and chemo, likely 2024.  Patient in agreement with plan.    -Patient presents with mild oral/pharyngeal dysphagia    -Factors affecting performance: Endurance    -Safety concerns: Risk for aspiration and Risk for inadequate nutrition/ hydration, increased risk with future radiation / chemo    -Risk factors: History of Head and Neck Cancer      SWALLOWING SAFETY PRECAUTIONS:  -Recommended solids: Regular and Mechanical Soft    -Recommended liquids: Thin    -Recommended medication form:     -Frequent/thorough oral care     -Aspiration precautions   *Monitor for signs/symptoms concerning for aspiration (e.g., low grade fever, increase in WBC, change in chest x-ray, increased congestion, increased coughing with P.O. intake)    -Reflux precautions     -Strategies: Small sips and bites when eating and Slow rate, swallow between bites    -Positioning: Upright position during meals    -Compensatory strategies: Effortful swallow    -Supervision: Independent     -Referrals: None and Videofluroscopic Swallow Study     Short Term    Patient will complete pharyngeal strengthening exercises for increased safety & improved swallow function.       Patient will complete daily oral motor exercises (IOPI as appropriate) to increase lingual/labial range  of motion, strength, and coordination with min cues to 90% effectiveness to improve bolus formation and strengthen oral musculature.    Patient will perform compensatory strategies (e.g., upright positioning, small bites/sips, slow rate, alternation of consistencies, multiple swallows, effortful swallow, chin tuck, head turn, etc.) with 80% accuracy with minimized overt s/sx penetration/aspiration of least restrictive food/liquid consistencies.     Patient will tolerate least restrictive diet with minimized overt s/sx of penetration or aspiration.      Long Term    Patient will receive a videofluoroscopic swallow study (VFSS) to fully assess physiology and anatomy of the swallow and to determine the appropriate diet and/or rehabilitation exercises by discharge.     Patient will maintain adequate hydration and nutrition with optimum safety and efficacy of swallowing function on P.O. intake without overt s/sx of penetration or aspiration by discharge.     Patient will utilize compensatory strategies with optimum safety and efficacy of swallowing function on P.O. intake without overt s/sx of penetration or aspiration by discharge.    Patient will develop safe and functional chewing and swallowing via use of dysphagia management strategies and diet modifications for adequate meal completion without significant s/sx of dysphagia and aspiration in order to maximize quality of life and to decrease potential for medical complications for dysphagia.            Treatment:  Education completed. Plans for EMST-75, Exercises for oral motor and pharyngeal strengthening and stretches. Would benefit from VFSS.      Visit Tracking:  POC   Expires Auth Expiration Date ST Visit Limit   9/23/24 12/31/24           Visit/Unit Tracking:  Auth Status Date 7/29/24   BOMN Used 1    Remaining BOMN         Plan:  Patient would benefit from outpatient skilled Speech Therapy services: Dysphagia therapy    Frequency: 2x weekly  Duration: 6-8  "weeks    Intervention certification from: 7/29/2024  Intervention certification to: 9/23/2024      Subjective:  History of present illness: Patient is a 69 y.o. male who was referred to outpatient skilled Speech Therapy services for a dysphagia evaluation. Patient was initially diagnosed 5 years ago with tumor on tongue/ s/p resection. Now, has a throat Cancer occurrence affecting left throat tissue and lymph nodes, s/p surgical removal of lymph nodes and will be undergoing radiation and chemo likely this Sept, 7-8 weeks likely.     Patient's goal(s): \"Cancer free\"    Hearing: WFL  Vision: WFL    Home environment/lifestyle: lives with wife, 3 children, 3 grand children  Highest level of education: retired, owned printing business      Objective (testing):  HEAD & NECK CANCER DYSPHAGIA EVALUATION:    -Reason for referral: Signs/symptoms of dysphagia    -Subjective report of swallowing difficulty: Coughing, Choking, and Globus sensation     -Difficulty swallowing: Solids and Pills      Head and Neck Cancer Checklist:  *Patient indicated that he is experiencing difficulties in the following areas:    SWALLOWING: Feeling of foods/liquids getting \"stuck\" in the throat, Needing to swallow many times to clear food from the mouth and/or throat, and Coughing/choking when swallowing    SPEECH: n/a    VOICE: n/a      Functional Outcome Measurements    1. Functional Oral Intake Scale (FOIS): 6 - total oral intake with no special preparation, but must avoid specific foods or liquid items    2. Performance Status Scale For Head and Neck Cancer Patients (PSS-HN):  -Normalcy of Diet (0-100):  n/a  -Public Eating (0-100):  n/a  -Understandability of Speech (0-100): n/a    3. Eating Assessment Tool (EAT-10) score:  9      -Current diet (solids): Regular  -Current diet (liquids): Thin  -Current pill intake method: pills with food  -Alternative Feeding Method?: No    -Facial appearance Symmetrical   -Dentition Adequate   -Labial " function WFL   -Lingual function WFL   -Velar function Symmetrical   -Trismus (i.e., limited jaw opening) Absent (measurement: functional)   -Lymphedema Absent   -Xerostomia  Absent (self-ratin)   -Neoplastic pain Absent    -Odynophagia (i.e., pain with swallowing) Absent (scale: 1)   -Mucositis  Absent   -Dysgeusia (i.e., altered taste)  Absent       -Reason for referral: Signs/symptoms of dysphagia    -Subjective report of swallowing difficulty: Coughing and Globus sensation     -Difficulty swallowing: Solids and Pills    -Current diet (solids): Regular  -Current diet (liquids): Thin  -Current pill intake method: pills with food  -Alternative Feeding Method?: No    -Facial appearance Symmetrical   -Dentition Adequate   -Labial function WFL   -Lingual function WFL   -Velar function Symmetrical       LIQUID CONSISTENCY TESTING:   Item(s) tested: (Thin) - water  Administered by: Self-fed    Clinical findings:  -Oral phase impairments: decreased oral control, suspected premature spillage  -Pharyngeal phase impairments: delayed swallow initiation    Other notes:  n/a    Strategies, attempts, and responses: small sips      SOLID CONSISTENCY TESTING:  Item(s) tested: (Regular, Mechanical Soft, Mixed, and Puree) -   Administered by: Self-fed    Clinical findings:  -Oral phase impairments: ineffective mastication, prolonged anterior-posterior transit time, reduced bolus formation/control, piecemeal deglutition, and suspected premature spillage  -Pharyngeal phase impairments: delayed swallow initiation and reduced hyolaryngeal elevation    Other notes: n/a    Strategies, attempts, and responses: small bites, small sips, liquid wash, multiple swallow, and effortful swallow              Intervention Comments:

## 2024-07-30 NOTE — PROGRESS NOTES
Daily Speech Treatment Note    Today's date: 2024 ***  Patient’s name: Jesus Hdz  : 1955  MRN: [id number]  Safety measures:  Referring provider: Magali Talbot MD    No diagnosis found.  Visit Tracking:  POC   Expires Auth Expiration Date ST Visit Limit   24                Visit/Unit Tracking:  Auth Status Date 24 ***   BOMN Used 1      Remaining BOMN          Subjective/Behavioral:  -***    Objective/Assessment:  -See goals targeted below during today's treatment session.    Short-term goals:     Patient will complete pharyngeal strengthening exercises for increased safety & improved swallow function.        Patient will complete daily oral motor exercises (IOPI as appropriate) to increase lingual/labial range of motion, strength, and coordination with min cues to 90% effectiveness to improve bolus formation and strengthen oral musculature.     Patient will perform compensatory strategies (e.g., upright positioning, small bites/sips, slow rate, alternation of consistencies, multiple swallows, effortful swallow, chin tuck, head turn, etc.) with 80% accuracy with minimized overt s/sx penetration/aspiration of least restrictive food/liquid consistencies.     Patient will tolerate least restrictive diet with minimized overt s/sx of penetration or aspiration.    Plan:  -Patient was provided with home exercises/activities to target goals in plan of care at the end of today's session.  -Continue with current plan of care.

## 2024-08-01 ENCOUNTER — OFFICE VISIT (OUTPATIENT)
Dept: SPEECH THERAPY | Facility: CLINIC | Age: 69
End: 2024-08-01
Payer: MEDICARE

## 2024-08-01 DIAGNOSIS — Z98.890 STATUS POST RADICAL DISSECTION OF NECK: ICD-10-CM

## 2024-08-01 DIAGNOSIS — R22.1 MASS OF RIGHT SIDE OF NECK: ICD-10-CM

## 2024-08-01 DIAGNOSIS — R13.10 DYSPHAGIA, UNSPECIFIED TYPE: ICD-10-CM

## 2024-08-01 DIAGNOSIS — C01 MALIGNANT NEOPLASM OF BASE OF TONGUE (HCC): Primary | ICD-10-CM

## 2024-08-01 PROCEDURE — 92526 ORAL FUNCTION THERAPY: CPT | Performed by: SPEECH-LANGUAGE PATHOLOGIST

## 2024-08-01 NOTE — PROGRESS NOTES
Daily Speech Treatment Note    Today's date: 2024   Patient’s name: Jesus Hdz  : 1955  MRN: [id number]  Safety measures:  Referring provider: Magali Talbot MD    Encounter Diagnosis     ICD-10-CM    1. 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 Mass of right side of neck  R22.1       3. Status post radical dissection of neck  Z98.890       4. Dysphagia, unspecified type  R13.10         Visit Tracking:  POC   Expires Auth Expiration Date ST Visit Limit   24                Visit/Unit Tracking:  Auth Status Date 24   BOMN Used 1 2     Remaining BOMN BOMN         Subjective/Behavioral:  - He is becoming more conscious of his eating. 75% of the time he is not swallowing everything that he chews. He will bring the food back up and chew some more.    He is currently eating whatever he wants to eat with no restrictions.    He's working with a nutritionist out of [place] to help him with a diet that will make treatments and eating more tolerable (especially with the chemotherapy).    Objective/Assessment:  -See goals targeted below during today's treatment session.    Provided extensive education on swallowing, strength training and cervical exercises.    Patient was provided with information to schedule his video swallow study and will be aiming to schedule this for before his next ST session on . Treating clinician can prepare for possible review of MBSS. Also mentioned to the patient that he can bring in a typical lunch to eat that day. This way clinician can provide further strategies and modifications.    Time was also spent discussing what to expect in terms of potential swallowing challenges and food tolerance (altered taste) during and after chemo/radiation. Patient came in with good knowledge of much of this after talking to his physicians and a friend who has gone through similar treatment.     Short-term goals:     Patient will complete  pharyngeal strengthening exercises for increased safety & improved swallow function. 8/1: The Macy, Mendelsohn and Effortful Swallow were reviewed with the patient. Patient practiced each exercise 10 times (effortful was practiced dry and with water). Patient demonstrated good understanding and execution of exercises. Handouts provided.     Additionally, clinician reviewed cervical stretches to improve range of motion in head and neck area. Patient demonstrated good execution of all exercises and acknowledged understanding of HEP.     Patient will complete daily oral motor exercises (IOPI as appropriate) to increase lingual/labial range of motion, strength, and coordination with min cues to 90% effectiveness to improve bolus formation and strengthen oral musculature.  8/1: Clinician provided patient with new tongue strengthening exercises (resistance based) to include in his daily HEP. Clinician initially demonstrated exercises, utilizing a tongue depressor for resistance against the tongue for the sides, top, bottom and tip of tongue. The patient then continued with each exercise, demonstrating good understanding and execution (e.g. providing resistance with the spoon and tongue).      Patient will perform compensatory strategies (e.g., upright positioning, small bites/sips, slow rate, alternation of consistencies, multiple swallows, effortful swallow, chin tuck, head turn, etc.) with 80% accuracy with minimized overt s/sx penetration/aspiration of least restrictive food/liquid consistencies.     Patient will tolerate least restrictive diet with minimized overt s/sx of penetration or aspiration.    Plan:  -Patient was provided with home exercises/activities to target goals in plan of care at the end of today's session.  -Continue with current plan of care.

## 2024-08-06 ENCOUNTER — HOSPITAL ENCOUNTER (OUTPATIENT)
Dept: RADIOLOGY | Facility: HOSPITAL | Age: 69
Discharge: HOME | End: 2024-08-06
Payer: MEDICARE

## 2024-08-06 DIAGNOSIS — Z98.890 STATUS POST RADICAL DISSECTION OF NECK: ICD-10-CM

## 2024-08-06 DIAGNOSIS — R22.1 MASS OF RIGHT SIDE OF NECK: ICD-10-CM

## 2024-08-06 DIAGNOSIS — C01 MALIGNANT NEOPLASM OF BASE OF TONGUE (HCC): ICD-10-CM

## 2024-08-06 DIAGNOSIS — R13.10 DYSPHAGIA, UNSPECIFIED TYPE: ICD-10-CM

## 2024-08-06 PROCEDURE — 92611 MOTION FLUOROSCOPY/SWALLOW: CPT

## 2024-08-06 PROCEDURE — 74230 X-RAY XM SWLNG FUNCJ C+: CPT

## 2024-08-06 NOTE — PROCEDURES
Video Swallow Study      Patient Name: Jesus Hdz  Today's Date: 8/6/2024        Past Medical History  Past Medical History:   Diagnosis Date    Facial basal cell cancer     GERD (gastroesophageal reflux disease)     diet controlled    Hypertension     Vitamin D deficiency         Past Surgical History  Past Surgical History:   Procedure Laterality Date    CHOLECYSTECTOMY      COLONOSCOPY N/A 03/22/2016    Procedure: COLONOSCOPY;  Surgeon: Daren Montana MD;  Location: [facility];  Service:     CA CERVICAL LYMPHADEC MODIFIED RADICAL NECK DSJ Right 09/04/2019    Procedure: MODIFIED RADICAL NECK DISSECTION;  Surgeon: Fady Montana MD;  [facility];  Service: ENT    CA CYSTO INSERTION TRANSPROSTATIC IMPLANT SINGLE N/A 04/14/2023    Procedure: CYSTOSCOPY WITH INSERTION UROLIFT;  Surgeon: Rubin Alfonso MD;  [facility];  Service: Urology    CA LAPAROSCOPY SURG CHOLECYSTECTOMY N/A 10/30/2019    Procedure: CHOLECYSTECTOMY LAPAROSCOPIC;  Surgeon: Christiano Cornejo MD;  Location: [facility];  Service: General    CA LARYNGOSCOPY DIRECT OPERATIVE W/BIOPSY N/A 07/17/2019    Procedure: MICROLARYNGOSCOPY DIRECT WITH BIOPSY OF VALLECULAR MASS;  Surgeon: Amol Cordova MD;  [facility];  Service: ENT    CA LARYNGOSCOPY DIRECT OPERATIVE W/BIOPSY N/A 6/19/2024    Procedure: DIRECT LARYNGOSCOPY WITH BIOPSY WITH RIGHT NECK DISSECTION LEVEL TWO AND THREE;  Surgeon: Fady Montana MD;  [facility];  Service: ENT    RADICAL NECK DISSECTION Right 6/19/2024    Procedure: DISSECTION NECK RADICAL;  Surgeon: Fady Montana MD;  [facility];  Service: ENT    RHINOPLASTY      SINUS SURGERY  1994    TRANSORAL ROBOTIC SURGERY (TORS) N/A 07/31/2019    Procedure: ROBOTICALLY ASSISTED PARTIAL GLOSSECTOMY, PARTIAL PHARYNGECTOMY;  Surgeon: Fady Montana MD;  [facility];  Service: ENT    UPPER GASTROINTESTINAL ENDOSCOPY      US GUIDED LYMPH  NODE BIOPSY RIGHT  5/6/2024    WISDOM TOOTH EXTRACTION         Modified (Video) Barium Swallow Study    Summary:  Images are on PACS for review.  Pt oropharyngeal swallow WNL. Mild pharyngeal residue noted. Recommend continuation of regular/thin liquids with safe swallow strategies to reduce pharyngeal residue. No aspiration or penetration noted throughout study. Recommend repeat VBS post radiation with ongoing outpatient SLP services.     Oral stage: WNL  Adequate labial seal and retrieval. Timely mastication and formation of bolus. Timely A-P transfer. No oral residue noted.     Pharyngeal stage: WNL  Timely initiation of swallow. Adequate laryngeal elevation and anterior hyoid excursion. Functional epiglottic inversion. Residue noted on BOT, vallecula, and pharyngeal wall with thins, hard cookie, sandwich bite, nutrigrain bar, and puree. Mild pooling in the AE folds with thins. Clearance with cued secondary swallow or hard swallow. No aspiration or penetration noted throughout.     Per gross esophageal screen: WNL  No dysmotility or retropulsion noted throughout study. Pills passed through to the stomach.     Recommendations:  Diet: Regular   Liquids: Thins   Meds: As best tolerated   Strategies: Double swallows, small bites/sips, alternating solids/liquids, addition of sauces/gravies   Frequent/thorough oral care  Upright position  Prognosis considerations: Radiation planned for early September   Aspiration Precautions  Consider consult with: Ongoing outpatient SLP post radiation   Results reviewed with: pt  Repeat MBS as necessary  If a dedicated assessment of the esophagus is desired:  Consider esophagram/routine barium swallow w/ barium tablet administration   or EGD      Functional Oral Intake Scale (FOIS)  Erwin Villalba PhD, F-KLAUDIA  (Does not include liquids)  Nothing by mouth (NPO).  Tube dependent with minimal attempts of food or liquid.  Tube dependent with consistent intake of food or liquid.  Total  "oral diet of a single consistency.  Total oral diet with multiple consistencies but requiring special preparation or compensations.  Total oral diet with multiple consistencies without special preparations, but with specific food limitations.  Total oral diet with no restriction.       Pt is a 69 year old male referred for MBS per outpatient SLP 2/2 oropharyngeal dysphagia. Per SLP evaluation note pt \"diagnosed 5 years ago with tumor on tongue/ s/p resection. Now, has a throat Cancer occurrence affecting left throat tissue and lymph nodes, s/p surgical removal of lymph nodes and will be undergoing radiation and chemo likely this Sept, 7-8 weeks likely.\" Per pt report, pt on regular/thin liquid diet at home and avoids \"fruits and breads\". Pt stating difficulty with occasional meds and usually takes with a banana or sips of water.       H&P/pertinent provider notes: (PMH noted above)    Otolaryngology 7/16/24:  Patient returns to our office s/p DL with biopsy with level II and III neck dissection on 06/19/24. Has second opinion with Dr. Ziegler on 07/22 at [facility]. Scheduled with Dr. Morrow, rad/onc, on 08/22/24.     More recently, he developed a new right neck lump and voice worsening with discomfort in throat. Underwent right level II FNAB on 05/06/2024 which was conclusive evidence of malignancy- carcinoma with keratinization, compatible with known squamous cell carcinoma.     Oncologic history:  He has a history of tongue base cancer. 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 S/p partial glossectomy and partial pharyngectomy on 07/31/2019, pathology negative for malignancy. S/p MRND right neck 09/04/2019 returned 0/34 nodes. PET scan was done 12/03/19 which showed mild asymmetry at the tongue base, likely post operative. Otherwise no distant metastasis. Prior imaging from June 2019 was clear with no evidence of disease.     SLP Evaluation 7/29/24:  Assessment:  Patient presents with mild " oral/pharyngeal dysphagia characterized by oral  / pharyngeal discoordination and suspected decreased oral / pharyngeal musculature with + soft signs of penetration / pharyngeal residuals.  Patient would benefit from video fluoroscopic swallow study for objective assessment. Patient would also benefit from dysphagia therapy focused on education of strengthening and maximizing oral and pharyngeal musculature particularly prior to radiation and chemo, likely Sept. 2024.  Patient in agreement with plan.     -Patient presents with mild oral/pharyngeal dysphagia    -Recommended solids: Regular and Mechanical Soft     -Recommended liquids: Thin    Special Studies:  MRI 7/17/24:  1. Both CT and MRI show changes from oropharyngeal surgery without local recurrence.     2. The CT shows two nonspecific nodes in the right neck, hypermetabolic on PET.  The subsequent MRI shows interval right neck dissection, including resection of these nodes.     3. The MRI does show a residual 11 mm nodule in/near the deep lobe of the right parotid gland, with signal suggesting cellularity.  This focus was also hypermetabolic on PET.  Differential diagnosis includes residual node and primary parotid neoplasm.     Previous MBS: None found on Epic       Food allergies: Shellfish   Current diet: Regular/thin   Premorbid diet: Regular/thin   Dentition: WNL   O2 requirement: Room air    Oral mech: WNL   Vocal quality/speech: WNL   Cognitive status: WNL       Consistencies administered: Puree, nutrigrain bar, hard cookie, turkey sandwich bite, barium pill w/ thin, thin  Thin liquid by: single cup  Pt was viewed standing laterally and AP

## 2024-08-07 NOTE — PROGRESS NOTES
Daily Speech Treatment Note    Today's date: 2024 ***  Patient’s name: Jesus Hdz  : 1955  MRN: [id number]  Safety measures:  Referring provider: Magali Talbot MD    Encounter Diagnosis     ICD-10-CM    1. 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 Mass of right side of neck  R22.1       3. Status post radical dissection of neck  Z98.890       4. Dysphagia, unspecified type  R13.10         Visit Tracking:  POC   Expires Auth Expiration Date ST Visit Limit   24                Visit/Unit Tracking:  Auth Status Date 24   BOMN Used 1 2     Remaining BOMN BOMN         Subjective/Behavioral:  - Patient reports ***    Objective/Assessment:  -See goals targeted below during today's treatment session.    24 Videofluoroscopic Swallow Study (VFSS) Results:     Summary:  Images are on PACS for review.  Pt oropharyngeal swallow WNL. Mild pharyngeal residue noted. Recommend continuation of regular/thin liquids with safe swallow strategies to reduce pharyngeal residue. No aspiration or penetration noted throughout study. Recommend repeat VBS post radiation with ongoing outpatient SLP services.      Oral stage: WNL  Adequate labial seal and retrieval. Timely mastication and formation of bolus. Timely A-P transfer. No oral residue noted.      Pharyngeal stage: WNL  Timely initiation of swallow. Adequate laryngeal elevation and anterior hyoid excursion. Functional epiglottic inversion. Residue noted on BOT, vallecula, and pharyngeal wall with thins, hard cookie, sandwich bite, nutrigrain bar, and puree. Mild pooling in the AE folds with thins. Clearance with cued secondary swallow or hard swallow. No aspiration or penetration noted throughout.      Per gross esophageal screen: WNL  No dysmotility or retropulsion noted throughout study. Pills passed through to the stomach.      Recommendations:  Diet: Regular   Liquids: Thins   Meds: As best tolerated   Strategies:  Double swallows, small bites/sips, alternating solids/liquids, addition of sauces/gravies   Frequent/thorough oral care  Upright position  Prognosis considerations: Radiation planned for early September   Aspiration Precautions  Consider consult with: Ongoing outpatient SLP post radiation   Results reviewed with: pt  Repeat MBS as necessary  If a dedicated assessment of the esophagus is desired:  Consider esophagram/routine barium swallow w/ barium tablet administration   or EGD       Short-term goals:     Patient will complete pharyngeal strengthening exercises for increased safety & improved swallow function. 8/1: The Macy, Mendelsohn and Effortful Swallow were reviewed with the patient. Patient practiced each exercise 10 times (effortful was practiced dry and with water). Patient demonstrated good understanding and execution of exercises. Handouts provided.     Additionally, clinician reviewed cervical stretches to improve range of motion in head and neck area. Patient demonstrated good execution of all exercises and acknowledged understanding of HEP.     Patient will complete daily oral motor exercises (IOPI as appropriate) to increase lingual/labial range of motion, strength, and coordination with min cues to 90% effectiveness to improve bolus formation and strengthen oral musculature.  8/1: Clinician provided patient with new tongue strengthening exercises (resistance based) to include in his daily HEP. Clinician initially demonstrated exercises, utilizing a tongue depressor for resistance against the tongue for the sides, top, bottom and tip of tongue. The patient then continued with each exercise, demonstrating good understanding and execution (e.g. providing resistance with the spoon and tongue).      Patient will perform compensatory strategies (e.g., upright positioning, small bites/sips, slow rate, alternation of consistencies, multiple swallows, effortful swallow, chin tuck, head turn, etc.) with 80%  accuracy with minimized overt s/sx penetration/aspiration of least restrictive food/liquid consistencies.     Patient will tolerate least restrictive diet with minimized overt s/sx of penetration or aspiration.    Plan:  -Patient was provided with home exercises/activities to target goals in plan of care at the end of today's session.  -Continue with current plan of care.

## 2024-08-08 ENCOUNTER — APPOINTMENT (OUTPATIENT)
Dept: SPEECH THERAPY | Facility: CLINIC | Age: 69
End: 2024-08-08
Payer: MEDICARE

## 2024-08-12 ENCOUNTER — TELEPHONE (OUTPATIENT)
Age: 69
End: 2024-08-12

## 2024-08-12 NOTE — TELEPHONE ENCOUNTER
Cassie is calling from [facility]'s Speech-Language Pathology Department, she faxed some paperwork for Dr Mcintosh to sign and fax back.

## 2024-08-20 NOTE — PROGRESS NOTES
CLARIFY DIAGNOSIS RESPONSE NOTE    Based on the query that was sent to you, please clarify the diagnosis below.    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

## 2024-08-23 NOTE — PROGRESS NOTES
Daily Speech Treatment Note    Today's date: 2024   Patient’s name: Jesus Hdz  : 1955  MRN: [id number]  Safety measures:  Referring provider: Magali Talbot MD    Encounter Diagnosis     ICD-10-CM    1. 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 Mass of right side of neck  R22.1       3. Status post radical dissection of neck  Z98.890       4. Dysphagia, unspecified type  R13.10         Visit Tracking:  POC   Expires Auth Expiration Date ST Visit Limit   24                Visit/Unit Tracking:  Auth Status Date 24   BOMN Used 1 2 3     Remaining BOMN BOMN BOMN         Subjective/Behavioral:  - Patient reports no changes in swallow function. He still has trouble swallowing bread and is naturally taking a double swallow as needed during meal time. He is completely swallowing exercises regularly. He received the EMST 75 lite in the mail over the past couple of weeks and has reviewed instructions and believes he calibrated the device well. He is almost tolerating the full 75 cm H2O.     Objective/Assessment:  -During today's session, time was spent reviewing the patient's recent VFSS results, instructions and usage for the EMST 75 lite and providing further education (e.g. supplying patient with current research studies related to the EMST 75 & 150 and benefits for swallow function during and post radiation treatment). Swallowing strategies were also reviewed for present swallow function. Patient will discharge from ST services at this time as swallow is WNL presently. He will continue to incorporate preventative swallowing exercises (e.g. pharyngeal strengthening and EMST 75 lite) into his daily routine as tolerated. Should he require further ST services during or after radiation treatment, please refer back to ST. Patient was provided with contact information for this clinic should he have any questions or concerns throughout his treatment course.  Patient demonstrated good understanding and utilization of exercises reviewed.     Patient is tolerating around 65 to 70 cm H2O of resistance on the EMST 75 lite. May considering purchasing the EMST 150 when he has reached 75 cm H2O.     8/6/24 Videofluoroscopic Swallow Study (VFSS) Results:     Summary:  Images are on PACS for review.  Pt oropharyngeal swallow WNL. Mild pharyngeal residue noted. Recommend continuation of regular/thin liquids with safe swallow strategies to reduce pharyngeal residue. No aspiration or penetration noted throughout study. Recommend repeat VBS post radiation with ongoing outpatient SLP services.      Oral stage: WNL  Adequate labial seal and retrieval. Timely mastication and formation of bolus. Timely A-P transfer. No oral residue noted.      Pharyngeal stage: WNL  Timely initiation of swallow. Adequate laryngeal elevation and anterior hyoid excursion. Functional epiglottic inversion. Residue noted on BOT, vallecula, and pharyngeal wall with thins, hard cookie, sandwich bite, nutrigrain bar, and puree. Mild pooling in the AE folds with thins. Clearance with cued secondary swallow or hard swallow. No aspiration or penetration noted throughout.      Per gross esophageal screen: WNL  No dysmotility or retropulsion noted throughout study. Pills passed through to the stomach.      Recommendations:  Diet: Regular   Liquids: Thins   Meds: As best tolerated   Strategies: Double swallows, small bites/sips, alternating solids/liquids, addition of sauces/gravies   Frequent/thorough oral care  Upright position  Prognosis considerations: Radiation planned for early September   Aspiration Precautions  Consider consult with: Ongoing outpatient SLP post radiation   Results reviewed with: pt  Repeat MBS as necessary  If a dedicated assessment of the esophagus is desired:  Consider esophagram/routine barium swallow w/ barium tablet administration   or EGD       Short-term goals:     Patient will complete  pharyngeal strengthening exercises for increased safety & improved swallow function. 8/1: The Macy, Mendelsohn and Effortful Swallow were reviewed with the patient. Patient practiced each exercise 10 times (effortful was practiced dry and with water). Patient demonstrated good understanding and execution of exercises. Handouts provided.   Additionally, clinician reviewed cervical stretches to improve range of motion in head and neck area. Patient demonstrated good execution of all exercises and acknowledged understanding of HEP.     Patient will complete daily oral motor exercises (IOPI as appropriate) to increase lingual/labial range of motion, strength, and coordination with min cues to 90% effectiveness to improve bolus formation and strengthen oral musculature.  8/1: Clinician provided patient with new tongue strengthening exercises (resistance based) to include in his daily HEP. Clinician initially demonstrated exercises, utilizing a tongue depressor for resistance against the tongue for the sides, top, bottom and tip of tongue. The patient then continued with each exercise, demonstrating good understanding and execution (e.g. providing resistance with the spoon and tongue).      Patient will perform compensatory strategies (e.g., upright positioning, small bites/sips, slow rate, alternation of consistencies, multiple swallows, effortful swallow, chin tuck, head turn, etc.) with 80% accuracy with minimized overt s/sx penetration/aspiration of least restrictive food/liquid consistencies.     Patient will tolerate least restrictive diet with minimized overt s/sx of penetration or aspiration.    Plan:  -Patient was provided with home exercises/activities to target goals in plan of care at the end of today's session.  -Discharge.

## 2024-08-26 ENCOUNTER — OFFICE VISIT (OUTPATIENT)
Dept: SPEECH THERAPY | Facility: CLINIC | Age: 69
End: 2024-08-26
Payer: MEDICARE

## 2024-08-26 DIAGNOSIS — Z98.890 STATUS POST RADICAL DISSECTION OF NECK: ICD-10-CM

## 2024-08-26 DIAGNOSIS — R22.1 MASS OF RIGHT SIDE OF NECK: ICD-10-CM

## 2024-08-26 DIAGNOSIS — R13.10 DYSPHAGIA, UNSPECIFIED TYPE: ICD-10-CM

## 2024-08-26 DIAGNOSIS — C01 MALIGNANT NEOPLASM OF BASE OF TONGUE (HCC): Primary | ICD-10-CM

## 2024-08-26 PROCEDURE — 92526 ORAL FUNCTION THERAPY: CPT | Performed by: SPEECH-LANGUAGE PATHOLOGIST

## 2024-08-28 ENCOUNTER — TELEPHONE (OUTPATIENT)
Age: 69
End: 2024-08-28

## 2024-08-28 NOTE — TELEPHONE ENCOUNTER
[facility] CT SIM [[id number]] Copay: $0.00    Provider: Fady Morrow MD  AN [facility] RESOURCE Department: AN RAD ONC  AN RAD ONC         Patient called in and wanted to schedule an appointment for his CT SIM

## 2024-08-29 ENCOUNTER — PATIENT OUTREACH (OUTPATIENT)
Dept: HEMATOLOGY ONCOLOGY | Facility: CLINIC | Age: 69
End: 2024-08-29

## 2024-08-29 DIAGNOSIS — C01 MALIGNANT NEOPLASM OF BASE OF TONGUE (HCC): Primary | ICD-10-CM

## 2024-08-29 NOTE — PROGRESS NOTES
Called and spoke to patient.  Introduced myself and informed him Dr. Morrow asked that I call and see how he would like to proceed.  I also told him at this point, since he wants to delay further, Dr. Morrow is suggesting a new PET/CT as his last PET/CT was 5/22.  Patient was agreeable to repeating his PET/CT and will schedule himself.  He said he is still not ready to move forward with chemo/RT.  I encouraged him to call to reschedule his SIM once his PET/CT is completed.  He was appreciative for the call.

## 2024-09-03 ENCOUNTER — PATIENT OUTREACH (OUTPATIENT)
Dept: HEMATOLOGY ONCOLOGY | Facility: CLINIC | Age: 69
End: 2024-09-03

## 2024-09-03 NOTE — PROGRESS NOTES
"Patient left message on ONN voice mailbox:    Patient of Doctor Eddy and Doctor Frank and Doctor Craven. Anyway, I'm calling to get an appointment to have my mask made. I was scheduled I think last Wednesday. I had to postpone it. So anyway, I'm calling back. Not too sure if I need to talk to you or somebody else, but that's the purpose of my call. My number . Again, my name is Brian THOMAS and my birthday is [birth year].\"   "

## 2024-09-11 ENCOUNTER — HOSPITAL ENCOUNTER (OUTPATIENT)
Dept: RADIOLOGY | Age: 69
Discharge: HOME | End: 2024-09-11
Payer: MEDICARE

## 2024-09-11 DIAGNOSIS — C01 MALIGNANT NEOPLASM OF BASE OF TONGUE (HCC): ICD-10-CM

## 2024-09-11 DIAGNOSIS — R39.9 UTI SYMPTOMS: Primary | ICD-10-CM

## 2024-09-11 LAB — GLUCOSE SERPL-MCNC: 100 MG/DL (ref 65–140)

## 2024-09-11 PROCEDURE — 82948 REAGENT STRIP/BLOOD GLUCOSE: CPT

## 2024-09-11 PROCEDURE — A9552 F18 FDG: HCPCS

## 2024-09-11 PROCEDURE — 78815 PET IMAGE W/CT SKULL-THIGH: CPT

## 2024-09-12 ENCOUNTER — APPOINTMENT (OUTPATIENT)
Dept: LAB | Facility: CLINIC | Age: 69
End: 2024-09-12
Payer: MEDICARE

## 2024-09-12 DIAGNOSIS — C01 MALIGNANT NEOPLASM OF BASE OF TONGUE (HCC): ICD-10-CM

## 2024-09-12 DIAGNOSIS — R39.9 UTI SYMPTOMS: ICD-10-CM

## 2024-09-12 LAB
BACTERIA UR QL AUTO: ABNORMAL /HPF
BILIRUB UR QL STRIP: NEGATIVE
CLARITY UR: CLEAR
COLOR UR: ABNORMAL
GLUCOSE UR STRIP-MCNC: NEGATIVE MG/DL
HGB UR QL STRIP.AUTO: NEGATIVE
KETONES UR STRIP-MCNC: NEGATIVE MG/DL
LEUKOCYTE ESTERASE UR QL STRIP: NEGATIVE
MUCOUS THREADS UR QL AUTO: ABNORMAL
NITRITE UR QL STRIP: NEGATIVE
NON-SQ EPI CELLS URNS QL MICRO: ABNORMAL /HPF
PH UR STRIP.AUTO: 6.5 [PH]
PROT UR STRIP-MCNC: NEGATIVE MG/DL
RBC #/AREA URNS AUTO: ABNORMAL /HPF
SP GR UR STRIP.AUTO: 1.01 (ref 1–1.03)
UROBILINOGEN UR STRIP-ACNC: <2 MG/DL
WBC #/AREA URNS AUTO: ABNORMAL /HPF

## 2024-09-12 PROCEDURE — 87086 URINE CULTURE/COLONY COUNT: CPT

## 2024-09-12 PROCEDURE — 81001 URINALYSIS AUTO W/SCOPE: CPT

## 2024-09-13 ENCOUNTER — TELEPHONE (OUTPATIENT)
Dept: UROLOGY | Facility: CLINIC | Age: 69
End: 2024-09-13

## 2024-09-13 LAB — BACTERIA UR CULT: ABNORMAL

## 2024-09-13 NOTE — TELEPHONE ENCOUNTER
Called to let pt know his urine came back negative for infection. Could not leave message, no voice mail to leave call back number.

## 2024-09-13 NOTE — TELEPHONE ENCOUNTER
----- Message from Ro Choi PA-C sent at 9/13/2024  2:28 PM EDT -----  Urine negative for infection. Antibiotics not needed at this time

## 2024-09-16 ENCOUNTER — TELEPHONE (OUTPATIENT)
Dept: RADIATION ONCOLOGY | Facility: HOSPITAL | Age: 69
End: 2024-09-16

## 2024-09-16 ENCOUNTER — APPOINTMENT (OUTPATIENT)
Dept: RADIATION ONCOLOGY | Facility: HOSPITAL | Age: 69
End: 2024-09-16
Payer: MEDICARE

## 2024-09-16 ENCOUNTER — APPOINTMENT (OUTPATIENT)
Dept: LAB | Facility: CLINIC | Age: 69
End: 2024-09-16
Payer: MEDICARE

## 2024-09-16 ENCOUNTER — RADIATION THERAPY TREATMENT (OUTPATIENT)
Dept: RADIATION ONCOLOGY | Facility: HOSPITAL | Age: 69
End: 2024-09-16
Attending: RADIOLOGY
Payer: MEDICARE

## 2024-09-16 DIAGNOSIS — C01 MALIGNANT NEOPLASM OF BASE OF TONGUE (HCC): Primary | ICD-10-CM

## 2024-09-16 LAB
CREAT SERPL-MCNC: 0.88 MG/DL (ref 0.6–1.3)
GFR SERPL CREATININE-BSD FRML MDRD: 88 ML/MIN/1.73SQ M

## 2024-09-16 PROCEDURE — 36415 COLL VENOUS BLD VENIPUNCTURE: CPT

## 2024-09-16 PROCEDURE — 82565 ASSAY OF CREATININE: CPT

## 2024-09-16 PROCEDURE — 77334 RADIATION TREATMENT AID(S): CPT | Performed by: RADIOLOGY

## 2024-09-16 PROCEDURE — 77470 SPECIAL RADIATION TREATMENT: CPT | Performed by: RADIOLOGY

## 2024-09-16 RX ORDER — OXYMETAZOLINE HYDROCHLORIDE 0.05 G/100ML
SPRAY NASAL
Status: COMPLETED
Start: 2024-09-16 | End: 2024-09-16

## 2024-09-16 RX ADMIN — OXYMETAZOLINE HYDROCHLORIDE: 0.05 SPRAY NASAL at 16:03

## 2024-09-16 NOTE — TELEPHONE ENCOUNTER
Rad Onc LOCATION [facility]  [facility] CERNA: [place]  Rad Course (# of TX's & dose): 7000cGy over 35 FX  Sim Date:9/16/24  Radiation Location: [facility]  First Day of Rad: 9/30/2024  Med Onc Consult/follow-up: Dr Talbot saw patient on 7/3 and patient undecided of treatment.   Does chemo/RT need to start on same day: Yes  What day of the week does chemo/rt need to begin:Monday    Special Consideration: PLEASE reach out to patient ASAP to re=establish care and begin Chemo RT on 9/30

## 2024-09-17 ENCOUNTER — TELEPHONE (OUTPATIENT)
Dept: HEMATOLOGY ONCOLOGY | Facility: CLINIC | Age: 69
End: 2024-09-17

## 2024-09-17 ENCOUNTER — PATIENT OUTREACH (OUTPATIENT)
Dept: HEMATOLOGY ONCOLOGY | Facility: CLINIC | Age: 69
End: 2024-09-17

## 2024-09-17 NOTE — TELEPHONE ENCOUNTER
Patient aware that he is scheduled to [facility]. Talbot [facility] - [facility]  Received communication from RT that patient is scheduled to begin treatment 9/30/24  Patient has not seen Dr. Talbot since July - treatment will be discussed tomorrow    Patient verbalized understanding

## 2024-09-17 NOTE — PROGRESS NOTES
I reached out to Brian to review for barriers to care and offer supportive services as he is getting ready to start Tx. He has had a long journey, done a lot of research, and worked with many physicians to come to his final discission to start chemo RT with [facility]. I reviewed the members of his care team and updated Epic. He prefers to communicate through [facility] whenever possible. I reviewed the number for Ricardo [phone] as his priority resource to call his oncology team during treatment if he has questions about symptoms or side effects that require quick response. This line has 24hr coverage with nurse triage as well as on call physicians. He has met with the ORD in the past and has her contact info but he has established care with a dietician outside of [facility] that he feels confident with. He shared that her plan aligns with his goals and he will follow closely with her. He has also met with SLP and is currently working on exercises at home to optimize and maintain his swallow. He does not currently have any financial concerns. He is still currently driving himself to all appnts and denies transportation issues. Brian currently reports no barriers to care. He has my direct number and I welcome him to give me a call if anything we discussed changes. He was appreciative for the call.

## 2024-09-18 ENCOUNTER — OFFICE VISIT (OUTPATIENT)
Age: 69
End: 2024-09-18
Payer: MEDICARE

## 2024-09-18 ENCOUNTER — PREP FOR PROCEDURE (OUTPATIENT)
Dept: INTERVENTIONAL RADIOLOGY/VASCULAR | Facility: CLINIC | Age: 69
End: 2024-09-18

## 2024-09-18 ENCOUNTER — TELEPHONE (OUTPATIENT)
Age: 69
End: 2024-09-18

## 2024-09-18 VITALS
RESPIRATION RATE: 18 BRPM | HEIGHT: 70 IN | OXYGEN SATURATION: 98 % | BODY MASS INDEX: 23.91 KG/M2 | HEART RATE: 76 BPM | WEIGHT: 167 LBS | TEMPERATURE: 98.3 F | SYSTOLIC BLOOD PRESSURE: 118 MMHG | DIASTOLIC BLOOD PRESSURE: 78 MMHG

## 2024-09-18 DIAGNOSIS — C01 MALIGNANT NEOPLASM OF BASE OF TONGUE (HCC): Primary | ICD-10-CM

## 2024-09-18 PROCEDURE — 99215 OFFICE O/P EST HI 40 MIN: CPT | Performed by: INTERNAL MEDICINE

## 2024-09-18 RX ORDER — ONDANSETRON 8 MG/1
8 TABLET, ORALLY DISINTEGRATING ORAL EVERY 8 HOURS PRN
Qty: 30 TABLET | Refills: 3 | Status: SHIPPED | OUTPATIENT
Start: 2024-09-18

## 2024-09-18 RX ORDER — PROCHLORPERAZINE MALEATE 10 MG
10 TABLET ORAL EVERY 6 HOURS PRN
Qty: 30 TABLET | Refills: 3 | Status: SHIPPED | OUTPATIENT
Start: 2024-09-18

## 2024-09-18 NOTE — TELEPHONE ENCOUNTER
Please see treatment plan, patient Needs weekly Cisplatin visit scheduled-infusion and Med Onc    Start 30 Sept at [facility]     Follow up weekly on Wednesday but treatment on Mondays.    Thank you

## 2024-09-18 NOTE — PROGRESS NOTES
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     See Note.     -- Confirmed by positive Pankeratin (CKAE1/3), p40 and normal wild-type expression       on p53 immunostains.    -- p16 immunostain positive (>70% diffuse and strong nuclear and cytoplasmic        staining); high-risk HPV EH pending; result will be reported in an addendum.      B. Lymph Node, Regional Resection, Level 2 lymph nodes, see comments:  - Metastatic squamous cell carcinoma, keratinizing, involving at least two lymph nodes (2/2).     -- Present in 1.0 cm lymph node and additional detached lymph node(s)/fragments.     -- Largest metastatic focus: 0.3 cm; Extranodal extension: Not identified.    -- Confirmed by positive Pankeratin (CKAE1/3) and p40 immunostains.    -- p16 immunostain positive (>70% diffuse and strong nuclear and cytoplasmic        staining).     C. Lymph Node, Regional Resection, Additional level 2 lymph nodes:  - Metastatic squamous cell carcinoma, keratinizing, involving 3 of 4 lymph     nodes/fragments (3/4).     -- Largest metastatic focus: 1 cm; Extranodal extension: Present, extranodal         lymphovascular invasion identified.    -- Confirmed by positive Pankeratin (CKAE1/3) and p40 immunostains.    -- p16 immunostain positive (>70% diffuse and strong nuclear and cytoplasmic        staining);  high-risk HPV EH pending; result will be reported in an addendum. See Note.      D. Lymph Node, Level 3 lymph nodes, lymph adenectomy:  - One lymph node positive for metastatic squamous cell carcinoma, keratinizing (1/1).     -- Largest metastatic focus: 1.1 cm; Extranodal extension: Not identified.    -- Confirmed by positive Pankeratin (CKAE1/3) and p40 immunostains.    -- p16 immunostain positive (>70% diffuse and strong nuclear and cytoplasmic        staining).  - Portion(s) of benign salivary gland with adjacent detached small (< 0.2 cm) portion      of carcinoma.   - Traumatic neuroma.          Comments:   This is an appended report. These results have been appended to a previously preliminary verified report.           7/3/2024 -  Cancer Staged    Staging form: Pharynx - Oropharynx, AJCC 8th Edition  - Clinical stage from 7/3/2024: Stage I (cT1, cN1, cM0, p16+) - Signed by Fady Morrow MD on 7/3/2024  Stage prefix: Initial diagnosis       9/30/2024 -  Chemotherapy    alteplase (CATHFLO), 2 mg, Intracatheter, Every 1 Minute as needed, 0 of 7 cycles  CISplatin (PLATINOL) infusion, 70 mg (original dose 40 mg/m2), Intravenous, Once, 0 of 7 cycles  Dose modification: 70 mg (original dose 40 mg/m2, Cycle 1, Reason: Other (Must fill in a comment), Comment: pharmacy dictates)  fosaprepitant (EMEND) IVPB, 150 mg, Intravenous, Once, 0 of 7 cycles             Discussion     Te definitive management of SCCHN in terms of oral cavity vs oropharynx vs larynx vs hypopharynx as well as pure tobacco related vs HPV with or without a prior/current smoking history had been surgery/RT.   In the past, the  role of chemotherapy was reserved for metastatic disease using Cisplatin at fairly high doses as well as infusional 5FU x 96 hours.  However there have been many trials which have changed the landscape of how locoregional and metastatic disease are now managed with chemotherapy, CRT or IO        It is unclear in this case, as  had prior RBOT Stage 1 HPV driven s/p resection with no high risk features 0/34 nodes positive at the time and no adjuvant therapy     Based on the DL/biopsy there is SCC in the pharynx but location unclear; 6/7 nodes were positive with ASHLEY and thus the below pertains in terms of combined therapy.  The issues is 6 or 7 weeks of treatment and this appears to be a new second primary based on the biology      In general the prognosis for HPV driven SCCHN is better than that of tobacco related disease and in fact the AJCC staging system reflects this.  However when tobacco is a factor with a patient being a long standing smoker that favorable prognosis is no longer applicable;  however this patient smoked a pack a day for years but quit 30 years ago and so he has more favorable disease     The OS/PFS/FRANCO responses were better in those patients treated on the  study who were HPV positive; induction was followed by CRT and in that study Carboplatin AUC 1.5 only rather than 40 mg/m2 Cisplatin due to perceived toxicities being worse with cisplatin after induction therapy.  That was the case for this patient who got Cisplatin        However,  CRT per RTOG 91-11 as become SOC.  In the modern era, weekly Cisplatin 40 mg/m2 is used for 7 weeks rather than the high dose Cisplatin in that trial with similar efficacy.  Other radiosensitizers include weekly Carboplatin/Taxol.  Based on the side effect profile of Cisplatin, een in lower doses, renal clearance is an issue.  Both Carboplatin and Cisplatin are renally cleared but Carboplatin is dosed based on renal function and may be more tolerable.  Other agents used as radiosensitizers include Cetuximab per the Garcia study.       Side effects, and  toxicities of chemotherapy alone and in combination with radiation had been discussed at [facility].  The side effects specific to radiation include loss of taste, xerostomia, mucositis, dysphagia , nausea, radiation induced dermatis and  fatigue.  Fatigue is also common with chemotherapy and is cumulative.  Copious ropey secretions were discussed as well as nutrition.  In terms of the effects of Cisplatin or Carboplatin/Taxol weekly alone and in combination with radiation are neutropenia/leucopenia, anemia, thrombocytopenia, nausea, GERD, mucositis, fungal infections, renal insufficiency/dehydration, electrolyte imbalances including platinum induced SIADH, neuropathy, hearing loss, minor hair loss.  Taxol can also cause an infusion reaction /premedications are needed.  As chemotherapy is being used as a radiosensitizer, the effects of radiation are magnified.       Nutrition and hydration are concerning in the combined modality setting and we discussed the use of a PEG.  Based on both internal scatter of radiation and with extensive radiation there is potential for fibrosis/strictures as well as mucositis, fungal infections, being hypermetabolic with swallowing issues.  Repletion as the caloric needs increase is improved via a PEG as well as IV fluids.  Nutrition generally follows these patients.       There are also chronic issues which can occur; the secretions generally improve in 3-6 months, taste returns within 1-2 months, xerostomia may be a chronic issue, fibrosis, lymphedema, thyroid issues with checks every 3 months after RT completion, length of PEG use of about 3-6 months.     Restaging would be 3 months after therapy with CT PET and then every 3 months with CT neck/chest in the first year, eery 4-6 months thereafter.  HPV driven disease tends to have a latent distant mets period.       PDL1 status as well as TMB, MMR, MSI should be ascertained via [facility] for future reverence      Thus the recommendation would be weekly Cisplatin 40 mg/m2 weekly with RT to 70 Gy and thus 7 weeks      Assessment / Plan:     Recurrent right level 2 node s/p partial glossectomy/pharyngectomy 7/31/2029 with no adjuvant CRT 0/34 nodes at the time and no high  "risk features     PET with no primary recurrence/ nodes only     MRI face/neck/orbit     Discussion regarding CRT with weekly Cisplatin 40 mg/m2 for 6-7 weeks     PEG placement     Dental evaluation     Nutrition evaluation     9/18/2024     Patient seen by Dr Morrow in Rad Onc; simulation done    Will be treated UB    PEG to be placed-has agreed to it    Nutrition follow up    Speech pathology/swallowing assessment     Tentative plan to start treatment 30 Sept 2024       Follow up     2 Oct 2024        History:   7/3/2024     69 year old with HTN with prior RBOT cancer;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 S/p partial glossectomy and partial pharyngectomy on 07/31/2019, pathology negative for malignancy. S/p MRND right neck 09/04/2019 returned 0/34 nodes. Staged as T1N0 M0       PET scan was done 12/03/19 which showed mild asymmetry at the tongue base, likely post operative. Otherwise no distant metastasis. Prior imaging from June 2019 was clear with no evidence of disease.      There were no high risk features to speak of and he did not receive any adjuvant therapies.       He then did well in follow-up until approximately 3 to 4 months ago when he noticed a palpable lump on the right neck.  Subsequent imaging with CT and PET/CT revealed multiple hypermetabolic lymph nodes in the right neck but no obvious primary recurrence, contralateral or distant disease.  On 6/19/2024 he was taken to the operating room for direct laryngoscopy as well as repeat right neck dissection.  7 lymph nodes were removed, 6 of which were positive for metastatic squamous cell carcinoma, keratinizing, p16 positive, with extranodal extension identified.  The largest lymph node measured 1.1 cm.  On the pathology report there is a specimen labeled right pharynx which also contained squamous cell carcinoma.  Looking at the operative report, they describe a small palpable prominence in the \"left pharynx near base of " "tongue.\"  This is in contradiction to the pathology report which described the specimen as from the right pharynx.            Ct neck 04/24/2024      New rounded lymph node in the palpable region of interest superficial to the sternocleidomastoid muscle measuring 9 x 6 mm. Differential diagnosis should include recurrent disease versus reactive lymphadenopathy.     Small cluster of right level 2B cervical lymph nodes also noted as described above with otherwise no suspicious adenopathy identified in the remainder of the neck.     Status post right-sided partial glossectomy without definite nodular enhancement in the operative bed.     05/06/2024 FNAB under US  Lymph Node, right level 2 (ThinPrep, smears and cell block preparations ):  Conclusive evidence of malignancy.  Carcinoma with keratinization, compatible with known squamous cell carcinoma     PET scan 05/22/2024      1. Scattered FDG-avid right cervical chain lymph nodes compatible with disease recurrence.  2. No suspicious uptake in the oropharynx or hypopharynx.  3. No findings for hypermetabolic metastasis to the chest, abdomen or pelvis.  \  No prior XRT or chemotherapy.      The patient is seeing Rad Onc later today     He is very pleased with the RND healing.  We had a long discussion as to second primary vs recurrence; treatment with CRT either 6 or 7 weeks.  As this is likely a second primary, would do 7 weeks but again, not definitively shown on PET.  Will get MRI face/neck/orbit to further assess.  He has been doing well post op.  Weight stable 169 pounds.  Has altered diet, eating healthier.  Has no issues, denies any SOB/KOLHI, CP change in bowel/bladder, blood stool/urine        Interval History:  9/18/2024    Doing well; waiting to start treatment 30 Sept 2024.  Risks/benefits and toxicities again explained to the patient with informed consent signed.  Sent zofran 8 mg ODT q 8 prn and compazine 10 mg q 6-8 prn to pharmacy.  No other active issues.  "     Since last visit PET scan done 9/11/2024    HEAD/NECK:  Increasing focus of radiotracer uptake at the right upper cervical chain, level 2 region, SUV max of 5.5, previously 3.6. This measures 7 mm short axis, image 38 series 17, stable from prior MRI. Based on the configuration on prior MRI, it is possible   this could represent a parotid lesion arising from the deep lobe of the parotid gland though an intraparotid lymph node or cervical chain lymph node is not excluded.     Otherwise resolution of previously seen right upper cervical chain foci.     No FDG avid lymph nodes in the left neck.     Small focus of FDG uptake in the right peritonsillar region, SUV max of 4.2, previously 4.4, image 33 series 2600, may correspond to the 7 mm heterogeneously enhancing lesion seen on prior MRI examination. Question residual disease. However this appears   asymmetrically decreased compared to what is likely normal physiologic left tonsillar activity, SUV max of 5.5. Cannot exclude that this is diminished physiologic activity and the known right pharyngeal lesion is not seen on PET.     CT images: No additional significant findings.     CHEST:  No FDG avid soft tissue lesions are seen.     CT images: Scattered coronary artery calcifications.     ABDOMEN:  No FDG avid soft tissue lesions are seen.      REVIEW OF SYSTEMS:  As above   Please note that a 14-point review of systems was performed to include Constitutional, HEENT, Respiratory, CVS, GI, , Musculoskeletal, Integumentary, Neurologic, Rheumatologic, Endocrinologic, Psychiatric, Lymphatic, and Hematologic/Oncologic systems were reviewed and are negative unless otherwise stated in HPI. Positive and negative findings pertinent to this evaluation are incorporated into the history of present illness.      ECOG PS: 0    PROBLEM LIST:  Patient Active Problem List   Diagnosis    Mass of right side of neck    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)    Status post radical  dissection of neck    Tongue pain    Dysphagia    Hypertension       Past Medical History:   has a past medical history of Facial basal cell cancer, GERD (gastroesophageal reflux disease), Hypertension, and Vitamin D deficiency.    PAST SURGICAL HISTORY:   has a past surgical history that includes Rhinoplasty; Colonoscopy (N/A, 03/22/2016); [place] tooth extraction; pr laryngoscopy direct operative w/biopsy (N/A, 07/17/2019); TRANSORAL ROBOTIC SURGERY (TORS) (N/A, 07/31/2019); pr cervical lymphadec modified radical neck dsj (Right, 09/04/2019); pr laparoscopy surg cholecystectomy (N/A, 10/30/2019); Upper gastrointestinal endoscopy; Cholecystectomy; pr cysto insertion transprostatic implant single (N/A, 04/14/2023); Sinus surgery (1994); US guided lymph node biopsy right (5/6/2024); pr laryngoscopy direct operative w/biopsy (N/A, 6/19/2024); and Radical neck dissection (Right, 6/19/2024).    CURRENT MEDICATIONS  Current Outpatient Medications   Medication Sig Dispense Refill    amLODIPine (NORVASC) 5 mg tablet Take 5 mg by mouth daily at bedtime       lisinopril (ZESTRIL) 10 mg tablet Take 10 mg by mouth daily at bedtime       ondansetron (ZOFRAN-ODT) 8 mg disintegrating tablet Take 1 tablet (8 mg total) by mouth every 8 (eight) hours as needed for nausea or vomiting 30 tablet 3    prochlorperazine (COMPAZINE) 10 mg tablet Take 1 tablet (10 mg total) by mouth every 6 (six) hours as needed for nausea or vomiting 30 tablet 3     No current facility-administered medications for this visit.     [unfilled]    SOCIAL HISTORY:   reports that he has never smoked. He has never used smokeless tobacco. He reports current alcohol use of about 5.0 standard drinks of alcohol per week. He reports that he does not currently use drugs.     FAMILY HISTORY:  family history includes Cancer in his mother; Diabetes in his mother; Heart failure in his father; Seizures in his father.     ALLERGIES:  is allergic to shellfish allergy - food  "allergy and bee venom.      Physical Exam:  Vital Signs:   Visit Vitals  /78 (BP Location: Left arm)   Pulse 76   Temp 98.3 °F (36.8 °C) (Tympanic)   Resp 18   Ht 5' 10\" (1.778 m)   Wt 75.8 kg (167 lb)   SpO2 98%   BMI 23.96 kg/m²   Smoking Status Never   BSA 1.93 m²     Body mass index is 23.96 kg/m².  Body surface area is 1.93 meters squared.    GEN: Alert, awake oriented x3, in no acute distress  HEENT- No pallor, icterus, cyanosis, no oral mucosal lesions,   LAD - no palpable cervical, clavicle, axillary, inguinal LAD  Heart- normal S1 S2, regular rate and rhythm, No murmur, rubs.   Lungs- clear breathing sound bilateral.   Abdomen- soft, Non tender, bowel sounds present  Extremities- No cyanosis, clubbing, edema  Neuro- No focal neurological deficit    Labs:  Lab Results   Component Value Date    WBC 6.75 06/13/2024    HGB 14.5 06/13/2024    HCT 44.2 06/13/2024    MCV 87 06/13/2024     06/13/2024     Lab Results   Component Value Date     03/03/2016    SODIUM 139 06/13/2024    K 3.9 06/13/2024     06/13/2024    CO2 24 06/13/2024    AGAP 11 06/13/2024    BUN 15 06/13/2024    CREATININE 0.88 09/16/2024    GLUC 81 06/13/2024    CALCIUM 9.0 06/13/2024    AST 19 04/02/2024    ALT 20 04/02/2024    ALKPHOS 54 04/02/2024    PROT 6.7 03/03/2016    TP 6.8 04/02/2024    BILITOT 0.6 03/03/2016    TBILI 0.8 04/02/2024    EGFR 88 09/16/2024           I spent 40 minutes on chart review, face to face counseling time, coordination of care and documentation.    Magali Talbot MD PhD        "

## 2024-09-18 NOTE — TELEPHONE ENCOUNTER
Medical/GYN Oncologist: Dr. Talbot   Infusion Start Date: 9/30  First Infusion Location: [facility]   Does chemo/RT need to start on same day: No  What day of the week does chemo/rt need to begin: Monday or Tuesday  Any Special Consideration: None

## 2024-09-19 ENCOUNTER — TELEPHONE (OUTPATIENT)
Dept: NUTRITION | Facility: HOSPITAL | Age: 69
End: 2024-09-19

## 2024-09-19 NOTE — TELEPHONE ENCOUNTER
Received notification by  Dr. Talbot  on 9/18 that pt has triggered for oncology nutrition care (reason for referral: HNC dx and TF).    Contacted Jesus today to establish care.  No answer.  Left voice message with the reason for today's call and this RD’s contact information asking that Jesus call back as able/desired.

## 2024-09-19 NOTE — TELEPHONE ENCOUNTER
Pt called RD back. I introduced myself and my services. Patient reports he has been eating relatively well and maintaining weight. There are certain foods that are harder to swallow I.e bread but overall eats a regular diet. He has worked with [facility] in the past and denies recent aspiration episodes. He is trying to incorporate foods with high calories, moderate protein, and low sugar. He is not interested in Ensure/Boost due to the sugar and ingredients. He has used TicTacTi in the past. I also recommended Benecalorie which is a high calorie, no sugar supplement. Pt appreciated suggestion.     Patient reports that he has been working with a functional medicine doctor and nutritionist in [place] that specializes in fasting mimicking diets to treat cancer and cancer related symptoms. He feels very strongly about exploring alternative therapies in adjunct to conventional treatment. He was very transparent and states that he plans to start fasting the day before and day of treatment days for the potential benefits. The fasting includes 500 calories of bone broth. I did explain to him my role/background as a clinical dietitian and that I do not recommend fasting during treatment given the risk of malnutrition and lack of evidence to support this. However, I listened to the patient and his preferences and he was open to working together. He does not plan to see the other nutritionist during treatment.     Pt is considering feeding tube. He reports he has been getting mixed recommendations. We reviewed the benefits and I answered his questions. He will continue to think about this.     We scheduled initial consultation for 9/30 during patients first treatment. Provided my contact information and asked him to please reach out with questions/concerns in the meantime.

## 2024-09-23 ENCOUNTER — PATIENT OUTREACH (OUTPATIENT)
Dept: HEMATOLOGY ONCOLOGY | Facility: CLINIC | Age: 69
End: 2024-09-23

## 2024-09-23 NOTE — PROGRESS NOTES
"Received VM from Brian today at 9:57am    \"Reema, this is Brian THOMAS calling. My birth date is [birth year]. My phone number is . Doctor Antione, when I saw her last week, wanted me to get a PEG put in, presumably before I start treatment, which is on the 30th on a week from today. But there are no appointments until I think maybe the earliest is second week in October. And so I'm thinking I need to reschedule my treatment. I don't know. But anyway, that's reason for my call. I wasn't sure if I should call you or her, but that's the reason for my call. So if you could give me a call back, I'd appreciate it. Thank you very much. Bye bye.\"    "

## 2024-09-23 NOTE — PROGRESS NOTES
Returned call to Brian' call. I will send his question to Dr. Talbot's team so that they can review with him and provide further advice. He was appreciative and will await a call.

## 2024-09-24 ENCOUNTER — TELEPHONE (OUTPATIENT)
Age: 69
End: 2024-09-24

## 2024-09-24 ENCOUNTER — PATIENT OUTREACH (OUTPATIENT)
Dept: HEMATOLOGY ONCOLOGY | Facility: CLINIC | Age: 69
End: 2024-09-24

## 2024-09-24 DIAGNOSIS — C01 MALIGNANT NEOPLASM OF BASE OF TONGUE (HCC): Primary | ICD-10-CM

## 2024-09-24 PROCEDURE — 77300 RADIATION THERAPY DOSE PLAN: CPT | Performed by: RADIOLOGY

## 2024-09-24 PROCEDURE — 77301 RADIOTHERAPY DOSE PLAN IMRT: CPT | Performed by: RADIOLOGY

## 2024-09-24 PROCEDURE — 77338 DESIGN MLC DEVICE FOR IMRT: CPT | Performed by: RADIOLOGY

## 2024-09-24 NOTE — TELEPHONE ENCOUNTER
Went to voice mail Medical communication form indicates no voice mail. Will call back in the morning. To offer appointment with GI to discuss peg tube.

## 2024-09-24 NOTE — PROGRESS NOTES
Called and spoke to patient.  Informed him of Dr. Morrow's recommendations.  Patient is still stating he does not want to start treatment until after he has a PEG tube.

## 2024-09-25 ENCOUNTER — DOCUMENTATION (OUTPATIENT)
Dept: NUTRITION | Facility: HOSPITAL | Age: 69
End: 2024-09-25

## 2024-09-25 NOTE — PROGRESS NOTES
Received home enteral nutrition referral from Dr. Morrow and Dr. Talbot on 9/24/2024. RD is scheduled to meet with patient on Monday 9/30 during infusion. If feeding tube placement is scheduled prior to this, will call patient as soon as possible.

## 2024-09-26 ENCOUNTER — PATIENT OUTREACH (OUTPATIENT)
Dept: HEMATOLOGY ONCOLOGY | Facility: CLINIC | Age: 69
End: 2024-09-26

## 2024-09-26 NOTE — PROGRESS NOTES
I returned call to Brian. We discussed his upcoming appnts. I made him aware that he will see additional radiation appnts pop up in his [employer] account and he does not need to call in to schedule these. If he does not see them by the end of this week he will have them on Monday when he starts Tx. I noted that the RN from the GI team has reached out to him regarding his feeding tube placement. I will let her know that he is available today for a return call. He will be home all day babysitting his granddaughter. We also talked a little about what to expect his first day of Tx. He was unsure if he should inconvenience his wife to attend his first day of Tx. I let him know that while there is no restriction on his ability to drive himself some patients like to have someone with them on the first day as a second set of ears and support as there may be additional reinforcement of education provided through out the day. We talked about the infusion center and what to expect as far as entertainment and comfort items that may be provided and what he can bring with him. He was very appreciative for the support. He feels that all questions have been answered. No need for Mandy to return his call. I will let her know.

## 2024-09-26 NOTE — PROGRESS NOTES
I spoke with pt and we talked about appointment options. He has a virtual appointment with Dr. Draper on the 09/30/24.

## 2024-09-26 NOTE — PROGRESS NOTES
"Received Vm from today at 08:42    \"Camilo, good morning, Reema. It's Brian THOMAS calling. My birth date is [birth year]. My phone number is [phone]. Reason I'm calling is I left a message with Madny yesterday morning regarding the PEG tube appointment. I've gotten a couple of emails indicating or implying that I should be making this appointment that Mandy, I think, told me that no, that she would coordinated through the gastroenterology area, which is one of the emails I got. But I didn't hear back from Mandy. And I just want to make sure I try to get this thing, you know, booked as quick as I can 'cause I'm supposed to start treatment on Monday. And the other thing has to do with the radiation treatment appointments. I think I was told that somebody there is coordinating that with the chemo treatment which starts on Monday. And I've seen in my chart that there is one radiology appointment that has been scheduled on my behalf right after the chemo on Monday. But I don't see any other appointments from Tuesday on, and I don't know if I need to make those or if somebody else is taking care of that. So that's the purpose of my call. If you could check it out and let me know, I'd appreciate it. Thank you so much. Have a great day. I look forward to talking with you. Bye.\"    "

## 2024-09-27 DIAGNOSIS — M04.8 OTHER AUTOINFLAMMATORY SYNDROMES (HCC): ICD-10-CM

## 2024-09-27 DIAGNOSIS — C01 MALIGNANT NEOPLASM OF BASE OF TONGUE (HCC): Primary | ICD-10-CM

## 2024-09-29 NOTE — PROGRESS NOTES
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     See Note.     -- Confirmed by positive Pankeratin (CKAE1/3), p40 and normal wild-type expression       on p53 immunostains.    -- p16 immunostain positive (>70% diffuse and strong nuclear and cytoplasmic        staining); high-risk HPV EH pending; result will be reported in an addendum.      B. Lymph Node, Regional Resection, Level 2 lymph nodes, see comments:  - Metastatic squamous cell carcinoma, keratinizing, involving at least two lymph nodes (2/2).     -- Present in 1.0 cm lymph node and additional detached lymph node(s)/fragments.     -- Largest metastatic focus: 0.3 cm; Extranodal extension: Not identified.    -- Confirmed by positive Pankeratin (CKAE1/3) and p40 immunostains.    -- p16 immunostain positive (>70% diffuse and strong nuclear and cytoplasmic        staining).     C. Lymph Node, Regional Resection, Additional level 2 lymph nodes:  - Metastatic squamous cell carcinoma, keratinizing, involving 3 of 4 lymph     nodes/fragments (3/4).     -- Largest metastatic focus: 1 cm; Extranodal extension: Present, extranodal         lymphovascular invasion identified.    -- Confirmed by positive Pankeratin (CKAE1/3) and p40 immunostains.    -- p16 immunostain positive (>70% diffuse and strong nuclear and  cytoplasmic        staining); high-risk HPV EH pending; result will be reported in an addendum. See Note.      D. Lymph Node, Level 3 lymph nodes, lymph adenectomy:  - One lymph node positive for metastatic squamous cell carcinoma, keratinizing (1/1).     -- Largest metastatic focus: 1.1 cm; Extranodal extension: Not identified.    -- Confirmed by positive Pankeratin (CKAE1/3) and p40 immunostains.    -- p16 immunostain positive (>70% diffuse and strong nuclear and cytoplasmic        staining).  - Portion(s) of benign salivary gland with adjacent detached small (< 0.2 cm) portion      of carcinoma.   - Traumatic neuroma.          Comments:   This is an appended report. These results have been appended to a previously preliminary verified report.           7/3/2024 -  Cancer Staged    Staging form: Pharynx - Oropharynx, AJCC 8th Edition  - Clinical stage from 7/3/2024: Stage I (cT1, cN1, cM0, p16+) - Signed by Fady Morrow MD on 7/3/2024  Stage prefix: Initial diagnosis       9/30/2024 -  Chemotherapy    alteplase (CATHFLO), 2 mg, Intracatheter, Every 1 Minute as needed, 0 of 7 cycles  CISplatin (PLATINOL) infusion, 70 mg (original dose 40 mg/m2), Intravenous, Once, 0 of 7 cycles  Dose modification: 70 mg (original dose 40 mg/m2, Cycle 1, Reason: Other (Must fill in a comment), Comment: pharmacy dictates)  fosaprepitant (EMEND) IVPB, 150 mg, Intravenous, Once, 0 of 7 cycles         Treatment Cisplatin 40 mg/m2 weekly x 7 doses with IMRT    C1 9/30/2024  C2  10/7/2024    Discussion     Te definitive management of SCCHN in terms of oral cavity vs oropharynx vs larynx vs hypopharynx as well as pure tobacco related vs HPV with or without a prior/current smoking history had been surgery/RT.   In the past, the  role of chemotherapy was reserved for metastatic disease using Cisplatin at fairly high doses as well as infusional 5FU x 96 hours.  However there have been many trials which have changed the landscape of how  locoregional and metastatic disease are now managed with chemotherapy, CRT or IO        It is unclear in this case, as had prior RBOT Stage 1 HPV driven s/p resection with no high risk features 0/34 nodes positive at the time and no adjuvant therapy     Based on the DL/biopsy there is SCC in the pharynx but location unclear; 6/7 nodes were positive with ASHLEY and thus the below pertains in terms of combined therapy.  The issues is 6 or 7 weeks of treatment and this appears to be a new second primary based on the biology      In general the prognosis for HPV driven SCCHN is better than that of tobacco related disease and in fact the AJCC staging system reflects this.  However when tobacco is a factor with a patient being a long standing smoker that favorable prognosis is no longer applicable;  however this patient smoked a pack a day for years but quit 30 years ago and so he has more favorable disease     The OS/PFS/FRANCO responses were better in those patients treated on the  study who were HPV positive; induction was followed by CRT and in that study Carboplatin AUC 1.5 only rather than 40 mg/m2 Cisplatin due to perceived toxicities being worse with cisplatin after induction therapy.  That was the case for this patient who got Cisplatin        However,  CRT per RTOG 91-11 as become SOC.  In the modern era, weekly Cisplatin 40 mg/m2 is used for 7 weeks rather than the high dose Cisplatin in that trial with similar efficacy.  Other radiosensitizers include weekly Carboplatin/Taxol.  Based on the side effect profile of Cisplatin, een in lower doses, renal clearance is an issue.  Both Carboplatin and Cisplatin are renally cleared but Carboplatin is dosed based on renal function and may be more tolerable.  Other agents used as radiosensitizers include Cetuximab per the Radha study.       Side effects, and  toxicities of chemotherapy alone and in combination with radiation had been discussed at [place].  The side effects  specific to radiation include loss of taste, xerostomia, mucositis, dysphagia , nausea, radiation induced dermatis and fatigue.  Fatigue is also common with chemotherapy and is cumulative.  Copious ropey secretions were discussed as well as nutrition.  In terms of the effects of Cisplatin or Carboplatin/Taxol weekly alone and in combination with radiation are neutropenia/leucopenia, anemia, thrombocytopenia, nausea, GERD, mucositis, fungal infections, renal insufficiency/dehydration, electrolyte imbalances including platinum induced SIADH, neuropathy, hearing loss, minor hair loss.  Taxol can also cause an infusion reaction /premedications are needed.  As chemotherapy is being used as a radiosensitizer, the effects of radiation are magnified.       Nutrition and hydration are concerning in the combined modality setting and we discussed the use of a PEG.  Based on both internal scatter of radiation and with extensive radiation there is potential for fibrosis/strictures as well as mucositis, fungal infections, being hypermetabolic with swallowing issues.  Repletion as the caloric needs increase is improved via a PEG as well as IV fluids.  Nutrition generally follows these patients.       There are also chronic issues which can occur; the secretions generally improve in 3-6 months, taste returns within 1-2 months, xerostomia may be a chronic issue, fibrosis, lymphedema, thyroid issues with checks every 3 months after RT completion, length of PEG use of about 3-6 months.     Restaging would be 3 months after therapy with CT PET and then every 3 months with CT neck/chest in the first year, eery 4-6 months thereafter.  HPV driven disease tends to have a latent distant mets period.       PDL1 status as well as TMB, MMR, MSI should be ascertained via [facility] for future reverence      Thus the recommendation would be weekly Cisplatin 40 mg/m2 weekly with RT to 70 Gy and thus 7 weeks      Assessment / Plan:     Recurrent right  "level 2 node s/p partial glossectomy/pharyngectomy 7/31/2029 with no adjuvant CRT 0/34 nodes at the time and no high risk features     PET with no primary recurrence/ nodes only     MRI face/neck/orbit     Discussion regarding CRT with weekly Cisplatin 40 mg/m2 for 6-7 weeks     PEG placement     Dental evaluation     Nutrition evaluation      9/18/2024      Patient seen by Dr Morrow in Rad Onc; simulation done     Will be treated UB     PEG to be placed-has agreed to it     Nutrition follow up     Speech pathology/swallowing assessment      Tentative plan to start treatment 30 Sept 2024      10/2/2024    Tolerated C1 Cisplatin    No issues other than flushing-took decadron out of premed cocktail.  Usually no infusion reactions with Cisplatin but with Carboplatin    No PEG, weight is 173    Using supplements despite telling patient that herbal remedies can cause issues with both chemo and RT.  He is \"holding\" these agents when gets chemotherapy but even then can affect outcome/formation of DNA adducts    Would again recommend not using these agents       Follow up     9 Oct 2024         History:   7/3/2024     69 year old with HTN with prior RBOT cancer;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 S/p partial glossectomy and partial pharyngectomy on 07/31/2019, pathology negative for malignancy. S/p MRND right neck 09/04/2019 returned 0/34 nodes. Staged as T1N0 M0       PET scan was done 12/03/19 which showed mild asymmetry at the tongue base, likely post operative. Otherwise no distant metastasis. Prior imaging from June 2019 was clear with no evidence of disease.      There were no high risk features to speak of and he did not receive any adjuvant therapies.       He then did well in follow-up until approximately 3 to 4 months ago when he noticed a palpable lump on the right neck.  Subsequent imaging with CT and PET/CT revealed multiple hypermetabolic lymph nodes in the right neck but no obvious " "primary recurrence, contralateral or distant disease.  On 6/19/2024 he was taken to the operating room for direct laryngoscopy as well as repeat right neck dissection.  7 lymph nodes were removed, 6 of which were positive for metastatic squamous cell carcinoma, keratinizing, p16 positive, with extranodal extension identified.  The largest lymph node measured 1.1 cm.  On the pathology report there is a specimen labeled right pharynx which also contained squamous cell carcinoma.  Looking at the operative report, they describe a small palpable prominence in the \"left pharynx near base of tongue.\"  This is in contradiction to the pathology report which described the specimen as from the right pharynx.            Ct neck 04/24/2024      New rounded lymph node in the palpable region of interest superficial to the sternocleidomastoid muscle measuring 9 x 6 mm. Differential diagnosis should include recurrent disease versus reactive lymphadenopathy.     Small cluster of right level 2B cervical lymph nodes also noted as described above with otherwise no suspicious adenopathy identified in the remainder of the neck.     Status post right-sided partial glossectomy without definite nodular enhancement in the operative bed.     05/06/2024 FNAB under US  Lymph Node, right level 2 (ThinPrep, smears and cell block preparations ):  Conclusive evidence of malignancy.  Carcinoma with keratinization, compatible with known squamous cell carcinoma     PET scan 05/22/2024      1. Scattered FDG-avid right cervical chain lymph nodes compatible with disease recurrence.  2. No suspicious uptake in the oropharynx or hypopharynx.  3. No findings for hypermetabolic metastasis to the chest, abdomen or pelvis.  \  No prior XRT or chemotherapy.      The patient is seeing Rad Onc later today     He is very pleased with the RND healing.  We had a long discussion as to second primary vs recurrence; treatment with CRT either 6 or 7 weeks.  As this is " likely a second primary, would do 7 weeks but again, not definitively shown on PET.  Will get MRI face/neck/orbit to further assess.  He has been doing well post op.  Weight stable 169 pounds.  Has altered diet, eating healthier.  Has no issues, denies any SOB/KOHLI, CP change in bowel/bladder, blood stool/urine         Interval History:  9/18/2024     Doing well; waiting to start treatment 30 Sept 2024.  Risks/benefits and toxicities again explained to the patient with informed consent signed.  Sent zofran 8 mg ODT q 8 prn and compazine 10 mg q 6-8 prn to pharmacy.  No other active issues.       Since last visit PET scan done 9/11/2024    HEAD/NECK:  Increasing focus of radiotracer uptake at the right upper cervical chain, level 2 region, SUV max of 5.5, previously 3.6. This measures 7 mm short axis, image 38 series 17, stable from prior MRI. Based on the configuration on prior MRI, it is possible   this could represent a parotid lesion arising from the deep lobe of the parotid gland though an intraparotid lymph node or cervical chain lymph node is not excluded.     Otherwise resolution of previously seen right upper cervical chain foci.     No FDG avid lymph nodes in the left neck.     Small focus of FDG uptake in the right peritonsillar region, SUV max of 4.2, previously 4.4, image 33 series 2600, may correspond to the 7 mm heterogeneously enhancing lesion seen on prior MRI examination. Question residual disease. However this appears   asymmetrically decreased compared to what is likely normal physiologic left tonsillar activity, SUV max of 5.5. Cannot exclude that this is diminished physiologic activity and the known right pharyngeal lesion is not seen on PET.     CT images: No additional significant findings.     CHEST:  No FDG avid soft tissue lesions are seen.     CT images: Scattered coronary artery calcifications.     ABDOMEN:  No FDG avid soft tissue lesions are seen.            Interval History:   10/2/2024    Doing well with treatment but had C1 30 Sept and this is day 3 of radiation.  He has started on herbal supplements despite advise against this.  Again told not acceptable.  Had issues with flushing but no other Cisplatin related effects.     Labs  9/30/2024    Na 138 K 3.5 Cr 0.79 ALT/AST 16/16/ Ca 9 Mg 2.1  WBC 4.4 Hgb 15.4 MCV 86 plts 210 ANC 2310    Baseline TSH 2.245          REVIEW OF SYSTEMS: as above     Please note that a 14-point review of systems was performed to include Constitutional, HEENT, Respiratory, CVS, GI, , Musculoskeletal, Integumentary, Neurologic, Rheumatologic, Endocrinologic, Psychiatric, Lymphatic, and Hematologic/Oncologic systems were reviewed and are negative unless otherwise stated in HPI. Positive and negative findings pertinent to this evaluation are incorporated into the history of present illness.      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    PAST SURGICAL HISTORY:   has a past surgical history that includes Rhinoplasty; Colonoscopy (N/A, 03/22/2016); [place] tooth extraction; pr laryngoscopy direct operative w/biopsy (N/A, 07/17/2019); TRANSORAL ROBOTIC SURGERY (TORS) (N/A, 07/31/2019); pr cervical lymphadec modified radical neck dsj (Right, 09/04/2019); pr laparoscopy surg cholecystectomy (N/A, 10/30/2019); Upper gastrointestinal endoscopy; Cholecystectomy; pr cysto insertion transprostatic implant single (N/A, 04/14/2023); Sinus surgery (1994); US guided lymph node biopsy right (5/6/2024); pr laryngoscopy direct operative w/biopsy (N/A, 6/19/2024); and Radical neck dissection (Right, 6/19/2024).    CURRENT  MEDICATIONS  Current Outpatient Medications   Medication Sig Dispense Refill    amLODIPine (NORVASC) 5 mg tablet Take 5 mg by mouth daily at bedtime       lisinopril (ZESTRIL) 10 mg tablet Take 10 mg by mouth daily at bedtime       ondansetron (ZOFRAN-ODT) 8 mg disintegrating tablet Take 1 tablet (8 mg total) by mouth every 8 (eight) hours as needed for nausea or vomiting 30 tablet 3    prochlorperazine (COMPAZINE) 10 mg tablet Take 1 tablet (10 mg total) by mouth every 6 (six) hours as needed for nausea or vomiting 30 tablet 3     No current facility-administered medications for this visit.     [unfilled]    SOCIAL HISTORY:   reports that he has never smoked. He has never used smokeless tobacco. He reports current alcohol use of about 5.0 standard drinks of alcohol per week. He reports that he does not currently use drugs.     FAMILY HISTORY:  family history includes Cancer in his mother; Diabetes in his mother; Heart failure in his father; Seizures in his father.     ALLERGIES:  is allergic to shellfish allergy - food allergy and bee venom.      Physical Exam:  Vital Signs:   Visit Vitals  Smoking Status Never     There is no height or weight on file to calculate BMI.  There is no height or weight on file to calculate BSA.    GEN: Alert, awake oriented x3, in no acute distress  HEENT- No pallor, icterus, cyanosis, no oral mucosal lesions,   LAD - no palpable cervical, clavicle, axillary, inguinal LAD  Heart- normal S1 S2, regular rate and rhythm, No murmur, rubs.   Lungs- clear breathing sound bilateral.   Abdomen- soft, Non tender, bowel sounds present  Extremities- No cyanosis, clubbing, edema  Neuro- No focal neurological deficit    Labs:  Lab Results   Component Value Date    WBC 6.75 06/13/2024    HGB 14.5 06/13/2024    HCT 44.2 06/13/2024    MCV 87 06/13/2024     06/13/2024     Lab Results   Component Value Date     03/03/2016    SODIUM 139 06/13/2024    K 3.9 06/13/2024     06/13/2024     CO2 24 06/13/2024    AGAP 11 06/13/2024    BUN 15 06/13/2024    CREATININE 0.88 09/16/2024    GLUC 81 06/13/2024    CALCIUM 9.0 06/13/2024    AST 19 04/02/2024    ALT 20 04/02/2024    ALKPHOS 54 04/02/2024    PROT 6.7 03/03/2016    TP 6.8 04/02/2024    BILITOT 0.6 03/03/2016    TBILI 0.8 04/02/2024    EGFR 88 09/16/2024           I spent 40 minutes on chart review, face to face counseling time, coordination of care and documentation.    Magali Talbot MD PhD

## 2024-09-30 ENCOUNTER — APPOINTMENT (OUTPATIENT)
Facility: HOSPITAL | Age: 69
End: 2024-09-30
Attending: RADIOLOGY
Payer: MEDICARE

## 2024-09-30 ENCOUNTER — TELEPHONE (OUTPATIENT)
Age: 69
End: 2024-09-30

## 2024-09-30 ENCOUNTER — HOSPITAL ENCOUNTER (OUTPATIENT)
Dept: INFUSION CENTER | Facility: HOSPITAL | Age: 69
Discharge: HOME | End: 2024-09-30
Attending: INTERNAL MEDICINE
Payer: MEDICARE

## 2024-09-30 ENCOUNTER — TELEMEDICINE (OUTPATIENT)
Age: 69
End: 2024-09-30
Payer: MEDICARE

## 2024-09-30 ENCOUNTER — NUTRITION (OUTPATIENT)
Dept: NUTRITION | Facility: HOSPITAL | Age: 69
End: 2024-09-30

## 2024-09-30 VITALS
WEIGHT: 169.4 LBS | RESPIRATION RATE: 18 BRPM | OXYGEN SATURATION: 98 % | TEMPERATURE: 97.4 F | HEART RATE: 67 BPM | BODY MASS INDEX: 24.25 KG/M2 | DIASTOLIC BLOOD PRESSURE: 74 MMHG | SYSTOLIC BLOOD PRESSURE: 143 MMHG | HEIGHT: 70 IN

## 2024-09-30 DIAGNOSIS — E46 PROTEIN-CALORIE MALNUTRITION, UNSPECIFIED SEVERITY (HCC): ICD-10-CM

## 2024-09-30 DIAGNOSIS — Z71.3 NUTRITIONAL COUNSELING: Primary | ICD-10-CM

## 2024-09-30 DIAGNOSIS — C01 MALIGNANT NEOPLASM OF BASE OF TONGUE (HCC): Primary | ICD-10-CM

## 2024-09-30 DIAGNOSIS — Z98.890 STATUS POST RADICAL DISSECTION OF NECK: ICD-10-CM

## 2024-09-30 DIAGNOSIS — R13.10 DYSPHAGIA, UNSPECIFIED TYPE: ICD-10-CM

## 2024-09-30 DIAGNOSIS — E46 PROTEIN-CALORIE MALNUTRITION, UNSPECIFIED SEVERITY (HCC): Primary | ICD-10-CM

## 2024-09-30 DIAGNOSIS — K14.6 TONGUE PAIN: ICD-10-CM

## 2024-09-30 DIAGNOSIS — R11.0 CHEMOTHERAPY-INDUCED NAUSEA: ICD-10-CM

## 2024-09-30 DIAGNOSIS — T45.1X5A CHEMOTHERAPY-INDUCED NAUSEA: ICD-10-CM

## 2024-09-30 DIAGNOSIS — M04.8 OTHER AUTOINFLAMMATORY SYNDROMES (HCC): ICD-10-CM

## 2024-09-30 LAB
ALBUMIN SERPL BCG-MCNC: 4.4 G/DL (ref 3.5–5)
ALP SERPL-CCNC: 50 U/L (ref 34–104)
ALT SERPL W P-5'-P-CCNC: 16 U/L (ref 7–52)
ANION GAP SERPL CALCULATED.3IONS-SCNC: 7 MMOL/L (ref 4–13)
AST SERPL W P-5'-P-CCNC: 16 U/L (ref 13–39)
BASOPHILS # BLD AUTO: 0.02 THOUSANDS/ÂΜL (ref 0–0.1)
BASOPHILS NFR BLD AUTO: 1 % (ref 0–1)
BILIRUB SERPL-MCNC: 1.36 MG/DL (ref 0.2–1)
BUN SERPL-MCNC: 14 MG/DL (ref 5–25)
CALCIUM SERPL-MCNC: 9 MG/DL (ref 8.4–10.2)
CHLORIDE SERPL-SCNC: 103 MMOL/L (ref 96–108)
CO2 SERPL-SCNC: 28 MMOL/L (ref 21–32)
CREAT SERPL-MCNC: 0.79 MG/DL (ref 0.6–1.3)
EOSINOPHIL # BLD AUTO: 0.16 THOUSAND/ÂΜL (ref 0–0.61)
EOSINOPHIL NFR BLD AUTO: 4 % (ref 0–6)
ERYTHROCYTE [DISTWIDTH] IN BLOOD BY AUTOMATED COUNT: 12.5 % (ref 11.6–15.1)
GFR SERPL CREATININE-BSD FRML MDRD: 92 ML/MIN/1.73SQ M
GLUCOSE SERPL-MCNC: 90 MG/DL (ref 65–140)
HCT VFR BLD AUTO: 48.9 % (ref 36.5–49.3)
HGB BLD-MCNC: 16.4 G/DL (ref 12–17)
IMM GRANULOCYTES # BLD AUTO: 0.02 THOUSAND/UL (ref 0–0.2)
IMM GRANULOCYTES NFR BLD AUTO: 1 % (ref 0–2)
LDH SERPL-CCNC: 116 U/L (ref 140–271)
LYMPHOCYTES # BLD AUTO: 1.5 THOUSANDS/ÂΜL (ref 0.6–4.47)
LYMPHOCYTES NFR BLD AUTO: 34 % (ref 14–44)
MAGNESIUM SERPL-MCNC: 2.1 MG/DL (ref 1.9–2.7)
MCH RBC QN AUTO: 28.9 PG (ref 26.8–34.3)
MCHC RBC AUTO-ENTMCNC: 33.5 G/DL (ref 31.4–37.4)
MCV RBC AUTO: 86 FL (ref 82–98)
MONOCYTES # BLD AUTO: 0.37 THOUSAND/ÂΜL (ref 0.17–1.22)
MONOCYTES NFR BLD AUTO: 8 % (ref 4–12)
NEUTROPHILS # BLD AUTO: 2.31 THOUSANDS/ÂΜL (ref 1.85–7.62)
NEUTS SEG NFR BLD AUTO: 52 % (ref 43–75)
NRBC BLD AUTO-RTO: 0 /100 WBCS
PLATELET # BLD AUTO: 210 THOUSANDS/UL (ref 149–390)
PMV BLD AUTO: 9.5 FL (ref 8.9–12.7)
POTASSIUM SERPL-SCNC: 3.5 MMOL/L (ref 3.5–5.3)
PROT SERPL-MCNC: 7.3 G/DL (ref 6.4–8.4)
RBC # BLD AUTO: 5.68 MILLION/UL (ref 3.88–5.62)
SODIUM SERPL-SCNC: 138 MMOL/L (ref 135–147)
TSH SERPL DL<=0.05 MIU/L-ACNC: 2.25 UIU/ML (ref 0.45–4.5)
WBC # BLD AUTO: 4.38 THOUSAND/UL (ref 4.31–10.16)

## 2024-09-30 PROCEDURE — 84443 ASSAY THYROID STIM HORMONE: CPT | Performed by: INTERNAL MEDICINE

## 2024-09-30 PROCEDURE — 77427 RADIATION TX MANAGEMENT X5: CPT | Performed by: RADIOLOGY

## 2024-09-30 PROCEDURE — 77014 CHG CT GUIDANCE RADIATION THERAPY FLDS PLACEMENT: CPT | Performed by: RADIOLOGY

## 2024-09-30 PROCEDURE — 96367 TX/PROPH/DG ADDL SEQ IV INF: CPT

## 2024-09-30 PROCEDURE — 99214 OFFICE O/P EST MOD 30 MIN: CPT | Performed by: INTERNAL MEDICINE

## 2024-09-30 PROCEDURE — G2211 COMPLEX E/M VISIT ADD ON: HCPCS | Performed by: INTERNAL MEDICINE

## 2024-09-30 PROCEDURE — 96413 CHEMO IV INFUSION 1 HR: CPT

## 2024-09-30 PROCEDURE — 96361 HYDRATE IV INFUSION ADD-ON: CPT

## 2024-09-30 PROCEDURE — 83735 ASSAY OF MAGNESIUM: CPT | Performed by: INTERNAL MEDICINE

## 2024-09-30 PROCEDURE — 85025 COMPLETE CBC W/AUTO DIFF WBC: CPT | Performed by: INTERNAL MEDICINE

## 2024-09-30 PROCEDURE — 80053 COMPREHEN METABOLIC PANEL: CPT | Performed by: INTERNAL MEDICINE

## 2024-09-30 PROCEDURE — 83615 LACTATE (LD) (LDH) ENZYME: CPT | Performed by: INTERNAL MEDICINE

## 2024-09-30 PROCEDURE — 77386 HB NTSTY MODUL RAD TX DLVR CPLX: CPT | Performed by: RADIOLOGY

## 2024-09-30 RX ORDER — SODIUM CHLORIDE 9 MG/ML
20 INJECTION, SOLUTION INTRAVENOUS ONCE
Status: COMPLETED | OUTPATIENT
Start: 2024-09-30 | End: 2024-09-30

## 2024-09-30 RX ORDER — SODIUM CHLORIDE 9 MG/ML
20 INJECTION, SOLUTION INTRAVENOUS ONCE
Status: CANCELLED | OUTPATIENT
Start: 2024-10-07

## 2024-09-30 RX ADMIN — SODIUM CHLORIDE 500 ML: 0.9 INJECTION, SOLUTION INTRAVENOUS at 08:27

## 2024-09-30 RX ADMIN — SODIUM CHLORIDE 500 ML: 0.9 INJECTION, SOLUTION INTRAVENOUS at 10:43

## 2024-09-30 RX ADMIN — FOSAPREPITANT 150 MG: 150 INJECTION, POWDER, LYOPHILIZED, FOR SOLUTION INTRAVENOUS at 09:04

## 2024-09-30 RX ADMIN — CISPLATIN 70 MG: 1 INJECTION, SOLUTION INTRAVENOUS at 09:46

## 2024-09-30 RX ADMIN — SODIUM CHLORIDE 20 ML/HR: 0.9 INJECTION, SOLUTION INTRAVENOUS at 08:27

## 2024-09-30 RX ADMIN — DEXAMETHASONE SODIUM PHOSPHATE: 10 INJECTION, SOLUTION INTRAMUSCULAR; INTRAVENOUS at 08:28

## 2024-09-30 NOTE — ASSESSMENT & PLAN NOTE
The patient is a 69-year-old male with malignant neoplasm of the base of the tongue with squamous cell carcinoma and metastatic disease with lymph node involvement on chemotherapy with concern for developing dysphagia and protein calorie malnutrition given internal scatter of radiation with extensive radiation and potential for mucositis, fungal infections, fibrosis and strictures, who now presents to establish care.  Overall he is feeling well.  Will plan for PEG tube placement prophylactically.    Endoscopy from January 2022 with single linear ulcer in the lower third of the esophagus with clean base and biopsies performed.    Colonoscopy from 2016 with diverticulosis and hemorrhoids.    EKG from June 2024 with QTc interval of 437 and normal sinus rhythm.    PET CT scan from September 2024 with increasing uptake in the right upper cervical chain focus concerning for metastases and small focus in the right peritonsillar region which could correspond to 7 mm heterogeneously enhancing lesion seen on prior MRI but no findings of hypermetabolic metastases to the chest abdomen or pelvis.    Most recent BMP with normal electrolytes and creatinine.  CBC with normal white blood cell count, hemoglobin, MCV, platelets.  Most recent CMP with relatively normal liver enzymes.    Continue Zofran and Compazine as needed for chemotherapy-induced nausea  Plan for PEG tube placement.  The risks and benefits were discussed with the patient.  Follow-up with oncology  Orders:    EGD Peg Tube Placement; Future

## 2024-09-30 NOTE — ASSESSMENT & PLAN NOTE
Concern for developing dysphagia in the setting of squamous cell carcinoma of the base of the tongue with metastatic disease and need for radiation therapy.  Will plan for PEG tube placement.  The risks and benefits were discussed with the patient.

## 2024-09-30 NOTE — TELEPHONE ENCOUNTER
----- Message from Luis Draper MD sent at 9/30/2024  8:07 AM EDT -----  Hi,    Can we please get him scheduled for a PEG?    Thank you!

## 2024-09-30 NOTE — H&P (VIEW-ONLY)
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  Overall he is feeling well.  Will plan for PEG tube placement prophylactically.    Endoscopy from 2022 with single linear ulcer in the lower third of the esophagus with clean base and biopsies performed.    Colonoscopy from  with diverticulosis and hemorrhoids.    EKG from 2024 with QTc interval of 437 and normal sinus rhythm.    PET CT scan from 2024 with increasing uptake in the right upper cervical chain focus concerning for metastases and small focus in the right peritonsillar region which could correspond to 7 mm heterogeneously enhancing lesion seen on prior MRI but no findings of hypermetabolic metastases to the chest abdomen or pelvis.    Most recent BMP with normal electrolytes and creatinine.  CBC with normal white blood cell count, hemoglobin, MCV, platelets.  Most recent CMP with relatively normal liver enzymes.    Continue Zofran and Compazine as needed for chemotherapy-induced nausea  Plan for PEG tube placement.  The risks and benefits were discussed with the patient.  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  Will plan for PEG tube placement.  The risks and benefits were discussed with the patient.         Tongue pain  Pain control as per oncology         Status post radical dissection of neck  Follow-up with oncology/radiation oncology       Protein-calorie malnutrition, unspecified severity (HCC)  Malnutrition Findings:            Wt Readings from Last 3 Encounters:   09/18/24 75.8 kg (167 lb)   07/16/24 76.2 kg (168 lb 0.4 oz)   07/03/24 76.7 kg (169 lb)       Continue to follow-up with nutrition  Plan for PEG tube placement.                       BMI Findings:           There is no height or weight on file to calculate BMI.            Chemotherapy-induced nausea  Continue Zofran and Compazine as needed.  Can consider scopolamine patch in the future.  Recommend routine EKGs.             Encounter provider Luis Draper MD    The patient was identified by name and date of birth. Jesus Hdz was informed that this is a telemedicine visit and that the visit is being conducted through the Epic Embedded platform. He agrees to proceed..  My office door was closed. No one else was in the room.  He acknowledged consent and understanding of privacy and security of the video platform. The patient has agreed to participate and understands they can discontinue the visit at any time.    Patient is aware this is a billable service.     History of Present Illness     Jesus Hdz is a 69 y.o. male who presents with tongue cancer.     He feels well. No issues with eating. No dysphagia, odynophagia, heartburn, nausea, vomiting.   He has a little weight loss and he attributes this to eating healthy, maybe 5 lbs. Also less alcohol. Appetite. No diarrhea, constipation, blood or black stools.     History obtained from :  patient    Review of systems:  10 point ROS reviewed and negative, except as above      Pertinent Medical History     Medical History Reviewed by provider this encounter:       Past Medical History   Past Medical History:   Diagnosis Date    Facial basal cell cancer     GERD (gastroesophageal reflux disease)     diet controlled    Hypertension     Vitamin D deficiency      Past Surgical History:   Procedure Laterality Date    CHOLECYSTECTOMY      COLONOSCOPY N/A 03/22/2016    Procedure: COLONOSCOPY;  Surgeon: Daren Montana MD;  Location: [facility];  Service:     MS CERVICAL LYMPHADEC MODIFIED RADICAL NECK DSJ Right 09/04/2019    Procedure: MODIFIED RADICAL NECK DISSECTION;  Surgeon: Fady Montana MD;  Location: [facility];  Service: ENT    MS CYSTO INSERTION TRANSPROSTATIC IMPLANT SINGLE N/A 04/14/2023    Procedure: CYSTOSCOPY WITH INSERTION UROLIFT;  Surgeon: Rubin Alfonso MD;  [facility];  Service: Urology    MS LAPAROSCOPY SURG CHOLECYSTECTOMY N/A 10/30/2019    Procedure: CHOLECYSTECTOMY LAPAROSCOPIC;  Surgeon: Christiano Cornejo MD;  Location: [facility];  Service: General    MS LARYNGOSCOPY DIRECT OPERATIVE W/BIOPSY N/A 07/17/2019    Procedure: MICROLARYNGOSCOPY DIRECT WITH BIOPSY OF VALLECULAR MASS;  Surgeon: Amol Cordova MD;  Location[facility];  Service: ENT    MS LARYNGOSCOPY DIRECT OPERATIVE W/BIOPSY N/A 6/19/2024    Procedure: DIRECT LARYNGOSCOPY WITH BIOPSY WITH RIGHT NECK DISSECTION LEVEL TWO AND THREE;  Surgeon: Fady Montana MD;  [facility];  Service: ENT    RADICAL NECK DISSECTION Right 6/19/2024    Procedure: DISSECTION NECK RADICAL;  Surgeon: Fady Montana MD;  [facility];  Service: ENT    RHINOPLASTY      SINUS SURGERY  1994    TRANSORAL ROBOTIC SURGERY (TORS) N/A 07/31/2019    Procedure: ROBOTICALLY ASSISTED PARTIAL GLOSSECTOMY, PARTIAL PHARYNGECTOMY;  Surgeon: Fady Montana MD;  [facility];  Service: ENT    UPPER GASTROINTESTINAL  ENDOSCOPY      US GUIDED LYMPH NODE BIOPSY RIGHT  5/6/2024    WISDOM TOOTH EXTRACTION       Family History   Problem Relation Age of Onset    Cancer Mother     Diabetes Mother     Heart failure Father     Seizures Father      Current Outpatient Medications on File Prior to Visit   Medication Sig Dispense Refill    amLODIPine (NORVASC) 5 mg tablet Take 5 mg by mouth daily at bedtime       lisinopril (ZESTRIL) 10 mg tablet Take 10 mg by mouth daily at bedtime       ondansetron (ZOFRAN-ODT) 8 mg disintegrating tablet Take 1 tablet (8 mg total) by mouth every 8 (eight) hours as needed for nausea or vomiting 30 tablet 3    prochlorperazine (COMPAZINE) 10 mg tablet Take 1 tablet (10 mg total) by mouth every 6 (six) hours as needed for nausea or vomiting 30 tablet 3     No current facility-administered medications on file prior to visit.     Allergies   Allergen Reactions    Shellfish Allergy - Food Allergy Edema     HANDS, LIPS    Bee Venom Swelling     AT BITE SITE      Current Outpatient Medications on File Prior to Visit   Medication Sig Dispense Refill    amLODIPine (NORVASC) 5 mg tablet Take 5 mg by mouth daily at bedtime       lisinopril (ZESTRIL) 10 mg tablet Take 10 mg by mouth daily at bedtime       ondansetron (ZOFRAN-ODT) 8 mg disintegrating tablet Take 1 tablet (8 mg total) by mouth every 8 (eight) hours as needed for nausea or vomiting 30 tablet 3    prochlorperazine (COMPAZINE) 10 mg tablet Take 1 tablet (10 mg total) by mouth every 6 (six) hours as needed for nausea or vomiting 30 tablet 3     No current facility-administered medications on file prior to visit.      Social History     Tobacco Use    Smoking status: Never    Smokeless tobacco: Never   Vaping Use    Vaping status: Never Used   Substance and Sexual Activity    Alcohol use: Yes     Alcohol/week: 5.0 standard drinks of alcohol     Types: 5 Cans of beer per week     Comment: on occ    Drug use: Not Currently    Sexual activity: Yes     Partners:  Female     Birth control/protection: Male Sterilization         Objective     There were no vitals taken for this visit.    PHYSICAL EXAMINATION:    General Appearance:   Alert, cooperative, no distress   HEENT:  Normocephalic, atraumatic, anicteric. Neck supple, symmetrical, trachea midline.   Lungs:   Equal chest rise and unlabored breathing, normal effort, no coughing.   Cardiovascular:   No visualized JVD.   Abdomen:   No abdominal distension.   Skin:   No jaundice, rashes, or lesions.    Musculoskeletal:   Normal range of motion visualized.   Psych:  Normal affect and normal insight.   Neuro:  Alert and appropriate.         Visit Time  10

## 2024-09-30 NOTE — TELEPHONE ENCOUNTER
Scheduled date of egd [employer]  (as of today) 10/10/24  Physician performing: Dr. Alvarez  Location of procedure:  [facility]   Instructions given to patient:  egd   Clearances: N/A    Instructions send in [address] [employer]

## 2024-09-30 NOTE — PROGRESS NOTES
Jesus Hdz  tolerated treatment well with no complications.      Jesus Hdz is aware of future appt on 10/7 at 0830.     AVS printed and given to Jesus Hdz:  No (Declined by Jesus Hdz)

## 2024-09-30 NOTE — PATIENT INSTRUCTIONS
Nutrition Rx & Recommendations:  Diet: High Calorie, High Protein (for high calorie foods see pages 52-53, and for high protein foods see pages 49-51 in your Eating Hints book)  Small, frequent meals/snacks may be easier to tolerate than 3 large daily meals.  Aim for 5-6 small meals per day.  Include protein at all meals/snacks.  Include a variety of foods (as tolerated/allowed by diet).  Stay hydrated by sipping fluids of choice/tolerance throughout the day.   Liquid nutrition may be better tolerated than solids at times.  Alter food choices and eating patterns to accommodate changing needs.  Refer to Eating Hints book for other meal/snack ideas and symptom management.  Avoid spicy, acidic, sharp/hard/crunchy foods & carbonated beverages as needed.  Follow proper oral care; Try baking soda/salt water rinse recipe (mix 3/4 tsp salt + 1 tsp baking soda + 1 qt water; rinse with plain water after using) in Eating Hints book (pg 18).  Brush your teeth before/after meals & before bed.  For sore mouth/throat: choose foods that are easy to chew/swallow; cook foods until they are soft/tender; moisten & soften foods (gravy/sauce/broth/yogurt); cut food into small pieces; drink with a straw (as tolerated); eat with a very small spoon; eat cold or room temperature food; suck on ice chips; Avoid citrus, spicy foods, tomatoes/ketchup, salty foods, raw vegetables, sharp/crunchy/hard foods & alcohol (see pages 23-28 in your Eating Hints book).  Weigh yourself regularly. If you notice weight loss, make an effort to increase your daily food/calorie intake. If you continue to notice loss after these efforts, reach out to your dietitian to establish a plan to stabilize weight.  Highly recommend to stop fasting if notice any weight loss.     Follow Up Plan: Next week or following after RT  Recommend Referral to Other Providers: none at this time

## 2024-09-30 NOTE — ASSESSMENT & PLAN NOTE
Malnutrition Findings:            Wt Readings from Last 3 Encounters:   09/18/24 75.8 kg (167 lb)   07/16/24 76.2 kg (168 lb 0.4 oz)   07/03/24 76.7 kg (169 lb)       Continue to follow-up with nutrition  Plan for PEG tube placement.                       BMI Findings:           There is no height or weight on file to calculate BMI.

## 2024-09-30 NOTE — PROGRESS NOTES
Outpatient Oncology Nutrition Consultation   Type of Consult: Initial Consult  Care Location: [facility]    Reason for referral: Received notification by  Dr. Talbot  on 9/18 that pt has triggered for oncology nutrition care (reason for referral: HNC dx and TF).    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     See Note.     -- Confirmed by positive Pankeratin (CKAE1/3), p40 and normal wild-type expression       on p53 immunostains.    -- p16 immunostain positive (>70% diffuse and strong nuclear and cytoplasmic        staining); high-risk HPV EH pending; result will be reported in an addendum.      B. Lymph Node, Regional Resection, Level 2 lymph nodes, see comments:  - Metastatic squamous cell carcinoma, keratinizing, involving at least two lymph nodes (2/2).     -- Present in 1.0 cm lymph node and additional detached lymph node(s)/fragments.     -- Largest metastatic focus: 0.3 cm; Extranodal extension: Not identified.    -- Confirmed by positive Pankeratin (CKAE1/3) and p40 immunostains.    -- p16 immunostain positive (>70% diffuse and strong nuclear and cytoplasmic        staining).     C. Lymph Node, Regional Resection, Additional level 2 lymph nodes:  - Metastatic squamous cell carcinoma, keratinizing, involving 3 of 4 lymph     nodes/fragments (3/4).     -- Largest metastatic focus: 1 cm; Extranodal extension: Present, extranodal         lymphovascular invasion identified.    -- Confirmed by positive Pankeratin (CKAE1/3) and p40 immunostains.    -- p16  immunostain positive (>70% diffuse and strong nuclear and cytoplasmic        staining); high-risk HPV EH pending; result will be reported in an addendum. See Note.      D. Lymph Node, Level 3 lymph nodes, lymph adenectomy:  - One lymph node positive for metastatic squamous cell carcinoma, keratinizing (1/1).     -- Largest metastatic focus: 1.1 cm; Extranodal extension: Not identified.    -- Confirmed by positive Pankeratin (CKAE1/3) and p40 immunostains.    -- p16 immunostain positive (>70% diffuse and strong nuclear and cytoplasmic        staining).  - Portion(s) of benign salivary gland with adjacent detached small (< 0.2 cm) portion      of carcinoma.   - Traumatic neuroma.          Comments:   This is an appended report. These results have been appended to a previously preliminary verified report.           7/3/2024 -  Cancer Staged    Staging form: Pharynx - Oropharynx, AJCC 8th Edition  - Clinical stage from 7/3/2024: Stage I (cT1, cN1, cM0, p16+) - Signed by Fady Morrow MD on 7/3/2024  Stage prefix: Initial diagnosis       9/30/2024 -  Chemotherapy    alteplase (CATHFLO), 2 mg, Intracatheter, Every 1 Minute as needed, 1 of 7 cycles  CISplatin (PLATINOL) infusion, 70 mg (original dose 40 mg/m2), Intravenous, Once, 1 of 7 cycles  Dose modification: 70 mg (original dose 40 mg/m2, Cycle 1, Reason: Other (Must fill in a comment), Comment: pharmacy dictates)  Administration: 70 mg (9/30/2024)  fosaprepitant (EMEND) IVPB, 150 mg, Intravenous, Once, 1 of 7 cycles  Administration: 150 mg (9/30/2024)       Past Medical & Surgical Hx:   Patient Active Problem List   Diagnosis    Mass of right side of neck    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)    Status post radical dissection of neck    Tongue pain    Dysphagia    Hypertension    Protein-calorie malnutrition (HCC)    Chemotherapy-induced nausea     Past Medical History:   Diagnosis Date    Facial basal cell cancer     GERD (gastroesophageal reflux disease)      diet controlled    Hypertension     Vitamin D deficiency      Past Surgical History:   Procedure Laterality Date    CHOLECYSTECTOMY      COLONOSCOPY N/A 03/22/2016    Procedure: COLONOSCOPY;  Surgeon: Daren Montana MD;  Location: [facility];  Service:     RI CERVICAL LYMPHADEC MODIFIED RADICAL NECK DSJ Right 09/04/2019    Procedure: MODIFIED RADICAL NECK DISSECTION;  Surgeon: Fady Montana MD;  Location[facility];  Service: ENT    RI CYSTO INSERTION TRANSPROSTATIC IMPLANT SINGLE N/A 04/14/2023    Procedure: CYSTOSCOPY WITH INSERTION UROLIFT;  Surgeon: Rubin Alfonso MD;  [facility];  Service: Urology    RI LAPAROSCOPY SURG CHOLECYSTECTOMY N/A 10/30/2019    Procedure: CHOLECYSTECTOMY LAPAROSCOPIC;  Surgeon: Christinao Cornejo MD;  Location: [facility];  Service: General    RI LARYNGOSCOPY DIRECT OPERATIVE W/BIOPSY N/A 07/17/2019    Procedure: MICROLARYNGOSCOPY DIRECT WITH BIOPSY OF VALLECULAR MASS;  Surgeon: Amol Cordova MD;  [facility];  Service: ENT    RI LARYNGOSCOPY DIRECT OPERATIVE W/BIOPSY N/A 6/19/2024    Procedure: DIRECT LARYNGOSCOPY WITH BIOPSY WITH RIGHT NECK DISSECTION LEVEL TWO AND THREE;  Surgeon: Fady Montana MD;  [facility];  Service: ENT    RADICAL NECK DISSECTION Right 6/19/2024    Procedure: DISSECTION NECK RADICAL;  Surgeon: Fady Montana MD;  [facility];  Service: ENT    RHINOPLASTY      SINUS SURGERY  1994    TRANSORAL ROBOTIC SURGERY (TORS) N/A 07/31/2019    Procedure: ROBOTICALLY ASSISTED PARTIAL GLOSSECTOMY, PARTIAL PHARYNGECTOMY;  Surgeon: Fady Montana MD;  [facility];  Service: ENT    UPPER GASTROINTESTINAL ENDOSCOPY      US GUIDED LYMPH NODE BIOPSY RIGHT  5/6/2024    WISDOM TOOTH EXTRACTION         Review of Medications:   Vitamins, Supplements and Herbals: Taking variety of complimentary supplements by herbalist. He will provide me a list and I did encourage him to let his oncologists know. He will be taking these  "Wednesday-Saturday. Let pt know that herbal supplements can interact with treatment and have negative side effects.  Refered pt to [facility] \"About Herbs\" website for further information on safety/effectiveness/interactions of supplements.      Current Outpatient Medications:     amLODIPine (NORVASC) 5 mg tablet, Take 5 mg by mouth daily at bedtime , Disp: , Rfl:     lisinopril (ZESTRIL) 10 mg tablet, Take 10 mg by mouth daily at bedtime , Disp: , Rfl:     ondansetron (ZOFRAN-ODT) 8 mg disintegrating tablet, Take 1 tablet (8 mg total) by mouth every 8 (eight) hours as needed for nausea or vomiting, Disp: 30 tablet, Rfl: 3    prochlorperazine (COMPAZINE) 10 mg tablet, Take 1 tablet (10 mg total) by mouth every 6 (six) hours as needed for nausea or vomiting, Disp: 30 tablet, Rfl: 3  No current facility-administered medications for this visit.    Facility-Administered Medications Ordered in Other Visits:     alteplase (CATHFLO) injection 2 mg, 2 mg, Intracatheter, Q1MIN PRN, Magali Talbot MD    Most Recent Lab Results:   Lab Results   Component Value Date    WBC 4.38 09/30/2024    NEUTROABS 2.31 09/30/2024    CHOL 246 (H) 03/03/2016    TRIG 226 (H) 03/03/2016    HDL 44 03/03/2016    ALT 16 09/30/2024    AST 16 09/30/2024    ALB 4.4 09/30/2024     03/03/2016    SODIUM 138 09/30/2024    SODIUM 139 06/13/2024    K 3.5 09/30/2024    K 3.9 06/13/2024     09/30/2024    BUN 14 09/30/2024    BUN 15 06/13/2024    CREATININE 0.79 09/30/2024    CREATININE 0.88 09/16/2024    EGFR 92 09/30/2024    TSH 1.55 08/30/2022    POCGLU 100 09/11/2024    GLUC 90 09/30/2024    HGBA1C 5.7 (H) 04/20/2023    HGBA1C 5.4 10/10/2019    CALCIUM 9.0 09/30/2024    MG 2.1 09/30/2024       Anthropometric Measurements:   Height: 70\"  Ht Readings from Last 1 Encounters:   09/30/24 5' 10\" (1.778 m)     Wt Readings from Last 20 Encounters:   09/30/24 76.8 kg (169 lb 6.4 oz)   09/18/24 75.8 kg (167 lb)   07/16/24 76.2 kg (168 lb 0.4 oz)   07/03/24 " 76.7 kg (169 lb)   06/26/24 77.1 kg (170 lb)   06/19/24 77.1 kg (170 lb)   06/13/24 82.1 kg (181 lb)   04/18/24 82.1 kg (181 lb)   01/22/24 82.1 kg (181 lb)   08/28/23 80.7 kg (178 lb)   05/19/23 82.1 kg (181 lb)   04/14/23 82.3 kg (181 lb 6.4 oz)   01/05/23 82.1 kg (181 lb)   11/17/22 83.2 kg (183 lb 6.4 oz)   10/19/22 84 kg (185 lb 3.2 oz)   08/29/22 81.6 kg (180 lb)   08/25/22 81.6 kg (180 lb)   04/05/22 81.2 kg (179 lb)   01/13/22 81.2 kg (179 lb)   11/08/21 81.2 kg (179 lb)       Weight History:   Usual Weight: 165-170#    Oncology Nutrition-Anthropometrics      Flowsheet Row Nutrition from 9/30/2024 in [facility] Oncology Dietitian [facility]   Patient age (years): 69 years   Patient (male) height (in): 70 in   Current weight (lbs): 169.4 lbs   Current weight to be used for anthropometric calculations (kg) 77 kg   BMI: 24.3   IBW male 166 lb   IBW (kg) male 75.5 kg   IBW % (male) 102 %   Adjusted BW (male): 166.9 lbs   Adjusted BW in kg (male): 75.9 kg   % weight change after 3 months: -0.4 %   Weight change after 3 months (lbs) -0.6 lbs            Nutrition-Focused Physical Findings: none observed    Food/Nutrition-Related History & Client/Social History:    Current Nutrition Impact Symptoms:  [] Nausea  [] Reduced Appetite  [] Acid Reflux    [] Vomiting  [] Unintended Wt Loss  [] Malabsorption    [] Diarrhea  [] Unintended Wt Gain  [] Dumping Syndrome    [] Constipation  [] Thick Mucous/Secretions  [] Abdominal Pain    [] Dysgeusia (Altered Taste)  [] Xerostomia (Dry Mouth)  [] Gas    [] Dysosmia (Altered Smell)  [] Thrush  [] Difficulty Chewing    [] Oral Mucositis (Sore Mouth)  [] Fatigue  [] Hyperglycemia   Lab Results   Component Value Date    HGBA1C 5.7 (H) 04/20/2023      [] Odynophagia  [] Esophagitis  [] Other:    [] Dysphagia  [] Early Satiety  [] No Problems Eating      Food Allergies & Intolerances: yes: mild shellfish - fingers swell up    Current Diet: Regular Diet, No Restrictions  Current  "Nutrition Intake: Unchanged from usual  Appetite: Good  Nutrition Route: PO  Oral Care: brushes daily, gargle with tonic, water pick   Full mobility of tongue; continues with exercises from prior SLP  No sensitives of hot/cold or spicy. Has trouble with bread and melon at times.  Activity level: Usual ADL's     24 Hr Diet Recall:    Pt has been fasting for 36 hrs and plans to fast for 24 hrs following treatment.   On non fasting days he reports eating very clean including salmon, rice, homemade bread as tolerated, avocados, etc.     Beverages: water, chicken bone broth  Whey protein powder (20g)  mixed with coffee     Supplements:   Whey protein powder 1x/day. He has used [employer] in past but hesitant due to the sugar  Has a \"clean\" high kcal supplement at home with low sugar but forgets name- will report to RD next time      Oncology Nutrition-Estimated Needs      Flowsheet Row Nutrition from 2024 in [facility] Oncology Dietitian [place]   Weight type used Actual weight   Weight in kilograms (kg) used for estimated needs 77 kg   Energy needs formula:  30-35 kcal/kg   Energy needs based on 30 kcal/k kcal   Energy needs based on 35 kcal/k kcal   Protein needs formula: 1.2-1.5 g/kg   Protein needs based on 1.2 g/k g   Protein needs based on 1.5 g/kg 116 g   Fluid needs formula: 30-35 mL/kg   Fluid needs based on 30 mL/kg 2304 mL   Fluid needs in ounces 78 oz   Fluid needs based on 35 mL/kg 2688 mL   Fluid needs in ounces 91 oz             Discussion & Intervention:   Jesus was evaluated today for an initial RD consultation regarding HNC and enteral nutrition.  Jesus is currently undergoing tx for HNCx .  RD met with pt and wife during first infusion today. Patient reports that he is currently feeling well without nutritional issues at this current time. Reports stable weight. He met with GI today and is getting a feeding tube placed on 10/10. The patient declined any education " today but let him know I can provide in the future. He does not currently have any food restrictions at home but does watch his diet closely. He likes to avoid certain ingredients and sugar. He has met with functional medicine doctors and will be doing complimentary forms of therapy aside from CRT. He started fasting (Sat night-Tuesday) and plans to do this throughout treatment. During this time, he will only be consuming 500 calories of bone broth and whey protein powder mixed with coffee and water. I have reviewed with the patient my concerns with this extreme fasting during aggressive HNC treatment and the negative risks of malnutrition. I explained that there is lack of research in his cancer population. He is aware and understands. He states that he plans to do this but will stop if he is losing weight. Pt also states he will be taking various herbal supplements to support his cancer. I asked him for a copy and asked him to let his doctor know. Reminded pt that herbal supplements can negatively impact treatment and may interact. I provided with About Herbs resource. He also plans to use honey prior to RT in effort to alleviate mucositis. I did encourage to avoid local/raw honey for bacterial purposes.     Reviewed 24 hour recall, which revealed an inadequate po intake, and discussed ways to increase kcal, protein, and fluid intakes and optimize nutrient intake.  Also reviewed the importance of wt management throughout the tx process and the role of a high kcal/ high protein diet in managing wt and overall health.  Based on today's assessment, discussion included: MNT for: HNC, oral care, how to modify foods for anticipated nutrition impact symptoms pt may experience during CA tx, practicing proper oral care, general food safety measures during cancer tx, a high kcal/protein diet & food choices to include at all meals & snacks (Examples of high kcal foods: cheese, full-fat dairy products, nut butter, plant-based  fats, coconut oil/milk, avocado, butter, cream soup, etc. Examples of high protein foods: eggs, chicken, fish, beans/legumes, nuts/nut butters, bone broth, etc.) , fortifying foods for added kcal and protein (examples include: adding cheese to foods such as eggs, mashed potatoes, casseroles, etc.; Making oatmeal with whole milk rather than water; Making fortified mashed potatoes with cream, butter, dry milk powder, plain Greek yogurt, and cheese.), adequate hydration & fluid choices, sipping on calorie containing beverages (examples include: adding whole milk or cream to coffee, oral nutrition supplements, juice, electrolyte replacement beverages, milk, etc.), eating smaller more frequent meals every 2-3 hours (5-6 small meals/day), utilizing oral nutrition supplements, and adding extra fat to foods while cooking such as butter, plant-based oil, coconut oil/milk, avocado, nut butters, etc..   Moving forward, Jesus was encouraged to increase kcal, protein, and fluid intakes .  Materials Provided: [facility] Eating Hints book  All questions and concerns addressed during today’s visit.  Jesus has RD contact information.    Nutrition Diagnosis:   Inadequate Energy Intake related to physiological causes, disease state and treatment related issues as evidenced by food recall, wt loss and discussion with pt and/or family.  Increased Nutrient Needs (kcal & pro) related to increased demand for nutrients and disease state as evidenced by cancer dx and pt undergoing tx for cancer.  Monitoring & Evaluation:   Goals:  weight maintenance/stabilization  adequate nutrition impact symptom management  pt to meet >/=75% estimated nutrition needs daily    Progress Towards Goals: Initiated    Nutrition Rx & Recommendations:  Diet: High Calorie, High Protein (for high calorie foods see pages 52-53, and for high protein foods see pages 49-51 in your Eating Hints book)  Small, frequent meals/snacks may be easier to tolerate than 3  large daily meals.  Aim for 5-6 small meals per day.  Include protein at all meals/snacks.  Include a variety of foods (as tolerated/allowed by diet).  Stay hydrated by sipping fluids of choice/tolerance throughout the day.    Liquid nutrition may be better tolerated than solids at times.  Alter food choices and eating patterns to accommodate changing needs.  Refer to Eating Hints book for other meal/snack ideas and symptom management.  Avoid spicy, acidic, sharp/hard/crunchy foods & carbonated beverages as needed.  Follow proper oral care; Try baking soda/salt water rinse recipe (mix 3/4 tsp salt + 1 tsp baking soda + 1 qt water; rinse with plain water after using) in Eating Hints book (pg 18).  Brush your teeth before/after meals & before bed.  For sore mouth/throat: choose foods that are easy to chew/swallow; cook foods until they are soft/tender; moisten & soften foods (gravy/sauce/broth/yogurt); cut food into small pieces; drink with a straw (as tolerated); eat with a very small spoon; eat cold or room temperature food; suck on ice chips; Avoid citrus, spicy foods, tomatoes/ketchup, salty foods, raw vegetables, sharp/crunchy/hard foods & alcohol (see pages 23-28 in your Eating Hints book).  Weigh yourself regularly. If you notice weight loss, make an effort to increase your daily food/calorie intake. If you continue to notice loss after these efforts, reach out to your dietitian to establish a plan to stabilize weight.  Highly recommend to stop fasting if notice any weight loss.     Follow Up Plan:  Next week or following after RT  Recommend Referral to Other Providers: none at this time

## 2024-09-30 NOTE — PROGRESS NOTES
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  Overall he is feeling well.  Will plan for PEG tube placement prophylactically.    Endoscopy from 2022 with single linear ulcer in the lower third of the esophagus with clean base and biopsies performed.    Colonoscopy from  with diverticulosis and hemorrhoids.    EKG from 2024 with QTc interval of 437 and normal sinus rhythm.    PET CT scan from 2024 with increasing uptake in the right upper cervical chain focus concerning for metastases and small focus in the right peritonsillar region which could correspond to 7 mm heterogeneously enhancing lesion seen on prior MRI but no findings of hypermetabolic metastases to the chest abdomen or pelvis.    Most recent BMP with normal electrolytes and creatinine.  CBC with normal white blood cell count, hemoglobin, MCV, platelets.  Most recent CMP with relatively normal liver enzymes.    Continue Zofran and Compazine as needed for chemotherapy-induced nausea  Plan for PEG tube placement.  The risks and benefits were discussed with the patient.  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  Will plan for PEG tube placement.  The risks and benefits were discussed with the patient.         Tongue pain  Pain control as per oncology         Status post radical dissection of neck  Follow-up with oncology/radiation oncology       Protein-calorie malnutrition, unspecified severity (HCC)  Malnutrition Findings:            Wt Readings from Last 3 Encounters:   09/18/24 75.8 kg (167 lb)   07/16/24 76.2 kg (168 lb 0.4 oz)   07/03/24 76.7 kg (169 lb)       Continue to follow-up with nutrition  Plan for PEG tube placement.                       BMI Findings:           There is no height or weight on file to calculate BMI.            Chemotherapy-induced nausea  Continue Zofran and Compazine as needed.  Can consider scopolamine patch in the future.  Recommend routine EKGs.             Encounter provider Luis Draper MD    The patient was identified by name and date of birth. Jesus Hdz was informed that this is a telemedicine visit and that the visit is being conducted through the Epic Embedded platform. He agrees to proceed..  My office door was closed. No one else was in the room.  He acknowledged consent and understanding of privacy and security of the video platform. The patient has agreed to participate and understands they can discontinue the visit at any time.    Patient is aware this is a billable service.     History of Present Illness     Jesus Hdz is a 69 y.o. male who presents with tongue cancer.     He feels well. No issues with eating. No dysphagia, odynophagia, heartburn, nausea, vomiting.   He has a little weight loss and he attributes this to eating healthy, maybe 5 lbs. Also less alcohol. Appetite. No diarrhea, constipation, blood or black stools.     History obtained from :  patient    Review of systems:  10 point ROS reviewed and negative, except as above      Pertinent Medical History     Medical History Reviewed by provider this encounter:       Past Medical History   Past Medical History:   Diagnosis Date    Facial basal cell cancer     GERD (gastroesophageal reflux disease)     diet controlled    Hypertension     Vitamin D deficiency      Past Surgical History:   Procedure Laterality Date    CHOLECYSTECTOMY      COLONOSCOPY N/A 03/22/2016    Procedure: COLONOSCOPY;  Surgeon: Daren Montana MD;  Location: [facility];  Service:     NE CERVICAL LYMPHADEC MODIFIED RADICAL NECK DSJ Right 09/04/2019    Procedure: MODIFIED RADICAL NECK DISSECTION;  Surgeon: Fady Montana MD;  Location[facility];  Service: ENT    NE CYSTO INSERTION TRANSPROSTATIC IMPLANT SINGLE N/A 04/14/2023    Procedure: CYSTOSCOPY WITH INSERTION UROLIFT;  Surgeon: Rubin Alfonso MD;  [facility];  Service: Urology    NE LAPAROSCOPY SURG CHOLECYSTECTOMY N/A 10/30/2019    Procedure: CHOLECYSTECTOMY LAPAROSCOPIC;  Surgeon: Christiano Cornejo MD;  Location: [facility];  Service: General    NE LARYNGOSCOPY DIRECT OPERATIVE W/BIOPSY N/A 07/17/2019    Procedure: MICROLARYNGOSCOPY DIRECT WITH BIOPSY OF VALLECULAR MASS;  Surgeon: Amol Cordova MD;  [facility];  Service: ENT    NE LARYNGOSCOPY DIRECT OPERATIVE W/BIOPSY N/A 6/19/2024    Procedure: DIRECT LARYNGOSCOPY WITH BIOPSY WITH RIGHT NECK DISSECTION LEVEL TWO AND THREE;  Surgeon: Fady Montana MD;  [facility];  Service: ENT    RADICAL NECK DISSECTION Right 6/19/2024    Procedure: DISSECTION NECK RADICAL;  Surgeon: Fady Montana MD;  [facility];  Service: ENT    RHINOPLASTY      SINUS SURGERY  1994    TRANSORAL ROBOTIC SURGERY (TORS) N/A 07/31/2019    Procedure: ROBOTICALLY ASSISTED PARTIAL GLOSSECTOMY, PARTIAL PHARYNGECTOMY;  Surgeon: Fady Montana MD;  [facility];  Service: ENT    UPPER GASTROINTESTINAL  ENDOSCOPY      US GUIDED LYMPH NODE BIOPSY RIGHT  5/6/2024    WISDOM TOOTH EXTRACTION       Family History   Problem Relation Age of Onset    Cancer Mother     Diabetes Mother     Heart failure Father     Seizures Father      Current Outpatient Medications on File Prior to Visit   Medication Sig Dispense Refill    amLODIPine (NORVASC) 5 mg tablet Take 5 mg by mouth daily at bedtime       lisinopril (ZESTRIL) 10 mg tablet Take 10 mg by mouth daily at bedtime       ondansetron (ZOFRAN-ODT) 8 mg disintegrating tablet Take 1 tablet (8 mg total) by mouth every 8 (eight) hours as needed for nausea or vomiting 30 tablet 3    prochlorperazine (COMPAZINE) 10 mg tablet Take 1 tablet (10 mg total) by mouth every 6 (six) hours as needed for nausea or vomiting 30 tablet 3     No current facility-administered medications on file prior to visit.     Allergies   Allergen Reactions    Shellfish Allergy - Food Allergy Edema     HANDS, LIPS    Bee Venom Swelling     AT BITE SITE      Current Outpatient Medications on File Prior to Visit   Medication Sig Dispense Refill    amLODIPine (NORVASC) 5 mg tablet Take 5 mg by mouth daily at bedtime       lisinopril (ZESTRIL) 10 mg tablet Take 10 mg by mouth daily at bedtime       ondansetron (ZOFRAN-ODT) 8 mg disintegrating tablet Take 1 tablet (8 mg total) by mouth every 8 (eight) hours as needed for nausea or vomiting 30 tablet 3    prochlorperazine (COMPAZINE) 10 mg tablet Take 1 tablet (10 mg total) by mouth every 6 (six) hours as needed for nausea or vomiting 30 tablet 3     No current facility-administered medications on file prior to visit.      Social History     Tobacco Use    Smoking status: Never    Smokeless tobacco: Never   Vaping Use    Vaping status: Never Used   Substance and Sexual Activity    Alcohol use: Yes     Alcohol/week: 5.0 standard drinks of alcohol     Types: 5 Cans of beer per week     Comment: on occ    Drug use: Not Currently    Sexual activity: Yes     Partners:  Female     Birth control/protection: Male Sterilization         Objective     There were no vitals taken for this visit.    PHYSICAL EXAMINATION:    General Appearance:   Alert, cooperative, no distress   HEENT:  Normocephalic, atraumatic, anicteric. Neck supple, symmetrical, trachea midline.   Lungs:   Equal chest rise and unlabored breathing, normal effort, no coughing.   Cardiovascular:   No visualized JVD.   Abdomen:   No abdominal distension.   Skin:   No jaundice, rashes, or lesions.    Musculoskeletal:   Normal range of motion visualized.   Psych:  Normal affect and normal insight.   Neuro:  Alert and appropriate.         Visit Time  10         no

## 2024-09-30 NOTE — ASSESSMENT & PLAN NOTE
Continue Zofran and Compazine as needed.  Can consider scopolamine patch in the future.  Recommend routine EKGs.

## 2024-10-01 ENCOUNTER — APPOINTMENT (OUTPATIENT)
Facility: HOSPITAL | Age: 69
End: 2024-10-01
Attending: RADIOLOGY
Payer: MEDICARE

## 2024-10-01 PROCEDURE — 77014 CHG CT GUIDANCE RADIATION THERAPY FLDS PLACEMENT: CPT | Performed by: RADIOLOGY

## 2024-10-01 PROCEDURE — 77386 HB NTSTY MODUL RAD TX DLVR CPLX: CPT | Performed by: RADIOLOGY

## 2024-10-02 ENCOUNTER — DOCUMENTATION (OUTPATIENT)
Dept: NUTRITION | Facility: HOSPITAL | Age: 69
End: 2024-10-02

## 2024-10-02 ENCOUNTER — OFFICE VISIT (OUTPATIENT)
Age: 69
End: 2024-10-02
Payer: MEDICARE

## 2024-10-02 VITALS
SYSTOLIC BLOOD PRESSURE: 130 MMHG | TEMPERATURE: 98.3 F | DIASTOLIC BLOOD PRESSURE: 79 MMHG | RESPIRATION RATE: 18 BRPM | OXYGEN SATURATION: 96 % | HEART RATE: 61 BPM | WEIGHT: 173 LBS | BODY MASS INDEX: 24.77 KG/M2 | HEIGHT: 70 IN

## 2024-10-02 DIAGNOSIS — C01 MALIGNANT NEOPLASM OF BASE OF TONGUE (HCC): Primary | ICD-10-CM

## 2024-10-02 DIAGNOSIS — R22.1 MASS OF RIGHT SIDE OF NECK: ICD-10-CM

## 2024-10-02 PROCEDURE — 99215 OFFICE O/P EST HI 40 MIN: CPT | Performed by: INTERNAL MEDICINE

## 2024-10-02 PROCEDURE — 77014 CHG CT GUIDANCE RADIATION THERAPY FLDS PLACEMENT: CPT | Performed by: RADIOLOGY

## 2024-10-02 PROCEDURE — 77386 HB NTSTY MODUL RAD TX DLVR CPLX: CPT | Performed by: RADIOLOGY

## 2024-10-02 RX ORDER — CHOLECALCIFEROL (VITAMIN D3) 1MM UNIT/G
LIQUID (GRAM) MISCELLANEOUS
COMMUNITY

## 2024-10-02 RX ORDER — PALONOSETRON 0.05 MG/ML
0.25 INJECTION, SOLUTION INTRAVENOUS ONCE
Status: CANCELLED | OUTPATIENT
Start: 2024-10-07

## 2024-10-02 NOTE — PROGRESS NOTES
Pt brought RD list of supplements that he is taking as recommended by natural medicine/integrative doctors. List includes variety of herbal and detox teas, high dose vitamins, etc. I do not recommend taking herbal supplements during treatment and again reviewed with him the risks associated with an abundance of supplements during chemotherapy and radiation. Explained that these supplements and their use are not well studied and may interact with treatment.  Pt is aware and acknowledges the risks as he feels confident that they will work. I assured him my role is to support his nutrition throughout treatment and provide proper education. He understands and agrees to reconsider if the supplementation or fasting interferes with adequate nutrition.

## 2024-10-03 ENCOUNTER — TELEPHONE (OUTPATIENT)
Age: 69
End: 2024-10-03

## 2024-10-03 PROCEDURE — 77014 CHG CT GUIDANCE RADIATION THERAPY FLDS PLACEMENT: CPT | Performed by: RADIOLOGY

## 2024-10-03 PROCEDURE — 77386 HB NTSTY MODUL RAD TX DLVR CPLX: CPT | Performed by: RADIOLOGY

## 2024-10-03 NOTE — TELEPHONE ENCOUNTER
Confirming Upcoming Procedure: egd peg tube  on 10/10/24  Physician performing: Dr Alvarez  Location of procedure:    Prep: egd prep     Spoke with patient explain instructions

## 2024-10-04 ENCOUNTER — TELEPHONE (OUTPATIENT)
Age: 69
End: 2024-10-04

## 2024-10-04 ENCOUNTER — PATIENT OUTREACH (OUTPATIENT)
Dept: CASE MANAGEMENT | Facility: OTHER | Age: 69
End: 2024-10-04

## 2024-10-04 ENCOUNTER — APPOINTMENT (OUTPATIENT)
Dept: LAB | Facility: CLINIC | Age: 69
End: 2024-10-04
Payer: MEDICARE

## 2024-10-04 DIAGNOSIS — C01 MALIGNANT NEOPLASM OF BASE OF TONGUE (HCC): Primary | ICD-10-CM

## 2024-10-04 DIAGNOSIS — C01 MALIGNANT NEOPLASM OF BASE OF TONGUE (HCC): ICD-10-CM

## 2024-10-04 LAB
ALBUMIN SERPL BCG-MCNC: 4.2 G/DL (ref 3.5–5)
ALP SERPL-CCNC: 48 U/L (ref 34–104)
ALT SERPL W P-5'-P-CCNC: 17 U/L (ref 7–52)
ANION GAP SERPL CALCULATED.3IONS-SCNC: 9 MMOL/L (ref 4–13)
AST SERPL W P-5'-P-CCNC: 18 U/L (ref 13–39)
BASOPHILS # BLD AUTO: 0.01 THOUSANDS/ÂΜL (ref 0–0.1)
BASOPHILS NFR BLD AUTO: 0 % (ref 0–1)
BILIRUB SERPL-MCNC: 0.59 MG/DL (ref 0.2–1)
BUN SERPL-MCNC: 17 MG/DL (ref 5–25)
CALCIUM SERPL-MCNC: 9 MG/DL (ref 8.4–10.2)
CHLORIDE SERPL-SCNC: 100 MMOL/L (ref 96–108)
CO2 SERPL-SCNC: 27 MMOL/L (ref 21–32)
CREAT SERPL-MCNC: 0.75 MG/DL (ref 0.6–1.3)
EOSINOPHIL # BLD AUTO: 0.09 THOUSAND/ÂΜL (ref 0–0.61)
EOSINOPHIL NFR BLD AUTO: 2 % (ref 0–6)
ERYTHROCYTE [DISTWIDTH] IN BLOOD BY AUTOMATED COUNT: 12.7 % (ref 11.6–15.1)
GFR SERPL CREATININE-BSD FRML MDRD: 94 ML/MIN/1.73SQ M
GLUCOSE SERPL-MCNC: 102 MG/DL (ref 65–140)
HCT VFR BLD AUTO: 47.1 % (ref 36.5–49.3)
HGB BLD-MCNC: 15.5 G/DL (ref 12–17)
IMM GRANULOCYTES # BLD AUTO: 0.01 THOUSAND/UL (ref 0–0.2)
IMM GRANULOCYTES NFR BLD AUTO: 0 % (ref 0–2)
LDH SERPL-CCNC: 146 U/L (ref 140–271)
LYMPHOCYTES # BLD AUTO: 1.1 THOUSANDS/ÂΜL (ref 0.6–4.47)
LYMPHOCYTES NFR BLD AUTO: 23 % (ref 14–44)
MAGNESIUM SERPL-MCNC: 2 MG/DL (ref 1.9–2.7)
MCH RBC QN AUTO: 28.5 PG (ref 26.8–34.3)
MCHC RBC AUTO-ENTMCNC: 32.9 G/DL (ref 31.4–37.4)
MCV RBC AUTO: 87 FL (ref 82–98)
MONOCYTES # BLD AUTO: 0.41 THOUSAND/ÂΜL (ref 0.17–1.22)
MONOCYTES NFR BLD AUTO: 8 % (ref 4–12)
NEUTROPHILS # BLD AUTO: 3.26 THOUSANDS/ÂΜL (ref 1.85–7.62)
NEUTS SEG NFR BLD AUTO: 67 % (ref 43–75)
NRBC BLD AUTO-RTO: 0 /100 WBCS
PLATELET # BLD AUTO: 224 THOUSANDS/UL (ref 149–390)
PMV BLD AUTO: 10.7 FL (ref 8.9–12.7)
POTASSIUM SERPL-SCNC: 4 MMOL/L (ref 3.5–5.3)
PROT SERPL-MCNC: 7 G/DL (ref 6.4–8.4)
RBC # BLD AUTO: 5.44 MILLION/UL (ref 3.88–5.62)
SODIUM SERPL-SCNC: 136 MMOL/L (ref 135–147)
TSH SERPL DL<=0.05 MIU/L-ACNC: 0.99 UIU/ML (ref 0.45–4.5)
WBC # BLD AUTO: 4.88 THOUSAND/UL (ref 4.31–10.16)

## 2024-10-04 PROCEDURE — 77014 CHG CT GUIDANCE RADIATION THERAPY FLDS PLACEMENT: CPT | Performed by: RADIOLOGY

## 2024-10-04 PROCEDURE — 83735 ASSAY OF MAGNESIUM: CPT

## 2024-10-04 PROCEDURE — 85025 COMPLETE CBC W/AUTO DIFF WBC: CPT

## 2024-10-04 PROCEDURE — 77386 HB NTSTY MODUL RAD TX DLVR CPLX: CPT | Performed by: RADIOLOGY

## 2024-10-04 PROCEDURE — 77336 RADIATION PHYSICS CONSULT: CPT | Performed by: RADIOLOGY

## 2024-10-04 PROCEDURE — 83615 LACTATE (LD) (LDH) ENZYME: CPT

## 2024-10-04 PROCEDURE — 80053 COMPREHEN METABOLIC PANEL: CPT

## 2024-10-04 PROCEDURE — 84443 ASSAY THYROID STIM HORMONE: CPT

## 2024-10-04 NOTE — TELEPHONE ENCOUNTER
Gastroenterology Enteral/Parenteral Nutrition Team Note       Dietician Rama Parada   Pre-placement   Appointment with GI 09/30/24   Tube Placement  appointment 10/10/24 GI Kim   Post-placement appointment with GI    Enteral Supplies Ordered and Delivered with Neurala kit no formula ordered    Referrals home health and case management Pt not eligible for home care at this time

## 2024-10-04 NOTE — PROGRESS NOTES
Patient has a malignant neoplasm  at the base of his tongue and is currently undergoing radiation. Gastrostomy is scheduled on 10/10.    Message left on patient's voicemail with my contact information.

## 2024-10-06 NOTE — PROGRESS NOTES
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     See Note.     -- Confirmed by positive Pankeratin (CKAE1/3), p40 and normal wild-type expression       on p53 immunostains.    -- p16 immunostain positive (>70% diffuse and strong nuclear and cytoplasmic        staining); high-risk HPV EH pending; result will be reported in an addendum.      B. Lymph Node, Regional Resection, Level 2 lymph nodes, see comments:  - Metastatic squamous cell carcinoma, keratinizing, involving at least two lymph nodes (2/2).     -- Present in 1.0 cm lymph node and additional detached lymph node(s)/fragments.     -- Largest metastatic focus: 0.3 cm; Extranodal extension: Not identified.    -- Confirmed by positive Pankeratin (CKAE1/3) and p40 immunostains.    -- p16 immunostain positive (>70% diffuse and strong nuclear and cytoplasmic        staining).     C. Lymph Node, Regional Resection, Additional level 2 lymph nodes:  - Metastatic squamous cell carcinoma, keratinizing, involving 3 of 4 lymph     nodes/fragments (3/4).     -- Largest metastatic focus: 1 cm; Extranodal extension: Present, extranodal         lymphovascular invasion identified.    -- Confirmed by positive Pankeratin (CKAE1/3) and p40 immunostains.    -- p16 immunostain positive (>70% diffuse and strong nuclear and cytoplasmic        staining); high-risk HPV EH  pending; result will be reported in an addendum. See Note.      D. Lymph Node, Level 3 lymph nodes, lymph adenectomy:  - One lymph node positive for metastatic squamous cell carcinoma, keratinizing (1/1).     -- Largest metastatic focus: 1.1 cm; Extranodal extension: Not identified.    -- Confirmed by positive Pankeratin (CKAE1/3) and p40 immunostains.    -- p16 immunostain positive (>70% diffuse and strong nuclear and cytoplasmic        staining).  - Portion(s) of benign salivary gland with adjacent detached small (< 0.2 cm) portion      of carcinoma.   - Traumatic neuroma.          Comments:   This is an appended report. These results have been appended to a previously preliminary verified report.           7/3/2024 -  Cancer Staged    Staging form: Pharynx - Oropharynx, AJCC 8th Edition  - Clinical stage from 7/3/2024: Stage I (cT1, cN1, cM0, p16+) - Signed by Fady Morrow MD on 7/3/2024  Stage prefix: Initial diagnosis       9/30/2024 -  Chemotherapy    alteplase (CATHFLO), 2 mg, Intracatheter, Every 1 Minute as needed, 1 of 7 cycles  palonosetron (ALOXI), 0.25 mg, Intravenous, Once, 0 of 6 cycles  CISplatin (PLATINOL) infusion, 70 mg (original dose 40 mg/m2), Intravenous, Once, 1 of 7 cycles  Dose modification: 70 mg (original dose 40 mg/m2, Cycle 1, Reason: Other (Must fill in a comment), Comment: pharmacy dictates)  Administration: 70 mg (9/30/2024)  fosaprepitant (EMEND) IVPB, 150 mg, Intravenous, Once, 1 of 7 cycles  Administration: 150 mg (9/30/2024)         Treatment Cisplatin 40 mg/m2 weekly x 7 doses with IMRT     C1 9/30/2024  C2  10/7/2024  C3 10/14/2024  C4 10/21/2024  C5 10/28/2024    Discussion     Te definitive management of SCCHN in terms of oral cavity vs oropharynx vs larynx vs hypopharynx as well as pure tobacco related vs HPV with or without a prior/current smoking history had been surgery/RT.   In the past, the  role of chemotherapy was reserved for metastatic disease using Cisplatin at  fairly high doses as well as infusional 5FU x 96 hours.  However there have been many trials which have changed the landscape of how locoregional and metastatic disease are now managed with chemotherapy, CRT or IO        It is unclear in this case, as had prior RBOT Stage 1 HPV driven s/p resection with no high risk features 0/34 nodes positive at the time and no adjuvant therapy     Based on the DL/biopsy there is SCC in the pharynx but location unclear; 6/7 nodes were positive with ASHLEY and thus the below pertains in terms of combined therapy.  The issues is 6 or 7 weeks of treatment and this appears to be a new second primary based on the biology      In general the prognosis for HPV driven SCCHN is better than that of tobacco related disease and in fact the AJCC staging system reflects this.  However when tobacco is a factor with a patient being a long standing smoker that favorable prognosis is no longer applicable;  however this patient smoked a pack a day for years but quit 30 years ago and so he has more favorable disease     The OS/PFS/FRANCO responses were better in those patients treated on the  study who were HPV positive; induction was followed by CRT and in that study Carboplatin AUC 1.5 only rather than 40 mg/m2 Cisplatin due to perceived toxicities being worse with cisplatin after induction therapy.  That was the case for this patient who got Cisplatin        However,  CRT per RTOG 91-11 as become SOC.  In the modern era, weekly Cisplatin 40 mg/m2 is used for 7 weeks rather than the high dose Cisplatin in that trial with similar efficacy.  Other radiosensitizers include weekly Carboplatin/Taxol.  Based on the side effect profile of Cisplatin, een in lower doses, renal clearance is an issue.  Both Carboplatin and Cisplatin are renally cleared but Carboplatin is dosed based on renal function and may be more tolerable.  Other agents used as radiosensitizers include Cetuximab per the Radha study.        Side effects, and  toxicities of chemotherapy alone and in combination with radiation had been discussed at [place].  The side effects specific to radiation include loss of taste, xerostomia, mucositis, dysphagia , nausea, radiation induced dermatis and fatigue.  Fatigue is also common with chemotherapy and is cumulative.  Copious ropey secretions were discussed as well as nutrition.  In terms of the effects of Cisplatin or Carboplatin/Taxol weekly alone and in combination with radiation are neutropenia/leucopenia, anemia, thrombocytopenia, nausea, GERD, mucositis, fungal infections, renal insufficiency/dehydration, electrolyte imbalances including platinum induced SIADH, neuropathy, hearing loss, minor hair loss.  Taxol can also cause an infusion reaction /premedications are needed.  As chemotherapy is being used as a radiosensitizer, the effects of radiation are magnified.       Nutrition and hydration are concerning in the combined modality setting and we discussed the use of a PEG.  Based on both internal scatter of radiation and with extensive radiation there is potential for fibrosis/strictures as well as mucositis, fungal infections, being hypermetabolic with swallowing issues.  Repletion as the caloric needs increase is improved via a PEG as well as IV fluids.  Nutrition generally follows these patients.       There are also chronic issues which can occur; the secretions generally improve in 3-6 months, taste returns within 1-2 months, xerostomia may be a chronic issue, fibrosis, lymphedema, thyroid issues with checks every 3 months after RT completion, length of PEG use of about 3-6 months.     Restaging would be 3 months after therapy with CT PET and then every 3 months with CT neck/chest in the first year, eery 4-6 months thereafter.  HPV driven disease tends to have a latent distant mets period.       PDL1 status as well as TMB, MMR, MSI should be ascertained via [facility] for future reverence      Thus the  "recommendation would be weekly Cisplatin 40 mg/m2 weekly with RT to 70 Gy and thus 7 weeks      Assessment / Plan:     Recurrent right level 2 node s/p partial glossectomy/pharyngectomy 7/31/2029 with no adjuvant CRT 0/34 nodes at the time and no high risk features     PET with no primary recurrence/ nodes only     MRI face/neck/orbit     Discussion regarding CRT with weekly Cisplatin 40 mg/m2 for 6-7 weeks     PEG placement     Dental evaluation     Nutrition evaluation      9/18/2024      Patient seen by Dr Morrow in Rad Onc; simulation done     Will be treated UB     PEG to be placed-has agreed to it     Nutrition follow up     Speech pathology/swallowing assessment      Tentative plan to start treatment 30 Sept 2024       10/2/2024     Tolerated C1 Cisplatin     No issues other than flushing-took decadron out of premed cocktail.  Usually no infusion reactions with Cisplatin but with Carboplatin     No PEG, weight is 173     Using supplements despite telling patient that herbal remedies can cause issues with both chemo and RT.  He is \"holding\" these agents when gets chemotherapy but even then can affect outcome/formation of DNA adducts     Would again recommend not using these agents    10/9/2024    Week 2/7     Already has grade 1/early 2 confluence in the hard palate    Tongue coated unclear if there is a thrush component as well    Magic mouthwash, salt water/baking soda rinses for mucositis-may consider decadron rinse if no better/worse next week    Nystatin swish/spit/swallow for thrush    No erythema/neck dermatitis from RT    Did have some nausea from chemotherapy, mostly related to constipation so taking compazine for relief    IVF to start additionally this Friday and weekly through duration treatment    PEG to be placed 10/10/2024; will need close nutrition follow up    Weight 170 pounds     Concerned with degree of mucositis in week 2    Secretions, minimal at this point     Labs acceptable        " "  Follow up    3 weeks for me    Zoraida José MIGUEL ANGEL either in 1 or 2 weeks          History:   7/3/2024     69 year old with HTN with prior RBOT cancer;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 S/p partial glossectomy and partial pharyngectomy on 07/31/2019, pathology negative for malignancy. S/p MRND right neck 09/04/2019 returned 0/34 nodes. Staged as T1N0 M0       PET scan was done 12/03/19 which showed mild asymmetry at the tongue base, likely post operative. Otherwise no distant metastasis. Prior imaging from June 2019 was clear with no evidence of disease.      There were no high risk features to speak of and he did not receive any adjuvant therapies.       He then did well in follow-up until approximately 3 to 4 months ago when he noticed a palpable lump on the right neck.  Subsequent imaging with CT and PET/CT revealed multiple hypermetabolic lymph nodes in the right neck but no obvious primary recurrence, contralateral or distant disease.  On 6/19/2024 he was taken to the operating room for direct laryngoscopy as well as repeat right neck dissection.  7 lymph nodes were removed, 6 of which were positive for metastatic squamous cell carcinoma, keratinizing, p16 positive, with extranodal extension identified.  The largest lymph node measured 1.1 cm.  On the pathology report there is a specimen labeled right pharynx which also contained squamous cell carcinoma.  Looking at the operative report, they describe a small palpable prominence in the \"left pharynx near base of tongue.\"  This is in contradiction to the pathology report which described the specimen as from the right pharynx.            Ct neck 04/24/2024      New rounded lymph node in the palpable region of interest superficial to the sternocleidomastoid muscle measuring 9 x 6 mm. Differential diagnosis should include recurrent disease versus reactive lymphadenopathy.     Small cluster of right level 2B cervical lymph nodes also noted " as described above with otherwise no suspicious adenopathy identified in the remainder of the neck.     Status post right-sided partial glossectomy without definite nodular enhancement in the operative bed.     05/06/2024 FNAB under US  Lymph Node, right level 2 (ThinPrep, smears and cell block preparations ):  Conclusive evidence of malignancy.  Carcinoma with keratinization, compatible with known squamous cell carcinoma     PET scan 05/22/2024      1. Scattered FDG-avid right cervical chain lymph nodes compatible with disease recurrence.  2. No suspicious uptake in the oropharynx or hypopharynx.  3. No findings for hypermetabolic metastasis to the chest, abdomen or pelvis.  \  No prior XRT or chemotherapy.      The patient is seeing Rad Onc later today     He is very pleased with the RND healing.  We had a long discussion as to second primary vs recurrence; treatment with CRT either 6 or 7 weeks.  As this is likely a second primary, would do 7 weeks but again, not definitively shown on PET.  Will get MRI face/neck/orbit to further assess.  He has been doing well post op.  Weight stable 169 pounds.  Has altered diet, eating healthier.  Has no issues, denies any SOB/KOHLI, CP change in bowel/bladder, blood stool/urine         Interval History:  9/18/2024     Doing well; waiting to start treatment 30 Sept 2024.  Risks/benefits and toxicities again explained to the patient with informed consent signed.  Sent zofran 8 mg ODT q 8 prn and compazine 10 mg q 6-8 prn to pharmacy.  No other active issues.       Since last visit PET scan done 9/11/2024    HEAD/NECK:  Increasing focus of radiotracer uptake at the right upper cervical chain, level 2 region, SUV max of 5.5, previously 3.6. This measures 7 mm short axis, image 38 series 17, stable from prior MRI. Based on the configuration on prior MRI, it is possible   this could represent a parotid lesion arising from the deep lobe of the parotid gland though an intraparotid  lymph node or cervical chain lymph node is not excluded.     Otherwise resolution of previously seen right upper cervical chain foci.     No FDG avid lymph nodes in the left neck.     Small focus of FDG uptake in the right peritonsillar region, SUV max of 4.2, previously 4.4, image 33 series 2600, may correspond to the 7 mm heterogeneously enhancing lesion seen on prior MRI examination. Question residual disease. However this appears   asymmetrically decreased compared to what is likely normal physiologic left tonsillar activity, SUV max of 5.5. Cannot exclude that this is diminished physiologic activity and the known right pharyngeal lesion is not seen on PET.     CT images: No additional significant findings.     CHEST:  No FDG avid soft tissue lesions are seen.     CT images: Scattered coronary artery calcifications.     ABDOMEN:  No FDG avid soft tissue lesions are seen.              Interval History:  10/2/2024     Doing well with treatment but had C1 30 Sept and this is day 3 of radiation.  He has started on herbal supplements despite advise against this.  Again told not acceptable.  Had issues with flushing but no other Cisplatin related effects.      Labs  9/30/2024     Na 138 K 3.5 Cr 0.79 ALT/AST 16/16/ Ca 9 Mg 2.1  WBC 4.4 Hgb 15.4 MCV 86 plts 210 ANC [id number]     Baseline TSH 2.245              Interval History:  10/9/2024 week 2/7    Has grade 1/early 2 confluent mucositis hard palate, extending to soft bilateral  As above not using salt water/baking soda rinses or magic mouthwash-given recipe and prescription for magic mouthwash which can be swallowed or spit out.,  Has coated tongue, concern with thrush so will start Nystatin as well.  Discussed other rinses if not helpful.  Seeing palliative care for pain control-consider gabapentin     Has had issues with nausea and constipation from zofran-using compazine with relief.  No GERD.  Decreased oral intake due to mucositis.  PEG being placed  10/10/2024    Labs 10/4/2024 with na 136 K 4.0 Cr 0.75 Ca 9.0 ALT/AST 17/18 TB 0.59 down from 1.36 last week  WBC 4.9 Hgb 15.5 MCV 87 plts 224 ANC [id number]         REVIEW OF SYSTEMS: as above   Please note that a 14-point review of systems was performed to include Constitutional, HEENT, Respiratory, CVS, GI, , Musculoskeletal, Integumentary, Neurologic, Rheumatologic, Endocrinologic, Psychiatric, Lymphatic, and Hematologic/Oncologic systems were reviewed and are negative unless otherwise stated in HPI. Positive and negative findings pertinent to this evaluation are incorporated into the history of present illness.      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    PAST SURGICAL HISTORY:   has a past surgical history that includes Rhinoplasty; Colonoscopy (N/A, 03/22/2016); [place] tooth extraction; pr laryngoscopy direct operative w/biopsy (N/A, 07/17/2019); TRANSORAL ROBOTIC SURGERY (TORS) (N/A, 07/31/2019); pr cervical lymphadec modified radical neck dsj (Right, 09/04/2019); pr laparoscopy surg cholecystectomy (N/A, 10/30/2019); Upper gastrointestinal endoscopy; Cholecystectomy; pr cysto insertion transprostatic implant single (N/A, 04/14/2023); Sinus surgery (1994); US guided lymph node biopsy right (5/6/2024); pr laryngoscopy direct operative w/biopsy (N/A, 6/19/2024); and Radical neck dissection (Right, 6/19/2024).    CURRENT MEDICATIONS  Current Outpatient Medications   Medication Sig Dispense Refill    amLODIPine (NORVASC) 5 mg tablet Take 5 mg by mouth daily at bedtime        Cholecalciferol (Vitamin D3) LIQD Use      lisinopril (ZESTRIL) 10 mg tablet Take 10 mg by mouth daily at bedtime       ondansetron (ZOFRAN-ODT) 8 mg disintegrating tablet Take 1 tablet (8 mg total) by mouth every 8 (eight) hours as needed for nausea or vomiting 30 tablet 3    prochlorperazine (COMPAZINE) 10 mg tablet Take 1 tablet (10 mg total) by mouth every 6 (six) hours as needed for nausea or vomiting 30 tablet 3     No current facility-administered medications for this visit.     [unfilled]    SOCIAL HISTORY:   reports that he has never smoked. He has never used smokeless tobacco. He reports current alcohol use of about 5.0 standard drinks of alcohol per week. He reports that he does not currently use drugs.     FAMILY HISTORY:  family history includes Cancer in his mother; Diabetes in his mother; Heart failure in his father; Seizures in his father.     ALLERGIES:  is allergic to shellfish allergy - food allergy and bee venom.      Physical Exam:  Vital Signs:   Visit Vitals  Smoking Status Never     There is no height or weight on file to calculate BMI.  There is no height or weight on file to calculate BSA.    GEN: Alert, awake oriented x3, in no acute distress  HEENT- No pallor, icterus, cyanosis, grade 1/early 2 mucositis hard palate, soft palate bilaterally, no tongue, thrush on oral/posterior tongue with chronic coating  Neck;  right level 2 node decreased. No erythema  Heart- normal S1 S2, regular rate and rhythm, No murmur, rubs.   Lungs- clear breathing sound bilateral.   Abdomen- soft, Non tender, bowel sounds present  Extremities- No cyanosis, clubbing, edema  Neuro- No focal neurological deficit    Labs:  Lab Results   Component Value Date    WBC 4.88 10/04/2024    HGB 15.5 10/04/2024    HCT 47.1 10/04/2024    MCV 87 10/04/2024     10/04/2024     Lab Results   Component Value Date     03/03/2016    SODIUM 136 10/04/2024    K 4.0 10/04/2024     10/04/2024    CO2 27 10/04/2024     AGAP 9 10/04/2024    BUN 17 10/04/2024    CREATININE 0.75 10/04/2024    GLUC 102 10/04/2024    CALCIUM 9.0 10/04/2024    AST 18 10/04/2024    ALT 17 10/04/2024    ALKPHOS 48 10/04/2024    PROT 6.7 03/03/2016    TP 7.0 10/04/2024    BILITOT 0.6 03/03/2016    TBILI 0.59 10/04/2024    EGFR 94 10/04/2024           I spent 40 minutes on chart review, face to face counseling time, coordination of care and documentation.    Magali Talbot MD PhD

## 2024-10-07 ENCOUNTER — TELEPHONE (OUTPATIENT)
Age: 69
End: 2024-10-07

## 2024-10-07 ENCOUNTER — HOSPITAL ENCOUNTER (OUTPATIENT)
Dept: INFUSION CENTER | Facility: HOSPITAL | Age: 69
Discharge: HOME | End: 2024-10-07
Attending: INTERNAL MEDICINE
Payer: MEDICARE

## 2024-10-07 ENCOUNTER — PATIENT OUTREACH (OUTPATIENT)
Dept: CASE MANAGEMENT | Facility: OTHER | Age: 69
End: 2024-10-07

## 2024-10-07 VITALS
OXYGEN SATURATION: 98 % | BODY MASS INDEX: 24.11 KG/M2 | WEIGHT: 168.43 LBS | HEIGHT: 70 IN | RESPIRATION RATE: 18 BRPM | TEMPERATURE: 97.2 F | HEART RATE: 92 BPM | DIASTOLIC BLOOD PRESSURE: 86 MMHG | SYSTOLIC BLOOD PRESSURE: 147 MMHG

## 2024-10-07 DIAGNOSIS — C01 MALIGNANT NEOPLASM OF BASE OF TONGUE (HCC): Primary | ICD-10-CM

## 2024-10-07 PROCEDURE — 77014 CHG CT GUIDANCE RADIATION THERAPY FLDS PLACEMENT: CPT | Performed by: RADIOLOGY

## 2024-10-07 PROCEDURE — 96361 HYDRATE IV INFUSION ADD-ON: CPT

## 2024-10-07 PROCEDURE — 96413 CHEMO IV INFUSION 1 HR: CPT

## 2024-10-07 PROCEDURE — 77386 HB NTSTY MODUL RAD TX DLVR CPLX: CPT | Performed by: RADIOLOGY

## 2024-10-07 PROCEDURE — 96375 TX/PRO/DX INJ NEW DRUG ADDON: CPT

## 2024-10-07 PROCEDURE — 96367 TX/PROPH/DG ADDL SEQ IV INF: CPT

## 2024-10-07 RX ORDER — PALONOSETRON 0.05 MG/ML
0.25 INJECTION, SOLUTION INTRAVENOUS ONCE
Status: COMPLETED | OUTPATIENT
Start: 2024-10-07 | End: 2024-10-07

## 2024-10-07 RX ORDER — SODIUM CHLORIDE 9 MG/ML
20 INJECTION, SOLUTION INTRAVENOUS ONCE
Status: COMPLETED | OUTPATIENT
Start: 2024-10-07 | End: 2024-10-07

## 2024-10-07 RX ADMIN — SODIUM CHLORIDE 500 ML: 0.9 INJECTION, SOLUTION INTRAVENOUS at 08:54

## 2024-10-07 RX ADMIN — CISPLATIN 70 MG: 1 INJECTION, SOLUTION INTRAVENOUS at 10:10

## 2024-10-07 RX ADMIN — PALONOSETRON HYDROCHLORIDE 0.25 MG: 0.25 INJECTION INTRAVENOUS at 09:00

## 2024-10-07 RX ADMIN — SODIUM CHLORIDE 500 ML: 0.9 INJECTION, SOLUTION INTRAVENOUS at 11:09

## 2024-10-07 RX ADMIN — SODIUM CHLORIDE 20 ML/HR: 9 INJECTION, SOLUTION INTRAVENOUS at 08:56

## 2024-10-07 RX ADMIN — FOSAPREPITANT 150 MG: 150 INJECTION, POWDER, LYOPHILIZED, FOR SOLUTION INTRAVENOUS at 09:20

## 2024-10-07 NOTE — TELEPHONE ENCOUNTER
I spoke with pt because he have a procedure with Dr. Alvarez on 10/10 the dr is no going to be available but we have Dr. Draper to cover him.  Pt agree.

## 2024-10-07 NOTE — PLAN OF CARE
Problem: Knowledge Deficit  Goal: Patient/family/caregiver demonstrates understanding of disease process, treatment plan, medications, and discharge instructions  Description: Complete learning assessment and assess knowledge base.  Interventions:  - Provide teaching at level of understanding  - Provide teaching via preferred learning methods  Outcome: Progressing     Outcome: Progressing  Goal: Maintain or return to baseline ADL function  Description: INTERVENTIONS:  -  Assess patient's ability to carry out ADLs; assess patient's baseline for ADL function and identify physical deficits which impact ability to perform ADLs (bathing, care of mouth/teeth, toileting, grooming, dressing, etc.)  - Assess/evaluate cause of self-care deficits   - Assess range of motion  - Assess patient's mobility; develop plan if impaired  - Assess patient's need for assistive devices and provide as appropriate  - Encourage maximum independence but intervene and supervise when necessary  - Involve family in performance of ADLs  - Assess for home care needs following discharge   - Consider OT consult to assist with ADL evaluation and planning for discharge  - Provide patient education as appropriate  Outcome: Progressing  GProblem: DISCHARGE PLANNING  Goal: Discharge to home or other facility with appropriate resources  Description: INTERVENTIONS:  - Identify barriers to discharge w/patient and caregiver  - Arrange for needed discharge resources and transportation as appropriate  - Identify discharge learning needs (meds, wound care, etc.)  - Arrange for interpretive services to assist at discharge as needed  - Refer to Case Management Department for coordinating discharge planning if the patient needs post-hospital services based on physician/advanced practitioner order or complex needs related to functional status, cognitive ability, or social support system  Outcome: Progressing

## 2024-10-07 NOTE — PROGRESS NOTES
Jesus Hdz  tolerated treatment well with no complications.      Jesus Hdz is aware of future appt on 10/14/2024 at 0800.     AVS printed and given to Jesus Hdz:   No (Declined by Jesus Hdz)

## 2024-10-08 PROCEDURE — 77386 HB NTSTY MODUL RAD TX DLVR CPLX: CPT | Performed by: RADIOLOGY

## 2024-10-08 PROCEDURE — 77014 CHG CT GUIDANCE RADIATION THERAPY FLDS PLACEMENT: CPT | Performed by: RADIOLOGY

## 2024-10-08 RX ORDER — SODIUM CHLORIDE 9 MG/ML
20 INJECTION, SOLUTION INTRAVENOUS ONCE
Status: CANCELLED | OUTPATIENT
Start: 2024-10-14

## 2024-10-08 RX ORDER — PALONOSETRON 0.05 MG/ML
0.25 INJECTION, SOLUTION INTRAVENOUS ONCE
Status: CANCELLED | OUTPATIENT
Start: 2024-10-14

## 2024-10-09 ENCOUNTER — TELEPHONE (OUTPATIENT)
Age: 69
End: 2024-10-09

## 2024-10-09 ENCOUNTER — NUTRITION (OUTPATIENT)
Dept: NUTRITION | Facility: HOSPITAL | Age: 69
End: 2024-10-09

## 2024-10-09 ENCOUNTER — OFFICE VISIT (OUTPATIENT)
Age: 69
End: 2024-10-09
Payer: MEDICARE

## 2024-10-09 VITALS
SYSTOLIC BLOOD PRESSURE: 146 MMHG | HEART RATE: 66 BPM | RESPIRATION RATE: 18 BRPM | BODY MASS INDEX: 24.34 KG/M2 | OXYGEN SATURATION: 97 % | WEIGHT: 170 LBS | DIASTOLIC BLOOD PRESSURE: 83 MMHG | HEIGHT: 70 IN | TEMPERATURE: 97.9 F

## 2024-10-09 DIAGNOSIS — E86.0 DEHYDRATION: ICD-10-CM

## 2024-10-09 DIAGNOSIS — C01 MALIGNANT NEOPLASM OF BASE OF TONGUE (HCC): Primary | ICD-10-CM

## 2024-10-09 DIAGNOSIS — Z71.3 NUTRITIONAL COUNSELING: Primary | ICD-10-CM

## 2024-10-09 DIAGNOSIS — C01 MALIGNANT NEOPLASM OF BASE OF TONGUE (HCC): ICD-10-CM

## 2024-10-09 DIAGNOSIS — E46 PROTEIN-CALORIE MALNUTRITION, UNSPECIFIED SEVERITY (HCC): ICD-10-CM

## 2024-10-09 PROCEDURE — 77014 CHG CT GUIDANCE RADIATION THERAPY FLDS PLACEMENT: CPT | Performed by: RADIOLOGY

## 2024-10-09 PROCEDURE — 77386 HB NTSTY MODUL RAD TX DLVR CPLX: CPT | Performed by: RADIOLOGY

## 2024-10-09 PROCEDURE — 99215 OFFICE O/P EST HI 40 MIN: CPT | Performed by: INTERNAL MEDICINE

## 2024-10-09 RX ORDER — NYSTATIN 100000 [USP'U]/ML
500000 SUSPENSION ORAL 4 TIMES DAILY
Qty: 400 ML | Refills: 3 | Status: SHIPPED | OUTPATIENT
Start: 2024-10-09

## 2024-10-09 RX ORDER — DIPHENHYDRAMINE HYDROCHLORIDE AND LIDOCAINE HYDROCHLORIDE AND ALUMINUM HYDROXIDE AND MAGNESIUM HYDRO
10 KIT EVERY 4 HOURS PRN
Qty: 500 ML | Refills: 4 | Status: SHIPPED | OUTPATIENT
Start: 2024-10-09 | End: 2024-10-09 | Stop reason: SDUPTHER

## 2024-10-09 RX ORDER — DIPHENHYDRAMINE HYDROCHLORIDE AND LIDOCAINE HYDROCHLORIDE AND ALUMINUM HYDROXIDE AND MAGNESIUM HYDRO
10 KIT EVERY 4 HOURS PRN
Qty: 500 ML | Refills: 4 | Status: SHIPPED | OUTPATIENT
Start: 2024-10-09

## 2024-10-09 NOTE — PROGRESS NOTES
Outpatient Oncology Nutrition Consultation   Type of Consult: Follow Up  Care Location: Office Visit    Reason for referral: Received notification by  Dr. Talbot  on 9/18 that pt has triggered for oncology nutrition care (reason for referral: HNC dx and TF).    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     See Note.     -- Confirmed by positive Pankeratin (CKAE1/3), p40 and normal wild-type expression       on p53 immunostains.    -- p16 immunostain positive (>70% diffuse and strong nuclear and cytoplasmic        staining); high-risk HPV EH pending; result will be reported in an addendum.      B. Lymph Node, Regional Resection, Level 2 lymph nodes, see comments:  - Metastatic squamous cell carcinoma, keratinizing, involving at least two lymph nodes (2/2).     -- Present in 1.0 cm lymph node and additional detached lymph node(s)/fragments.     -- Largest metastatic focus: 0.3 cm; Extranodal extension: Not identified.    -- Confirmed by positive Pankeratin (CKAE1/3) and p40 immunostains.    -- p16 immunostain positive (>70% diffuse and strong nuclear and cytoplasmic        staining).     C. Lymph Node, Regional Resection, Additional level 2 lymph nodes:  - Metastatic squamous cell carcinoma, keratinizing, involving 3 of 4 lymph     nodes/fragments (3/4).     -- Largest metastatic focus: 1 cm; Extranodal extension: Present, extranodal         lymphovascular invasion identified.    -- Confirmed by positive Pankeratin (CKAE1/3) and p40 immunostains.    -- p16 immunostain  positive (>70% diffuse and strong nuclear and cytoplasmic        staining); high-risk HPV EH pending; result will be reported in an addendum. See Note.      D. Lymph Node, Level 3 lymph nodes, lymph adenectomy:  - One lymph node positive for metastatic squamous cell carcinoma, keratinizing (1/1).     -- Largest metastatic focus: 1.1 cm; Extranodal extension: Not identified.    -- Confirmed by positive Pankeratin (CKAE1/3) and p40 immunostains.    -- p16 immunostain positive (>70% diffuse and strong nuclear and cytoplasmic        staining).  - Portion(s) of benign salivary gland with adjacent detached small (< 0.2 cm) portion      of carcinoma.   - Traumatic neuroma.          Comments:   This is an appended report. These results have been appended to a previously preliminary verified report.           7/3/2024 -  Cancer Staged    Staging form: Pharynx - Oropharynx, AJCC 8th Edition  - Clinical stage from 7/3/2024: Stage I (cT1, cN1, cM0, p16+) - Signed by Fady Morrow MD on 7/3/2024  Stage prefix: Initial diagnosis       9/30/2024 -  Chemotherapy    alteplase (CATHFLO), 2 mg, Intracatheter, Every 1 Minute as needed, 2 of 7 cycles  palonosetron (ALOXI), 0.25 mg, Intravenous, Once, 1 of 6 cycles  Administration: 0.25 mg (10/7/2024)  CISplatin (PLATINOL) infusion, 70 mg (original dose 40 mg/m2), Intravenous, Once, 2 of 7 cycles  Dose modification: 70 mg (original dose 40 mg/m2, Cycle 1, Reason: Other (Must fill in a comment), Comment: pharmacy dictates)  Administration: 70 mg (9/30/2024), 70 mg (10/7/2024)  sodium chloride, 500 mL, Intravenous, Once, 2 of 7 cycles  Administration: 500 mL (9/30/2024), 500 mL (9/30/2024), 500 mL (10/7/2024), 500 mL (10/7/2024)  fosaprepitant (EMEND) IVPB, 150 mg, Intravenous, Once, 2 of 7 cycles  Administration: 150 mg (9/30/2024), 150 mg (10/7/2024)       Past Medical & Surgical Hx:   Patient Active Problem List   Diagnosis    Mass of right side of neck    Malignant neoplasm of base  of tongue (HCC)    Status post radical dissection of neck    Tongue pain    Dysphagia    Hypertension    Protein-calorie malnutrition (HCC)    Chemotherapy-induced nausea     Past Medical History:   Diagnosis Date    Facial basal cell cancer     GERD (gastroesophageal reflux disease)     diet controlled    Hypertension     Vitamin D deficiency      Past Surgical History:   Procedure Laterality Date    CHOLECYSTECTOMY      COLONOSCOPY N/A 03/22/2016    Procedure: COLONOSCOPY;  Surgeon: Daren Montana MD;  Location: [facility];  Service:     MT CERVICAL LYMPHADEC MODIFIED RADICAL NECK DSJ Right 09/04/2019    Procedure: MODIFIED RADICAL NECK DISSECTION;  Surgeon: Fady Montana MD;  Location: [facility];  Service: ENT    MT CYSTO INSERTION TRANSPROSTATIC IMPLANT SINGLE N/A 04/14/2023    Procedure: CYSTOSCOPY WITH INSERTION UROLIFT;  Surgeon: Rubin Alfonso MD;  [facility];  Service: Urology    MT LAPAROSCOPY SURG CHOLECYSTECTOMY N/A 10/30/2019    Procedure: CHOLECYSTECTOMY LAPAROSCOPIC;  Surgeon: Christiano Cornejo MD;  Location: [facility];  Service: General    MT LARYNGOSCOPY DIRECT OPERATIVE W/BIOPSY N/A 07/17/2019    Procedure: MICROLARYNGOSCOPY DIRECT WITH BIOPSY OF VALLECULAR MASS;  Surgeon: Amol Cordova MD;  [facility];  Service: ENT    MT LARYNGOSCOPY DIRECT OPERATIVE W/BIOPSY N/A 6/19/2024    Procedure: DIRECT LARYNGOSCOPY WITH BIOPSY WITH RIGHT NECK DISSECTION LEVEL TWO AND THREE;  Surgeon: Fady Montana MD;  [facility];  Service: ENT    RADICAL NECK DISSECTION Right 6/19/2024    Procedure: DISSECTION NECK RADICAL;  Surgeon: Fady Montana MD;  [facility];  Service: ENT    RHINOPLASTY      SINUS SURGERY  1994    TRANSORAL ROBOTIC SURGERY (TORS) N/A 07/31/2019    Procedure: ROBOTICALLY ASSISTED PARTIAL GLOSSECTOMY, PARTIAL PHARYNGECTOMY;  Surgeon: Fady Montana MD;  [facility];  Service: ENT    UPPER GASTROINTESTINAL ENDOSCOPY      US GUIDED  "LYMPH NODE BIOPSY RIGHT  5/6/2024    WISDOM TOOTH EXTRACTION         Review of Medications:   Vitamins, Supplements and Herbals: Taking variety of herbal supplements by naturopathic/ Ayurvedic doctor:   Genistein, Artemisinin, Apigenin, VitD3, Amla (Vit C), CoQ10, Frankincense, Angiostop(Sea Cucumber), Artecin, C-statin, PauD Arco, Black Cumin/Black seed oil. Alternating with: [place], Curcumin, Indole 3 Carbinol, Quercetin, Resveratrol, Vascustatin, Cronaxal (Benegene), Lycopene, Agaricus Murill + IV: Kaplan or Immunity 15   + Immune support tea, Herbal detox tea, Sinus care Jam, Acid balance tincture  He takes these Wednesday-Saturday. Reviewed w/ patient in detail that herbal supplements can interact with treatment and have negative side effects.  Refered pt to [facility] \"About Herbs\" website for further information on safety/effectiveness/interactions of supplements.      Current Outpatient Medications:     amLODIPine (NORVASC) 5 mg tablet, Take 5 mg by mouth daily at bedtime , Disp: , Rfl:     Cholecalciferol (Vitamin D3) LIQD, Use, Disp: , Rfl:     diphenhydramine, lidocaine, Al/Mg hydroxide, simethicone (Magic Mouthwash) SUSP, Swish and swallow 10 mL every 4 (four) hours as needed for mouth pain or discomfort, Disp: 500 mL, Rfl: 4    lisinopril (ZESTRIL) 10 mg tablet, Take 10 mg by mouth daily at bedtime , Disp: , Rfl:     nystatin (MYCOSTATIN) 500,000 units/5 mL suspension, Apply 5 mL (500,000 Units total) to the mouth or throat 4 (four) times a day, Disp: 400 mL, Rfl: 3    ondansetron (ZOFRAN-ODT) 8 mg disintegrating tablet, Take 1 tablet (8 mg total) by mouth every 8 (eight) hours as needed for nausea or vomiting, Disp: 30 tablet, Rfl: 3    prochlorperazine (COMPAZINE) 10 mg tablet, Take 1 tablet (10 mg total) by mouth every 6 (six) hours as needed for nausea or vomiting, Disp: 30 tablet, Rfl: 3  No current facility-administered medications for this visit.    Facility-Administered Medications Ordered in Other " "Visits:     alteplase (CATHFLO) injection 2 mg, 2 mg, Intracatheter, Q1MIN PRN, Magali Talbot MD    Most Recent Lab Results:   Lab Results   Component Value Date    WBC 4.88 10/04/2024    NEUTROABS 3.26 10/04/2024    CHOL 246 (H) 03/03/2016    TRIG 226 (H) 03/03/2016    HDL 44 03/03/2016    ALT 17 10/04/2024    AST 18 10/04/2024    ALB 4.2 10/04/2024     03/03/2016    SODIUM 136 10/04/2024    SODIUM 138 09/30/2024    K 4.0 10/04/2024    K 3.5 09/30/2024     10/04/2024    BUN 17 10/04/2024    BUN 14 09/30/2024    CREATININE 0.75 10/04/2024    CREATININE 0.79 09/30/2024    EGFR 94 10/04/2024    TSH 1.55 08/30/2022    POCGLU 100 09/11/2024    GLUC 102 10/04/2024    HGBA1C 5.7 (H) 04/20/2023    HGBA1C 5.4 10/10/2019    CALCIUM 9.0 10/04/2024    MG 2.0 10/04/2024       Anthropometric Measurements:   Height: 70\"  Ht Readings from Last 1 Encounters:   10/09/24 5' 10\" (1.778 m)     Wt Readings from Last 20 Encounters:   10/09/24 77.1 kg (170 lb)   10/07/24 76.4 kg (168 lb 6.9 oz)   10/02/24 78.5 kg (173 lb)   09/30/24 76.8 kg (169 lb 6.4 oz)   09/18/24 75.8 kg (167 lb)   07/16/24 76.2 kg (168 lb 0.4 oz)   07/03/24 76.7 kg (169 lb)   06/26/24 77.1 kg (170 lb)   06/19/24 77.1 kg (170 lb)   06/13/24 82.1 kg (181 lb)   04/18/24 82.1 kg (181 lb)   01/22/24 82.1 kg (181 lb)   08/28/23 80.7 kg (178 lb)   05/19/23 82.1 kg (181 lb)   04/14/23 82.3 kg (181 lb 6.4 oz)   01/05/23 82.1 kg (181 lb)   11/17/22 83.2 kg (183 lb 6.4 oz)   10/19/22 84 kg (185 lb 3.2 oz)   08/29/22 81.6 kg (180 lb)   08/25/22 81.6 kg (180 lb)       Weight History:   Usual Weight: 165-170#    Oncology Nutrition-Anthropometrics      Flowsheet Row Nutrition from 10/9/2024 in [facility] Oncology Dietitian [facility] Nutrition from 9/30/2024 in [facility] Oncology Dietitian [facility]   Patient age (years): [age] years   Patient (male) height (in): 70 in 70 in   Current weight (lbs): 170 lbs 169.4 lbs   Current weight to be used for " anthropometric calculations (kg) 77.3 kg 77 kg   BMI: 24.4 24.3   IBW male 166 lb 166 lb   IBW (kg) male 75.5 kg 75.5 kg   IBW % (male) 102.4 % 102 %   Adjusted BW (male): 167 lbs 166.9 lbs   Adjusted BW in kg (male): 75.9 kg 75.9 kg   % weight change after 1 week: -1.7 % --   Weight change after 1 week (lbs) -3 lbs --   % weight change after 1 month: 1.8 % --   Weight change after 1 month (lbs) 3 lbs --   % weight change after 3 months: 0.6 % -0.4 %   Weight change after 3 months (lbs) 1 lbs -0.6 lbs   % weight change after 6 months: -6.1 % --   Weight change after 6 months (lbs) -11 lbs --            Nutrition-Focused Physical Findings: none observed    Food/Nutrition-Related History & Client/Social History:    Current Nutrition Impact Symptoms:  [] Nausea  [] Reduced Appetite  [] Acid Reflux    [] Vomiting  [] Unintended Wt Loss  [] Malabsorption    [] Diarrhea  [] Unintended Wt Gain  [] Dumping Syndrome    [] Constipation  [] Thick Mucous/Secretions  [] Abdominal Pain    [] Dysgeusia (Altered Taste)  [] Xerostomia (Dry Mouth)  [] Gas    [] Dysosmia (Altered Smell)  [] Thrush  [] Difficulty Chewing    [x] Oral Mucositis (Sore Mouth) - mild soft palate [] Fatigue  [] Hyperglycemia   Lab Results   Component Value Date    HGBA1C 5.7 (H) 04/20/2023      [x] Odynophagia mild [] Esophagitis  [] Other:    [x] Dysphagia mild - pills getting stuck [] Early Satiety  [] No Problems Eating      Food Allergies & Intolerances: yes: mild shellfish - fingers swell up    Current Diet: Regular Diet, No Restrictions  Current Nutrition Intake: Unchanged from last visit (continues to fast for 3 days before/during/after chemo)  Appetite: Good, Fair   Nutrition Route: PO  Oral Care: brushes daily, gargle with tonic, water pick   Full mobility of tongue; continues with exercises from prior SLP  No sensitives of hot/cold or spicy. Has trouble with bread and melon at times.  Activity level: Usual ADL's     24 Hr Diet Recall:    Breakfast:  oatmeal with banana + walnuts + oat milk OR scrambled eggs  Fasts for 36 hrs prior to chemo and 24 hrs following treatment (Sat night-Tuesday AM)  On non fasting days he reports eating very healthy including salmon, rice, homemade bread as tolerated, avocados, etc.     Beverages: water, chicken bone broth, 64 oz detox and herbal tea  Whey protein powder (20g)  mixed with coffee     Supplements:   Whey protein powder 1x/day. He has used [email or web address] in past but hesitant due to the sugar  Functional Formularies at home- has not yet tried. Reviewed pros/cons.       Oncology Nutrition-Estimated Needs      Flowsheet Row Nutrition from 2024 in [facility] Oncology Dietitian [place]   Weight type used Actual weight   Weight in kilograms (kg) used for estimated needs 77 kg   Energy needs formula:  30-35 kcal/kg   Energy needs based on 30 kcal/k kcal   Energy needs based on 35 kcal/k kcal   Protein needs formula: 1.2-1.5 g/kg   Protein needs based on 1.2 g/k g   Protein needs based on 1.5 g/kg 116 g   Fluid needs formula: 30-35 mL/kg   Fluid needs based on 30 mL/kg 2304 mL   Fluid needs in ounces 78 oz   Fluid needs based on 35 mL/kg 2688 mL   Fluid needs in ounces 91 oz             Discussion & Intervention:   Jesus was evaluated today for an RD follow up regarding HNC and enteral nutrition.  Jesus is currently undergoing tx for HNCx .  RD met with patient prior to RT today. Patient is scheduled for PEG tomorrow. He is still eating and does not anticipate needing to use it anytime soon. We did go over feeding tube care protocols and formula options should he need to use it. He reports that he is already starting to experience mild pain/difficulty swallowing and soreness in mouth (particularly soft palate). His mouth does not appear to have any open lesions or thrush. The patient is planning to meet with Palliative care for assistance with pain management and insomnia. He continues to do  a fasting mimicking diet for 3 days during chemotherapy and take variety of herbal supplements. He is also using honey prior to RT as effort to minimize mucositis. He reports that he is having some difficulty with the pills and them getting stuck. He states they go down better with food. Reminded pt that the supplements are not the focus of his treatment and I would not recommend continuing if they are causing worsening symptoms and interfering with nutritional intake. Pt reports that his first meal to break his fast yesterday induced nausea.  Reviewed the impact this may be having on his body. He believes he ate too fast/too much at once. He states he took Compazine and felt better. He took anti-emetic prior to eating today and had no issue. He plans to to do this moving forward.He reports loose stools when fasting but otherwise having normal BM. His weight has remained stable thus far.    Reviewed 24 hour recall, which revealed an inadequate po intake, and discussed ways to increase kcal, protein, and fluid intakes and optimize nutrient intake.  Also reviewed the importance of wt management throughout the tx process and the role of a high kcal/ high protein diet in managing wt and overall health.  Based on today's assessment, discussion included: MNT for: sore mouth/throat, EN, how to modify foods for anticipated nutrition impact symptoms pt may experience during CA tx, practicing proper oral care, general food safety measures during cancer tx, a high kcal/protein diet & food choices to include at all meals & snacks (Examples of high kcal foods: cheese, full-fat dairy products, nut butter, plant-based fats, coconut oil/milk, avocado, butter, cream soup, etc. Examples of high protein foods: eggs, chicken, fish, beans/legumes, nuts/nut butters, bone broth, etc.) , fortifying foods for added kcal and protein (examples include: adding cheese to foods such as eggs, mashed potatoes, casseroles, etc.; Making oatmeal with  whole milk rather than water; Making fortified mashed potatoes with cream, butter, dry milk powder, plain Greek yogurt, and cheese.), adequate hydration & fluid choices, sipping on calorie containing beverages (examples include: adding whole milk or cream to coffee, oral nutrition supplements, juice, electrolyte replacement beverages, milk, etc.), eating smaller more frequent meals every 2-3 hours (5-6 small meals/day), utilizing oral nutrition supplements, and adding extra fat to foods while cooking such as butter, plant-based oil, coconut oil/milk, avocado, nut butters, etc..   Moving forward, Jesus was encouraged to increase kcal, protein, and fluid intakes .  Materials Provided:  Liquid Hope and Compleat samples  All questions and concerns addressed during today’s visit.  Jesus has RD contact information.    Nutrition Diagnosis:   Inadequate Energy Intake related to physiological causes, disease state and treatment related issues as evidenced by food recall, wt loss and discussion with pt and/or family.  Increased Nutrient Needs (kcal & pro) related to increased demand for nutrients and disease state as evidenced by cancer dx and pt undergoing tx for cancer.  Monitoring & Evaluation:   Goals:   weight maintenance/stabilization  adequate nutrition impact symptom management  pt to meet >/=75% estimated nutrition needs daily    Progress Towards Goals: Progressing    Nutrition Rx & Recommendations:  Diet: High Calorie, High Protein (for high calorie foods see pages 52-53, and for high protein foods see pages 49-51 in your Eating Hints book)  Small, frequent meals/snacks may be easier to tolerate than 3 large daily meals.  Aim for 5-6 small meals per day.  Include protein at all meals/snacks.  Include a variety of foods (as tolerated/allowed by diet).  Liquid nutrition may be better tolerated than solids at times.  Alter food choices and eating patterns to accommodate changing needs.  Refer to Eating  Hints book for other meal/snack ideas and symptom management.  Avoid spicy, acidic, sharp/hard/crunchy foods & carbonated beverages as needed.  Follow proper oral care; Try baking soda/salt water rinse recipe (mix 3/4 tsp salt + 1 tsp baking soda + 1 qt water; rinse with plain water after using) in Eating Hints book (pg 18).  Brush your teeth before/after meals & before bed.  For trouble swallowing: eat 5-6 small meals/day; choose foods that are easy to swallow; choose foods that are high in protein & calories; cook foods until they are soft/tender; cut food into small pieces; moisten & soften foods (gravy/sauce/broth/yogurt); sip drinks through a straw (as tolerated); avoid foods/drinks that can burn/scrape your throat (hot temperatures, spicy foods, acidic foods, sharp/hard/crunchy foods, alcohol); talk to your doctor about a Speech Therapy referral for a swallowing evaluation (see page 26-28 in your Eating Hints book).  Weigh yourself regularly. If you notice weight loss, make an effort to increase your daily food/calorie intake. If you continue to notice loss after these efforts, reach out to your dietitian to establish a plan to stabilize weight.  Swap nuts for nut butter, add extra milk/broth/water for thinner consistency, utilize oils/gravies/syrups to lubricate, cook down fruits/vegetables/proteins until tender and mashable.   In the event dysphagia worsens and PEG is needed to be used sooner than next week, please contact RD as soon as possible.   Flush feeding tube with 60-120mL water atleast once daily when not in use.    Prioritize food vs supplements. Consider weaning/holding herbal supplements if causing discomfort, interfering with ability to swallow, and/or get in adequate PO intake.   Highly recommend to stop fasting if weight starts to decline.     Follow Up Plan:  10/16 at 8:15am (prior to RT)  Recommend Referral to Other Providers: none at this time

## 2024-10-09 NOTE — PATIENT INSTRUCTIONS
Nutrition Rx & Recommendations:  Diet: High Calorie, High Protein (for high calorie foods see pages 52-53, and for high protein foods see pages 49-51 in your Eating Hints book)  Small, frequent meals/snacks may be easier to tolerate than 3 large daily meals.  Aim for 5-6 small meals per day.  Include protein at all meals/snacks.  Include a variety of foods (as tolerated/allowed by diet).  Liquid nutrition may be better tolerated than solids at times.  Alter food choices and eating patterns to accommodate changing needs.  Refer to Eating Hints book for other meal/snack ideas and symptom management.  Avoid spicy, acidic, sharp/hard/crunchy foods & carbonated beverages as needed.  Follow proper oral care; Try baking soda/salt water rinse recipe (mix 3/4 tsp salt + 1 tsp baking soda + 1 qt water; rinse with plain water after using) in Eating Hints book (pg 18).  Brush your teeth before/after meals & before bed.  For trouble swallowing: eat 5-6 small meals/day; choose foods that are easy to swallow; choose foods that are high in protein & calories; cook foods until they are soft/tender; cut food into small pieces; moisten & soften foods (gravy/sauce/broth/yogurt); sip drinks through a straw (as tolerated); avoid foods/drinks that can burn/scrape your throat (hot temperatures, spicy foods, acidic foods, sharp/hard/crunchy foods, alcohol); talk to your doctor about a Speech Therapy referral for a swallowing evaluation (see page 26-28 in your Eating Hints book).  Weigh yourself regularly. If you notice weight loss, make an effort to increase your daily food/calorie intake. If you continue to notice loss after these efforts, reach out to your dietitian to establish a plan to stabilize weight.  Swap nuts for nut butter, add extra milk/broth/water for thinner consistency, utilize oils/gravies/syrups to lubricate, cook down fruits/vegetables/proteins until tender and mashable.   In the event dysphagia worsens and PEG is needed  to be used sooner than next week, please contact RD as soon as possible.   Flush feeding tube with 60-120mL water atleast once daily when not in use.    Prioritize food vs supplements. Consider weaning/holding herbal supplements if causing discomfort, interfering with ability to swallow, and/or get in adequate PO intake.   Highly recommend to stop fasting if weight starts to decline.     Follow Up Plan: 10/16 at 8:15am (prior to RT)  Recommend Referral to Other Providers: none at this time

## 2024-10-10 ENCOUNTER — PATIENT MESSAGE (OUTPATIENT)
Dept: PALLIATIVE MEDICINE | Facility: CLINIC | Age: 69
End: 2024-10-10

## 2024-10-10 ENCOUNTER — ANESTHESIA (OUTPATIENT)
Dept: GASTROENTEROLOGY | Facility: HOSPITAL | Age: 69
End: 2024-10-10
Payer: MEDICARE

## 2024-10-10 ENCOUNTER — HOSPITAL ENCOUNTER (OUTPATIENT)
Dept: GASTROENTEROLOGY | Facility: HOSPITAL | Age: 69
Setting detail: OUTPATIENT SURGERY
Discharge: HOME | End: 2024-10-10
Attending: INTERNAL MEDICINE
Payer: MEDICARE

## 2024-10-10 ENCOUNTER — TELEPHONE (OUTPATIENT)
Age: 69
End: 2024-10-10

## 2024-10-10 ENCOUNTER — ANESTHESIA EVENT (OUTPATIENT)
Dept: GASTROENTEROLOGY | Facility: HOSPITAL | Age: 69
End: 2024-10-10
Payer: MEDICARE

## 2024-10-10 VITALS
SYSTOLIC BLOOD PRESSURE: 155 MMHG | OXYGEN SATURATION: 99 % | TEMPERATURE: 98 F | DIASTOLIC BLOOD PRESSURE: 78 MMHG | RESPIRATION RATE: 15 BRPM | HEART RATE: 58 BPM

## 2024-10-10 DIAGNOSIS — C01 MALIGNANT NEOPLASM OF BASE OF TONGUE (HCC): ICD-10-CM

## 2024-10-10 DIAGNOSIS — R13.10 DYSPHAGIA, UNSPECIFIED TYPE: ICD-10-CM

## 2024-10-10 PROCEDURE — 77014 CHG CT GUIDANCE RADIATION THERAPY FLDS PLACEMENT: CPT | Performed by: RADIOLOGY

## 2024-10-10 PROCEDURE — 43246 EGD PLACE GASTROSTOMY TUBE: CPT | Performed by: INTERNAL MEDICINE

## 2024-10-10 PROCEDURE — 77386 HB NTSTY MODUL RAD TX DLVR CPLX: CPT | Performed by: RADIOLOGY

## 2024-10-10 RX ORDER — PROPOFOL 10 MG/ML
INJECTION, EMULSION INTRAVENOUS CONTINUOUS PRN
Status: DISCONTINUED | OUTPATIENT
Start: 2024-10-10 | End: 2024-10-10

## 2024-10-10 RX ORDER — CEFAZOLIN SODIUM 1 G/3ML
INJECTION, POWDER, FOR SOLUTION INTRAMUSCULAR; INTRAVENOUS AS NEEDED
Status: DISCONTINUED | OUTPATIENT
Start: 2024-10-10 | End: 2024-10-10

## 2024-10-10 RX ORDER — PROPOFOL 10 MG/ML
INJECTION, EMULSION INTRAVENOUS AS NEEDED
Status: DISCONTINUED | OUTPATIENT
Start: 2024-10-10 | End: 2024-10-10

## 2024-10-10 RX ORDER — LIDOCAINE HYDROCHLORIDE 10 MG/ML
INJECTION, SOLUTION EPIDURAL; INFILTRATION; INTRACAUDAL; PERINEURAL AS NEEDED
Status: DISCONTINUED | OUTPATIENT
Start: 2024-10-10 | End: 2024-10-10

## 2024-10-10 RX ORDER — ATOVAQUONE AND PROGUANIL HYDROCHLORIDE 250; 100 MG/1; MG/1
1 TABLET, FILM COATED ORAL DAILY
COMMUNITY
Start: 2024-07-17

## 2024-10-10 RX ORDER — SODIUM CHLORIDE, SODIUM LACTATE, POTASSIUM CHLORIDE, CALCIUM CHLORIDE 600; 310; 30; 20 MG/100ML; MG/100ML; MG/100ML; MG/100ML
125 INJECTION, SOLUTION INTRAVENOUS CONTINUOUS
Status: DISCONTINUED | OUTPATIENT
Start: 2024-10-10 | End: 2024-10-14 | Stop reason: HOSPADM

## 2024-10-10 RX ORDER — FENTANYL CITRATE 50 UG/ML
INJECTION, SOLUTION INTRAMUSCULAR; INTRAVENOUS AS NEEDED
Status: DISCONTINUED | OUTPATIENT
Start: 2024-10-10 | End: 2024-10-10

## 2024-10-10 RX ADMIN — PROPOFOL 20 MG: 10 INJECTION, EMULSION INTRAVENOUS at 10:29

## 2024-10-10 RX ADMIN — FENTANYL CITRATE 25 MCG: 50 INJECTION, SOLUTION INTRAMUSCULAR; INTRAVENOUS at 10:31

## 2024-10-10 RX ADMIN — FENTANYL CITRATE 25 MCG: 50 INJECTION, SOLUTION INTRAMUSCULAR; INTRAVENOUS at 10:27

## 2024-10-10 RX ADMIN — FENTANYL CITRATE 25 MCG: 50 INJECTION, SOLUTION INTRAMUSCULAR; INTRAVENOUS at 10:14

## 2024-10-10 RX ADMIN — CEFAZOLIN 2000 MG: 330 INJECTION, POWDER, FOR SOLUTION INTRAMUSCULAR; INTRAVENOUS at 10:17

## 2024-10-10 RX ADMIN — PROPOFOL 100 MCG/KG/MIN: 10 INJECTION, EMULSION INTRAVENOUS at 10:17

## 2024-10-10 RX ADMIN — LIDOCAINE HYDROCHLORIDE 50 MG: 10 SOLUTION INTRAVENOUS at 10:17

## 2024-10-10 RX ADMIN — PROPOFOL 50 MG: 10 INJECTION, EMULSION INTRAVENOUS at 10:17

## 2024-10-10 RX ADMIN — PROPOFOL 20 MG: 10 INJECTION, EMULSION INTRAVENOUS at 10:19

## 2024-10-10 RX ADMIN — SODIUM CHLORIDE, SODIUM LACTATE, POTASSIUM CHLORIDE, AND CALCIUM CHLORIDE 125 ML/HR: .6; .31; .03; .02 INJECTION, SOLUTION INTRAVENOUS at 09:47

## 2024-10-10 NOTE — TELEPHONE ENCOUNTER
I called the patient back to Schedule an NP appt with a Headache or General specialist as per notes below.  Patient said that he does not want to schedule at this time as he has too much going on with chemo appts and other appts.     Feels like its not a major concern to him at this time but will call back or send MyChart message when he is ready to schedule .      Will change status to Patient coordinating and close referral. The referral can be re-opened if patient calls back to schedule.

## 2024-10-10 NOTE — ANESTHESIA POSTPROCEDURE EVALUATION
Post-Op Assessment Note    Last Filed PACU Vitals:  Vitals Value Taken Time   Temp 97.1 °F (36.2 °C) 10/10/24 1042   Pulse 71 10/10/24 1042   /89 10/10/24 1042   Resp 18 10/10/24 1042   SpO2 100 % 10/10/24 1042       Modified Jason:  Activity: 2 (10/10/2024 10:42 AM)  Respiration: 2 (10/10/2024 10:42 AM)  Circulation: 2 (10/10/2024 10:42 AM)  Consciousness: 1 (10/10/2024 10:42 AM)  Oxygen Saturation: 2 (10/10/2024 10:42 AM)  Modified Jason Score: 9 (10/10/2024 10:42 AM)

## 2024-10-10 NOTE — ANESTHESIA POSTPROCEDURE EVALUATION
Post-Op Assessment Note    CV Status:  Stable    Pain management: adequate       Mental Status:  Alert and awake   Hydration Status:  Euvolemic   PONV Controlled:  Controlled   Airway Patency:  Patent     Post Op Vitals Reviewed: Yes    No anethesia notable event occurred.    Staff: CRNA           Last Filed PACU Vitals:  Vitals Value Taken Time   Temp 97.1 °F (36.2 °C) 10/10/24 1042   Pulse 71 10/10/24 1042   /89 10/10/24 1042   Resp 18 10/10/24 1042   SpO2 100 % 10/10/24 1042       Modified Jason:  Activity: 2 (10/10/2024  9:37 AM)  Respiration: 2 (10/10/2024  9:37 AM)  Circulation: 2 (10/10/2024  9:37 AM)  Consciousness: 2 (10/10/2024  9:37 AM)  Oxygen Saturation: 2 (10/10/2024  9:37 AM)  Modified Jason Score: 10 (10/10/2024  9:37 AM)

## 2024-10-10 NOTE — ANESTHESIA PREPROCEDURE EVALUATION
Procedure:  EGD    Relevant Problems   CARDIO   (+) Hypertension      GI/HEPATIC   (+) Dysphagia        Physical Exam    Airway    Mallampati score: II  TM Distance: >3 FB       Dental   No notable dental hx     Cardiovascular  Rhythm: regular, Rate: normal, Cardiovascular exam normal    Pulmonary  Pulmonary exam normal Breath sounds clear to auscultation    Other Findings        Anesthesia Plan  ASA Score- 2     Anesthesia Type- IV sedation with anesthesia with ASA Monitors.         Additional Monitors:     Airway Plan:            Plan Factors-Exercise tolerance (METS): >4 METS.    Chart reviewed. EKG reviewed. Imaging results reviewed. Existing labs reviewed. Patient summary reviewed.    Patient is not a current smoker.  Patient did not smoke on day of surgery.            Induction- intravenous.    Postoperative Plan-     Perioperative Resuscitation Plan - Level 1 - Full Code.       Informed Consent- Anesthetic plan and risks discussed with patient.  I personally reviewed this patient with the CRNA. Discussed and agreed on the Anesthesia Plan with the CRNA..

## 2024-10-10 NOTE — INTERVAL H&P NOTE
H&P reviewed. After examining the patient I find no changes in the patients condition since the H&P had been written.    Vitals:    10/10/24 0943   BP: 157/85   Pulse: 79   Resp: 16   Temp: (!) 97.3 °F (36.3 °C)   SpO2: 99%

## 2024-10-11 ENCOUNTER — HOSPITAL ENCOUNTER (OUTPATIENT)
Dept: INFUSION CENTER | Facility: HOSPITAL | Age: 69
End: 2024-10-11
Attending: INTERNAL MEDICINE

## 2024-10-11 ENCOUNTER — APPOINTMENT (OUTPATIENT)
Dept: LAB | Facility: HOSPITAL | Age: 69
End: 2024-10-11
Payer: MEDICARE

## 2024-10-11 DIAGNOSIS — C01 MALIGNANT NEOPLASM OF BASE OF TONGUE (HCC): ICD-10-CM

## 2024-10-11 LAB
ALBUMIN SERPL BCG-MCNC: 4.2 G/DL (ref 3.5–5)
ALP SERPL-CCNC: 69 U/L (ref 34–104)
ALT SERPL W P-5'-P-CCNC: 37 U/L (ref 7–52)
ANION GAP SERPL CALCULATED.3IONS-SCNC: 7 MMOL/L (ref 4–13)
AST SERPL W P-5'-P-CCNC: 33 U/L (ref 13–39)
BASOPHILS # BLD AUTO: 0.01 THOUSANDS/ΜL (ref 0–0.1)
BASOPHILS NFR BLD AUTO: 0 % (ref 0–1)
BILIRUB SERPL-MCNC: 0.55 MG/DL (ref 0.2–1)
BUN SERPL-MCNC: 22 MG/DL (ref 5–25)
CALCIUM SERPL-MCNC: 9.3 MG/DL (ref 8.4–10.2)
CHLORIDE SERPL-SCNC: 102 MMOL/L (ref 96–108)
CO2 SERPL-SCNC: 29 MMOL/L (ref 21–32)
CREAT SERPL-MCNC: 0.8 MG/DL (ref 0.6–1.3)
EOSINOPHIL # BLD AUTO: 0.12 THOUSAND/ΜL (ref 0–0.61)
EOSINOPHIL NFR BLD AUTO: 2 % (ref 0–6)
ERYTHROCYTE [DISTWIDTH] IN BLOOD BY AUTOMATED COUNT: 12.4 % (ref 11.6–15.1)
GFR SERPL CREATININE-BSD FRML MDRD: 91 ML/MIN/1.73SQ M
GLUCOSE SERPL-MCNC: 122 MG/DL (ref 65–140)
HCT VFR BLD AUTO: 43.2 % (ref 36.5–49.3)
HGB BLD-MCNC: 13.9 G/DL (ref 12–17)
IMM GRANULOCYTES # BLD AUTO: 0.03 THOUSAND/UL (ref 0–0.2)
IMM GRANULOCYTES NFR BLD AUTO: 1 % (ref 0–2)
LDH SERPL-CCNC: 119 U/L (ref 140–271)
LYMPHOCYTES # BLD AUTO: 0.76 THOUSANDS/ΜL (ref 0.6–4.47)
LYMPHOCYTES NFR BLD AUTO: 13 % (ref 14–44)
MAGNESIUM SERPL-MCNC: 2 MG/DL (ref 1.9–2.7)
MCH RBC QN AUTO: 28.2 PG (ref 26.8–34.3)
MCHC RBC AUTO-ENTMCNC: 32.2 G/DL (ref 31.4–37.4)
MCV RBC AUTO: 88 FL (ref 82–98)
MONOCYTES # BLD AUTO: 0.32 THOUSAND/ΜL (ref 0.17–1.22)
MONOCYTES NFR BLD AUTO: 6 % (ref 4–12)
NEUTROPHILS # BLD AUTO: 4.6 THOUSANDS/ΜL (ref 1.85–7.62)
NEUTS SEG NFR BLD AUTO: 78 % (ref 43–75)
NRBC BLD AUTO-RTO: 0 /100 WBCS
PLATELET # BLD AUTO: 192 THOUSANDS/UL (ref 149–390)
PMV BLD AUTO: 9.9 FL (ref 8.9–12.7)
POTASSIUM SERPL-SCNC: 3.8 MMOL/L (ref 3.5–5.3)
PROT SERPL-MCNC: 7.1 G/DL (ref 6.4–8.4)
RBC # BLD AUTO: 4.93 MILLION/UL (ref 3.88–5.62)
SODIUM SERPL-SCNC: 138 MMOL/L (ref 135–147)
WBC # BLD AUTO: 5.84 THOUSAND/UL (ref 4.31–10.16)

## 2024-10-11 PROCEDURE — 80053 COMPREHEN METABOLIC PANEL: CPT

## 2024-10-11 PROCEDURE — 85025 COMPLETE CBC W/AUTO DIFF WBC: CPT

## 2024-10-11 PROCEDURE — 77386 HB NTSTY MODUL RAD TX DLVR CPLX: CPT | Performed by: RADIOLOGY

## 2024-10-11 PROCEDURE — 83615 LACTATE (LD) (LDH) ENZYME: CPT

## 2024-10-11 PROCEDURE — 83735 ASSAY OF MAGNESIUM: CPT

## 2024-10-11 PROCEDURE — 77336 RADIATION PHYSICS CONSULT: CPT | Performed by: RADIOLOGY

## 2024-10-11 PROCEDURE — 36415 COLL VENOUS BLD VENIPUNCTURE: CPT

## 2024-10-11 PROCEDURE — 77014 CHG CT GUIDANCE RADIATION THERAPY FLDS PLACEMENT: CPT | Performed by: RADIOLOGY

## 2024-10-14 ENCOUNTER — TELEPHONE (OUTPATIENT)
Age: 69
End: 2024-10-14

## 2024-10-14 ENCOUNTER — PATIENT OUTREACH (OUTPATIENT)
Dept: CASE MANAGEMENT | Facility: OTHER | Age: 69
End: 2024-10-14

## 2024-10-14 ENCOUNTER — TELEPHONE (OUTPATIENT)
Dept: NUTRITION | Facility: HOSPITAL | Age: 69
End: 2024-10-14

## 2024-10-14 ENCOUNTER — HOSPITAL ENCOUNTER (OUTPATIENT)
Dept: INFUSION CENTER | Facility: HOSPITAL | Age: 69
Discharge: HOME | End: 2024-10-14
Attending: INTERNAL MEDICINE
Payer: MEDICARE

## 2024-10-14 VITALS
DIASTOLIC BLOOD PRESSURE: 90 MMHG | OXYGEN SATURATION: 98 % | WEIGHT: 167.11 LBS | HEIGHT: 70 IN | SYSTOLIC BLOOD PRESSURE: 157 MMHG | TEMPERATURE: 97.4 F | BODY MASS INDEX: 23.92 KG/M2 | HEART RATE: 62 BPM

## 2024-10-14 DIAGNOSIS — C01 MALIGNANT NEOPLASM OF BASE OF TONGUE (HCC): Primary | ICD-10-CM

## 2024-10-14 PROCEDURE — 96367 TX/PROPH/DG ADDL SEQ IV INF: CPT

## 2024-10-14 PROCEDURE — 96413 CHEMO IV INFUSION 1 HR: CPT

## 2024-10-14 PROCEDURE — 96375 TX/PRO/DX INJ NEW DRUG ADDON: CPT

## 2024-10-14 PROCEDURE — 96361 HYDRATE IV INFUSION ADD-ON: CPT

## 2024-10-14 RX ORDER — SODIUM CHLORIDE 9 MG/ML
20 INJECTION, SOLUTION INTRAVENOUS ONCE
Status: COMPLETED | OUTPATIENT
Start: 2024-10-14 | End: 2024-10-14

## 2024-10-14 RX ORDER — PALONOSETRON 0.05 MG/ML
0.25 INJECTION, SOLUTION INTRAVENOUS ONCE
Status: COMPLETED | OUTPATIENT
Start: 2024-10-14 | End: 2024-10-14

## 2024-10-14 RX ORDER — SODIUM CHLORIDE 9 MG/ML
20 INJECTION, SOLUTION INTRAVENOUS ONCE
Status: CANCELLED | OUTPATIENT
Start: 2024-10-21

## 2024-10-14 RX ORDER — PALONOSETRON 0.05 MG/ML
0.25 INJECTION, SOLUTION INTRAVENOUS ONCE
Status: CANCELLED | OUTPATIENT
Start: 2024-10-21

## 2024-10-14 RX ADMIN — FOSAPREPITANT 150 MG: 150 INJECTION, POWDER, LYOPHILIZED, FOR SOLUTION INTRAVENOUS at 09:53

## 2024-10-14 RX ADMIN — CISPLATIN 70 MG: 1 INJECTION, SOLUTION INTRAVENOUS at 10:34

## 2024-10-14 RX ADMIN — SODIUM CHLORIDE 500 ML: 0.9 INJECTION, SOLUTION INTRAVENOUS at 08:40

## 2024-10-14 RX ADMIN — PALONOSETRON HYDROCHLORIDE 0.25 MG: 0.25 INJECTION INTRAVENOUS at 09:52

## 2024-10-14 RX ADMIN — SODIUM CHLORIDE 20 ML/HR: 0.9 INJECTION, SOLUTION INTRAVENOUS at 08:40

## 2024-10-14 RX ADMIN — SODIUM CHLORIDE 500 ML: 0.9 INJECTION, SOLUTION INTRAVENOUS at 11:48

## 2024-10-14 NOTE — TELEPHONE ENCOUNTER
Patient states he needs to RS Radiation Treatment scheduled today at 9:45am at [facility]

## 2024-10-14 NOTE — TELEPHONE ENCOUNTER
Spoke with pt he is starting to have difficulty swallowing and would like to get his feeding supplies ordered. Pt has samples that RD supplied him with to use until he supplies arrive. I will message Rd to let her know he wants to start ordering tube feeds.

## 2024-10-14 NOTE — PROGRESS NOTES
Pt is here for tx. In assessing him for SEs, he states he is having a lot of trouble swallowing, even his tiny pills. He denies pain with swallowing, denies mucous, but feels as if there is a large lump in his throat making it difficult to swallow. He is concerned and as such, cx'd his radiation for today and is hoping to speak to the radiation doc and is asking to be scoped. He is swishing with salt water, baking soda as ordered and using the nystatin swish. As per Micaela Whiting RN, ok to proceed with chemotherapy treatment.

## 2024-10-14 NOTE — PROGRESS NOTES
..Jesus Hdz  tolerated treatment well with no complications.      Jesus Hdz is aware of future appt on 10/18 at 9:30.     AVS printed and given to Jesus Hdz:  No (Declined by Jesus Hdz)

## 2024-10-14 NOTE — TELEPHONE ENCOUNTER
Pt called with questions regarding how to use Peg tube. Call was transferred to [facility].

## 2024-10-14 NOTE — TELEPHONE ENCOUNTER
Radiation Therapists aware. Radiation Staff will discuss timing with patient while in infusion center

## 2024-10-15 ENCOUNTER — TELEPHONE (OUTPATIENT)
Age: 69
End: 2024-10-15

## 2024-10-15 ENCOUNTER — CONSULT (OUTPATIENT)
Dept: PALLIATIVE MEDICINE | Facility: CLINIC | Age: 69
End: 2024-10-15

## 2024-10-15 VITALS
BODY MASS INDEX: 23.79 KG/M2 | HEART RATE: 108 BPM | SYSTOLIC BLOOD PRESSURE: 140 MMHG | OXYGEN SATURATION: 99 % | WEIGHT: 165.79 LBS | DIASTOLIC BLOOD PRESSURE: 70 MMHG | TEMPERATURE: 96.2 F

## 2024-10-15 DIAGNOSIS — K12.30 MUCOSITIS: Primary | ICD-10-CM

## 2024-10-15 DIAGNOSIS — C01 MALIGNANT NEOPLASM OF BASE OF TONGUE (HCC): ICD-10-CM

## 2024-10-15 PROCEDURE — 77014 CHG CT GUIDANCE RADIATION THERAPY FLDS PLACEMENT: CPT | Performed by: RADIOLOGY

## 2024-10-15 PROCEDURE — 77427 RADIATION TX MANAGEMENT X5: CPT | Performed by: RADIOLOGY

## 2024-10-15 PROCEDURE — 77386 HB NTSTY MODUL RAD TX DLVR CPLX: CPT | Performed by: RADIOLOGY

## 2024-10-15 RX ORDER — GABAPENTIN 250 MG/5ML
250 SOLUTION ORAL
Qty: 470 ML | Refills: 0 | Status: SHIPPED | OUTPATIENT
Start: 2024-10-15

## 2024-10-15 NOTE — ASSESSMENT & PLAN NOTE
Orders:    Ambulatory referral to Palliative Care    Gabapentin (NEURONTIN) 300 mg/6mL solution; Take 5 mL (250 mg total) by mouth daily at bedtime as needed (pain, insomnia) May reduce dose for daytime grogginess

## 2024-10-15 NOTE — PROGRESS NOTES
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)    Orders:    Ambulatory referral to Palliative Care    Gabapentin (NEURONTIN) 300 mg/6mL solution; Take 5 mL (250 mg total) by mouth daily at bedtime as needed (pain, insomnia) May reduce dose for daytime grogginess    Mucositis    Orders:    Gabapentin (NEURONTIN) 300 mg/6mL solution; Take 5 mL (250 mg total) by mouth daily at bedtime as needed (pain, insomnia) May reduce dose for daytime grogginess      Narrative rationale for today's treatments:  - Pt would prefer naturopathic approach to care, where possible and effective  - Pt has had possible flushing reaction to steroids; these are withheld at this time  - Pt encouraged to manage symptoms and nutrition aggressively, in order to be able to complete treatments and achieve best evidence-based outcomes   = Pt will continue to intermittently fast on or near chemo days.  - Return to clinic: 1mo     PDMP Review: I have reviewed the patient's controlled substance dispensing history in the Prescription Drug Monitoring Program in compliance with the [facility] regulations before prescribing any controlled substances.    Erwin Paulino MD  Palliative and Supportive Care  Clinic/Answering Service: [phone]  You can find me on [employer] Secure Chat!       History of Present Illness     Jesus Hdz is a 69 y.o. male who presents as a referral from Dr. 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  Pt also follows with Dr. Talbot of Med/Onc.      Prior to our visit, pt has undergone partial glossectomy and pharyngectomy.  He is rec'd to start on adjuvant chemorads now, after placement of PEG for nutrition.  For better or worse,  treatment started before PEG placement, and this has been placed.    Today in office, he is now noting globus sensation and some mild/mod odynophagia, just since Sunday.  He has been concerned that he should not continue with XRT as he is noting worsening symptoms.      He does have his feeding tube in place, and has been flushing it to keep it clean, but he has not been using it for nutrition nor meds as of yet.      Importantly, he has a holistic DO provider that is supporting him in naturopathic approach to complement his formal chemorads.  For the most part, this is cinnamon and connor and a few other plant based compounds.  He is additionally engaging in a planned fast (500 Cl) on the day PRIOR to chemo infusions.      Additionally, he has been struggling rather mightily to have restorative sleep.  Recently, his sleep (which is usually fragmented, but with low latency), has been very shallow and non-restorative.      Today in office, we reviewed that we have many options to improve his symptom control, and that our evidence base suggests that he should do his best to complete all treatments as scheduled / advised.  Complementary therapies might best function when added to usual therapies.    Past medical, surgical, social, and family histories are reviewed and pertinent updates and considerations are included in the above narrative.        Objective     /70 (BP Location: Left arm, Patient Position: Sitting, Cuff Size: Standard)   Pulse (!) 108   Temp (!) 96.2 °F (35.7 °C) (Temporal)   Wt 75.2 kg (165 lb 12.6 oz)   SpO2 99%   BMI 23.79 kg/m²     Physical Exam  Constitutional:       General: He is not in acute distress.     Appearance: He is well-developed. He is not ill-appearing or diaphoretic.   HENT:      Head: Normocephalic and atraumatic.      Right Ear: External ear normal.      Left Ear: External ear normal.   Eyes:      General:         Right eye: No discharge.         Left eye: No discharge.       Conjunctiva/sclera: Conjunctivae normal.      Pupils: Pupils are equal, round, and reactive to light.   Neck:      Trachea: No tracheal deviation.   Pulmonary:      Effort: Pulmonary effort is normal. No respiratory distress.      Breath sounds: No stridor.   Abdominal:      Palpations: Abdomen is soft.   Musculoskeletal:         General: No deformity.   Skin:     Coloration: Skin is not pale.      Findings: No erythema or rash.   Neurological:      General: No focal deficit present.      Mental Status: He is alert and oriented to person, place, and time. Mental status is at baseline.      Cranial Nerves: No cranial nerve deficit.   Psychiatric:         Mood and Affect: Mood normal.         Behavior: Behavior normal.         Thought Content: Thought content normal.         Judgment: Judgment normal.         Recent data: none new; reviewed historical      Administrative Statements   I have spent a total time of 45+ minutes in caring for this patient on the day of the visit/encounter including  . chart review; symptom pursuit; supportive listening; anticipatory guidance.

## 2024-10-15 NOTE — Clinical Note
Appreciate the referral.  Advised pt that persons completing the full course of chemorads as ordered have better long-term survival from cancer / recurrence.  He expressed understanding, and will try to more aggressively manage symptoms in order to complete therapies.

## 2024-10-16 ENCOUNTER — NUTRITION (OUTPATIENT)
Dept: NUTRITION | Facility: HOSPITAL | Age: 69
End: 2024-10-16

## 2024-10-16 ENCOUNTER — TELEPHONE (OUTPATIENT)
Age: 69
End: 2024-10-16

## 2024-10-16 DIAGNOSIS — C01 MALIGNANT NEOPLASM OF BASE OF TONGUE (HCC): Primary | ICD-10-CM

## 2024-10-16 DIAGNOSIS — E86.0 DEHYDRATION: ICD-10-CM

## 2024-10-16 PROCEDURE — 77014 CHG CT GUIDANCE RADIATION THERAPY FLDS PLACEMENT: CPT | Performed by: RADIOLOGY

## 2024-10-16 PROCEDURE — 77386 HB NTSTY MODUL RAD TX DLVR CPLX: CPT | Performed by: RADIOLOGY

## 2024-10-16 NOTE — TELEPHONE ENCOUNTER
Pt. Calling to review care instruction of G tube, advised he can shower after 1 week and keep area clean and dry, pt. Verbalized understanding, no further review needed

## 2024-10-16 NOTE — PROGRESS NOTES
Outpatient Oncology Nutrition Consultation   Type of Consult: Follow Up  Care Location: Radiation Oncology    Reason for referral: Received notification by  Dr. Talbot  on 9/18 that pt has triggered for oncology nutrition care (reason for referral: HNC dx and TF).    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     See Note.     -- Confirmed by positive Pankeratin (CKAE1/3), p40 and normal wild-type expression       on p53 immunostains.    -- p16 immunostain positive (>70% diffuse and strong nuclear and cytoplasmic        staining); high-risk HPV EH pending; result will be reported in an addendum.      B. Lymph Node, Regional Resection, Level 2 lymph nodes, see comments:  - Metastatic squamous cell carcinoma, keratinizing, involving at least two lymph nodes (2/2).     -- Present in 1.0 cm lymph node and additional detached lymph node(s)/fragments.     -- Largest metastatic focus: 0.3 cm; Extranodal extension: Not identified.    -- Confirmed by positive Pankeratin (CKAE1/3) and p40 immunostains.    -- p16 immunostain positive (>70% diffuse and strong nuclear and cytoplasmic        staining).     C. Lymph Node, Regional Resection, Additional level 2 lymph nodes:  - Metastatic squamous cell carcinoma, keratinizing, involving 3 of 4 lymph     nodes/fragments (3/4).     -- Largest metastatic focus: 1 cm; Extranodal extension: Present, extranodal         lymphovascular invasion identified.    -- Confirmed by positive Pankeratin (CKAE1/3) and p40  Referral placed immunostains.    -- p16 immunostain positive (>70% diffuse and strong nuclear and cytoplasmic        staining); high-risk HPV EH pending; result will be reported in an addendum. See Note.      D. Lymph Node, Level 3 lymph nodes, lymph adenectomy:  - One lymph node positive for metastatic squamous cell carcinoma, keratinizing (1/1).     -- Largest metastatic focus: 1.1 cm; Extranodal extension: Not identified.    -- Confirmed by positive Pankeratin (CKAE1/3) and p40 immunostains.    -- p16 immunostain positive (>70% diffuse and strong nuclear and cytoplasmic        staining).  - Portion(s) of benign salivary gland with adjacent detached small (< 0.2 cm) portion      of carcinoma.   - Traumatic neuroma.          Comments:   This is an appended report. These results have been appended to a previously preliminary verified report.           7/3/2024 -  Cancer Staged    Staging form: Pharynx - Oropharynx, AJCC 8th Edition  - Clinical stage from 7/3/2024: Stage I (cT1, cN1, cM0, p16+) - Signed by Fady Morrow MD on 7/3/2024  Stage prefix: Initial diagnosis       9/30/2024 -  Chemotherapy    alteplase (CATHFLO), 2 mg, Intracatheter, Every 1 Minute as needed, 3 of 7 cycles  palonosetron (ALOXI), 0.25 mg, Intravenous, Once, 2 of 6 cycles  Administration: 0.25 mg (10/7/2024), 0.25 mg (10/14/2024)  CISplatin (PLATINOL) infusion, 70 mg (original dose 40 mg/m2), Intravenous, Once, 3 of 7 cycles  Dose modification: 70 mg (original dose 40 mg/m2, Cycle 1, Reason: Other (Must fill in a comment), Comment: pharmacy dictates)  Administration: 70 mg (9/30/2024), 70 mg (10/7/2024), 70 mg (10/14/2024)  sodium chloride, 500 mL, Intravenous, Once, 3 of 7 cycles  Administration: 500 mL (9/30/2024), 500 mL (9/30/2024), 500 mL (10/7/2024), 500 mL (10/7/2024), 500 mL (10/14/2024), 500 mL (10/14/2024)  fosaprepitant (EMEND) IVPB, 150 mg, Intravenous, Once, 3 of 7 cycles  Administration: 150 mg (9/30/2024), 150 mg (10/7/2024), 150 mg  (10/14/2024)       Past Medical & Surgical Hx:   Patient Active Problem List   Diagnosis    Mass of right side of neck    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)    Status post radical dissection of neck    Tongue pain    Dysphagia    Hypertension    Protein-calorie malnutrition (HCC)    Chemotherapy-induced nausea    Dehydration     Past Medical History:   Diagnosis Date    Facial basal cell cancer     GERD (gastroesophageal reflux disease)     diet controlled    Hypertension     Throat cancer (HCC)     Vitamin D deficiency      Past Surgical History:   Procedure Laterality Date    CHOLECYSTECTOMY      COLONOSCOPY N/A 03/22/2016    Procedure: COLONOSCOPY;  Surgeon: Daren Montana MD;  Location: [facility];  Service:     OR CERVICAL LYMPHADEC MODIFIED RADICAL NECK DSJ Right 09/04/2019    Procedure: MODIFIED RADICAL NECK DISSECTION;  Surgeon: Fady Montana MD;  Location: [facility];  Service: ENT    OR CYSTO INSERTION TRANSPROSTATIC IMPLANT SINGLE N/A 04/14/2023    Procedure: CYSTOSCOPY WITH INSERTION UROLIFT;  Surgeon: Rubin Alfonso MD;  [facility];  Service: Urology    OR LAPAROSCOPY SURG CHOLECYSTECTOMY N/A 10/30/2019    Procedure: CHOLECYSTECTOMY LAPAROSCOPIC;  Surgeon: Christiano Cornejo MD;  Location: [facility];  Service: General    OR LARYNGOSCOPY DIRECT OPERATIVE W/BIOPSY N/A 07/17/2019    Procedure: MICROLARYNGOSCOPY DIRECT WITH BIOPSY OF VALLECULAR MASS;  Surgeon: Amol Cordova MD;  Location: [facility];  Service: ENT    OR LARYNGOSCOPY DIRECT OPERATIVE W/BIOPSY N/A 6/19/2024    Procedure: DIRECT LARYNGOSCOPY WITH BIOPSY WITH RIGHT NECK DISSECTION LEVEL TWO AND THREE;  Surgeon: Fady Montana MD;  [facility];  Service: ENT    RADICAL NECK DISSECTION Right 6/19/2024    Procedure: DISSECTION NECK RADICAL;  Surgeon: Fady Montana MD;  [facility];  Service: ENT    RHINOPLASTY      SINUS SURGERY  1994    TRANSORAL ROBOTIC SURGERY (TORS) N/A 07/31/2019     "Procedure: ROBOTICALLY ASSISTED PARTIAL GLOSSECTOMY, PARTIAL PHARYNGECTOMY;  Surgeon: Fady Montana MD;  Location[facility];  Service: ENT    UPPER GASTROINTESTINAL ENDOSCOPY      US GUIDED LYMPH NODE BIOPSY RIGHT  5/6/2024    WISDOM TOOTH EXTRACTION         Review of Medications:   Vitamins, Supplements and Herbals: Taking variety of herbal supplements by naturopathic/ Ayurvedic doctor:   Genistein, Artemisinin, Apigenin, VitD3, Amla (Vit C), CoQ10, Frankincense, Angiostop(Sea Cucumber), Artecin, C-statin, PauD Arco, Black Cumin/Black seed oil. Alternating with: [place], Curcumin, Indole 3 Carbinol, Quercetin, Resveratrol, Vascustatin, Cronaxal (Benegene), Lycopene, Agaricus Murill + IV: Kaplan or Immunity 15   + Immune support tea, Herbal detox tea, Sinus care Jam, Acid balance tincture  He takes these Wednesday-Saturday. Reviewed w/ patient in detail that herbal supplements can interact with treatment and have negative side effects.  Refered [place] to [facility] [place]About Herbs\" website for further information on safety/effectiveness/interactions of supplements.      Current Outpatient Medications:     Allergy Relief 25 MG/10ML oral liquid, , Disp: , Rfl:     amLODIPine (NORVASC) 5 mg tablet, Take 5 mg by mouth daily at bedtime , Disp: , Rfl:     amoxicillin-clavulanate (AUGMENTIN) 875-125 mg per tablet, TAKE TWO TABLETS BY MOUTH NOW, THEN TAKE ONE TABLET BY MOUTH EVERY 12 HOURS UNTIL FINISHED WITH FOOD (Patient not taking: Reported on 10/14/2024), Disp: , Rfl:     atovaquone-proguanil (MALARONE) 250-100 mg, Take 1 tablet by mouth daily, Disp: , Rfl:     Cholecalciferol (Vitamin D3) 125 MCG (5000 UT) TABS, every 24 hours, Disp: , Rfl:     Cholecalciferol (Vitamin D3) LIQD, Use, Disp: , Rfl:     diphenhydramine, lidocaine, Al/Mg hydroxide, simethicone (Magic Mouthwash) SUSP, Swish and swallow 10 mL every 4 (four) hours as needed for mouth pain or discomfort, Disp: 500 mL, Rfl: 4    Gabapentin (NEURONTIN) 300 mg/6mL solution, " "Take 5 mL (250 mg total) by mouth daily at bedtime as needed (pain, insomnia) May reduce dose for daytime grogginess, Disp: 470 mL, Rfl: 0    lisinopril (ZESTRIL) 10 mg tablet, Take 10 mg by mouth daily at bedtime , Disp: , Rfl:     Misc Natural Products (ADRENAL PO), 1 orally as directed, Disp: , Rfl:     nystatin (MYCOSTATIN) 500,000 units/5 mL suspension, Apply 5 mL (500,000 Units total) to the mouth or throat 4 (four) times a day, Disp: 400 mL, Rfl: 3    ondansetron (ZOFRAN-ODT) 8 mg disintegrating tablet, Take 1 tablet (8 mg total) by mouth every 8 (eight) hours as needed for nausea or vomiting, Disp: 30 tablet, Rfl: 3    Probiotic Product (PROBIOTIC PO), as directed Orally, Disp: , Rfl:     prochlorperazine (COMPAZINE) 10 mg tablet, Take 1 tablet (10 mg total) by mouth every 6 (six) hours as needed for nausea or vomiting, Disp: 30 tablet, Rfl: 3  No current facility-administered medications for this visit.    Facility-Administered Medications Ordered in Other Visits:     alteplase (CATHFLO) injection 2 mg, 2 mg, Intracatheter, Q1MIN PRN, Magali Talbot MD    Most Recent Lab Results:   Lab Results   Component Value Date    WBC 5.84 10/11/2024    NEUTROABS 4.60 10/11/2024    CHOL 246 (H) 03/03/2016    TRIG 226 (H) 03/03/2016    HDL 44 03/03/2016    ALT 37 10/11/2024    AST 33 10/11/2024    ALB 4.2 10/11/2024     03/03/2016    SODIUM 138 10/11/2024    SODIUM 136 10/04/2024    K 3.8 10/11/2024    K 4.0 10/04/2024     10/11/2024    BUN 22 10/11/2024    BUN 17 10/04/2024    CREATININE 0.80 10/11/2024    CREATININE 0.75 10/04/2024    EGFR 91 10/11/2024    TSH 1.55 08/30/2022    POCGLU 100 09/11/2024    GLUC 122 10/11/2024    HGBA1C 5.7 (H) 04/20/2023    HGBA1C 5.4 10/10/2019    CALCIUM 9.3 10/11/2024    MG 2.0 10/11/2024       Anthropometric Measurements:   Height: 70\"  Ht Readings from Last 1 Encounters:   10/14/24 5' 10\" (1.778 m)     Wt Readings from Last 20 Encounters:   10/15/24 75.2 kg (165 lb 12.6 " oz)   10/14/24 75.8 kg (167 lb 1.7 oz)   10/09/24 77.1 kg (170 lb)   10/07/24 76.4 kg (168 lb 6.9 oz)   10/02/24 78.5 kg (173 lb)   09/30/24 76.8 kg (169 lb 6.4 oz)   09/18/24 75.8 kg (167 lb)   07/16/24 76.2 kg (168 lb 0.4 oz)   07/03/24 76.7 kg (169 lb)   06/26/24 77.1 kg (170 lb)   06/19/24 77.1 kg (170 lb)   06/13/24 82.1 kg (181 lb)   04/18/24 82.1 kg (181 lb)   01/22/24 82.1 kg (181 lb)   08/28/23 80.7 kg (178 lb)   05/19/23 82.1 kg (181 lb)   04/14/23 82.3 kg (181 lb 6.4 oz)   01/05/23 82.1 kg (181 lb)   11/17/22 83.2 kg (183 lb 6.4 oz)   10/19/22 84 kg (185 lb 3.2 oz)       Weight History:   Usual Weight: 165-170#  [employer]: 10/7: 169#, 10/15: 167.6#  Home Scale: 157# (no clothes on, 10/16) - pt states this is has been stable    Oncology Nutrition-Anthropometrics      Flowsheet Row Nutrition from 10/16/2024 in [facility] Oncology Dietitian [facility] Nutrition from 10/9/2024 in [facility] Oncology Dietitian [facility]   Patient age (years): 69 years 69 years   Patient (male) height (in): 70 in 70 in   Current weight (lbs): 165.8 lbs 170 lbs   Current weight to be used for anthropometric calculations (kg) 75.4 kg 77.3 kg   BMI: 23.8 24.4   IBW male 166 lb 166 lb   IBW (kg) male 75.5 kg 75.5 kg   IBW % (male) 99.9 % 102.4 %   Adjusted BW (male): 166 lbs 167 lbs   Adjusted BW in kg (male): 75.5 kg 75.9 kg   % weight change after 1 week: -1.5 % -1.7 %   Weight change after 1 week (lbs) -2.6 lbs -3 lbs   % weight change after 1 month: -0.7 % 1.8 %   Weight change after 1 month (lbs) -1.2 lbs 3 lbs   % weight change after 3 months: -1.3 % 0.6 %   Weight change after 3 months (lbs) -2.2 lbs 1 lbs   % weight change after 6 months: -8.4 % -6.1 %   Weight change after 6 months (lbs) -15.2 lbs -11 lbs            Nutrition-Focused Physical Findings: none observed    Food/Nutrition-Related History & Client/Social History:    Current Nutrition Impact Symptoms:  [] Nausea  [] Reduced Appetite  [] Acid Reflux    [] Vomiting   [] Unintended Wt Loss  [] Malabsorption    [] Diarrhea  [] Unintended Wt Gain  [] Dumping Syndrome    [] Constipation  [] Thick Mucous/Secretions  [] Abdominal Pain    [] Dysgeusia (Altered Taste) [] Xerostomia (Dry Mouth)  [] Gas    [] Dysosmia (Altered Smell)  [] Thrush  [] Difficulty Chewing    [x] Oral Mucositis (Sore Mouth) [] Fatigue  [] Hyperglycemia   Lab Results   Component Value Date    HGBA1C 5.7 (H) 2023      [x] Odynophagia  mild [] Esophagitis  [] Other:    [x] Dysphagia -mild [] Early Satiety  [] No Problems Eating      Food Allergies & Intolerances: yes: mild shellfish - fingers swell up    Current Diet: High Calorie/High Protein - Soft/Moist  Current Nutrition Intake: Decreased since last visit /Monday difficulty swallowing but improved last 2 days  Appetite: Good, Fair   Nutrition Route: PO and PEG  Oral Care: brushes daily, gargle with tonic, water pick   Full mobility of tongue; continues with exercises from prior SLP  No sensitives of hot/cold or spicy. Has trouble with bread and melon at times.  Activity level: Usual ADL's     24 Hr Diet Recall:    Breakfast 3x scrambled eggs  Dinner last night: Hamburger, [employer]   [employer] 1/2 carton via PEG this AM - well tolerated per pt    Fasts for 36 hrs prior to chemo and 24 hrs following treatment (Sat night-Tuesday AM)    Beverages: water, chicken bone broth, 64 oz detox and herbal tea  Whey protein powder (20g)  mixed with coffee     Supplements:   Whey protein powder 1x/day.     Oncology Nutrition-Estimated Needs      Flowsheet Row Nutrition from 10/16/2024 in [facility] Oncology Dietitian [facility] Nutrition from 2024 in [facility] Oncology Dietitian [facility]   Weight type used Actual weight Actual weight   Weight in kilograms (kg) used for estimated needs 75.4 kg 77 kg   Energy needs formula:  30-35 kcal/kg 30-35 kcal/kg   Energy needs based on 30 kcal/k kcal 2310 kcal   Energy needs based on 35 kcal/k kcal  2695 kcal   Protein needs formula: 1.2-1.5 g/kg 1.2-1.5 g/kg   Protein needs based on 1.2 g/k g 92 g   Protein needs based on 1.5 g/kg 113 g 116 g   Fluid needs formula: 30-35 mL/kg 30-35 mL/kg   Fluid needs based on 30 mL/kg 2265 mL 2304 mL   Fluid needs in ounces 77 oz 78 oz   Fluid needs based on 35 mL/kg 2643 mL 2688 mL   Fluid needs in ounces 89 oz 91 oz             Discussion & Intervention:   Jesus was evaluated today for an RD follow up regarding HNC and enteral nutrition.  Jesus is currently undergoing tx for HNCx .  RD met with patient prior to RT today. Patient had PEG placed on 10/10. The site is healing well. We reviewed cleaning/care. Pt stated he was having difficulty swallowing solids on  and Monday but has remarkably improved yesterday and today. Pt has been using nystatin 4x/day since Thursday for suspicion of thrush. He denies dry mouth and thick saliva. Taste intact. His weight has remained stable within 5#. He continues to intermittent fast around chemo days. Stopped taking oral supplements due to swallowing difficulty. Pt started using cannabis gummies for sleep and also met with Palliative care for pain/sleep management. Pt tried [employer] via PEG this morning with no signs of intolerance. He is drinking >80oz fluid daily.    Reviewed 24 hour recall, which revealed an suboptimal po intake, and discussed ways to increase kcal, protein, and fluid intakes and optimize nutrient intake.  Also reviewed the importance of wt management throughout the tx process and the role of a high kcal/ high protein diet and enteral nutrition in managing wt and overall health.  Based on today's assessment, discussion included: MNT for: Enteral nutrition, tube care, how to modify foods for anticipated nutrition impact symptoms pt may experience during CA tx, practicing proper oral care, general food safety measures during cancer tx, a high kcal/protein diet & food choices to include at all  meals & snacks (Examples of high kcal foods: cheese, full-fat dairy products, nut butter, plant-based fats, coconut oil/milk, avocado, butter, cream soup, etc. Examples of high protein foods: eggs, chicken, fish, beans/legumes, nuts/nut butters, bone broth, etc.) , fortifying foods for added kcal and protein (examples include: adding cheese to foods such as eggs, mashed potatoes, casseroles, etc.; Making oatmeal with whole milk rather than water; Making fortified mashed potatoes with cream, butter, dry milk powder, plain Greek yogurt, and cheese.), adequate hydration & fluid choices, sipping on calorie containing beverages (examples include: adding whole milk or cream to coffee, oral nutrition supplements, juice, electrolyte replacement beverages, milk, etc.), eating smaller more frequent meals every 2-3 hours (5-6 small meals/day), utilizing oral nutrition supplements, adding extra fat to foods while cooking such as butter, plant-based oil, coconut oil/milk, avocado, nut butters, etc., practicing proper feeding tube use & care, adherence to and compliance with enteral nutrition plan of care, and individualized enteral nutrition recommendations & plan: [email or web address] 1.4 via PEG 5x/day    Moving forward, Jesus was encouraged to increase kcal, protein, and fluid intakes   Materials Provided:  [employer] 1.4 standard   All questions and concerns addressed during today’s visit.  Jesus has RD contact information.    Nutrition Diagnosis:   Inadequate Energy Intake related to physiological causes, disease state and treatment related issues as evidenced by food recall, wt loss and discussion with pt and/or family.  Increased Nutrient Needs (kcal & pro) related to increased demand for nutrients and disease state as evidenced by cancer dx and pt undergoing tx for cancer.  Monitoring & Evaluation:   Goals:   weight maintenance/stabilization  adequate nutrition impact symptom management  pt to meet >/=75% estimated  nutrition needs daily    Progress Towards Goals: Progressing    Nutrition Rx & Recommendations:  Diet: High Kcal, High Protein; Enteral nutrition   Liquid nutrition may be better tolerated than solids at times.  Alter food choices and eating patterns to accommodate changing needs.  Refer to Eating Hints book for other meal/snack ideas and symptom management.  Avoid spicy, acidic, sharp/hard/crunchy foods & carbonated beverages as needed.  Follow proper oral care; Try baking soda/salt water rinse recipe (mix 3/4 tsp salt + 1 tsp baking soda + 1 qt water; rinse with plain water after using) in Eating Hints book (pg 18).  Brush your teeth before/after meals & before bed.  For trouble swallowing: eat 5-6 small meals/day; choose foods that are easy to swallow; choose foods that are high in protein & calories; cook foods until they are soft/tender; cut food into small pieces; moisten & soften foods (gravy/sauce/broth/yogurt); sip drinks through a straw (as tolerated); avoid foods/drinks that can burn/scrape your throat (hot temperatures, spicy foods, acidic foods, sharp/hard/crunchy foods, alcohol); talk to your doctor about a Speech Therapy referral for a swallowing evaluation (see page 26-28 in your Eating Hints book).  Weigh yourself regularly. If you notice weight loss, make an effort to increase your daily food/calorie intake. If you continue to notice loss after these efforts, reach out to your dietitian to establish a plan to stabilize weight.  Continue to eat by mouth, as appropriate/tolerated, as much as possible.  Your syringes are each 60 mL or 2 fl oz.  Always flush your feeding tube with 60 mL room-temp water 1-2 times daily while feeding tube is not in use.  In the event enteral nutrition is needed for Christopher's sole source of nutrition, recommend:  Initiation: Recommend initiating bolus TF with a 160 mL bolus 5 times per day (every 2-3 hours, as tolerated).  Once tolerating this, each day begin to  slowly increase the volume at each feeding until bolus volume goal is achieved (see below).  TF Goal: 1 container (325 mL) 5 times per day of [employer] 1.4 via PEG (push syringe or gravity syringe method to administer boluses; 1 container to infuse over ~15-20 minutes).   Water Flushin mL free water flush pre/post each bolus (total of 600 mL/day in flushes; flushes spread throughout the day; will need to adjust flushes prn).   Enteral Nutrition goal to provide: 1625 mL volume (5 containers/day), 2275 kcal, 100 grams protein, 1154 mL free water    Follow Up Plan:  10/21 during infusion  Recommend Referral to Other Providers: none at this time

## 2024-10-17 PROCEDURE — 77386 HB NTSTY MODUL RAD TX DLVR CPLX: CPT | Performed by: RADIOLOGY

## 2024-10-17 PROCEDURE — 77014 CHG CT GUIDANCE RADIATION THERAPY FLDS PLACEMENT: CPT | Performed by: RADIOLOGY

## 2024-10-18 ENCOUNTER — APPOINTMENT (OUTPATIENT)
Dept: LAB | Facility: HOSPITAL | Age: 69
End: 2024-10-18
Payer: MEDICARE

## 2024-10-18 ENCOUNTER — HOSPITAL ENCOUNTER (OUTPATIENT)
Dept: INFUSION CENTER | Facility: HOSPITAL | Age: 69
Discharge: HOME | End: 2024-10-18
Attending: INTERNAL MEDICINE

## 2024-10-18 DIAGNOSIS — C01 MALIGNANT NEOPLASM OF BASE OF TONGUE (HCC): ICD-10-CM

## 2024-10-18 LAB
ALBUMIN SERPL BCG-MCNC: 4.1 G/DL (ref 3.5–5)
ALP SERPL-CCNC: 68 U/L (ref 34–104)
ALT SERPL W P-5'-P-CCNC: 24 U/L (ref 7–52)
ANION GAP SERPL CALCULATED.3IONS-SCNC: 6 MMOL/L (ref 4–13)
AST SERPL W P-5'-P-CCNC: 15 U/L (ref 13–39)
BASOPHILS # BLD AUTO: 0.01 THOUSANDS/ΜL (ref 0–0.1)
BASOPHILS NFR BLD AUTO: 0 % (ref 0–1)
BILIRUB SERPL-MCNC: 0.4 MG/DL (ref 0.2–1)
BUN SERPL-MCNC: 15 MG/DL (ref 5–25)
CALCIUM SERPL-MCNC: 9 MG/DL (ref 8.4–10.2)
CHLORIDE SERPL-SCNC: 103 MMOL/L (ref 96–108)
CO2 SERPL-SCNC: 31 MMOL/L (ref 21–32)
CREAT SERPL-MCNC: 0.72 MG/DL (ref 0.6–1.3)
EOSINOPHIL # BLD AUTO: 0.11 THOUSAND/ΜL (ref 0–0.61)
EOSINOPHIL NFR BLD AUTO: 3 % (ref 0–6)
ERYTHROCYTE [DISTWIDTH] IN BLOOD BY AUTOMATED COUNT: 12.5 % (ref 11.6–15.1)
GFR SERPL CREATININE-BSD FRML MDRD: 95 ML/MIN/1.73SQ M
GLUCOSE SERPL-MCNC: 77 MG/DL (ref 65–140)
HCT VFR BLD AUTO: 44.3 % (ref 36.5–49.3)
HGB BLD-MCNC: 14.1 G/DL (ref 12–17)
IMM GRANULOCYTES # BLD AUTO: 0.02 THOUSAND/UL (ref 0–0.2)
IMM GRANULOCYTES NFR BLD AUTO: 1 % (ref 0–2)
LDH SERPL-CCNC: 106 U/L (ref 140–271)
LYMPHOCYTES # BLD AUTO: 0.46 THOUSANDS/ΜL (ref 0.6–4.47)
LYMPHOCYTES NFR BLD AUTO: 13 % (ref 14–44)
MAGNESIUM SERPL-MCNC: 1.9 MG/DL (ref 1.9–2.7)
MCH RBC QN AUTO: 28.2 PG (ref 26.8–34.3)
MCHC RBC AUTO-ENTMCNC: 31.8 G/DL (ref 31.4–37.4)
MCV RBC AUTO: 89 FL (ref 82–98)
MONOCYTES # BLD AUTO: 0.33 THOUSAND/ΜL (ref 0.17–1.22)
MONOCYTES NFR BLD AUTO: 9 % (ref 4–12)
NEUTROPHILS # BLD AUTO: 2.69 THOUSANDS/ΜL (ref 1.85–7.62)
NEUTS SEG NFR BLD AUTO: 74 % (ref 43–75)
NRBC BLD AUTO-RTO: 0 /100 WBCS
PLATELET # BLD AUTO: 209 THOUSANDS/UL (ref 149–390)
PMV BLD AUTO: 10 FL (ref 8.9–12.7)
POTASSIUM SERPL-SCNC: 3.8 MMOL/L (ref 3.5–5.3)
PROT SERPL-MCNC: 6.9 G/DL (ref 6.4–8.4)
RBC # BLD AUTO: 5 MILLION/UL (ref 3.88–5.62)
SODIUM SERPL-SCNC: 140 MMOL/L (ref 135–147)
WBC # BLD AUTO: 3.62 THOUSAND/UL (ref 4.31–10.16)

## 2024-10-18 PROCEDURE — 83735 ASSAY OF MAGNESIUM: CPT

## 2024-10-18 PROCEDURE — 85025 COMPLETE CBC W/AUTO DIFF WBC: CPT

## 2024-10-18 PROCEDURE — 77014 CHG CT GUIDANCE RADIATION THERAPY FLDS PLACEMENT: CPT | Performed by: RADIOLOGY

## 2024-10-18 PROCEDURE — 80053 COMPREHEN METABOLIC PANEL: CPT

## 2024-10-18 PROCEDURE — 36415 COLL VENOUS BLD VENIPUNCTURE: CPT

## 2024-10-18 PROCEDURE — 77386 HB NTSTY MODUL RAD TX DLVR CPLX: CPT | Performed by: RADIOLOGY

## 2024-10-18 PROCEDURE — 83615 LACTATE (LD) (LDH) ENZYME: CPT

## 2024-10-21 ENCOUNTER — NUTRITION (OUTPATIENT)
Dept: NUTRITION | Facility: HOSPITAL | Age: 69
End: 2024-10-21

## 2024-10-21 ENCOUNTER — HOSPITAL ENCOUNTER (OUTPATIENT)
Dept: INFUSION CENTER | Facility: HOSPITAL | Age: 69
Discharge: HOME | End: 2024-10-21
Attending: INTERNAL MEDICINE
Payer: MEDICARE

## 2024-10-21 VITALS
RESPIRATION RATE: 16 BRPM | TEMPERATURE: 97.5 F | OXYGEN SATURATION: 99 % | WEIGHT: 168.43 LBS | BODY MASS INDEX: 24.11 KG/M2 | SYSTOLIC BLOOD PRESSURE: 147 MMHG | HEIGHT: 70 IN | DIASTOLIC BLOOD PRESSURE: 93 MMHG | HEART RATE: 88 BPM

## 2024-10-21 DIAGNOSIS — C01 MALIGNANT NEOPLASM OF BASE OF TONGUE (HCC): Primary | ICD-10-CM

## 2024-10-21 DIAGNOSIS — Z71.3 NUTRITIONAL COUNSELING: Primary | ICD-10-CM

## 2024-10-21 DIAGNOSIS — C01 MALIGNANT NEOPLASM OF BASE OF TONGUE (HCC): ICD-10-CM

## 2024-10-21 PROCEDURE — 96375 TX/PRO/DX INJ NEW DRUG ADDON: CPT

## 2024-10-21 PROCEDURE — 77386 HB NTSTY MODUL RAD TX DLVR CPLX: CPT | Performed by: RADIOLOGY

## 2024-10-21 PROCEDURE — 77336 RADIATION PHYSICS CONSULT: CPT | Performed by: RADIOLOGY

## 2024-10-21 PROCEDURE — 96367 TX/PROPH/DG ADDL SEQ IV INF: CPT

## 2024-10-21 PROCEDURE — 96361 HYDRATE IV INFUSION ADD-ON: CPT

## 2024-10-21 PROCEDURE — 96413 CHEMO IV INFUSION 1 HR: CPT

## 2024-10-21 PROCEDURE — 77014 CHG CT GUIDANCE RADIATION THERAPY FLDS PLACEMENT: CPT | Performed by: RADIOLOGY

## 2024-10-21 RX ORDER — NYSTATIN 100000 [USP'U]/ML
500000 SUSPENSION ORAL 4 TIMES DAILY
Qty: 400 ML | Refills: 3 | OUTPATIENT
Start: 2024-10-21

## 2024-10-21 RX ORDER — SODIUM CHLORIDE 9 MG/ML
20 INJECTION, SOLUTION INTRAVENOUS ONCE
OUTPATIENT
Start: 2024-10-28

## 2024-10-21 RX ORDER — PALONOSETRON 0.05 MG/ML
0.25 INJECTION, SOLUTION INTRAVENOUS ONCE
Status: COMPLETED | OUTPATIENT
Start: 2024-10-21 | End: 2024-10-21

## 2024-10-21 RX ORDER — SODIUM CHLORIDE 9 MG/ML
20 INJECTION, SOLUTION INTRAVENOUS ONCE
Status: COMPLETED | OUTPATIENT
Start: 2024-10-21 | End: 2024-10-21

## 2024-10-21 RX ORDER — PALONOSETRON 0.05 MG/ML
0.25 INJECTION, SOLUTION INTRAVENOUS ONCE
OUTPATIENT
Start: 2024-10-28

## 2024-10-21 RX ADMIN — SODIUM CHLORIDE 500 ML: 0.9 INJECTION, SOLUTION INTRAVENOUS at 10:03

## 2024-10-21 RX ADMIN — CISPLATIN 70 MG: 1 INJECTION, SOLUTION INTRAVENOUS at 11:14

## 2024-10-21 RX ADMIN — FOSAPREPITANT 150 MG: 150 INJECTION, POWDER, LYOPHILIZED, FOR SOLUTION INTRAVENOUS at 10:33

## 2024-10-21 RX ADMIN — PALONOSETRON HYDROCHLORIDE 0.25 MG: 0.25 INJECTION INTRAVENOUS at 10:19

## 2024-10-21 RX ADMIN — SODIUM CHLORIDE 500 ML: 0.9 INJECTION, SOLUTION INTRAVENOUS at 12:20

## 2024-10-21 RX ADMIN — SODIUM CHLORIDE 20 ML/HR: 0.9 INJECTION, SOLUTION INTRAVENOUS at 09:57

## 2024-10-21 NOTE — PROGRESS NOTES
Jesus Hdz  tolerated treatment well with no complications.      Jesus Hdz is aware of future appt on 10/25/2024 at 0930.     AVS printed and given to Jesus Hdz:   No (Declined by Jesus Hdz)

## 2024-10-21 NOTE — PATIENT INSTRUCTIONS
Nutrition Rx & Recommendations:  Diet: High Kcal, High Protein, Soft/Moist; Enteral nutrition PRN   Liquid nutrition may be better tolerated than solids at times.  Alter food choices and eating patterns to accommodate changing needs.  Refer to Eating Hints book for other meal/snack ideas and symptom management.  Avoid spicy, acidic, sharp/hard/crunchy foods & carbonated beverages as needed.  Follow proper oral care; Try baking soda/salt water rinse recipe (mix 3/4 tsp salt + 1 tsp baking soda + 1 qt water; rinse with plain water after using) in Eating Hints book (pg 18).  Brush your teeth before/after meals & before bed.  For trouble swallowing: eat 5-6 small meals/day; choose foods that are easy to swallow; choose foods that are high in protein & calories; cook foods until they are soft/tender; cut food into small pieces; moisten & soften foods (gravy/sauce/broth/yogurt); sip drinks through a straw (as tolerated); avoid foods/drinks that can burn/scrape your throat (hot temperatures, spicy foods, acidic foods, sharp/hard/crunchy foods, alcohol); talk to your doctor about a Speech Therapy referral for a swallowing evaluation (see page 26-28 in your Eating Hints book).  Weigh yourself regularly. If you notice weight loss, make an effort to increase your daily food/calorie intake. If you continue to notice loss after these efforts, reach out to your dietitian to establish a plan to stabilize weight.  Continue to eat by mouth, as appropriate/tolerated, as much as possible.  Your syringes are each 60 mL or 2 fl oz.  Always flush your feeding tube with 60 mL room-temp water 1-2 times daily while feeding tube is not in use.  In the event enteral nutrition is needed for Christopher's sole source of nutrition, recommend:  Initiation: Recommend initiating bolus TF with a 160 mL bolus 5 times per day (every 2-3 hours, as tolerated).  Once tolerating this, each day begin to slowly increase the volume at each feeding until  bolus volume goal is achieved (see below).  TF Goal: 1 container (325 mL) 5 times per day of [employer] 1.4 via PEG (push syringe or gravity syringe method to administer boluses; 1 container to infuse over ~15-20 minutes).   Water Flushin mL free water flush pre/post each bolus (total of 600 mL/day in flushes; flushes spread throughout the day; will need to adjust flushes prn).   Enteral Nutrition goal to provide: 1625 mL volume (5 containers/day), 2275 kcal, 100 grams protein, 1154 mL free water    Follow Up Plan: 10/28 during infusion  Recommend Referral to Other Providers: none at this time

## 2024-10-21 NOTE — PROGRESS NOTES
Outpatient Oncology Nutrition Consultation   Type of Consult: Follow Up  Care Location: Radiation Oncology    Reason for referral: Received notification by  Dr. Talbot  on 9/18 that pt has triggered for oncology nutrition care (reason for referral: HNC dx and TF).    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     See Note.     -- Confirmed by positive Pankeratin (CKAE1/3), p40 and normal wild-type expression       on p53 immunostains.    -- p16 immunostain positive (>70% diffuse and strong nuclear and cytoplasmic        staining); high-risk HPV EH pending; result will be reported in an addendum.      B. Lymph Node, Regional Resection, Level 2 lymph nodes, see comments:  - Metastatic squamous cell carcinoma, keratinizing, involving at least two lymph nodes (2/2).     -- Present in 1.0 cm lymph node and additional detached lymph node(s)/fragments.     -- Largest metastatic focus: 0.3 cm; Extranodal extension: Not identified.    -- Confirmed by positive Pankeratin (CKAE1/3) and p40 immunostains.    -- p16 immunostain positive (>70% diffuse and strong nuclear and cytoplasmic        staining).     C. Lymph Node, Regional Resection, Additional level 2 lymph nodes:  - Metastatic squamous cell carcinoma, keratinizing, involving 3 of 4 lymph     nodes/fragments (3/4).     -- Largest metastatic focus: 1 cm; Extranodal extension: Present, extranodal         lymphovascular invasion identified.    -- Confirmed by positive Pankeratin (CKAE1/3) and p40  immunostains.    -- p16 immunostain positive (>70% diffuse and strong nuclear and cytoplasmic        staining); high-risk HPV EH pending; result will be reported in an addendum. See Note.      D. Lymph Node, Level 3 lymph nodes, lymph adenectomy:  - One lymph node positive for metastatic squamous cell carcinoma, keratinizing (1/1).     -- Largest metastatic focus: 1.1 cm; Extranodal extension: Not identified.    -- Confirmed by positive Pankeratin (CKAE1/3) and p40 immunostains.    -- p16 immunostain positive (>70% diffuse and strong nuclear and cytoplasmic        staining).  - Portion(s) of benign salivary gland with adjacent detached small (< 0.2 cm) portion      of carcinoma.   - Traumatic neuroma.          Comments:   This is an appended report. These results have been appended to a previously preliminary verified report.           7/3/2024 -  Cancer Staged    Staging form: Pharynx - Oropharynx, AJCC 8th Edition  - Clinical stage from 7/3/2024: Stage I (cT1, cN1, cM0, p16+) - Signed by Fady Morrow MD on 7/3/2024  Stage prefix: Initial diagnosis       9/30/2024 -  Chemotherapy    alteplase (CATHFLO), 2 mg, Intracatheter, Every 1 Minute as needed, 4 of 7 cycles  palonosetron (ALOXI), 0.25 mg, Intravenous, Once, 3 of 6 cycles  Administration: 0.25 mg (10/7/2024), 0.25 mg (10/14/2024), 0.25 mg (10/21/2024)  CISplatin (PLATINOL) infusion, 70 mg (original dose 40 mg/m2), Intravenous, Once, 4 of 7 cycles  Dose modification: 70 mg (original dose 40 mg/m2, Cycle 1, Reason: Other (Must fill in a comment), Comment: pharmacy dictates)  Administration: 70 mg (9/30/2024), 70 mg (10/7/2024), 70 mg (10/14/2024), 70 mg (10/21/2024)  sodium chloride, 500 mL, Intravenous, Once, 4 of 7 cycles  Administration: 500 mL (9/30/2024), 500 mL (9/30/2024), 500 mL (10/7/2024), 500 mL (10/7/2024), 500 mL (10/14/2024), 500 mL (10/14/2024), 500 mL (10/21/2024), 500 mL (10/21/2024)  fosaprepitant (EMEND) IVPB, 150 mg, Intravenous, Once, 4  of 7 cycles  Administration: 150 mg (9/30/2024), 150 mg (10/7/2024), 150 mg (10/14/2024), 150 mg (10/21/2024)       Past Medical & Surgical Hx:   Patient Active Problem List   Diagnosis    Mass of right side of neck    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)    Status post radical dissection of neck    Tongue pain    Dysphagia    Hypertension    Protein-calorie malnutrition (HCC)    Chemotherapy-induced nausea    Dehydration     Past Medical History:   Diagnosis Date    Facial basal cell cancer     GERD (gastroesophageal reflux disease)     diet controlled    Hypertension     Throat cancer (HCC)     Vitamin D deficiency      Past Surgical History:   Procedure Laterality Date    CHOLECYSTECTOMY      COLONOSCOPY N/A 03/22/2016    Procedure: COLONOSCOPY;  Surgeon: Daren Montana MD;  Location: [facility] GI LAB;  Service:     MT CERVICAL LYMPHADEC MODIFIED RADICAL NECK DSJ Right 09/04/2019    Procedure: MODIFIED RADICAL NECK DISSECTION;  Surgeon: Fady Montana MD;  Location: [facility];  Service: ENT    MT CYSTO INSERTION TRANSPROSTATIC IMPLANT SINGLE N/A 04/14/2023    Procedure: CYSTOSCOPY WITH INSERTION UROLIFT;  Surgeon: Rubin Alfonso MD;  [facility];  Service: Urology    MT LAPAROSCOPY SURG CHOLECYSTECTOMY N/A 10/30/2019    Procedure: CHOLECYSTECTOMY LAPAROSCOPIC;  Surgeon: Christiano Cornejo MD;  Location: [facility];  Service: General    MT LARYNGOSCOPY DIRECT OPERATIVE W/BIOPSY N/A 07/17/2019    Procedure: MICROLARYNGOSCOPY DIRECT WITH BIOPSY OF VALLECULAR MASS;  Surgeon: Amol Codrova MD;  Location[facility];  Service: ENT    MT LARYNGOSCOPY DIRECT OPERATIVE W/BIOPSY N/A 6/19/2024    Procedure: DIRECT LARYNGOSCOPY WITH BIOPSY WITH RIGHT NECK DISSECTION LEVEL TWO AND THREE;  Surgeon: Fady Montana MD;  Location[facility];  Service: ENT    RADICAL NECK DISSECTION Right 6/19/2024    Procedure: DISSECTION NECK RADICAL;  Surgeon: Fady Montana MD;  [facility];  Service: ENT     "RHINOPLASTY      SINUS SURGERY  1994    TRANSORAL ROBOTIC SURGERY (TORS) N/A 07/31/2019    Procedure: ROBOTICALLY ASSISTED PARTIAL GLOSSECTOMY, PARTIAL PHARYNGECTOMY;  Surgeon: Fady Montana MD;  [facility];  Service: ENT    UPPER GASTROINTESTINAL ENDOSCOPY      US GUIDED LYMPH NODE BIOPSY RIGHT  5/6/2024    WISDOM TOOTH EXTRACTION         Review of Medications:   Vitamins, Supplements and Herbals: Taking variety of herbal supplements by naturopathic/ Ayurvedic doctor:   Genistein, Artemisinin, Apigenin, VitD3, Amla (Vit C), CoQ10, Frankincense, Angiostop(Sea Cucumber), Artecin, C-statin, PauD Arco, Black Cumin/Black seed oil. Alternating with: [place], Curcumin, Indole 3 Carbinol, Quercetin, Resveratrol, Vascustatin, Cronaxal (Benegene), Lycopene, Agaricus Murill + IV: Kaplan or Immunity 15   + Immune support tea, Herbal detox tea, Sinus care Jam, Acid balance tincture  He takes these Wednesday-Saturday. Reviewed w/ patient in detail that herbal supplements can interact with treatment and have negative side effects.  Refered pt to [facility] \"About Herbs\" website for further information on safety/effectiveness/interactions of supplements.      Current Outpatient Medications:     Allergy Relief 25 MG/10ML oral liquid, , Disp: , Rfl:     amLODIPine (NORVASC) 5 mg tablet, Take 5 mg by mouth daily at bedtime , Disp: , Rfl:     amoxicillin-clavulanate (AUGMENTIN) 875-125 mg per tablet, TAKE TWO TABLETS BY MOUTH NOW, THEN TAKE ONE TABLET BY MOUTH EVERY 12 HOURS UNTIL FINISHED WITH FOOD (Patient not taking: Reported on 10/14/2024), Disp: , Rfl:     atovaquone-proguanil (MALARONE) 250-100 mg, Take 1 tablet by mouth daily, Disp: , Rfl:     Cholecalciferol (Vitamin D3) 125 MCG (5000 UT) TABS, every 24 hours, Disp: , Rfl:     Cholecalciferol (Vitamin D3) LIQD, Use, Disp: , Rfl:     diphenhydramine, lidocaine, Al/Mg hydroxide, simethicone (Magic Mouthwash) SUSP, Swish and swallow 10 mL every 4 (four) hours as needed for mouth " "pain or discomfort, Disp: 500 mL, Rfl: 4    Gabapentin (NEURONTIN) 300 mg/6mL solution, Take 5 mL (250 mg total) by mouth daily at bedtime as needed (pain, insomnia) May reduce dose for daytime grogginess, Disp: 470 mL, Rfl: 0    lisinopril (ZESTRIL) 10 mg tablet, Take 10 mg by mouth daily at bedtime , Disp: , Rfl:     Misc Natural Products (ADRENAL PO), 1 orally as directed, Disp: , Rfl:     nystatin (MYCOSTATIN) 500,000 units/5 mL suspension, Apply 5 mL (500,000 Units total) to the mouth or throat 4 (four) times a day, Disp: 400 mL, Rfl: 3    ondansetron (ZOFRAN-ODT) 8 mg disintegrating tablet, Take 1 tablet (8 mg total) by mouth every 8 (eight) hours as needed for nausea or vomiting, Disp: 30 tablet, Rfl: 3    Probiotic Product (PROBIOTIC PO), as directed Orally, Disp: , Rfl:     prochlorperazine (COMPAZINE) 10 mg tablet, Take 1 tablet (10 mg total) by mouth every 6 (six) hours as needed for nausea or vomiting, Disp: 30 tablet, Rfl: 3  No current facility-administered medications for this visit.    Facility-Administered Medications Ordered in Other Visits:     alteplase (CATHFLO) injection 2 mg, 2 mg, Intracatheter, Q1MIN PRN, Magali Talbot MD    Most Recent Lab Results:   Lab Results   Component Value Date    WBC 3.62 (L) 10/18/2024    NEUTROABS 2.69 10/18/2024    CHOL 246 (H) 03/03/2016    TRIG 226 (H) 03/03/2016    HDL 44 03/03/2016    ALT 24 10/18/2024    AST 15 10/18/2024    ALB 4.1 10/18/2024     03/03/2016    SODIUM 140 10/18/2024    SODIUM 138 10/11/2024    K 3.8 10/18/2024    K 3.8 10/11/2024     10/18/2024    BUN 15 10/18/2024    BUN 22 10/11/2024    CREATININE 0.72 10/18/2024    CREATININE 0.80 10/11/2024    EGFR 95 10/18/2024    TSH 1.55 08/30/2022    POCGLU 100 09/11/2024    GLUC 77 10/18/2024    HGBA1C 5.7 (H) 04/20/2023    HGBA1C 5.4 10/10/2019    CALCIUM 9.0 10/18/2024    MG 1.9 10/18/2024       Anthropometric Measurements:   Height: 70\"  Ht Readings from Last 1 Encounters: " "  10/21/24 5' 10\" (1.778 m)     Wt Readings from Last 20 Encounters:   10/21/24 76.4 kg (168 lb 6.9 oz)   10/15/24 75.2 kg (165 lb 12.6 oz)   10/14/24 75.8 kg (167 lb 1.7 oz)   10/09/24 77.1 kg (170 lb)   10/07/24 76.4 kg (168 lb 6.9 oz)   10/02/24 78.5 kg (173 lb)   09/30/24 76.8 kg (169 lb 6.4 oz)   09/18/24 75.8 kg (167 lb)   07/16/24 76.2 kg (168 lb 0.4 oz)   07/03/24 76.7 kg (169 lb)   06/26/24 77.1 kg (170 lb)   06/19/24 77.1 kg (170 lb)   06/13/24 82.1 kg (181 lb)   04/18/24 82.1 kg (181 lb)   01/22/24 82.1 kg (181 lb)   08/28/23 80.7 kg (178 lb)   05/19/23 82.1 kg (181 lb)   04/14/23 82.3 kg (181 lb 6.4 oz)   01/05/23 82.1 kg (181 lb)   11/17/22 83.2 kg (183 lb 6.4 oz)       Weight History:   Usual Weight: 165-170#  Varian: 10/7: 169#, 10/15: 167.6#,  (10/21) 166.4#  Home Scale: 157# (no clothes on, 10/16) - pt states this is has been stable    Oncology Nutrition-Anthropometrics      Flowsheet Row Nutrition from 10/21/2024 in [facility] Nutrition from 10/16/2024 in [facility]   Patient age (years): 69 years 69 years   Patient (male) height (in): 70 in 70 in   Current weight (lbs): 168.4 lbs 165.8 lbs   Current weight to be used for anthropometric calculations (kg) 76.5 kg 75.4 kg   BMI: 24.2 23.8   IBW male 166 lb 166 lb   IBW (kg) male 75.5 kg 75.5 kg   IBW % (male) 101.4 % 99.9 %   Adjusted BW (male): 166.6 lbs 166 lbs   Adjusted BW in kg (male): 75.7 kg 75.5 kg   % weight change after 1 week: 0.8 % -1.5 %   Weight change after 1 week (lbs) 1.4 lbs -2.6 lbs   % weight change after 1 month: 0.8 % -0.7 %   Weight change after 1 month (lbs) 1.4 lbs -1.2 lbs   % weight change after 3 months: 0.2 % -1.3 %   Weight change after 3 months (lbs) 0.4 lbs -2.2 lbs   % weight change after 6 months: -- -8.4 %   Weight change after 6 months (lbs) -- -15.2 lbs            Nutrition-Focused Physical Findings: none observed    Food/Nutrition-Related History & " Client/Social History:    Current Nutrition Impact Symptoms:  [x] Nausea  [] Reduced Appetite  [] Acid Reflux    [] Vomiting  [] Unintended Wt Loss  [] Malabsorption    [] Diarrhea  [] Unintended Wt Gain  [] Dumping Syndrome    [] Constipation  [] Thick Mucous/Secretions mild [] Abdominal Pain    [] Dysgeusia (Altered Taste) intact [] Xerostomia (Dry Mouth)  [] Gas    [] Dysosmia (Altered Smell)  [] Thrush  [] Difficulty Chewing    [] Oral Mucositis (Sore Mouth)  Nystatin swish QID [] Fatigue  [] Hyperglycemia   Lab Results   Component Value Date    HGBA1C 5.7 (H) 2023      [x] Odynophagia  improving [] Esophagitis  [] Other:    [x] Dysphagia improving [] Early Satiety  [] No Problems Eating      Food Allergies & Intolerances: yes: mild shellfish - fingers swell up    Current Diet: High Calorie/High Protein - Soft/Moist  Current Nutrition Intake: Increased since last visit   Appetite: Fair   Nutrition Route: PO and PEG  Oral Care: brushes daily, gargle with salt/baking soda before meals  Full mobility of tongue; continues with exercises from prior SLP  No sensitives of hot/cold or spicy. Has trouble with bread and melon at times.  Activity level: Usual ADL's     24 Hr Diet Recall / Recent oral intake:    Breakfast: Oatmeal with oatmilk (although thinks oatmilk causes nausea)  Cheez its x2 - during infusion    Beverages: water, chicken bone broth, 64 oz detox and herbal tea  Whey protein powder (20g)  mixed with coffee     Supplements:   Whey protein powder 1x/day.     Oncology Nutrition-Estimated Needs      Flowsheet Row Nutrition from 10/16/2024 in [facility] Nutrition from 2024 in [facility]   Weight type used Actual weight Actual weight   Weight in kilograms (kg) used for estimated needs 75.4 kg 77 kg   Energy needs formula:  30-35 kcal/kg 30-35 kcal/kg   Energy needs based on 30 kcal/k kcal 2310 kcal   Energy needs based on 35  kcal/k kcal 2695 kcal   Protein needs formula: 1.2-1.5 g/kg 1.2-1.5 g/kg   Protein needs based on 1.2 g/k g 92 g   Protein needs based on 1.5 g/kg 113 g 116 g   Fluid needs formula: 30-35 mL/kg 30-35 mL/kg   Fluid needs based on 30 mL/kg 2265 mL 2304 mL   Fluid needs in ounces 77 oz 78 oz   Fluid needs based on 35 mL/kg 2643 mL 2688 mL   Fluid needs in ounces 89 oz 91 oz             Discussion & Intervention:   Jesus was evaluated today for an RD follow up regarding HNC and enteral nutrition.  Jesus is currently undergoing tx for HNCx .  RD met with patient  during infusion today. Patient had PEG placed on 10/10. The site is healing well. Pt reports that his swallowing difficulty only lasted a few days and since last visit has not been eating well. He reports that he has been more nauseous lately and therefore did not fast this time around. We reviewed tips for nausea including ginger and belly comfort tea. He takes anti-emetics as needed. He has been maintaining his weight. He reports that yesterday he ate really well but did not elaborate on intake. Samreen Macias was delivered to his house for feeding tube use once needed. He is holding off on using his feeding tube since his swallowing has improved. If wt declines, pt aware of recommendations.    Reviewed 24 hour recall, which revealed an suboptimal po intake, and discussed ways to increase kcal, protein, and fluid intakes and optimize nutrient intake.  Also reviewed the importance of wt management throughout the tx process and the role of a high kcal/ high protein diet and enteral nutrition in managing wt and overall health.  Based on today's assessment, discussion included: MNT for: Enteral nutrition, tube care, how to modify foods for anticipated nutrition impact symptoms pt may experience during CA tx, practicing proper oral care, general food safety measures during cancer tx, a high kcal/protein diet & food choices to include at all  meals & snacks (Examples of high kcal foods: cheese, full-fat dairy products, nut butter, plant-based fats, coconut oil/milk, avocado, butter, cream soup, etc. Examples of high protein foods: eggs, chicken, fish, beans/legumes, nuts/nut butters, bone broth, etc.) , fortifying foods for added kcal and protein (examples include: adding cheese to foods such as eggs, mashed potatoes, casseroles, etc.; Making oatmeal with whole milk rather than water; Making fortified mashed potatoes with cream, butter, dry milk powder, plain Greek yogurt, and cheese.), adequate hydration & fluid choices, sipping on calorie containing beverages (examples include: adding whole milk or cream to coffee, oral nutrition supplements, juice, electrolyte replacement beverages, milk, etc.), eating smaller more frequent meals every 2-3 hours (5-6 small meals/day), utilizing oral nutrition supplements, adding extra fat to foods while cooking such as butter, plant-based oil, coconut oil/milk, avocado, nut butters, etc., practicing proper feeding tube use & care, adherence to and compliance with enteral nutrition plan of care, and individualized enteral nutrition recommendations & plan: Samreen Farms 1.4 via PEG 5x/day    Moving forward, Jesus was encouraged to increase kcal, protein, and fluid intakes   Materials Provided:  N/A  All questions and concerns addressed during today’s visit.  Jesus has RD contact information.    Nutrition Diagnosis:   Inadequate Energy Intake related to physiological causes, disease state and treatment related issues as evidenced by food recall, wt loss and discussion with pt and/or family.  Increased Nutrient Needs (kcal & pro) related to increased demand for nutrients and disease state as evidenced by cancer dx and pt undergoing tx for cancer.  Monitoring & Evaluation:   Goals:   weight maintenance/stabilization  adequate nutrition impact symptom management  pt to meet >/=75% estimated nutrition needs  daily    Progress Towards Goals: Progressing    Nutrition Rx & Recommendations:  Diet: High Kcal, High Protein, Soft/Moist; Enteral nutrition PRN   Liquid nutrition may be better tolerated than solids at times.  Alter food choices and eating patterns to accommodate changing needs.  Refer to Eating Hints book for other meal/snack ideas and symptom management.  Avoid spicy, acidic, sharp/hard/crunchy foods & carbonated beverages as needed.  Follow proper oral care; Try baking soda/salt water rinse recipe (mix 3/4 tsp salt + 1 tsp baking soda + 1 qt water; rinse with plain water after using) in Eating Hints book (pg 18).  Brush your teeth before/after meals & before bed.  For trouble swallowing: eat 5-6 small meals/day; choose foods that are easy to swallow; choose foods that are high in protein & calories; cook foods until they are soft/tender; cut food into small pieces; moisten & soften foods (gravy/sauce/broth/yogurt); sip drinks through a straw (as tolerated); avoid foods/drinks that can burn/scrape your throat (hot temperatures, spicy foods, acidic foods, sharp/hard/crunchy foods, alcohol); talk to your doctor about a Speech Therapy referral for a swallowing evaluation (see page 26-28 in your Eating Hints book).  Weigh yourself regularly. If you notice weight loss, make an effort to increase your daily food/calorie intake. If you continue to notice loss after these efforts, reach out to your dietitian to establish a plan to stabilize weight.  Continue to eat by mouth, as appropriate/tolerated, as much as possible.  Your syringes are each 60 mL or 2 fl oz.  Always flush your feeding tube with 60 mL room-temp water 1-2 times daily while feeding tube is not in use.  In the event enteral nutrition is needed for Christopher's sole source of nutrition, recommend:  Initiation: Recommend initiating bolus TF with a 160 mL bolus 5 times per day (every 2-3 hours, as tolerated).  Once tolerating this, each day begin to  slowly increase the volume at each feeding until bolus volume goal is achieved (see below).  TF Goal: 1 container (325 mL) 5 times per day of Sam[employer] Farms 1.4 via PEG (push syringe or gravity syringe method to administer boluses; 1 container to infuse over ~15-20 minutes).   Water Flushin mL free water flush pre/post each bolus (total of 600 mL/day in flushes; flushes spread throughout the day; will need to adjust flushes prn).   Enteral Nutrition goal to provide: 1625 mL volume (5 containers/day), 2275 kcal, 100 grams protein, 1154 mL free water    Follow Up Plan:  10/28 during infusion  Recommend Referral to Other Providers: none at this time

## 2024-10-22 PROCEDURE — 77014 CHG CT GUIDANCE RADIATION THERAPY FLDS PLACEMENT: CPT | Performed by: RADIOLOGY

## 2024-10-22 PROCEDURE — 77386 HB NTSTY MODUL RAD TX DLVR CPLX: CPT | Performed by: RADIOLOGY

## 2024-10-22 PROCEDURE — 77427 RADIATION TX MANAGEMENT X5: CPT | Performed by: RADIOLOGY

## 2024-10-23 ENCOUNTER — TELEPHONE (OUTPATIENT)
Age: 69
End: 2024-10-23

## 2024-10-23 DIAGNOSIS — E86.0 DEHYDRATION: ICD-10-CM

## 2024-10-23 DIAGNOSIS — C01 MALIGNANT NEOPLASM OF BASE OF TONGUE (HCC): Primary | ICD-10-CM

## 2024-10-23 PROCEDURE — 77386 HB NTSTY MODUL RAD TX DLVR CPLX: CPT | Performed by: RADIOLOGY

## 2024-10-23 PROCEDURE — 77014 CHG CT GUIDANCE RADIATION THERAPY FLDS PLACEMENT: CPT | Performed by: RADIOLOGY

## 2024-10-23 NOTE — TELEPHONE ENCOUNTER
To note - this was sent to his pharmacy on 10/9 with 3 refills so it should be available to him once authorization is obtained.

## 2024-10-23 NOTE — TELEPHONE ENCOUNTER
Pt states that his Nystatin needs a new prior auth.    nystatin (MYCOSTATIN) 500,000 units/5 mL suspension     Would you please let the pt know when/if it has been authorized.    Thank you

## 2024-10-24 ENCOUNTER — TELEMEDICINE (OUTPATIENT)
Age: 69
End: 2024-10-24
Payer: MEDICARE

## 2024-10-24 VITALS — WEIGHT: 168 LBS | HEIGHT: 70 IN | BODY MASS INDEX: 24.05 KG/M2

## 2024-10-24 DIAGNOSIS — Z93.1 PEG (PERCUTANEOUS ENDOSCOPIC GASTROSTOMY) STATUS (HCC): Primary | ICD-10-CM

## 2024-10-24 DIAGNOSIS — C01 MALIGNANT NEOPLASM OF BASE OF TONGUE (HCC): ICD-10-CM

## 2024-10-24 DIAGNOSIS — E46 PROTEIN-CALORIE MALNUTRITION, UNSPECIFIED SEVERITY (HCC): ICD-10-CM

## 2024-10-24 PROCEDURE — G2211 COMPLEX E/M VISIT ADD ON: HCPCS | Performed by: DIETITIAN, REGISTERED

## 2024-10-24 PROCEDURE — 99214 OFFICE O/P EST MOD 30 MIN: CPT | Performed by: DIETITIAN, REGISTERED

## 2024-10-24 PROCEDURE — 77014 CHG CT GUIDANCE RADIATION THERAPY FLDS PLACEMENT: CPT | Performed by: RADIOLOGY

## 2024-10-24 PROCEDURE — 77386 HB NTSTY MODUL RAD TX DLVR CPLX: CPT | Performed by: RADIOLOGY

## 2024-10-24 NOTE — PROGRESS NOTES
Virtual Regular Visit  Name: Jesus Hdz      : 1955      MRN: [id number]  Encounter Provider: Sal José PA-C  Encounter Date: 10/24/2024   Encounter department: [facility]    Verification of patient location:    Patient is located at Home in the following state in which I hold an active license [place]    Assessment & Plan  PEG (percutaneous endoscopic gastrostomy) status (HCC)  69 y.o. 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  Patient had a normal EGD with PEG placement on 10/10/2024.  Patient continues to eat/drink by mouth, has noticed a loss of appetite and some worsening dysphagia.  He has done two feedings through his PEG tube so far, and these went well.  No issues with tolerance of Factor 14 tube feed formula.  He has mild constipation and is trying to eat prunes to help.  He does well with water intake and he is always sure to flush the PEG tube before/after medications and tube feeds.  He denies any abdominal discomfort or pain at the PEG site, but he is having some skin irritation due to tape.     -Advised patient to utilize PEG tube as needed for hydration as well as nutrition.  -Advised patient to look into a fabric belt designed for G-tubes, so that he can avoid using tape, which has been irritating his skin.  -Continue dietary management of constipation including prunes, prune juice, excellent hydration.  If needed, can consider medication such as MiraLAX.  -Monitor weight closely.  -Continue to follow up with dietitian as scheduled.  -Continue excellent tube care.  Flush tube with 60 cc water before/after medications and bolus feeds.  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)         Protein-calorie malnutrition, unspecified severity (HCC)    Body mass index is 24.11 kg/m².     "        Follow up 2 months.    Encounter provider Sal José PA-C    The patient was identified by name and date of birth. Jesus Hdz was informed that this is a telemedicine visit and that the visit is being conducted through the [employer] platform. He agrees to proceed..  My office door was closed. No one else was in the room.  He acknowledged consent and understanding of privacy and security of the video platform. The patient has agreed to participate and understands they can discontinue the visit at any time.    Patient is aware this is a billable service.     History of Present Illness     Jesus Hdz is a 69 y.o. 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    Patient had a normal EGD with PEG placement on 10/10/2024.      Patient continues to eat/drink by mouth, has noticed a loss of appetite and some worsening dysphagia.  He has done two feedings through his PEG tube so far, and these went well.  No issues with tolerance of [employer] tube feed formula.  He has mild constipation and is trying to eat prunes to help.  He does well with water intake and he is always sure to flush the PEG tube before/after medications and tube feeds.  He denies any abdominal discomfort or pain at the PEG site, but he is having some skin irritation due to tape.      PET CT scan from September 2024 with increasing uptake in the right upper cervical chain focus concerning for metastases and small focus in the right peritonsillar region which could correspond to 7 mm heterogeneously enhancing lesion seen on prior MRI but no findings of hypermetabolic metastases to the chest abdomen or pelvis.       Review of Systems   All other systems reviewed and are negative.          Objective     Ht 5' 10\" (1.778 m)   Wt 76.2 kg (168 lb)   BMI 24.11 kg/m²   Physical " Exam  HENT:      Head: Normocephalic and atraumatic.   Eyes:      Conjunctiva/sclera: Conjunctivae normal.   Pulmonary:      Effort: Pulmonary effort is normal.   Abdominal:      Comments: Patient showed PEG site over camera - no visible redness or drainage appreciated.   Skin:     Coloration: Skin is not jaundiced or pale.   Neurological:      General: No focal deficit present.   Psychiatric:         Mood and Affect: Mood normal.         Behavior: Behavior normal.         Visit Time  Total Visit Duration: 20 minutes

## 2024-10-25 ENCOUNTER — APPOINTMENT (OUTPATIENT)
Dept: LAB | Facility: HOSPITAL | Age: 69
End: 2024-10-25
Payer: MEDICARE

## 2024-10-25 ENCOUNTER — HOSPITAL ENCOUNTER (OUTPATIENT)
Dept: INFUSION CENTER | Facility: HOSPITAL | Age: 69
End: 2024-10-25
Attending: INTERNAL MEDICINE

## 2024-10-25 DIAGNOSIS — C01 MALIGNANT NEOPLASM OF BASE OF TONGUE (HCC): ICD-10-CM

## 2024-10-25 LAB
ALBUMIN SERPL BCG-MCNC: 4.1 G/DL (ref 3.5–5)
ALP SERPL-CCNC: 66 U/L (ref 34–104)
ALT SERPL W P-5'-P-CCNC: 23 U/L (ref 7–52)
ANION GAP SERPL CALCULATED.3IONS-SCNC: 6 MMOL/L (ref 4–13)
AST SERPL W P-5'-P-CCNC: 17 U/L (ref 13–39)
BASOPHILS # BLD AUTO: 0.02 THOUSANDS/ΜL (ref 0–0.1)
BASOPHILS NFR BLD AUTO: 1 % (ref 0–1)
BILIRUB SERPL-MCNC: 0.49 MG/DL (ref 0.2–1)
BUN SERPL-MCNC: 12 MG/DL (ref 5–25)
CALCIUM SERPL-MCNC: 9 MG/DL (ref 8.4–10.2)
CHLORIDE SERPL-SCNC: 102 MMOL/L (ref 96–108)
CO2 SERPL-SCNC: 32 MMOL/L (ref 21–32)
CREAT SERPL-MCNC: 0.76 MG/DL (ref 0.6–1.3)
EOSINOPHIL # BLD AUTO: 0.11 THOUSAND/ΜL (ref 0–0.61)
EOSINOPHIL NFR BLD AUTO: 3 % (ref 0–6)
ERYTHROCYTE [DISTWIDTH] IN BLOOD BY AUTOMATED COUNT: 12.7 % (ref 11.6–15.1)
GFR SERPL CREATININE-BSD FRML MDRD: 93 ML/MIN/1.73SQ M
GLUCOSE SERPL-MCNC: 82 MG/DL (ref 65–140)
HCT VFR BLD AUTO: 42.6 % (ref 36.5–49.3)
HGB BLD-MCNC: 14 G/DL (ref 12–17)
IMM GRANULOCYTES # BLD AUTO: 0.03 THOUSAND/UL (ref 0–0.2)
IMM GRANULOCYTES NFR BLD AUTO: 1 % (ref 0–2)
LDH SERPL-CCNC: 117 U/L (ref 140–271)
LYMPHOCYTES # BLD AUTO: 0.42 THOUSANDS/ΜL (ref 0.6–4.47)
LYMPHOCYTES NFR BLD AUTO: 11 % (ref 14–44)
MAGNESIUM SERPL-MCNC: 2 MG/DL (ref 1.9–2.7)
MCH RBC QN AUTO: 28.5 PG (ref 26.8–34.3)
MCHC RBC AUTO-ENTMCNC: 32.9 G/DL (ref 31.4–37.4)
MCV RBC AUTO: 87 FL (ref 82–98)
MONOCYTES # BLD AUTO: 0.35 THOUSAND/ΜL (ref 0.17–1.22)
MONOCYTES NFR BLD AUTO: 9 % (ref 4–12)
NEUTROPHILS # BLD AUTO: 2.82 THOUSANDS/ΜL (ref 1.85–7.62)
NEUTS SEG NFR BLD AUTO: 75 % (ref 43–75)
NRBC BLD AUTO-RTO: 0 /100 WBCS
PLATELET # BLD AUTO: 171 THOUSANDS/UL (ref 149–390)
PMV BLD AUTO: 9.2 FL (ref 8.9–12.7)
POTASSIUM SERPL-SCNC: 4.3 MMOL/L (ref 3.5–5.3)
PROT SERPL-MCNC: 7 G/DL (ref 6.4–8.4)
RBC # BLD AUTO: 4.91 MILLION/UL (ref 3.88–5.62)
SODIUM SERPL-SCNC: 140 MMOL/L (ref 135–147)
WBC # BLD AUTO: 3.75 THOUSAND/UL (ref 4.31–10.16)

## 2024-10-25 PROCEDURE — 77386 HB NTSTY MODUL RAD TX DLVR CPLX: CPT | Performed by: RADIOLOGY

## 2024-10-25 PROCEDURE — 85025 COMPLETE CBC W/AUTO DIFF WBC: CPT

## 2024-10-25 PROCEDURE — 83615 LACTATE (LD) (LDH) ENZYME: CPT

## 2024-10-25 PROCEDURE — 80053 COMPREHEN METABOLIC PANEL: CPT

## 2024-10-25 PROCEDURE — 83735 ASSAY OF MAGNESIUM: CPT

## 2024-10-25 PROCEDURE — 36415 COLL VENOUS BLD VENIPUNCTURE: CPT

## 2024-10-25 PROCEDURE — 77014 CHG CT GUIDANCE RADIATION THERAPY FLDS PLACEMENT: CPT | Performed by: RADIOLOGY

## 2024-10-26 NOTE — PROGRESS NOTES
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     See Note.     -- Confirmed by positive Pankeratin (CKAE1/3), p40 and normal wild-type expression       on p53 immunostains.    -- p16 immunostain positive (>70% diffuse and strong nuclear and cytoplasmic        staining); high-risk HPV EH pending; result will be reported in an addendum.      B. Lymph Node, Regional Resection, Level 2 lymph nodes, see comments:  - Metastatic squamous cell carcinoma, keratinizing, involving at least two lymph nodes (2/2).     -- Present in 1.0 cm lymph node and additional detached lymph node(s)/fragments.     -- Largest metastatic focus: 0.3 cm; Extranodal extension: Not identified.    -- Confirmed by positive Pankeratin (CKAE1/3) and p40 immunostains.    -- p16 immunostain positive (>70% diffuse and strong nuclear and cytoplasmic        staining).     C. Lymph Node, Regional Resection, Additional level 2 lymph nodes:  - Metastatic squamous cell carcinoma, keratinizing, involving 3 of 4 lymph     nodes/fragments (3/4).     -- Largest metastatic focus: 1 cm; Extranodal extension: Present, extranodal         lymphovascular invasion identified.    -- Confirmed by positive Pankeratin (CKAE1/3) and p40 immunostains.    -- p16 immunostain positive  (>70% diffuse and strong nuclear and cytoplasmic        staining); high-risk HPV EH pending; result will be reported in an addendum. See Note.      D. Lymph Node, Level 3 lymph nodes, lymph adenectomy:  - One lymph node positive for metastatic squamous cell carcinoma, keratinizing (1/1).     -- Largest metastatic focus: 1.1 cm; Extranodal extension: Not identified.    -- Confirmed by positive Pankeratin (CKAE1/3) and p40 immunostains.    -- p16 immunostain positive (>70% diffuse and strong nuclear and cytoplasmic        staining).  - Portion(s) of benign salivary gland with adjacent detached small (< 0.2 cm) portion      of carcinoma.   - Traumatic neuroma.          Comments:   This is an appended report. These results have been appended to a previously preliminary verified report.           7/3/2024 -  Cancer Staged    Staging form: Pharynx - Oropharynx, AJCC 8th Edition  - Clinical stage from 7/3/2024: Stage I (cT1, cN1, cM0, p16+) - Signed by Fady Morrow MD on 7/3/2024  Stage prefix: Initial diagnosis       9/30/2024 -  Chemotherapy    alteplase (CATHFLO), 2 mg, Intracatheter, Every 1 Minute as needed, 4 of 7 cycles  palonosetron (ALOXI), 0.25 mg, Intravenous, Once, 3 of 6 cycles  Administration: 0.25 mg (10/7/2024), 0.25 mg (10/14/2024), 0.25 mg (10/21/2024)  CISplatin (PLATINOL) infusion, 70 mg (original dose 40 mg/m2), Intravenous, Once, 4 of 7 cycles  Dose modification: 70 mg (original dose 40 mg/m2, Cycle 1, Reason: Other (Must fill in a comment), Comment: pharmacy dictates)  Administration: 70 mg (9/30/2024), 70 mg (10/7/2024), 70 mg (10/14/2024), 70 mg (10/21/2024)  sodium chloride, 500 mL, Intravenous, Once, 4 of 7 cycles  Administration: 500 mL (9/30/2024), 500 mL (9/30/2024), 500 mL (10/7/2024), 500 mL (10/7/2024), 500 mL (10/14/2024), 500 mL (10/14/2024), 500 mL (10/21/2024), 500 mL (10/21/2024)  fosaprepitant (EMEND) IVPB, 150 mg, Intravenous, Once, 4 of 7 cycles  Administration: 150 mg  (9/30/2024), 150 mg (10/7/2024), 150 mg (10/14/2024), 150 mg (10/21/2024)         Treatment Cisplatin 40 mg/m2 weekly x 7 doses with IMRT     C1 9/30/2024  C2  10/7/2024  C3 10/14/2024  C4 10/21/2024  C5 10/28/2024  C6  11/4/2024    C7   11/11/2024    Discussion     Te definitive management of SCCHN in terms of oral cavity vs oropharynx vs larynx vs hypopharynx as well as pure tobacco related vs HPV with or without a prior/current smoking history had been surgery/RT.   In the past, the  role of chemotherapy was reserved for metastatic disease using Cisplatin at fairly high doses as well as infusional 5FU x 96 hours.  However there have been many trials which have changed the landscape of how locoregional and metastatic disease are now managed with chemotherapy, CRT or IO        It is unclear in this case, as had prior RBOT Stage 1 HPV driven s/p resection with no high risk features 0/34 nodes positive at the time and no adjuvant therapy     Based on the DL/biopsy there is SCC in the pharynx but location unclear; 6/7 nodes were positive with ASHLEY and thus the below pertains in terms of combined therapy.  The issues is 6 or 7 weeks of treatment and this appears to be a new second primary based on the biology      In general the prognosis for HPV driven SCCHN is better than that of tobacco related disease and in fact the AJCC staging system reflects this.  However when tobacco is a factor with a patient being a long standing smoker that favorable prognosis is no longer applicable;  however this patient smoked a pack a day for years but quit 30 years ago and so he has more favorable disease     The OS/PFS/FRANCO responses were better in those patients treated on the  study who were HPV positive; induction was followed by CRT and in that study Carboplatin AUC 1.5 only rather than 40 mg/m2 Cisplatin due to perceived toxicities being worse with cisplatin after induction therapy.  That was the case for this patient who  got Cisplatin        However,  CRT per RTOG 91-11 as become SOC.  In the modern era, weekly Cisplatin 40 mg/m2 is used for 7 weeks rather than the high dose Cisplatin in that trial with similar efficacy.  Other radiosensitizers include weekly Carboplatin/Taxol.  Based on the side effect profile of Cisplatin, een in lower doses, renal clearance is an issue.  Both Carboplatin and Cisplatin are renally cleared but Carboplatin is dosed based on renal function and may be more tolerable.  Other agents used as radiosensitizers include Cetuximab per the Garcia study.       Side effects, and  toxicities of chemotherapy alone and in combination with radiation had been discussed at [facility].  The side effects specific to radiation include loss of taste, xerostomia, mucositis, dysphagia , nausea, radiation induced dermatis and fatigue.  Fatigue is also common with chemotherapy and is cumulative.  Copious ropey secretions were discussed as well as nutrition.  In terms of the effects of Cisplatin or Carboplatin/Taxol weekly alone and in combination with radiation are neutropenia/leucopenia, anemia, thrombocytopenia, nausea, GERD, mucositis, fungal infections, renal insufficiency/dehydration, electrolyte imbalances including platinum induced SIADH, neuropathy, hearing loss, minor hair loss.  Taxol can also cause an infusion reaction /premedications are needed.  As chemotherapy is being used as a radiosensitizer, the effects of radiation are magnified.       Nutrition and hydration are concerning in the combined modality setting and we discussed the use of a PEG.  Based on both internal scatter of radiation and with extensive radiation there is potential for fibrosis/strictures as well as mucositis, fungal infections, being hypermetabolic with swallowing issues.  Repletion as the caloric needs increase is improved via a PEG as well as IV fluids.  Nutrition generally follows these patients.       There are also chronic issues which can  "occur; the secretions generally improve in 3-6 months, taste returns within 1-2 months, xerostomia may be a chronic issue, fibrosis, lymphedema, thyroid issues with checks every 3 months after RT completion, length of PEG use of about 3-6 months.     Restaging would be 3 months after therapy with CT PET and then every 3 months with CT neck/chest in the first year, eery 4-6 months thereafter.  HPV driven disease tends to have a latent distant mets period.       PDL1 status as well as TMB, MMR, MSI should be ascertained via [facility] for future reverence      Thus the recommendation would be weekly Cisplatin 40 mg/m2 weekly with RT to 70 Gy and thus 7 weeks      Assessment / Plan:     Recurrent right level 2 node s/p partial glossectomy/pharyngectomy 7/31/2029 with no adjuvant CRT 0/34 nodes at the time and no high risk features     PET with no primary recurrence/ nodes only     MRI face/neck/orbit     Discussion regarding CRT with weekly Cisplatin 40 mg/m2 for 6-7 weeks     PEG placement     Dental evaluation     Nutrition evaluation      9/18/2024      Patient seen by Dr Morrow in Rad Onc; simulation done     Will be treated UB     PEG to be placed-has agreed to it     Nutrition follow up     Speech pathology/swallowing assessment      Tentative plan to start treatment 30 Sept 2024       10/2/2024     Tolerated C1 Cisplatin     No issues other than flushing-took decadron out of premed cocktail.  Usually no infusion reactions with Cisplatin but with Carboplatin     No PEG, weight is 173     Using supplements despite telling patient that herbal remedies can cause issues with both chemo and RT.  He is \"holding\" these agents when gets chemotherapy but even then can affect outcome/formation of DNA adducts     Would again recommend not using these agents     10/9/2024     Week 2/7      Already has grade 1/early 2 confluence in the hard palate     Tongue coated unclear if there is a thrush component as well     Magic " mouthwash, salt water/baking soda rinses for mucositis-may consider decadron rinse if no better/worse next week     Nystatin swish/spit/swallow for thrush     No erythema/neck dermatitis from RT     Did have some nausea from chemotherapy, mostly related to constipation so taking compazine for relief     IVF to start additionally this Friday and weekly through duration treatment     PEG to be placed 10/10/2024; will need close nutrition follow up     Weight 170 pounds      Concerned with degree of mucositis in week 2     Secretions, minimal at this point      Labs acceptable      10/30/2024    Week 5/7    Has increased mucositis posterior oropharynx    Coated tongue/thrush-Nystatin S/S    Started using PEG 5 days ago; nutrition following     Weight 176 pounds was 165    Skin with grade 1 desquamation-mometasone started/aquaphor    Refusing IVF; /74, does not appear dehydrated by renal function             Follow up     1 week         History:   7/3/2024     69 year old with HTN with prior RBOT cancer;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 S/p partial glossectomy and partial pharyngectomy on 07/31/2019, pathology negative for malignancy. S/p MRND right neck 09/04/2019 returned 0/34 nodes. Staged as T1N0 M0       PET scan was done 12/03/19 which showed mild asymmetry at the tongue base, likely post operative. Otherwise no distant metastasis. Prior imaging from June 2019 was clear with no evidence of disease.      There were no high risk features to speak of and he did not receive any adjuvant therapies.       He then did well in follow-up until approximately 3 to 4 months ago when he noticed a palpable lump on the right neck.  Subsequent imaging with CT and PET/CT revealed multiple hypermetabolic lymph nodes in the right neck but no obvious primary recurrence, contralateral or distant disease.  On 6/19/2024 he was taken to the operating room for direct laryngoscopy as well as repeat right neck  "dissection.  7 lymph nodes were removed, 6 of which were positive for metastatic squamous cell carcinoma, keratinizing, p16 positive, with extranodal extension identified.  The largest lymph node measured 1.1 cm.  On the pathology report there is a specimen labeled right pharynx which also contained squamous cell carcinoma.  Looking at the operative report, they describe a small palpable prominence in the \"left pharynx near base of tongue.\"  This is in contradiction to the pathology report which described the specimen as from the right pharynx.            Ct neck 04/24/2024      New rounded lymph node in the palpable region of interest superficial to the sternocleidomastoid muscle measuring 9 x 6 mm. Differential diagnosis should include recurrent disease versus reactive lymphadenopathy.     Small cluster of right level 2B cervical lymph nodes also noted as described above with otherwise no suspicious adenopathy identified in the remainder of the neck.     Status post right-sided partial glossectomy without definite nodular enhancement in the operative bed.     05/06/2024 FNAB under US  Lymph Node, right level 2 (ThinPrep, smears and cell block preparations ):  Conclusive evidence of malignancy.  Carcinoma with keratinization, compatible with known squamous cell carcinoma     PET scan 05/22/2024      1. Scattered FDG-avid right cervical chain lymph nodes compatible with disease recurrence.  2. No suspicious uptake in the oropharynx or hypopharynx.  3. No findings for hypermetabolic metastasis to the chest, abdomen or pelvis.  \  No prior XRT or chemotherapy.      The patient is seeing Rad Onc later today     He is very pleased with the RND healing.  We had a long discussion as to second primary vs recurrence; treatment with CRT either 6 or 7 weeks.  As this is likely a second primary, would do 7 weeks but again, not definitively shown on PET.  Will get MRI face/neck/orbit to further assess.  He has been doing well " post op.  Weight stable 169 pounds.  Has altered diet, eating healthier.  Has no issues, denies any SOB/KOHLI, CP change in bowel/bladder, blood stool/urine         Interval History:  9/18/2024     Doing well; waiting to start treatment 30 Sept 2024.  Risks/benefits and toxicities again explained to the patient with informed consent signed.  Sent zofran 8 mg ODT q 8 prn and compazine 10 mg q 6-8 prn to pharmacy.  No other active issues.       Since last visit PET scan done 9/11/2024    HEAD/NECK:  Increasing focus of radiotracer uptake at the right upper cervical chain, level 2 region, SUV max of 5.5, previously 3.6. This measures 7 mm short axis, image 38 series 17, stable from prior MRI. Based on the configuration on prior MRI, it is possible   this could represent a parotid lesion arising from the deep lobe of the parotid gland though an intraparotid lymph node or cervical chain lymph node is not excluded.     Otherwise resolution of previously seen right upper cervical chain foci.     No FDG avid lymph nodes in the left neck.     Small focus of FDG uptake in the right peritonsillar region, SUV max of 4.2, previously 4.4, image 33 series 2600, may correspond to the 7 mm heterogeneously enhancing lesion seen on prior MRI examination. Question residual disease. However this appears   asymmetrically decreased compared to what is likely normal physiologic left tonsillar activity, SUV max of 5.5. Cannot exclude that this is diminished physiologic activity and the known right pharyngeal lesion is not seen on PET.     CT images: No additional significant findings.     CHEST:  No FDG avid soft tissue lesions are seen.     CT images: Scattered coronary artery calcifications.     ABDOMEN:  No FDG avid soft tissue lesions are seen.              Interval History:  10/2/2024     Doing well with treatment but had C1 30 Sept and this is day 3 of radiation.  He has started on herbal supplements despite advise against this.  Again  told not acceptable.  Had issues with flushing but no other Cisplatin related effects.      Labs  9/30/2024     Na 138 K 3.5 Cr 0.79 ALT/AST 16/16/ Ca 9 Mg 2.1  WBC 4.4 Hgb 15.4 MCV 86 plts 210 ANC [id number]     Baseline TSH 2.245               Interval History:  10/9/2024 week 2/7     Has grade 1/early 2 confluent mucositis hard palate, extending to soft bilateral  As above not using salt water/baking soda rinses or magic mouthwash-given recipe and prescription for magic mouthwash which can be swallowed or spit out.,  Has coated tongue, concern with thrush so will start Nystatin as well.  Discussed other rinses if not helpful.  Seeing palliative care for pain control-consider gabapentin      Has had issues with nausea and constipation from zofran-using compazine with relief.  No GERD.  Decreased oral intake due to mucositis.  PEG being placed 10/10/2024     Labs 10/4/2024 with na 136 K 4.0 Cr 0.75 Ca 9.0 ALT/AST 17/18 TB 0.59 down from 1.36 last week  WBC 4.9 Hgb 15.5 MCV 87 plts 224 ANC [id number]      Interval History: week 5/7  10/30/2024    Patient with prior RBOT cancer;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 S/p partial glossectomy and partial pharyngectomy on 07/31/2019, pathology negative for malignancy. S/p MRND right neck 09/04/2019 returned 0/34 nodes. Staged as T1N0 NO prior adjuvant therapy    Noted recurrent right neck disease/ new pharynx, recurrent RBOT    Now getting CRT weekly x 7weeks with 70 Gy IMRT    Skin with grade 1 desquamation right >left.  Using aquaphor mometasone cream.  Started with PEG, swallowing more difficult.  Has thrush, grade 1 mucositis posterior oropharynx.  Can swallow water but solids more difficult.  Nutrition following.  Is getting free water orally and via PEG.  Refusing IVF.  Weight 176 pounds BP stable.      Labs  1025/2024    Na 140 K 4.3 Cr 0.76 Ca 9.0 ALT/AST 23/17 Mg 20  WBC 3.8 Hgb 14 MCV 87 plts 171 ANC [id number]     REVIEW OF SYSTEMS: as  above  Please note that a 14-point review of systems was performed to include Constitutional, HEENT, Respiratory, CVS, GI, , Musculoskeletal, Integumentary, Neurologic, Rheumatologic, Endocrinologic, Psychiatric, Lymphatic, and Hematologic/Oncologic systems were reviewed and are negative unless otherwise stated in HPI. Positive and negative findings pertinent to this evaluation are incorporated into the history of present illness.      ECOG PS: 1    PROBLEM LIST:  Patient Active Problem List   Diagnosis    Mass of right side of neck    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)    Status post radical dissection of neck    Tongue pain    Dysphagia    Hypertension    Protein-calorie malnutrition (HCC)    Chemotherapy-induced nausea    Dehydration       Past Medical History:   has a past medical history of Facial basal cell cancer, GERD (gastroesophageal reflux disease), Hypertension, Throat cancer (HCC), and Vitamin D deficiency.    PAST SURGICAL HISTORY:   has a past surgical history that includes Rhinoplasty; Colonoscopy (N/A, 03/22/2016); [place] tooth extraction; pr laryngoscopy direct operative w/biopsy (N/A, 07/17/2019); TRANSORAL ROBOTIC SURGERY (TORS) (N/A, 07/31/2019); pr cervical lymphadec modified radical neck dsj (Right, 09/04/2019); pr laparoscopy surg cholecystectomy (N/A, 10/30/2019); Upper gastrointestinal endoscopy; Cholecystectomy; pr cysto insertion transprostatic implant single (N/A, 04/14/2023); Sinus surgery (1994); US guided lymph node biopsy right (5/6/2024); pr laryngoscopy direct operative w/biopsy (N/A, 6/19/2024); and Radical neck dissection (Right, 6/19/2024).    CURRENT MEDICATIONS  Current Outpatient Medications   Medication Sig Dispense Refill    Allergy Relief 25 MG/10ML oral liquid       amLODIPine (NORVASC) 5 mg tablet Take 5 mg by mouth daily at bedtime       amoxicillin-clavulanate (AUGMENTIN) 875-125 mg per tablet TAKE TWO TABLETS BY MOUTH NOW, THEN TAKE ONE TABLET BY MOUTH EVERY  12 HOURS UNTIL FINISHED WITH FOOD (Patient not taking: Reported on 10/14/2024)      atovaquone-proguanil (MALARONE) 250-100 mg Take 1 tablet by mouth daily      Cholecalciferol (Vitamin D3) 125 MCG (5000 UT) TABS every 24 hours      Cholecalciferol (Vitamin D3) LIQD Use      diphenhydramine, lidocaine, Al/Mg hydroxide, simethicone (Magic Mouthwash) SUSP Swish and swallow 10 mL every 4 (four) hours as needed for mouth pain or discomfort 500 mL 4    Gabapentin (NEURONTIN) 300 mg/6mL solution Take 5 mL (250 mg total) by mouth daily at bedtime as needed (pain, insomnia) May reduce dose for daytime grogginess (Patient not taking: Reported on 10/24/2024) 470 mL 0    lisinopril (ZESTRIL) 10 mg tablet Take 10 mg by mouth daily at bedtime       Misc Natural Products (ADRENAL PO) 1 orally as directed      nystatin (MYCOSTATIN) 500,000 units/5 mL suspension Apply 5 mL (500,000 Units total) to the mouth or throat 4 (four) times a day 400 mL 3    ondansetron (ZOFRAN-ODT) 8 mg disintegrating tablet Take 1 tablet (8 mg total) by mouth every 8 (eight) hours as needed for nausea or vomiting 30 tablet 3    Probiotic Product (PROBIOTIC PO) as directed Orally (Patient not taking: Reported on 10/24/2024)      prochlorperazine (COMPAZINE) 10 mg tablet Take 1 tablet (10 mg total) by mouth every 6 (six) hours as needed for nausea or vomiting 30 tablet 3     No current facility-administered medications for this visit.     [unfilled]    SOCIAL HISTORY:   reports that he has never smoked. He has never used smokeless tobacco. He reports that he does not currently use alcohol after a past usage of about 5.0 standard drinks of alcohol per week. He reports that he does not currently use drugs.     FAMILY HISTORY:  family history includes Cancer in his mother; Diabetes in his mother; Heart failure in his father; Seizures in his father.     ALLERGIES:  is allergic to shellfish allergy - food allergy and bee venom.      Physical Exam:  Vital Signs:    Visit Vitals  Smoking Status Never     There is no height or weight on file to calculate BMI.  There is no height or weight on file to calculate BSA.    GEN: Alert, awake oriented x3, in no acute distress  HEENT- No pallor, icterus, cyanosis, no oral mucosal lesions, thrush oral tongue grade 1 mucositis posteriorly   LAD - no palpable cervical, clavicle, axillary, inguinal LAD increased erythema/desquamation right neck>left, no nodes   Heart- normal S1 S2, regular rate and rhythm, No murmur, rubs.   Lungs- clear breathing sound bilateral.   Abdomen- soft, Non tender, bowel sounds present  Extremities- No cyanosis, clubbing, edema  Neuro- No focal neurological deficit    Labs:  Lab Results   Component Value Date    WBC 3.75 (L) 10/25/2024    HGB 14.0 10/25/2024    HCT 42.6 10/25/2024    MCV 87 10/25/2024     10/25/2024     Lab Results   Component Value Date     03/03/2016    SODIUM 140 10/25/2024    K 4.3 10/25/2024     10/25/2024    CO2 32 10/25/2024    AGAP 6 10/25/2024    BUN 12 10/25/2024    CREATININE 0.76 10/25/2024    GLUC 82 10/25/2024    CALCIUM 9.0 10/25/2024    AST 17 10/25/2024    ALT 23 10/25/2024    ALKPHOS 66 10/25/2024    PROT 6.7 03/03/2016    TP 7.0 10/25/2024    BILITOT 0.6 03/03/2016    TBILI 0.49 10/25/2024    EGFR 93 10/25/2024           I spent 40 minutes on chart review, face to face counseling time, coordination of care and documentation.    Magali Talbot MD PhD

## 2024-10-28 ENCOUNTER — HOSPITAL ENCOUNTER (OUTPATIENT)
Dept: INFUSION CENTER | Facility: HOSPITAL | Age: 69
Discharge: HOME | End: 2024-10-28
Attending: INTERNAL MEDICINE
Payer: MEDICARE

## 2024-10-28 ENCOUNTER — NUTRITION (OUTPATIENT)
Dept: NUTRITION | Facility: HOSPITAL | Age: 69
End: 2024-10-28

## 2024-10-28 VITALS
RESPIRATION RATE: 16 BRPM | DIASTOLIC BLOOD PRESSURE: 77 MMHG | HEART RATE: 112 BPM | TEMPERATURE: 97.3 F | WEIGHT: 165.57 LBS | BODY MASS INDEX: 23.7 KG/M2 | OXYGEN SATURATION: 98 % | HEIGHT: 70 IN | SYSTOLIC BLOOD PRESSURE: 113 MMHG

## 2024-10-28 DIAGNOSIS — C01 MALIGNANT NEOPLASM OF BASE OF TONGUE (HCC): Primary | ICD-10-CM

## 2024-10-28 PROCEDURE — 96375 TX/PRO/DX INJ NEW DRUG ADDON: CPT

## 2024-10-28 PROCEDURE — 77386 HB NTSTY MODUL RAD TX DLVR CPLX: CPT | Performed by: RADIOLOGY

## 2024-10-28 PROCEDURE — 96361 HYDRATE IV INFUSION ADD-ON: CPT

## 2024-10-28 PROCEDURE — 77014 CHG CT GUIDANCE RADIATION THERAPY FLDS PLACEMENT: CPT | Performed by: RADIOLOGY

## 2024-10-28 PROCEDURE — 77336 RADIATION PHYSICS CONSULT: CPT | Performed by: RADIOLOGY

## 2024-10-28 PROCEDURE — 96367 TX/PROPH/DG ADDL SEQ IV INF: CPT

## 2024-10-28 PROCEDURE — 96413 CHEMO IV INFUSION 1 HR: CPT

## 2024-10-28 RX ORDER — SODIUM CHLORIDE 9 MG/ML
20 INJECTION, SOLUTION INTRAVENOUS ONCE
Status: COMPLETED | OUTPATIENT
Start: 2024-10-28 | End: 2024-10-28

## 2024-10-28 RX ORDER — PALONOSETRON 0.05 MG/ML
0.25 INJECTION, SOLUTION INTRAVENOUS ONCE
Status: COMPLETED | OUTPATIENT
Start: 2024-10-28 | End: 2024-10-28

## 2024-10-28 RX ADMIN — SODIUM CHLORIDE 20 ML/HR: 9 INJECTION, SOLUTION INTRAVENOUS at 08:53

## 2024-10-28 RX ADMIN — FOSAPREPITANT 150 MG: 150 INJECTION, POWDER, LYOPHILIZED, FOR SOLUTION INTRAVENOUS at 09:40

## 2024-10-28 RX ADMIN — PALONOSETRON HYDROCHLORIDE 0.25 MG: 0.25 INJECTION INTRAVENOUS at 09:40

## 2024-10-28 RX ADMIN — SODIUM CHLORIDE 500 ML: 0.9 INJECTION, SOLUTION INTRAVENOUS at 11:39

## 2024-10-28 RX ADMIN — SODIUM CHLORIDE 500 ML: 0.9 INJECTION, SOLUTION INTRAVENOUS at 08:53

## 2024-10-28 RX ADMIN — CISPLATIN 70 MG: 1 INJECTION, SOLUTION INTRAVENOUS at 10:37

## 2024-10-28 NOTE — PROGRESS NOTES
Jesus Hdz  tolerated treatment well with no complications.      Jesus Hdz is aware of future appt on 11/4 at 0800.     AVS printed and given to Jesus Hdz:  No (Declined by Jesus Hdz)

## 2024-10-28 NOTE — PROGRESS NOTES
Outpatient Oncology Nutrition Consultation   Type of Consult: Follow Up  Care Location: Radiation Oncology    Reason for referral: Received notification by  Dr. Talbot  on 9/18 that pt has triggered for oncology nutrition care (reason for referral: HNC dx and TF).    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     See Note.     -- Confirmed by positive Pankeratin (CKAE1/3), p40 and normal wild-type expression       on p53 immunostains.    -- p16 immunostain positive (>70% diffuse and strong nuclear and cytoplasmic        staining); high-risk HPV EH pending; result will be reported in an addendum.      B. Lymph Node, Regional Resection, Level 2 lymph nodes, see comments:  - Metastatic squamous cell carcinoma, keratinizing, involving at least two lymph nodes (2/2).     -- Present in 1.0 cm lymph node and additional detached lymph node(s)/fragments.     -- Largest metastatic focus: 0.3 cm; Extranodal extension: Not identified.    -- Confirmed by positive Pankeratin (CKAE1/3) and p40 immunostains.    -- p16 immunostain positive (>70% diffuse and strong nuclear and cytoplasmic        staining).     C. Lymph Node, Regional Resection, Additional level 2 lymph nodes:  - Metastatic squamous cell carcinoma, keratinizing, involving 3 of 4 lymph     nodes/fragments (3/4).     -- Largest metastatic focus: 1 cm; Extranodal extension: Present, extranodal         lymphovascular invasion identified.    -- Confirmed by positive Pankeratin (CKAE1/3) and p40  immunostains.    -- p16 immunostain positive (>70% diffuse and strong nuclear and cytoplasmic        staining); high-risk HPV EH pending; result will be reported in an addendum. See Note.      D. Lymph Node, Level 3 lymph nodes, lymph adenectomy:  - One lymph node positive for metastatic squamous cell carcinoma, keratinizing (1/1).     -- Largest metastatic focus: 1.1 cm; Extranodal extension: Not identified.    -- Confirmed by positive Pankeratin (CKAE1/3) and p40 immunostains.    -- p16 immunostain positive (>70% diffuse and strong nuclear and cytoplasmic        staining).  - Portion(s) of benign salivary gland with adjacent detached small (< 0.2 cm) portion      of carcinoma.   - Traumatic neuroma.          Comments:   This is an appended report. These results have been appended to a previously preliminary verified report.           7/3/2024 -  Cancer Staged    Staging form: Pharynx - Oropharynx, AJCC 8th Edition  - Clinical stage from 7/3/2024: Stage I (cT1, cN1, cM0, p16+) - Signed by Fady Morrow MD on 7/3/2024  Stage prefix: Initial diagnosis       9/30/2024 -  Chemotherapy    alteplase (CATHFLO), 2 mg, Intracatheter, Every 1 Minute as needed, 5 of 7 cycles  palonosetron (ALOXI), 0.25 mg, Intravenous, Once, 4 of 6 cycles  Administration: 0.25 mg (10/7/2024), 0.25 mg (10/14/2024), 0.25 mg (10/21/2024)  CISplatin (PLATINOL) infusion, 70 mg (original dose 40 mg/m2), Intravenous, Once, 5 of 7 cycles  Dose modification: 70 mg (original dose 40 mg/m2, Cycle 1, Reason: Other (Must fill in a comment), Comment: pharmacy dictates)  Administration: 70 mg (9/30/2024), 70 mg (10/7/2024), 70 mg (10/14/2024), 70 mg (10/21/2024)  sodium chloride, 500 mL, Intravenous, Once, 5 of 7 cycles  Administration: 500 mL (9/30/2024), 500 mL (9/30/2024), 500 mL (10/7/2024), 500 mL (10/7/2024), 500 mL (10/14/2024), 500 mL (10/14/2024), 500 mL (10/21/2024), 500 mL (10/21/2024)  fosaprepitant (EMEND) IVPB, 150 mg, Intravenous, Once, 5  of 7 cycles  Administration: 150 mg (9/30/2024), 150 mg (10/7/2024), 150 mg (10/14/2024), 150 mg (10/21/2024)       Past Medical & Surgical Hx:   Patient Active Problem List   Diagnosis    Mass of right side of neck    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)    Status post radical dissection of neck    Tongue pain    Dysphagia    Hypertension    Protein-calorie malnutrition (HCC)    Chemotherapy-induced nausea    Dehydration     Past Medical History:   Diagnosis Date    Facial basal cell cancer     GERD (gastroesophageal reflux disease)     diet controlled    Hypertension     Throat cancer (HCC)     Vitamin D deficiency      Past Surgical History:   Procedure Laterality Date    CHOLECYSTECTOMY      COLONOSCOPY N/A 03/22/2016    Procedure: COLONOSCOPY;  Surgeon: Daren Montana MD;  Location: [facility] GI LAB;  Service:     MA CERVICAL LYMPHADEC MODIFIED RADICAL NECK DSJ Right 09/04/2019    Procedure: MODIFIED RADICAL NECK DISSECTION;  Surgeon: Fady Montana MD;  Location: [facility];  Service: ENT    MA CYSTO INSERTION TRANSPROSTATIC IMPLANT SINGLE N/A 04/14/2023    Procedure: CYSTOSCOPY WITH INSERTION UROLIFT;  Surgeon: Rubin Alfonso MD;  [facility];  Service: Urology    MA LAPAROSCOPY SURG CHOLECYSTECTOMY N/A 10/30/2019    Procedure: CHOLECYSTECTOMY LAPAROSCOPIC;  Surgeon: Christiano Cornejo MD;  Location: [facility];  Service: General    MA LARYNGOSCOPY DIRECT OPERATIVE W/BIOPSY N/A 07/17/2019    Procedure: MICROLARYNGOSCOPY DIRECT WITH BIOPSY OF VALLECULAR MASS;  Surgeon: Amol Cordova MD;  Location[facility];  Service: ENT    MA LARYNGOSCOPY DIRECT OPERATIVE W/BIOPSY N/A 6/19/2024    Procedure: DIRECT LARYNGOSCOPY WITH BIOPSY WITH RIGHT NECK DISSECTION LEVEL TWO AND THREE;  Surgeon: Fady Montana MD;  [facility];  Service: ENT    RADICAL NECK DISSECTION Right 6/19/2024    Procedure: DISSECTION NECK RADICAL;  Surgeon: Fady Montana MD;  [facility];  Service: ENT     "RHINOPLASTY      SINUS SURGERY  1994    TRANSORAL ROBOTIC SURGERY (TORS) N/A 07/31/2019    Procedure: ROBOTICALLY ASSISTED PARTIAL GLOSSECTOMY, PARTIAL PHARYNGECTOMY;  Surgeon: Fady Montana MD;  [facility];  Service: ENT    UPPER GASTROINTESTINAL ENDOSCOPY      US GUIDED LYMPH NODE BIOPSY RIGHT  5/6/2024    WISDOM TOOTH EXTRACTION         Review of Medications:   Vitamins, Supplements and Herbals: Taking variety of herbal supplements by naturopathic/ Ayurvedic doctor:   Genistein, Artemisinin, Apigenin, VitD3, Amla (Vit C), CoQ10, Frankincense, Angiostop(Sea Cucumber), Artecin, C-statin, PauD Arco, Black Cumin/Black seed oil. Alternating with: [place], Curcumin, Indole 3 Carbinol, Quercetin, Resveratrol, Vascustatin, Cronaxal (Benegene), Lycopene, Agaricus Murill + IV: Kaplan or Immunity 15   + Immune support tea, Herbal detox tea, Sinus care Jam, Acid balance tincture  He takes these Wednesday-Saturday. Reviewed w/ patient in detail that herbal supplements can interact with treatment and have negative side effects.  Refered pt to [facility] \"About Herbs\" website for further information on safety/effectiveness/interactions of supplements.  Pt holding supplements at this time due to swallowing difficulty 10/28      Current Outpatient Medications:     Allergy Relief 25 MG/10ML oral liquid, , Disp: , Rfl:     amLODIPine (NORVASC) 5 mg tablet, Take 5 mg by mouth daily at bedtime , Disp: , Rfl:     amoxicillin-clavulanate (AUGMENTIN) 875-125 mg per tablet, TAKE TWO TABLETS BY MOUTH NOW, THEN TAKE ONE TABLET BY MOUTH EVERY 12 HOURS UNTIL FINISHED WITH FOOD (Patient not taking: Reported on 10/14/2024), Disp: , Rfl:     atovaquone-proguanil (MALARONE) 250-100 mg, Take 1 tablet by mouth daily, Disp: , Rfl:     Cholecalciferol (Vitamin D3) 125 MCG (5000 UT) TABS, every 24 hours, Disp: , Rfl:     Cholecalciferol (Vitamin D3) LIQD, Use, Disp: , Rfl:     diphenhydramine, lidocaine, Al/Mg hydroxide, simethicone (Magic " Mouthwash) SUSP, Swish and swallow 10 mL every 4 (four) hours as needed for mouth pain or discomfort, Disp: 500 mL, Rfl: 4    Gabapentin (NEURONTIN) 300 mg/6mL solution, Take 5 mL (250 mg total) by mouth daily at bedtime as needed (pain, insomnia) May reduce dose for daytime grogginess (Patient not taking: Reported on 10/24/2024), Disp: 470 mL, Rfl: 0    lisinopril (ZESTRIL) 10 mg tablet, Take 10 mg by mouth daily at bedtime , Disp: , Rfl:     Misc Natural Products (ADRENAL PO), 1 orally as directed, Disp: , Rfl:     nystatin (MYCOSTATIN) 500,000 units/5 mL suspension, Apply 5 mL (500,000 Units total) to the mouth or throat 4 (four) times a day, Disp: 400 mL, Rfl: 3    ondansetron (ZOFRAN-ODT) 8 mg disintegrating tablet, Take 1 tablet (8 mg total) by mouth every 8 (eight) hours as needed for nausea or vomiting, Disp: 30 tablet, Rfl: 3    Probiotic Product (PROBIOTIC PO), as directed Orally (Patient not taking: Reported on 10/24/2024), Disp: , Rfl:     prochlorperazine (COMPAZINE) 10 mg tablet, Take 1 tablet (10 mg total) by mouth every 6 (six) hours as needed for nausea or vomiting, Disp: 30 tablet, Rfl: 3  No current facility-administered medications for this visit.    Facility-Administered Medications Ordered in Other Visits:     alteplase (CATHFLO) injection 2 mg, 2 mg, Intracatheter, Q1MIN PRN, Magali Talbot MD    CISplatin (PLATINOL) 70 mg in sodium chloride 0.9 % 250 mL infusion, 70 mg, Intravenous, Once, Magali Talbot MD    fosaprepitant (EMEND) 150 mg in sodium chloride 0.9 % 250 mL IVPB, 150 mg, Intravenous, Once, Magali Talbot MD, Last Rate: 500 mL/hr at 10/28/24 0940, 150 mg at 10/28/24 0940    sodium chloride 0.9 % bolus 500 mL, 500 mL, Intravenous, Once, Magali Talbot MD, Last Rate: 500 mL/hr at 10/28/24 0853, 500 mL at 10/28/24 0853    sodium chloride 0.9 % bolus 500 mL, 500 mL, Intravenous, Once, Magali Talbot MD    Most Recent Lab Results:   Lab Results   Component Value Date    WBC 3.75  "(L) 10/25/2024    NEUTROABS 2.82 10/25/2024    CHOL 246 (H) 03/03/2016    TRIG 226 (H) 03/03/2016    HDL 44 03/03/2016    ALT 23 10/25/2024    AST 17 10/25/2024    ALB 4.1 10/25/2024     03/03/2016    SODIUM 140 10/25/2024    SODIUM 140 10/18/2024    K 4.3 10/25/2024    K 3.8 10/18/2024     10/25/2024    BUN 12 10/25/2024    BUN 15 10/18/2024    CREATININE 0.76 10/25/2024    CREATININE 0.72 10/18/2024    EGFR 93 10/25/2024    TSH 1.55 08/30/2022    POCGLU 100 09/11/2024    GLUC 82 10/25/2024    HGBA1C 5.7 (H) 04/20/2023    HGBA1C 5.4 10/10/2019    CALCIUM 9.0 10/25/2024    MG 2.0 10/25/2024       Anthropometric Measurements:   Height: 70\"  Ht Readings from Last 1 Encounters:   10/28/24 5' 10\" (1.778 m)     Wt Readings from Last 20 Encounters:   10/28/24 75.1 kg (165 lb 9.1 oz)   10/24/24 76.2 kg (168 lb)   10/21/24 76.4 kg (168 lb 6.9 oz)   10/15/24 75.2 kg (165 lb 12.6 oz)   10/14/24 75.8 kg (167 lb 1.7 oz)   10/09/24 77.1 kg (170 lb)   10/07/24 76.4 kg (168 lb 6.9 oz)   10/02/24 78.5 kg (173 lb)   09/30/24 76.8 kg (169 lb 6.4 oz)   09/18/24 75.8 kg (167 lb)   07/16/24 76.2 kg (168 lb 0.4 oz)   07/03/24 76.7 kg (169 lb)   06/26/24 77.1 kg (170 lb)   06/19/24 77.1 kg (170 lb)   06/13/24 82.1 kg (181 lb)   04/18/24 82.1 kg (181 lb)   01/22/24 82.1 kg (181 lb)   08/28/23 80.7 kg (178 lb)   05/19/23 82.1 kg (181 lb)   04/14/23 82.3 kg (181 lb 6.4 oz)       Weight History:   Usual Weight: 165-170#  Varian: 10/7: 169#, 10/15: 167.6#,  (10/21) 166.4#, 167.4 (10/24)  Home Scale: 157# (10/16), 155# (10/28/2024)    Oncology Nutrition-Anthropometrics      Flowsheet Row Nutrition from 10/28/2024 in [facility] Oncology Dietitian [facility]   Patient age (years): 69 years 69 years   Patient (male) height (in): 70 in 70 in   Current weight (lbs): 165.6 lbs 168.4 lbs   Current weight to be used for anthropometric calculations (kg) 75.3 kg 76.5 kg "   BMI: 23.8 24.2   IBW male 166 lb 166 lb   IBW (kg) male 75.5 kg 75.5 kg   IBW % (male) 99.8 % 101.4 %   Adjusted BW (male): 165.9 lbs 166.6 lbs   Adjusted BW in kg (male): 75.4 kg 75.7 kg   % weight change after 1 week: -1.7 % 0.8 %   Weight change after 1 week (lbs) -2.8 lbs 1.4 lbs   % weight change after 1 month: -2.2 % 0.8 %   Weight change after 1 month (lbs) -3.8 lbs 1.4 lbs   % weight change after 3 months: -- 0.2 %   Weight change after 3 months (lbs) -- 0.4 lbs            Nutrition-Focused Physical Findings: none observed    Food/Nutrition-Related History & Client/Social History:    Current Nutrition Impact Symptoms:  [x] Nausea  [x] Reduced Appetite  [] Acid Reflux    [] Vomiting  [] Unintended Wt Loss  [] Malabsorption    [] Diarrhea  [] Unintended Wt Gain  [] Dumping Syndrome    [] Constipation  [x] Thick Mucous/Secretions  [] Abdominal Pain    [] Dysgeusia (Altered Taste) intact [] Xerostomia (Dry Mouth)  [] Gas    [] Dysosmia (Altered Smell)  [] Thrush  [] Difficulty Chewing    [] Oral Mucositis (Sore Mouth) [] Fatigue  [] Hyperglycemia   Lab Results   Component Value Date    HGBA1C 5.7 (H) 04/20/2023      [] Odynophagia   [] Esophagitis  [] Other: starting to experience neuropathy   [x] Dysphagia  [] Early Satiety  [] No Problems Eating      Food Allergies & Intolerances: yes: mild shellfish - fingers swell up    Current Diet: Tube Feeding  Current Nutrition Intake:  Less orally, increased via PEG    Appetite: Fair , Poor  Nutrition Route: PEG  Oral Care: brushes daily, gargle with salt/baking soda before meals  Full mobility of tongue; continues with exercises from prior SLP  No sensitives of hot/cold or spicy. Has trouble with bread and melon at times.  Activity level: Usual ADL's     24 hr EN Recall:  DME: [employer]  Access Type: PEG  Formula: Samreen "[employer]" 1.4  Method: Bolus  Regimen: 11oz (325 mL) 4 times per day of Samreen "[employer]" via PEG (gravity syringe method to administer boluses; 1 container  infusing over ~15-20 minutes).  Flush: 60 mL free water flush pre and 60 mL free water flush post each bolus ( 120mL x 4 boluses = 480 mL) with several water flushes in between feedings.  EN providin mL volume (4 cartons/day), 1820 kcal (80% est needs), 80g protein (89% est needs), 923 mL free water    Pt gets in total 60+ oz daily       Beverages: water,  64 oz detox and herbal tea    Supplements:   Whey protein powder 1x/day (when able to eat)     Oncology Nutrition-Estimated Needs      Flowsheet Row Nutrition from 10/16/2024 in [facility] Oncology Dietitian [facility]   Weight type used Actual weight Actual weight   Weight in kilograms (kg) used for estimated needs 75.4 kg 77 kg   Energy needs formula:  30-35 kcal/kg 30-35 kcal/kg   Energy needs based on 30 kcal/k kcal 2310 kcal   Energy needs based on 35 kcal/k kcal 2695 kcal   Protein needs formula: 1.2-1.5 g/kg 1.2-1.5 g/kg   Protein needs based on 1.2 g/k g 92 g   Protein needs based on 1.5 g/kg 113 g 116 g   Fluid needs formula: 30-35 mL/kg 30-35 mL/kg   Fluid needs based on 30 mL/kg 2265 mL 2304 mL   Fluid needs in ounces 77 oz 78 oz   Fluid needs based on 35 mL/kg 2643 mL 2688 mL   Fluid needs in ounces 89 oz 91 oz             Discussion & Intervention:   Jesus was evaluated today for an RD follow up regarding HNC and enteral nutrition.  Jesus is currently undergoing tx for HNCx .  RD met with patient  during infusion today. Pt is on his fifth week of HNC concurrent CRT (last day planned for ).  Pt reports that over the weekend he started experiencing dysphagia. He does not experience pain, his throat has just felt like it closed up. He has been using PEG tube for nutrition. He get in 4 feedings yesterday without signs of intolerance. He reports stools were a bit looser than normal but not diarrhea. He has been maintaining his weight. He  reports that dysphagia has improved today as yesterday was unable to drink water but able today. He is going to try eating later today, but plans to continue using PEG tube based on symptoms. Pt plans to hold on vitamin supplements due to dysphagia. He is using PEG tube for medications as well. He is getting in adequate hydration either via PEG and orally; about 60+oz daily. Pt also experiencing nausea and reduced appetite. He takes anti-emetics sometimes. Advised to use as needed and if interfering with ability to get in adequate nutrition may need to be more consistent with alternating Zofran/compazine.    Reviewed 24 hour recall, which revealed an suboptimal po intake, and discussed ways to increase kcal, protein, and fluid intakes and optimize nutrient intake.  Also reviewed the importance of wt management throughout the tx process and the role of a high kcal/ high protein diet and enteral nutrition in managing wt and overall health.  Based on today's assessment, discussion included: MNT for: Enteral nutrition, tube care, how to modify foods for anticipated nutrition impact symptoms pt may experience during CA tx, practicing proper oral care, general food safety measures during cancer tx, a high kcal/protein diet & food choices to include at all meals & snacks (Examples of high kcal foods: cheese, full-fat dairy products, nut butter, plant-based fats, coconut oil/milk, avocado, butter, cream soup, etc. Examples of high protein foods: eggs, chicken, fish, beans/legumes, nuts/nut butters, bone broth, etc.) , fortifying foods for added kcal and protein (examples include: adding cheese to foods such as eggs, mashed potatoes, casseroles, etc.; Making oatmeal with whole milk rather than water; Making fortified mashed potatoes with cream, butter, dry milk powder, plain Greek yogurt, and cheese.), adequate hydration & fluid choices, sipping on calorie containing beverages (examples include: adding whole milk or cream to  coffee, oral nutrition supplements, juice, electrolyte replacement beverages, milk, etc.), eating smaller more frequent meals every 2-3 hours (5-6 small meals/day), utilizing oral nutrition supplements, adding extra fat to foods while cooking such as butter, plant-based oil, coconut oil/milk, avocado, nut butters, etc., practicing proper feeding tube use & care, adherence to and compliance with enteral nutrition plan of care, and individualized enteral nutrition recommendations & plan: [employer] 1.4 via PEG 5 cartons per day    Moving forward, Jesus was encouraged to increase kcal, protein, and fluid intakes   Materials Provided:  N/A  All questions and concerns addressed during today’s visit.  Jesus has RD contact information.    Nutrition Diagnosis:   Inadequate Energy Intake related to physiological causes, disease state and treatment related issues as evidenced by food recall, wt loss and discussion with pt and/or family.  Increased Nutrient Needs (kcal & pro) related to increased demand for nutrients and disease state as evidenced by cancer dx and pt undergoing tx for cancer.  Monitoring & Evaluation:   Goals:   weight maintenance/stabilization  adequate nutrition impact symptom management  pt to meet >/=75% estimated nutrition needs daily    Progress Towards Goals: Progressing    Nutrition Rx & Recommendations:  Diet: Enteral Nutrition as primary source of nutrition  Alter food choices and eating patterns to accommodate changing needs.  Refer to Eating Hints book for other meal/snack ideas and symptom management.  Avoid spicy, acidic, sharp/hard/crunchy foods & carbonated beverages as needed.  Follow proper oral care; Try baking soda/salt water rinse recipe (mix 3/4 tsp salt + 1 tsp baking soda + 1 qt water; rinse with plain water after using) in Eating Hints book (pg 18).  Brush your teeth before/after meals & before bed.  For trouble swallowing: eat 5-6 small meals/day; choose foods that are  easy to swallow; choose foods that are high in protein & calories; cook foods until they are soft/tender; cut food into small pieces; moisten & soften foods (gravy/sauce/broth/yogurt); sip drinks through a straw (as tolerated); avoid foods/drinks that can burn/scrape your throat (hot temperatures, spicy foods, acidic foods, sharp/hard/crunchy foods, alcohol); talk to your doctor about a Speech Therapy referral for a swallowing evaluation (see page 26-28 in your Eating Hints book).  For thick mucous: make sure you are staying well hydrated (>64 oz/day), try swishing and spitting with plain carbonated water (unless you have a sore mouth), talk to your doctor about trying Mucinex.  Weigh yourself regularly. If you notice weight loss, make an effort to increase your daily food/calorie intake. If you continue to notice loss after these efforts, reach out to your dietitian to establish a plan to stabilize weight.  Continue to eat by mouth, as appropriate/tolerated, as much as possible.  Your syringes are each 60 mL or 2 fl oz.  Always flush your feeding tube with 60 mL room-temp water 1-2 times daily while feeding tube is not in use.  Feeding tube plan:  TF Goal: 1 container (325 mL) 5 times per day of [employer] 1.4 via PEG (push syringe or gravity syringe method to administer boluses; 1 container to infuse over ~15-20 minutes).   OR 1 + 1/4 carton 4x/ day to equal 5 cartons total daily   Water Flushin mL free water flush pre/post each bolus (total of 600 mL/day in flushes; flushes spread throughout the day; will need to adjust flushes prn).   Enteral Nutrition goal to provide: 1625 mL volume (5 containers/day), 2275 kcal, 100 grams protein, 1154 mL free water    Follow Up Plan:   before RT   Recommend Referral to Other Providers: none at this time

## 2024-10-29 ENCOUNTER — TELEPHONE (OUTPATIENT)
Facility: HOSPITAL | Age: 69
End: 2024-10-29

## 2024-10-29 DIAGNOSIS — L58.9 RADIATION DERMATITIS: Primary | ICD-10-CM

## 2024-10-29 PROCEDURE — 77014 CHG CT GUIDANCE RADIATION THERAPY FLDS PLACEMENT: CPT | Performed by: RADIOLOGY

## 2024-10-29 PROCEDURE — 77386 HB NTSTY MODUL RAD TX DLVR CPLX: CPT | Performed by: RADIOLOGY

## 2024-10-29 RX ORDER — MOMETASONE FUROATE 1 MG/G
CREAM TOPICAL DAILY
Qty: 45 G | Refills: 1 | Status: SHIPPED | OUTPATIENT
Start: 2024-10-29

## 2024-10-29 NOTE — TELEPHONE ENCOUNTER
Spoke to patient during radiation appointment. Informed him Dr. Ying will be ordering mometasone cream for his skin reaction. He verbalized understanding.

## 2024-10-30 ENCOUNTER — OFFICE VISIT (OUTPATIENT)
Age: 69
End: 2024-10-30
Payer: MEDICARE

## 2024-10-30 VITALS
RESPIRATION RATE: 16 BRPM | DIASTOLIC BLOOD PRESSURE: 74 MMHG | SYSTOLIC BLOOD PRESSURE: 125 MMHG | BODY MASS INDEX: 25.2 KG/M2 | TEMPERATURE: 97.3 F | HEART RATE: 106 BPM | WEIGHT: 176 LBS | HEIGHT: 70 IN | OXYGEN SATURATION: 96 %

## 2024-10-30 DIAGNOSIS — E44.0 MODERATE PROTEIN-CALORIE MALNUTRITION (HCC): ICD-10-CM

## 2024-10-30 DIAGNOSIS — E86.0 DEHYDRATION: ICD-10-CM

## 2024-10-30 DIAGNOSIS — T45.1X5A CHEMOTHERAPY-INDUCED NAUSEA: ICD-10-CM

## 2024-10-30 DIAGNOSIS — C01 MALIGNANT NEOPLASM OF BASE OF TONGUE (HCC): Primary | ICD-10-CM

## 2024-10-30 DIAGNOSIS — R11.0 CHEMOTHERAPY-INDUCED NAUSEA: ICD-10-CM

## 2024-10-30 PROCEDURE — 99215 OFFICE O/P EST HI 40 MIN: CPT | Performed by: INTERNAL MEDICINE

## 2024-10-30 PROCEDURE — 77386 HB NTSTY MODUL RAD TX DLVR CPLX: CPT | Performed by: RADIOLOGY

## 2024-10-30 PROCEDURE — 77014 CHG CT GUIDANCE RADIATION THERAPY FLDS PLACEMENT: CPT | Performed by: RADIOLOGY

## 2024-10-31 ENCOUNTER — TELEPHONE (OUTPATIENT)
Age: 69
End: 2024-10-31

## 2024-10-31 PROCEDURE — 77386 HB NTSTY MODUL RAD TX DLVR CPLX: CPT | Performed by: RADIOLOGY

## 2024-10-31 PROCEDURE — 77014 CHG CT GUIDANCE RADIATION THERAPY FLDS PLACEMENT: CPT | Performed by: RADIOLOGY

## 2024-10-31 RX ORDER — PALONOSETRON 0.05 MG/ML
0.25 INJECTION, SOLUTION INTRAVENOUS ONCE
Status: CANCELLED | OUTPATIENT
Start: 2024-11-04

## 2024-10-31 RX ORDER — SODIUM CHLORIDE 9 MG/ML
20 INJECTION, SOLUTION INTRAVENOUS ONCE
Status: CANCELLED | OUTPATIENT
Start: 2024-11-04

## 2024-10-31 NOTE — TELEPHONE ENCOUNTER
Spoke with patient and he is okay with keeping Dr. Talbot 1 week follow up at 11:40am and he's going to try to rearrange his other appointments that day instead.

## 2024-11-01 ENCOUNTER — APPOINTMENT (OUTPATIENT)
Dept: LAB | Facility: CLINIC | Age: 69
End: 2024-11-01
Payer: MEDICARE

## 2024-11-01 ENCOUNTER — APPOINTMENT (OUTPATIENT)
Facility: HOSPITAL | Age: 69
End: 2024-11-01
Attending: RADIOLOGY
Payer: MEDICARE

## 2024-11-01 ENCOUNTER — HOSPITAL ENCOUNTER (OUTPATIENT)
Dept: INFUSION CENTER | Facility: HOSPITAL | Age: 69
Discharge: HOME | End: 2024-11-01
Attending: INTERNAL MEDICINE

## 2024-11-01 DIAGNOSIS — C01 MALIGNANT NEOPLASM OF BASE OF TONGUE (HCC): ICD-10-CM

## 2024-11-01 DIAGNOSIS — L58.9 RADIATION DERMATITIS: Primary | ICD-10-CM

## 2024-11-01 LAB
ALBUMIN SERPL BCG-MCNC: 4 G/DL (ref 3.5–5)
ALP SERPL-CCNC: 67 U/L (ref 34–104)
ALT SERPL W P-5'-P-CCNC: 19 U/L (ref 7–52)
ANION GAP SERPL CALCULATED.3IONS-SCNC: 6 MMOL/L (ref 4–13)
AST SERPL W P-5'-P-CCNC: 17 U/L (ref 13–39)
BASOPHILS # BLD AUTO: 0.01 THOUSANDS/ΜL (ref 0–0.1)
BASOPHILS NFR BLD AUTO: 0 % (ref 0–1)
BILIRUB SERPL-MCNC: 0.49 MG/DL (ref 0.2–1)
BUN SERPL-MCNC: 18 MG/DL (ref 5–25)
CALCIUM SERPL-MCNC: 8.8 MG/DL (ref 8.4–10.2)
CHLORIDE SERPL-SCNC: 100 MMOL/L (ref 96–108)
CO2 SERPL-SCNC: 28 MMOL/L (ref 21–32)
CREAT SERPL-MCNC: 0.81 MG/DL (ref 0.6–1.3)
EOSINOPHIL # BLD AUTO: 0.09 THOUSAND/ΜL (ref 0–0.61)
EOSINOPHIL NFR BLD AUTO: 2 % (ref 0–6)
ERYTHROCYTE [DISTWIDTH] IN BLOOD BY AUTOMATED COUNT: 13.2 % (ref 11.6–15.1)
GFR SERPL CREATININE-BSD FRML MDRD: 91 ML/MIN/1.73SQ M
GLUCOSE SERPL-MCNC: 95 MG/DL (ref 65–140)
HCT VFR BLD AUTO: 40.8 % (ref 36.5–49.3)
HGB BLD-MCNC: 13.7 G/DL (ref 12–17)
IMM GRANULOCYTES # BLD AUTO: 0.01 THOUSAND/UL (ref 0–0.2)
IMM GRANULOCYTES NFR BLD AUTO: 0 % (ref 0–2)
LDH SERPL-CCNC: 119 U/L (ref 140–271)
LYMPHOCYTES # BLD AUTO: 0.6 THOUSANDS/ΜL (ref 0.6–4.47)
LYMPHOCYTES NFR BLD AUTO: 13 % (ref 14–44)
MAGNESIUM SERPL-MCNC: 1.9 MG/DL (ref 1.9–2.7)
MCH RBC QN AUTO: 29.5 PG (ref 26.8–34.3)
MCHC RBC AUTO-ENTMCNC: 33.6 G/DL (ref 31.4–37.4)
MCV RBC AUTO: 88 FL (ref 82–98)
MONOCYTES # BLD AUTO: 0.46 THOUSAND/ΜL (ref 0.17–1.22)
MONOCYTES NFR BLD AUTO: 10 % (ref 4–12)
NEUTROPHILS # BLD AUTO: 3.49 THOUSANDS/ΜL (ref 1.85–7.62)
NEUTS SEG NFR BLD AUTO: 75 % (ref 43–75)
NRBC BLD AUTO-RTO: 0 /100 WBCS
PLATELET # BLD AUTO: 171 THOUSANDS/UL (ref 149–390)
PMV BLD AUTO: 10.2 FL (ref 8.9–12.7)
POTASSIUM SERPL-SCNC: 4.5 MMOL/L (ref 3.5–5.3)
PROT SERPL-MCNC: 6.6 G/DL (ref 6.4–8.4)
RBC # BLD AUTO: 4.64 MILLION/UL (ref 3.88–5.62)
SODIUM SERPL-SCNC: 134 MMOL/L (ref 135–147)
WBC # BLD AUTO: 4.66 THOUSAND/UL (ref 4.31–10.16)

## 2024-11-01 PROCEDURE — 77014 CHG CT GUIDANCE RADIATION THERAPY FLDS PLACEMENT: CPT | Performed by: RADIOLOGY

## 2024-11-01 PROCEDURE — 83735 ASSAY OF MAGNESIUM: CPT

## 2024-11-01 PROCEDURE — 77386 HB NTSTY MODUL RAD TX DLVR CPLX: CPT | Performed by: RADIOLOGY

## 2024-11-01 PROCEDURE — 80053 COMPREHEN METABOLIC PANEL: CPT

## 2024-11-01 PROCEDURE — 85025 COMPLETE CBC W/AUTO DIFF WBC: CPT

## 2024-11-01 PROCEDURE — 36415 COLL VENOUS BLD VENIPUNCTURE: CPT

## 2024-11-01 PROCEDURE — 83615 LACTATE (LD) (LDH) ENZYME: CPT

## 2024-11-01 RX ORDER — SILVER SULFADIAZINE 10 MG/G
CREAM TOPICAL DAILY
Qty: 50 G | Refills: 2 | Status: SHIPPED | OUTPATIENT
Start: 2024-11-01

## 2024-11-03 NOTE — PROGRESS NOTES
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     See Note.     -- Confirmed by positive Pankeratin (CKAE1/3), p40 and normal wild-type expression       on p53 immunostains.    -- p16 immunostain positive (>70% diffuse and strong nuclear and cytoplasmic        staining); high-risk HPV EH pending; result will be reported in an addendum.      B. Lymph Node, Regional Resection, Level 2 lymph nodes, see comments:  - Metastatic squamous cell carcinoma, keratinizing, involving at least two lymph nodes (2/2).     -- Present in 1.0 cm lymph node and additional detached lymph node(s)/fragments.     -- Largest metastatic focus: 0.3 cm; Extranodal extension: Not identified.    -- Confirmed by positive Pankeratin (CKAE1/3) and p40 immunostains.    -- p16 immunostain positive (>70% diffuse and strong nuclear and cytoplasmic        staining).     C. Lymph Node, Regional Resection, Additional level 2 lymph nodes:  - Metastatic squamous cell carcinoma, keratinizing, involving 3 of 4 lymph     nodes/fragments (3/4).     -- Largest metastatic focus: 1 cm; Extranodal extension: Present, extranodal         lymphovascular invasion identified.    -- Confirmed by positive Pankeratin (CKAE1/3) and p40 immunostains.    -- p16 immunostain positive  (>70% diffuse and strong nuclear and cytoplasmic        staining); high-risk HPV EH pending; result will be reported in an addendum. See Note.      D. Lymph Node, Level 3 lymph nodes, lymph adenectomy:  - One lymph node positive for metastatic squamous cell carcinoma, keratinizing (1/1).     -- Largest metastatic focus: 1.1 cm; Extranodal extension: Not identified.    -- Confirmed by positive Pankeratin (CKAE1/3) and p40 immunostains.    -- p16 immunostain positive (>70% diffuse and strong nuclear and cytoplasmic        staining).  - Portion(s) of benign salivary gland with adjacent detached small (< 0.2 cm) portion      of carcinoma.   - Traumatic neuroma.          Comments:   This is an appended report. These results have been appended to a previously preliminary verified report.           7/3/2024 -  Cancer Staged    Staging form: Pharynx - Oropharynx, AJCC 8th Edition  - Clinical stage from 7/3/2024: Stage I (cT1, cN1, cM0, p16+) - Signed by Fady Morrow MD on 7/3/2024  Stage prefix: Initial diagnosis       9/30/2024 -  Chemotherapy    alteplase (CATHFLO), 2 mg, Intracatheter, Every 1 Minute as needed, 5 of 7 cycles  palonosetron (ALOXI), 0.25 mg, Intravenous, Once, 4 of 6 cycles  Administration: 0.25 mg (10/7/2024), 0.25 mg (10/14/2024), 0.25 mg (10/21/2024), 0.25 mg (10/28/2024)  CISplatin (PLATINOL) infusion, 70 mg (original dose 40 mg/m2), Intravenous, Once, 5 of 7 cycles  Dose modification: 70 mg (original dose 40 mg/m2, Cycle 1, Reason: Other (Must fill in a comment), Comment: pharmacy dictates), 30 mg/m2 (original dose 40 mg/m2, Cycle 6, Reason: Anticipated Tolerance)  Administration: 70 mg (9/30/2024), 70 mg (10/7/2024), 70 mg (10/14/2024), 70 mg (10/21/2024), 70 mg (10/28/2024)  sodium chloride, 500 mL, Intravenous, Once, 5 of 7 cycles  Administration: 500 mL (9/30/2024), 500 mL (9/30/2024), 500 mL (10/7/2024), 500 mL (10/7/2024), 500 mL (10/14/2024), 500 mL (10/14/2024), 500 mL (10/21/2024), 500 mL  (10/21/2024), 500 mL (10/28/2024), 500 mL (10/28/2024)  fosaprepitant (EMEND) IVPB, 150 mg, Intravenous, Once, 5 of 7 cycles  Administration: 150 mg (9/30/2024), 150 mg (10/7/2024), 150 mg (10/14/2024), 150 mg (10/21/2024), 150 mg (10/28/2024)         Treatment Cisplatin 40 mg/m2 weekly x 7 doses with IMRT     C1 9/30/2024  C2  10/7/2024  C3 10/14/2024  C4 10/21/2024  C5 10/28/2024  C6  11/4/2024     C7   11/11/2024    Discussion     Te definitive management of SCCHN in terms of oral cavity vs oropharynx vs larynx vs hypopharynx as well as pure tobacco related vs HPV with or without a prior/current smoking history had been surgery/RT.   In the past, the  role of chemotherapy was reserved for metastatic disease using Cisplatin at fairly high doses as well as infusional 5FU x 96 hours.  However there have been many trials which have changed the landscape of how locoregional and metastatic disease are now managed with chemotherapy, CRT or IO        It is unclear in this case, as had prior RBOT Stage 1 HPV driven s/p resection with no high risk features 0/34 nodes positive at the time and no adjuvant therapy     Based on the DL/biopsy there is SCC in the pharynx but location unclear; 6/7 nodes were positive with ASHLEY and thus the below pertains in terms of combined therapy.  The issues is 6 or 7 weeks of treatment and this appears to be a new second primary based on the biology      In general the prognosis for HPV driven SCCHN is better than that of tobacco related disease and in fact the AJCC staging system reflects this.  However when tobacco is a factor with a patient being a long standing smoker that favorable prognosis is no longer applicable;  however this patient smoked a pack a day for years but quit 30 years ago and so he has more favorable disease     The OS/PFS/FRANCO responses were better in those patients treated on the  study who were HPV positive; induction was followed by CRT and in that study  Carboplatin AUC 1.5 only rather than 40 mg/m2 Cisplatin due to perceived toxicities being worse with cisplatin after induction therapy.  That was the case for this patient who got Cisplatin        However,  CRT per RTOG 91-11 as become SOC.  In the modern era, weekly Cisplatin 40 mg/m2 is used for 7 weeks rather than the high dose Cisplatin in that trial with similar efficacy.  Other radiosensitizers include weekly Carboplatin/Taxol.  Based on the side effect profile of Cisplatin, een in lower doses, renal clearance is an issue.  Both Carboplatin and Cisplatin are renally cleared but Carboplatin is dosed based on renal function and may be more tolerable.  Other agents used as radiosensitizers include Cetuximab per the Garcia study.       Side effects, and  toxicities of chemotherapy alone and in combination with radiation had been discussed at [place].  The side effects specific to radiation include loss of taste, xerostomia, mucositis, dysphagia , nausea, radiation induced dermatis and fatigue.  Fatigue is also common with chemotherapy and is cumulative.  Copious ropey secretions were discussed as well as nutrition.  In terms of the effects of Cisplatin or Carboplatin/Taxol weekly alone and in combination with radiation are neutropenia/leucopenia, anemia, thrombocytopenia, nausea, GERD, mucositis, fungal infections, renal insufficiency/dehydration, electrolyte imbalances including platinum induced SIADH, neuropathy, hearing loss, minor hair loss.  Taxol can also cause an infusion reaction /premedications are needed.  As chemotherapy is being used as a radiosensitizer, the effects of radiation are magnified.       Nutrition and hydration are concerning in the combined modality setting and we discussed the use of a PEG.  Based on both internal scatter of radiation and with extensive radiation there is potential for fibrosis/strictures as well as mucositis, fungal infections, being hypermetabolic with swallowing issues.   "Repletion as the caloric needs increase is improved via a PEG as well as IV fluids.  Nutrition generally follows these patients.       There are also chronic issues which can occur; the secretions generally improve in 3-6 months, taste returns within 1-2 months, xerostomia may be a chronic issue, fibrosis, lymphedema, thyroid issues with checks every 3 months after RT completion, length of PEG use of about 3-6 months.     Restaging would be 3 months after therapy with CT PET and then every 3 months with CT neck/chest in the first year, eery 4-6 months thereafter.  HPV driven disease tends to have a latent distant mets period.       PDL1 status as well as TMB, MMR, MSI should be ascertained via [facility] for future reverence      Thus the recommendation would be weekly Cisplatin 40 mg/m2 weekly with RT to 70 Gy and thus 7 weeks      Assessment / Plan:     Recurrent right level 2 node s/p partial glossectomy/pharyngectomy 7/31/2029 with no adjuvant CRT 0/34 nodes at the time and no high risk features     PET with no primary recurrence/ nodes only     MRI face/neck/orbit     Discussion regarding CRT with weekly Cisplatin 40 mg/m2 for 6-7 weeks     PEG placement     Dental evaluation     Nutrition evaluation      9/18/2024      Patient seen by Dr Morrow in Rad Onc; simulation done     Will be treated UB     PEG to be placed-has agreed to it     Nutrition follow up     Speech pathology/swallowing assessment      Tentative plan to start treatment 30 Sept 2024       10/2/2024     Tolerated C1 Cisplatin     No issues other than flushing-took decadron out of premed cocktail.  Usually no infusion reactions with Cisplatin but with Carboplatin     No PEG, weight is 173     Using supplements despite telling patient that herbal remedies can cause issues with both chemo and RT.  He is \"holding\" these agents when gets chemotherapy but even then can affect outcome/formation of DNA adducts     Would again recommend not using these " agents     10/9/2024     Week 2/7      Already has grade 1/early 2 confluence in the hard palate     Tongue coated unclear if there is a thrush component as well     Magic mouthwash, salt water/baking soda rinses for mucositis-may consider decadron rinse if no better/worse next week     Nystatin swish/spit/swallow for thrush     No erythema/neck dermatitis from RT     Did have some nausea from chemotherapy, mostly related to constipation so taking compazine for relief     IVF to start additionally this Friday and weekly through duration treatment     PEG to be placed 10/10/2024; will need close nutrition follow up     Weight 170 pounds      Concerned with degree of mucositis in week 2     Secretions, minimal at this point      Labs acceptable       10/30/2024     Week 5/7     Has increased mucositis posterior oropharynx     Coated tongue/thrush-Nystatin S/S     Started using PEG 5 days ago; nutrition following      Weight 176 pounds was 165     Skin with grade 1 desquamation-mometasone started/aquaphor     Refusing IVF; /74, does not appear dehydrated by renal function     Week 6/7    Significant grade 3 skin desquamation right > left; has wrap in place, RT held since Monday; we were not told or would have held chemotherapy which he received    Silvadene, aquaphor, may need wound care although unable to thoroughly assess    Seeing Rad Onc today regarding restarting    States is improving slowly, painful    Has thrush on oral tongue-stopped using nystatin rinse; will do diflucan    Grade 2 mucositis    PEG dependent, 4 ans per day, not eating anything orally, able to drink water    Weight 155 at home, states stable; 165 here    Had IV infiltrate with what appears to be superficial phlebitis    Doppler done later today confirming no DVT and superficial thromobphlebitis which he was told to place warm compresses, elevation Tylenol for pain    IVF later this week       Follow up     1 week            History:    "7/3/2024     69 year old with HTN with prior RBOT cancer;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 S/p partial glossectomy and partial pharyngectomy on 07/31/2019, pathology negative for malignancy. S/p MRND right neck 09/04/2019 returned 0/34 nodes. Staged as T1N0 M0       PET scan was done 12/03/19 which showed mild asymmetry at the tongue base, likely post operative. Otherwise no distant metastasis. Prior imaging from June 2019 was clear with no evidence of disease.      There were no high risk features to speak of and he did not receive any adjuvant therapies.       He then did well in follow-up until approximately 3 to 4 months ago when he noticed a palpable lump on the right neck.  Subsequent imaging with CT and PET/CT revealed multiple hypermetabolic lymph nodes in the right neck but no obvious primary recurrence, contralateral or distant disease.  On 6/19/2024 he was taken to the operating room for direct laryngoscopy as well as repeat right neck dissection.  7 lymph nodes were removed, 6 of which were positive for metastatic squamous cell carcinoma, keratinizing, p16 positive, with extranodal extension identified.  The largest lymph node measured 1.1 cm.  On the pathology report there is a specimen labeled right pharynx which also contained squamous cell carcinoma.  Looking at the operative report, they describe a small palpable prominence in the \"left pharynx near base of tongue.\"  This is in contradiction to the pathology report which described the specimen as from the right pharynx.            Ct neck 04/24/2024      New rounded lymph node in the palpable region of interest superficial to the sternocleidomastoid muscle measuring 9 x 6 mm. Differential diagnosis should include recurrent disease versus reactive lymphadenopathy.     Small cluster of right level 2B cervical lymph nodes also noted as described above with otherwise no suspicious adenopathy identified in the remainder of the " neck.     Status post right-sided partial glossectomy without definite nodular enhancement in the operative bed.     05/06/2024 FNAB under US  Lymph Node, right level 2 (ThinPrep, smears and cell block preparations ):  Conclusive evidence of malignancy.  Carcinoma with keratinization, compatible with known squamous cell carcinoma     PET scan 05/22/2024      1. Scattered FDG-avid right cervical chain lymph nodes compatible with disease recurrence.  2. No suspicious uptake in the oropharynx or hypopharynx.  3. No findings for hypermetabolic metastasis to the chest, abdomen or pelvis.  \  No prior XRT or chemotherapy.      The patient is seeing Rad Onc later today     He is very pleased with the RND healing.  We had a long discussion as to second primary vs recurrence; treatment with CRT either 6 or 7 weeks.  As this is likely a second primary, would do 7 weeks but again, not definitively shown on PET.  Will get MRI face/neck/orbit to further assess.  He has been doing well post op.  Weight stable 169 pounds.  Has altered diet, eating healthier.  Has no issues, denies any SOB/KOHLI, CP change in bowel/bladder, blood stool/urine         Interval History:  9/18/2024     Doing well; waiting to start treatment 30 Sept 2024.  Risks/benefits and toxicities again explained to the patient with informed consent signed.  Sent zofran 8 mg ODT q 8 prn and compazine 10 mg q 6-8 prn to pharmacy.  No other active issues.       Since last visit PET scan done 9/11/2024    HEAD/NECK:  Increasing focus of radiotracer uptake at the right upper cervical chain, level 2 region, SUV max of 5.5, previously 3.6. This measures 7 mm short axis, image 38 series 17, stable from prior MRI. Based on the configuration on prior MRI, it is possible   this could represent a parotid lesion arising from the deep lobe of the parotid gland though an intraparotid lymph node or cervical chain lymph node is not excluded.     Otherwise resolution of previously  seen right upper cervical chain foci.     No FDG avid lymph nodes in the left neck.     Small focus of FDG uptake in the right peritonsillar region, SUV max of 4.2, previously 4.4, image 33 series 2600, may correspond to the 7 mm heterogeneously enhancing lesion seen on prior MRI examination. Question residual disease. However this appears   asymmetrically decreased compared to what is likely normal physiologic left tonsillar activity, SUV max of 5.5. Cannot exclude that this is diminished physiologic activity and the known right pharyngeal lesion is not seen on PET.     CT images: No additional significant findings.     CHEST:  No FDG avid soft tissue lesions are seen.     CT images: Scattered coronary artery calcifications.     ABDOMEN:  No FDG avid soft tissue lesions are seen.              Interval History:  10/2/2024     Doing well with treatment but had C1 30 Sept and this is day 3 of radiation.  He has started on herbal supplements despite advise against this.  Again told not acceptable.  Had issues with flushing but no other Cisplatin related effects.      Labs  9/30/2024     Na 138 K 3.5 Cr 0.79 ALT/AST 16/16/ Ca 9 Mg 2.1  WBC 4.4 Hgb 15.4 MCV 86 plts 210 ANC [id number]     Baseline TSH 2.245               Interval History:  10/9/2024 week 2/7     Has grade 1/early 2 confluent mucositis hard palate, extending to soft bilateral  As above not using salt water/baking soda rinses or magic mouthwash-given recipe and prescription for magic mouthwash which can be swallowed or spit out.,  Has coated tongue, concern with thrush so will start Nystatin as well.  Discussed other rinses if not helpful.  Seeing palliative care for pain control-consider gabapentin      Has had issues with nausea and constipation from zofran-using compazine with relief.  No GERD.  Decreased oral intake due to mucositis.  PEG being placed 10/10/2024     Labs 10/4/2024 with na 136 K 4.0 Cr 0.75 Ca 9.0 ALT/AST 17/18 TB 0.59 down from 1.36  last week  WBC 4.9 Hgb 15.5 MCV 87 plts 224 ANC [id number]      Interval History: week 5/7  10/30/2024     Patient with prior RBOT cancer;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 S/p partial glossectomy and partial pharyngectomy on 07/31/2019, pathology negative for malignancy. S/p MRND right neck 09/04/2019 returned 0/34 nodes. Staged as T1N0 NO prior adjuvant therapy     Noted recurrent right neck disease/ new pharynx, recurrent RBOT     Now getting CRT weekly x 7weeks with 70 Gy IMRT     Skin with grade 1 desquamation right >left.  Using aquaphor mometasone cream.  Started with PEG, swallowing more difficult.  Has thrush, grade 1 mucositis posterior oropharynx.  Can swallow water but solids more difficult.  Nutrition following.  Is getting free water orally and via PEG.  Refusing IVF.  Weight 176 pounds BP stable.       Labs  1025/2024     Na 140 K 4.3 Cr 0.76 Ca 9.0 ALT/AST 23/17 Mg 20  WBC 3.8 Hgb 14 MCV 87 plts 171 ANC [id number]           Interval History: week 6/7    As above grade 3/4 skin desquamation, grade 1-2 mucositis, thrush oral tongue, doing poorly.  PEG dependent at this point using 4 cans feeds; nutrition following  RT held past 2-3 days but we were not informed as his skin was not like this last week; had erythema only not blistering.  May need wound care for further management.  Will continue with IVF this week as excessive fluid loss through skin.  Silvadene/aquaphor mometasone Diflucan for thrush as not using nystatin.  Encouraged continue use of rinses, swallowing water.  May consider holding chemo based on extent of skin issues;      Labs 11/1/2024 Na 134 K 4.5 Cr 0.81 Ca 8.8 ALT/AST 19/17 TB 0.49 Mg 1.9  WBC 4.66 Hgb 13.7 MCV 88 plts 171 ANC [id number]       REVIEW OF SYSTEMS: as above   Please note that a 14-point review of systems was performed to include Constitutional, HEENT, Respiratory, CVS, GI, , Musculoskeletal, Integumentary, Neurologic, Rheumatologic,  Endocrinologic, Psychiatric, Lymphatic, and Hematologic/Oncologic systems were reviewed and are negative unless otherwise stated in HPI. Positive and negative findings pertinent to this evaluation are incorporated into the history of present illness.      ECOG PS: 2    PROBLEM LIST:  Patient Active Problem List   Diagnosis    Mass of right side of neck    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)    Status post radical dissection of neck    Tongue pain    Dysphagia    Hypertension    Protein-calorie malnutrition (HCC)    Chemotherapy-induced nausea    Dehydration       Past Medical History:   has a past medical history of Facial basal cell cancer, GERD (gastroesophageal reflux disease), Hypertension, Throat cancer (HCC), and Vitamin D deficiency.    PAST SURGICAL HISTORY:   has a past surgical history that includes Rhinoplasty; Colonoscopy (N/A, 03/22/2016); [place] tooth extraction; pr laryngoscopy direct operative w/biopsy (N/A, 07/17/2019); TRANSORAL ROBOTIC SURGERY (TORS) (N/A, 07/31/2019); pr cervical lymphadec modified radical neck dsj (Right, 09/04/2019); pr laparoscopy surg cholecystectomy (N/A, 10/30/2019); Upper gastrointestinal endoscopy; Cholecystectomy; pr cysto insertion transprostatic implant single (N/A, 04/14/2023); Sinus surgery (1994); US guided lymph node biopsy right (5/6/2024); pr laryngoscopy direct operative w/biopsy (N/A, 6/19/2024); and Radical neck dissection (Right, 6/19/2024).    CURRENT MEDICATIONS  Current Outpatient Medications   Medication Sig Dispense Refill    Allergy Relief 25 MG/10ML oral liquid       amLODIPine (NORVASC) 5 mg tablet Take 5 mg by mouth daily at bedtime       amoxicillin-clavulanate (AUGMENTIN) 875-125 mg per tablet TAKE TWO TABLETS BY MOUTH NOW, THEN TAKE ONE TABLET BY MOUTH EVERY 12 HOURS UNTIL FINISHED WITH FOOD (Patient not taking: Reported on 10/14/2024)      atovaquone-proguanil (MALARONE) 250-100 mg Take 1 tablet by mouth daily      Cholecalciferol (Vitamin  D3) 125 MCG (5000 UT) TABS every 24 hours      Cholecalciferol (Vitamin D3) LIQD Use      cyclobenzaprine (FLEXERIL) 5 mg tablet Take 1 tablet (5 mg total) by mouth 3 (three) times a day as needed for muscle spasms 50 tablet 0    diphenhydramine, lidocaine, Al/Mg hydroxide, simethicone (Magic Mouthwash) SUSP Swish and swallow 10 mL every 4 (four) hours as needed for mouth pain or discomfort 500 mL 4    Gabapentin (NEURONTIN) 300 mg/6mL solution Take 5 mL (250 mg total) by mouth daily at bedtime as needed (pain, insomnia) May reduce dose for daytime grogginess (Patient not taking: Reported on 10/24/2024) 470 mL 0    lisinopril (ZESTRIL) 10 mg tablet Take 10 mg by mouth daily at bedtime       Misc Natural Products (ADRENAL PO) 1 orally as directed      mometasone (ELOCON) 0.1 % cream Apply topically daily Apply thin layer to neck twice daily (not within 4 hrs of radiation) 45 g 1    nystatin (MYCOSTATIN) 500,000 units/5 mL suspension Apply 5 mL (500,000 Units total) to the mouth or throat 4 (four) times a day 400 mL 3    ondansetron (ZOFRAN-ODT) 8 mg disintegrating tablet Take 1 tablet (8 mg total) by mouth every 8 (eight) hours as needed for nausea or vomiting 30 tablet 3    pantoprazole (PROTONIX) 40 mg tablet Take 1 tablet (40 mg total) by mouth daily 30 tablet 3    Probiotic Product (PROBIOTIC PO) as directed Orally (Patient not taking: Reported on 10/24/2024)      prochlorperazine (COMPAZINE) 10 mg tablet Take 1 tablet (10 mg total) by mouth every 6 (six) hours as needed for nausea or vomiting 30 tablet 3    silver sulfadiazine (SILVADENE,SSD) 1 % cream Apply topically daily 50 g 2     No current facility-administered medications for this visit.     [unfilled]    SOCIAL HISTORY:   reports that he has never smoked. He has never used smokeless tobacco. He reports that he does not currently use alcohol after a past usage of about 5.0 standard drinks of alcohol per week. He reports that he does not currently use drugs.      FAMILY HISTORY:  family history includes Cancer in his mother; Diabetes in his mother; Heart failure in his father; Seizures in his father.     ALLERGIES:  is allergic to shellfish allergy - food allergy and bee venom.      Physical Exam:  Vital Signs:   Visit Vitals  Smoking Status Never     There is no height or weight on file to calculate BMI.  There is no height or weight on file to calculate BSA.    GEN: Alert, awake oriented x3, in no acute distress  HEENT- No pallor, icterus, cyanosis, mucositis posteriorly  thrush oral tongue   Neck grade 3/4 skin sloughing visible portion right neck   Heart- normal S1 S2, regular rate and rhythm, No murmur, rubs.   Lungs- clear breathing sound bilateral.   Abdomen- soft, Non tender, bowel sounds present  Extremities- No cyanosis, clubbing, edema  Neuro- No focal neurological deficit    Labs:  Lab Results   Component Value Date    WBC 4.66 11/01/2024    HGB 13.7 11/01/2024    HCT 40.8 11/01/2024    MCV 88 11/01/2024     11/01/2024     Lab Results   Component Value Date     03/03/2016    SODIUM 134 (L) 11/01/2024    K 4.5 11/01/2024     11/01/2024    CO2 28 11/01/2024    AGAP 6 11/01/2024    BUN 18 11/01/2024    CREATININE 0.81 11/01/2024    GLUC 95 11/01/2024    CALCIUM 8.8 11/01/2024    AST 17 11/01/2024    ALT 19 11/01/2024    ALKPHOS 67 11/01/2024    PROT 6.7 03/03/2016    TP 6.6 11/01/2024    BILITOT 0.6 03/03/2016    TBILI 0.49 11/01/2024    EGFR 91 11/01/2024           I spent 40 minutes on chart review, face to face counseling time, coordination of care and documentation.    Magali Talbot MD PhD

## 2024-11-04 ENCOUNTER — NUTRITION (OUTPATIENT)
Dept: NUTRITION | Facility: HOSPITAL | Age: 69
End: 2024-11-04

## 2024-11-04 ENCOUNTER — TELEPHONE (OUTPATIENT)
Dept: HEMATOLOGY ONCOLOGY | Facility: CLINIC | Age: 69
End: 2024-11-04

## 2024-11-04 ENCOUNTER — HOSPITAL ENCOUNTER (OUTPATIENT)
Dept: CT IMAGING | Facility: HOSPITAL | Age: 69
Discharge: HOME | End: 2024-11-04
Attending: RADIOLOGY
Payer: MEDICARE

## 2024-11-04 ENCOUNTER — TELEPHONE (OUTPATIENT)
Age: 69
End: 2024-11-04

## 2024-11-04 ENCOUNTER — APPOINTMENT (OUTPATIENT)
Facility: HOSPITAL | Age: 69
End: 2024-11-04
Attending: RADIOLOGY
Payer: MEDICARE

## 2024-11-04 ENCOUNTER — HOSPITAL ENCOUNTER (OUTPATIENT)
Dept: INFUSION CENTER | Facility: HOSPITAL | Age: 69
Discharge: HOME | End: 2024-11-04
Attending: INTERNAL MEDICINE
Payer: MEDICARE

## 2024-11-04 ENCOUNTER — PATIENT OUTREACH (OUTPATIENT)
Dept: HEMATOLOGY ONCOLOGY | Facility: CLINIC | Age: 69
End: 2024-11-04

## 2024-11-04 VITALS
BODY MASS INDEX: 23.67 KG/M2 | HEIGHT: 70 IN | SYSTOLIC BLOOD PRESSURE: 151 MMHG | DIASTOLIC BLOOD PRESSURE: 96 MMHG | RESPIRATION RATE: 16 BRPM | HEART RATE: 102 BPM | OXYGEN SATURATION: 99 % | WEIGHT: 165.34 LBS | TEMPERATURE: 97.5 F

## 2024-11-04 DIAGNOSIS — K14.6 TONGUE PAIN: Primary | ICD-10-CM

## 2024-11-04 DIAGNOSIS — C01 MALIGNANT NEOPLASM OF BASE OF TONGUE (HCC): Primary | ICD-10-CM

## 2024-11-04 DIAGNOSIS — C77.0 MALIGNANT NEOPLASM METASTATIC TO LYMPH NODE OF NECK (HCC): Primary | ICD-10-CM

## 2024-11-04 DIAGNOSIS — R22.1 MASS OF RIGHT SIDE OF NECK: ICD-10-CM

## 2024-11-04 DIAGNOSIS — C77.0 MALIGNANT NEOPLASM METASTATIC TO LYMPH NODE OF NECK (HCC): ICD-10-CM

## 2024-11-04 PROCEDURE — 96361 HYDRATE IV INFUSION ADD-ON: CPT

## 2024-11-04 PROCEDURE — 96367 TX/PROPH/DG ADDL SEQ IV INF: CPT

## 2024-11-04 PROCEDURE — 70491 CT SOFT TISSUE NECK W/DYE: CPT

## 2024-11-04 PROCEDURE — 96413 CHEMO IV INFUSION 1 HR: CPT

## 2024-11-04 PROCEDURE — 96375 TX/PRO/DX INJ NEW DRUG ADDON: CPT

## 2024-11-04 RX ORDER — PALONOSETRON 0.05 MG/ML
0.25 INJECTION, SOLUTION INTRAVENOUS ONCE
Status: COMPLETED | OUTPATIENT
Start: 2024-11-04 | End: 2024-11-04

## 2024-11-04 RX ORDER — SODIUM CHLORIDE 9 MG/ML
20 INJECTION, SOLUTION INTRAVENOUS ONCE
Status: COMPLETED | OUTPATIENT
Start: 2024-11-04 | End: 2024-11-04

## 2024-11-04 RX ADMIN — FOSAPREPITANT 150 MG: 150 INJECTION, POWDER, LYOPHILIZED, FOR SOLUTION INTRAVENOUS at 08:42

## 2024-11-04 RX ADMIN — PALONOSETRON HYDROCHLORIDE 0.25 MG: 0.25 INJECTION INTRAVENOUS at 08:39

## 2024-11-04 RX ADMIN — IOHEXOL 75 ML: 350 INJECTION, SOLUTION INTRAVENOUS at 15:46

## 2024-11-04 RX ADMIN — SODIUM CHLORIDE 500 ML: 0.9 INJECTION, SOLUTION INTRAVENOUS at 11:17

## 2024-11-04 RX ADMIN — SODIUM CHLORIDE 20 ML/HR: 9 INJECTION, SOLUTION INTRAVENOUS at 08:35

## 2024-11-04 RX ADMIN — SODIUM CHLORIDE 500 ML: 0.9 INJECTION, SOLUTION INTRAVENOUS at 08:35

## 2024-11-04 RX ADMIN — CISPLATIN 58.2 MG: 1 INJECTION, SOLUTION INTRAVENOUS at 10:16

## 2024-11-04 NOTE — TELEPHONE ENCOUNTER
Called and spoke with patient. States he has taken the gabapentin twice in a row but neither time really helped. He has no trouble falling asleep, but has trouble staying asleep. He has pain on the right side of his neck from radiation burns and so he can't lie on that side. This causes him to have to sleep on his left side because sleeping on his back is uncomfortable. He reports he does toss and turn and he will experience pain from radiation on the neck when he moves and the skin stretches. When he moves his neck skin hurts for a minute or two until he stops moving and the area calms down. When he has this pain it goes up to 7/10 but doesn't last long. Reports he wakes up 10 times a night and so he feels more tired throughout the day. He is hoping to try something different to help him stay asleep so he can be better rested for his treatments. Reports he is okay otherwise from a pain standpoint. His throat is doing okay and his mouth isn't too bad.

## 2024-11-04 NOTE — TELEPHONE ENCOUNTER
----- Message from Inés CHAPPELL sent at 11/4/2024  3:39 PM EST -----  Good afternoon,    I was speaking with Brian to check in on how he is doing on his treatments. He stated Dr. Paulino prescribed Gabapentin for him to take as needed for sleep. He used this twice and he did not get any benefit from it. His poor sleep is causing increased fatigue. He is wondering if there are any other options. Please call patient to discuss.    Thank you,  Inés, Patient Navigator

## 2024-11-04 NOTE — PROGRESS NOTES
Outpatient Oncology Nutrition Consultation   Type of Consult: Follow Up  Care Location: [facility]    Reason for referral: Received notification by  Dr. Talbot  on 9/18 that pt has triggered for oncology nutrition care (reason for referral: HNC dx and TF).    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     See Note.     -- Confirmed by positive Pankeratin (CKAE1/3), p40 and normal wild-type expression       on p53 immunostains.    -- p16 immunostain positive (>70% diffuse and strong nuclear and cytoplasmic        staining); high-risk HPV EH pending; result will be reported in an addendum.      B. Lymph Node, Regional Resection, Level 2 lymph nodes, see comments:  - Metastatic squamous cell carcinoma, keratinizing, involving at least two lymph nodes (2/2).     -- Present in 1.0 cm lymph node and additional detached lymph node(s)/fragments.     -- Largest metastatic focus: 0.3 cm; Extranodal extension: Not identified.    -- Confirmed by positive Pankeratin (CKAE1/3) and p40 immunostains.    -- p16 immunostain positive (>70% diffuse and strong nuclear and cytoplasmic        staining).     C. Lymph Node, Regional Resection, Additional level 2 lymph nodes:  - Metastatic squamous cell carcinoma, keratinizing, involving 3 of 4 lymph     nodes/fragments (3/4).     -- Largest metastatic focus: 1 cm; Extranodal extension: Present, extranodal         lymphovascular invasion identified.    -- Confirmed by positive Pankeratin  (CKAE1/3) and p40 immunostains.    -- p16 immunostain positive (>70% diffuse and strong nuclear and cytoplasmic        staining); high-risk HPV EH pending; result will be reported in an addendum. See Note.      D. Lymph Node, Level 3 lymph nodes, lymph adenectomy:  - One lymph node positive for metastatic squamous cell carcinoma, keratinizing (1/1).     -- Largest metastatic focus: 1.1 cm; Extranodal extension: Not identified.    -- Confirmed by positive Pankeratin (CKAE1/3) and p40 immunostains.    -- p16 immunostain positive (>70% diffuse and strong nuclear and cytoplasmic        staining).  - Portion(s) of benign salivary gland with adjacent detached small (< 0.2 cm) portion      of carcinoma.   - Traumatic neuroma.          Comments:   This is an appended report. These results have been appended to a previously preliminary verified report.           7/3/2024 -  Cancer Staged    Staging form: Pharynx - Oropharynx, AJCC 8th Edition  - Clinical stage from 7/3/2024: Stage I (cT1, cN1, cM0, p16+) - Signed by Fady Morrow MD on 7/3/2024  Stage prefix: Initial diagnosis       9/30/2024 -  Chemotherapy    alteplase (CATHFLO), 2 mg, Intracatheter, Every 1 Minute as needed, 6 of 7 cycles  palonosetron (ALOXI), 0.25 mg, Intravenous, Once, 5 of 6 cycles  Administration: 0.25 mg (10/7/2024), 0.25 mg (10/14/2024), 0.25 mg (10/21/2024), 0.25 mg (10/28/2024)  CISplatin (PLATINOL) infusion, 70 mg (original dose 40 mg/m2), Intravenous, Once, 6 of 7 cycles  Dose modification: 70 mg (original dose 40 mg/m2, Cycle 1, Reason: Other (Must fill in a comment), Comment: pharmacy dictates), 30 mg/m2 (original dose 40 mg/m2, Cycle 6, Reason: Anticipated Tolerance)  Administration: 70 mg (9/30/2024), 70 mg (10/7/2024), 70 mg (10/14/2024), 70 mg (10/21/2024), 70 mg (10/28/2024)  sodium chloride, 500 mL, Intravenous, Once, 6 of 7 cycles  Administration: 500 mL (9/30/2024), 500 mL (9/30/2024), 500 mL (10/7/2024), 500 mL (10/7/2024), 500 mL  (10/14/2024), 500 mL (10/14/2024), 500 mL (10/21/2024), 500 mL (10/21/2024), 500 mL (10/28/2024), 500 mL (10/28/2024), 500 mL (11/4/2024)  fosaprepitant (EMEND) IVPB, 150 mg, Intravenous, Once, 6 of 7 cycles  Administration: 150 mg (9/30/2024), 150 mg (10/7/2024), 150 mg (10/14/2024), 150 mg (10/21/2024), 150 mg (10/28/2024)       Past Medical & Surgical Hx:   Patient Active Problem List   Diagnosis    Mass of right side of neck    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)    Status post radical dissection of neck    Tongue pain    Dysphagia    Hypertension    Protein-calorie malnutrition (HCC)    Chemotherapy-induced nausea    Dehydration     Past Medical History:   Diagnosis Date    Facial basal cell cancer     GERD (gastroesophageal reflux disease)     diet controlled    Hypertension     Throat cancer (HCC)     Vitamin D deficiency      Past Surgical History:   Procedure Laterality Date    CHOLECYSTECTOMY      COLONOSCOPY N/A 03/22/2016    Procedure: COLONOSCOPY;  Surgeon: Daren Montana MD;  Location: [facility];  Service:     AZ CERVICAL LYMPHADEC MODIFIED RADICAL NECK DSJ Right 09/04/2019    Procedure: MODIFIED RADICAL NECK DISSECTION;  Surgeon: Fady Montana MD;  [facility];  Service: ENT    AZ CYSTO INSERTION TRANSPROSTATIC IMPLANT SINGLE N/A 04/14/2023    Procedure: CYSTOSCOPY WITH INSERTION UROLIFT;  Surgeon: Rubin Alfonso MD;  [facility];  Service: Urology    AZ LAPAROSCOPY SURG CHOLECYSTECTOMY N/A 10/30/2019    Procedure: CHOLECYSTECTOMY LAPAROSCOPIC;  Surgeon: Christiano Cornejo MD;  Location: [facility];  Service: General    AZ LARYNGOSCOPY DIRECT OPERATIVE W/BIOPSY N/A 07/17/2019    Procedure: MICROLARYNGOSCOPY DIRECT WITH BIOPSY OF VALLECULAR MASS;  Surgeon: Amol Cordova MD;  [facility];  Service: ENT    AZ LARYNGOSCOPY DIRECT OPERATIVE W/BIOPSY N/A 6/19/2024    Procedure: DIRECT LARYNGOSCOPY WITH BIOPSY WITH RIGHT NECK DISSECTION LEVEL TWO AND THREE;   "Surgeon: Fady Montana MD;  [facility];  Service: ENT    RADICAL NECK DISSECTION Right 6/19/2024    Procedure: DISSECTION NECK RADICAL;  Surgeon: Fady Montana MD;  [facility];  Service: ENT    RHINOPLASTY      SINUS SURGERY  1994    TRANSORAL ROBOTIC SURGERY (TORS) N/A 07/31/2019    Procedure: ROBOTICALLY ASSISTED PARTIAL GLOSSECTOMY, PARTIAL PHARYNGECTOMY;  Surgeon: Fady Montana MD;  [facility];  Service: ENT    UPPER GASTROINTESTINAL ENDOSCOPY      US GUIDED LYMPH NODE BIOPSY RIGHT  5/6/2024    WISDOM TOOTH EXTRACTION         Review of Medications:   Vitamins, Supplements and Herbals: Taking variety of herbal supplements by naturopathic/ Ayurvedic doctor:   Genistein, Artemisinin, Apigenin, VitD3, Amla (Vit C), CoQ10, Frankincense, Angiostop(Sea Cucumber), Artecin, C-statin, PauD Arco, Black Cumin/Black seed oil. Alternating with: [place], Curcumin, Indole 3 Carbinol, Quercetin, Resveratrol, Vascustatin, Cronaxal (Benegene), Lycopene, Agaricus Murill + IV: Kaplan or Immunity 15   + Immune support tea, Herbal detox tea, Sinus care Jam, Acid balance tincture  He takes these Wednesday-Saturday. Reviewed w/ patient in detail that herbal supplements can interact with treatment and have negative side effects.  Refered pt to [facility] [place], [place] \"About Herbs\" website for further information on safety/effectiveness/interactions of supplements.  Pt holding supplements at this time due to swallowing difficulty 10/28      Current Outpatient Medications:     Allergy Relief 25 MG/10ML oral liquid, , Disp: , Rfl:     amLODIPine (NORVASC) 5 mg tablet, Take 5 mg by mouth daily at bedtime , Disp: , Rfl:     amoxicillin-clavulanate (AUGMENTIN) 875-125 mg per tablet, TAKE TWO TABLETS BY MOUTH NOW, THEN TAKE ONE TABLET BY MOUTH EVERY 12 HOURS UNTIL FINISHED WITH FOOD (Patient not taking: Reported on 10/14/2024), Disp: , Rfl:     atovaquone-proguanil (MALARONE) 250-100 mg, Take 1 tablet by mouth daily, Disp: , " Rfl:     Cholecalciferol (Vitamin D3) 125 MCG (5000 UT) TABS, every 24 hours, Disp: , Rfl:     Cholecalciferol (Vitamin D3) LIQD, Use, Disp: , Rfl:     cyclobenzaprine (FLEXERIL) 5 mg tablet, Take 1 tablet (5 mg total) by mouth 3 (three) times a day as needed for muscle spasms, Disp: 50 tablet, Rfl: 0    diphenhydramine, lidocaine, Al/Mg hydroxide, simethicone (Magic Mouthwash) SUSP, Swish and swallow 10 mL every 4 (four) hours as needed for mouth pain or discomfort, Disp: 500 mL, Rfl: 4    Gabapentin (NEURONTIN) 300 mg/6mL solution, Take 5 mL (250 mg total) by mouth daily at bedtime as needed (pain, insomnia) May reduce dose for daytime grogginess (Patient not taking: Reported on 10/24/2024), Disp: 470 mL, Rfl: 0    lisinopril (ZESTRIL) 10 mg tablet, Take 10 mg by mouth daily at bedtime , Disp: , Rfl:     Misc Natural Products (ADRENAL PO), 1 orally as directed, Disp: , Rfl:     mometasone (ELOCON) 0.1 % cream, Apply topically daily Apply thin layer to neck twice daily (not within 4 hrs of radiation), Disp: 45 g, Rfl: 1    nystatin (MYCOSTATIN) 500,000 units/5 mL suspension, Apply 5 mL (500,000 Units total) to the mouth or throat 4 (four) times a day, Disp: 400 mL, Rfl: 3    ondansetron (ZOFRAN-ODT) 8 mg disintegrating tablet, Take 1 tablet (8 mg total) by mouth every 8 (eight) hours as needed for nausea or vomiting, Disp: 30 tablet, Rfl: 3    pantoprazole (PROTONIX) 40 mg tablet, Take 1 tablet (40 mg total) by mouth daily, Disp: 30 tablet, Rfl: 3    Probiotic Product (PROBIOTIC PO), as directed Orally (Patient not taking: Reported on 10/24/2024), Disp: , Rfl:     prochlorperazine (COMPAZINE) 10 mg tablet, Take 1 tablet (10 mg total) by mouth every 6 (six) hours as needed for nausea or vomiting, Disp: 30 tablet, Rfl: 3    silver sulfadiazine (SILVADENE,SSD) 1 % cream, Apply topically daily, Disp: 50 g, Rfl: 2  No current facility-administered medications for this visit.    Facility-Administered Medications Ordered  "in Other Visits:     CISplatin (PLATINOL) 58.2 mg in sodium chloride 0.9 % 250 mL infusion, 30 mg/m2 (Treatment Plan Recorded), Intravenous, Once, Magali Talbot MD    sodium chloride 0.9 % bolus 500 mL, 500 mL, Intravenous, Once, Magali Talbot MD    Most Recent Lab Results:   Lab Results   Component Value Date    WBC 4.66 11/01/2024    NEUTROABS 3.49 11/01/2024    CHOL 246 (H) 03/03/2016    TRIG 226 (H) 03/03/2016    HDL 44 03/03/2016    ALT 19 11/01/2024    AST 17 11/01/2024    ALB 4.0 11/01/2024     03/03/2016    SODIUM 134 (L) 11/01/2024    SODIUM 140 10/25/2024    K 4.5 11/01/2024    K 4.3 10/25/2024     11/01/2024    BUN 18 11/01/2024    BUN 12 10/25/2024    CREATININE 0.81 11/01/2024    CREATININE 0.76 10/25/2024    EGFR 91 11/01/2024    TSH 1.55 08/30/2022    POCGLU 100 09/11/2024    GLUC 95 11/01/2024    HGBA1C 5.7 (H) 04/20/2023    HGBA1C 5.4 10/10/2019    CALCIUM 8.8 11/01/2024    MG 1.9 11/01/2024       Anthropometric Measurements:   Height: 70\"  Ht Readings from Last 1 Encounters:   11/04/24 5' 10\" (1.778 m)     Wt Readings from Last 20 Encounters:   11/04/24 75 kg (165 lb 5.5 oz)   10/31/24 79.8 kg (176 lb)   10/30/24 79.8 kg (176 lb)   10/28/24 75.1 kg (165 lb 9.1 oz)   10/24/24 76.2 kg (168 lb)   10/21/24 76.4 kg (168 lb 6.9 oz)   10/15/24 75.2 kg (165 lb 12.6 oz)   10/14/24 75.8 kg (167 lb 1.7 oz)   10/09/24 77.1 kg (170 lb)   10/07/24 76.4 kg (168 lb 6.9 oz)   10/02/24 78.5 kg (173 lb)   09/30/24 76.8 kg (169 lb 6.4 oz)   09/18/24 75.8 kg (167 lb)   07/16/24 76.2 kg (168 lb 0.4 oz)   07/03/24 76.7 kg (169 lb)   06/26/24 77.1 kg (170 lb)   06/19/24 77.1 kg (170 lb)   06/13/24 82.1 kg (181 lb)   04/18/24 82.1 kg (181 lb)   01/22/24 82.1 kg (181 lb)       Weight History:   Usual Weight: 165-170#  [employer]arian: 10/7: 169#, 10/15: 167.6#,  (10/21) 166.4#, 167.4 (10/24), (10/31): 166#  Home Scale: 157# (10/16), 155# (10/28/2024), 11/4: 156#    Oncology Nutrition-Anthropometrics      Flowsheet " Row Nutrition from 11/4/2024 in [facility] Oncology Dietitian [facility] Nutrition from 10/28/2024 in [facility] Oncology Dietitian [facility]   Patient age (years): 69 years 69 years   Patient (male) height (in): 70 in 70 in   Current weight (lbs): 165.4 lbs 165.6 lbs   Current weight to be used for anthropometric calculations (kg) 75.2 kg 75.3 kg   BMI: 23.7 23.8   IBW male 166 lb 166 lb   IBW (kg) male 75.5 kg 75.5 kg   IBW % (male) 99.6 % 99.8 %   Adjusted BW (male): 165.9 lbs 165.9 lbs   Adjusted BW in kg (male): 75.4 kg 75.4 kg   % weight change after 1 week: -0.1 % -1.7 %   Weight change after 1 week (lbs) -0.2 lbs -2.8 lbs   % weight change after 1 month: -4.4 % -2.2 %   Weight change after 1 month (lbs) -7.6 lbs -3.8 lbs            Nutrition-Focused Physical Findings: none observed    Food/Nutrition-Related History & Client/Social History:    Current Nutrition Impact Symptoms:  [x] Nausea - mild, improving [x] Reduced Appetite  [] Acid Reflux    [] Vomiting  [] Unintended Wt Loss  [] Malabsorption    [] Diarrhea  [] Unintended Wt Gain  [] Dumping Syndrome    [] Constipation  [x] Thick Mucous/Secretions  -mild [] Abdominal Pain    [] Dysgeusia (Altered Taste) intact [] Xerostomia (Dry Mouth)  [] Gas    [] Dysosmia (Altered Smell)  [] Thrush  [] Difficulty Chewing    [] Oral Mucositis (Sore Mouth) [x] Fatigue  [] Hyperglycemia   Lab Results   Component Value Date    HGBA1C 5.7 (H) 04/20/2023      [] Odynophagia   [] Esophagitis  [] Other: starting to experience neuropathy   [x] Dysphagia  [] Early Satiety  [] No Problems Eating      Food Allergies & Intolerances: yes: mild shellfish - fingers swell up    Current Diet: Tube Feeding  Current Nutrition Intake: Unchanged from last visit   Appetite: Fair , Poor  Nutrition Route: PEG  Oral Care: brushes daily, gargle with salt/baking soda before meals  Full mobility of tongue; continues with exercises from prior SLP  No sensitives of hot/cold or spicy. Has  trouble with bread and melon at times.  Activity level: Usual ADL's    24 hr EN Recall:  DME: [employer]  Access Type: PEG  Formula: [employer] 1.4  Method: Bolus  Regimen: 11oz (325 mL) 4 times per day of [employer] via PEG (gravity syringe method to administer boluses; 1 container infusing over ~15-20 minutes).  Flush: 60 mL free water flush pre and 60 mL free water flush post each bolus ( 120mL x 4 boluses = 480 mL) with several 12oz/350mL water flushes in between feedings.  EN providin mL volume (4 cartons/day), 1820 kcal (80% est needs), 80g protein (89% est needs), 923 mL free water   Total 60-70oz daily     Beverages: water sips throughout the day to help maintain swallow function    Supplements:   None    Oncology Nutrition-Estimated Needs      Flowsheet Row Nutrition from 10/16/2024 in [facility] Oncology Dietitian [facility] Nutrition from 2024 in [facility] Oncology Dietitian [facility]   Weight type used Actual weight Actual weight   Weight in kilograms (kg) used for estimated needs 75.4 kg 77 kg   Energy needs formula:  30-35 kcal/kg 30-35 kcal/kg   Energy needs based on 30 kcal/k kcal 2310 kcal   Energy needs based on 35 kcal/k kcal 2695 kcal   Protein needs formula: 1.2-1.5 g/kg 1.2-1.5 g/kg   Protein needs based on 1.2 g/k g 92 g   Protein needs based on 1.5 g/kg 113 g 116 g   Fluid needs formula: 30-35 mL/kg 30-35 mL/kg   Fluid needs based on 30 mL/kg 2265 mL 2304 mL   Fluid needs in ounces 77 oz 78 oz   Fluid needs based on 35 mL/kg 2643 mL 2688 mL   Fluid needs in ounces 89 oz 91 oz             Discussion & Intervention:   Jesus was evaluated today for an RD follow up regarding HNC and enteral nutrition.  Jesus is currently undergoing tx for HNCx .  RD met with patient  during infusion today. Pt is on his 6th week of HNC concurrent CRT (last day planned for ).  Pt reports exterior radiation burns are painful and swallowing continues to be  difficult. He reports that on Friday he was unable to get water down or burp. He reports after this it improved and able to drink water in slow sips. However, he is getting all of his nutrition via PEG tube at this time. Pt hydrating adequately via PEG as well. Pt reports he is not in a lot of pain otherwise. He does c/o some mouth sores and thick mucus but manageable. He uses turmeric rinses as well as salt/baking soda rinses several times daily. Pt has also been coating mouth with honey prior to RT. Pt uses magic MW as well as nystatin. We discussed expectations for feeding tube removal post treatment.   Reviewed 24 hour recall, which revealed an adequate po intake, and discussed ways to increase kcal, protein, and fluid intakes and optimize nutrient intake.  Also reviewed the importance of wt management throughout the tx process and the role of a high kcal/ high protein diet and enteral nutrition in managing wt and overall health.  Based on today's assessment, discussion included: MNT for: Enteral nutrition, how to modify foods for anticipated nutrition impact symptoms pt may experience during CA tx, practicing proper oral care, general food safety measures during cancer tx, a high kcal/protein diet & food choices to include at all meals & snacks (Examples of high kcal foods: cheese, full-fat dairy products, nut butter, plant-based fats, coconut oil/milk, avocado, butter, cream soup, etc. Examples of high protein foods: eggs, chicken, fish, beans/legumes, nuts/nut butters, bone broth, etc.) , fortifying foods for added kcal and protein (examples include: adding cheese to foods such as eggs, mashed potatoes, casseroles, etc.; Making oatmeal with whole milk rather than water; Making fortified mashed potatoes with cream, butter, dry milk powder, plain Greek yogurt, and cheese.), adequate hydration & fluid choices, sipping on calorie containing beverages (examples include: adding whole milk or cream to coffee, oral  nutrition supplements, juice, electrolyte replacement beverages, milk, etc.), eating smaller more frequent meals every 2-3 hours (5-6 small meals/day), utilizing oral nutrition supplements, adding extra fat to foods while cooking such as butter, plant-based oil, coconut oil/milk, avocado, nut butters, etc., practicing proper feeding tube use & care, adherence to and compliance with enteral nutrition plan of care, and individualized enteral nutrition recommendations & plan: [employer] 1.4 via PEG 4-5 cartons per day    Moving forward, Jesus was encouraged to continue current enteral nutrition plan of care   Materials Provided:  N/A  All questions and concerns addressed during today’s visit.  Jesus has RD contact information.    Nutrition Diagnosis:   Inadequate Energy Intake related to physiological causes, disease state and treatment related issues as evidenced by food recall, wt loss and discussion with pt and/or family.  Increased Nutrient Needs (kcal & pro) related to increased demand for nutrients and disease state as evidenced by cancer dx and pt undergoing tx for cancer.  Monitoring & Evaluation:   Goals:   weight maintenance/stabilization  adequate nutrition impact symptom management  pt to meet >/=75% estimated nutrition needs daily    Progress Towards Goals: Progressing    Nutrition Rx & Recommendations:  Diet: Enteral Nutrition as primary source of nutrition  Alter food choices and eating patterns to accommodate changing needs.  Avoid spicy, acidic, sharp/hard/crunchy foods & carbonated beverages as needed.  Follow proper oral care; Try baking soda/salt water rinse recipe (mix 3/4 tsp salt + 1 tsp baking soda + 1 qt water; rinse with plain water after using) in Eating Hints book (pg 18).  Brush your teeth before/after meals & before bed.  For thick mucous: make sure you are staying well hydrated (>64 oz/day), try swishing and spitting with plain carbonated water (unless you have a sore mouth),  talk to your doctor about trying Mucinex.  Weigh yourself regularly. If you notice weight loss, make an effort to increase your daily food/calorie intake. If you continue to notice loss after these efforts, reach out to your dietitian to establish a plan to stabilize weight.  Continue to eat by mouth, as appropriate/tolerated, as much as possible.  Your syringes are each 60 mL or 2 fl oz.  Always flush your feeding tube with 60 mL room-temp water 1-2 times daily while feeding tube is not in use.  Feeding tube plan:  TF Goal: 1 container (325 mL) 4-5 times per day of [employer] 1.4 via PEG (push syringe or gravity syringe method to administer boluses; 1 container to infuse over ~15-20 minutes).   Can try: 1 + 1/4 carton 4x/ day to equal 5 cartons total daily   Water Flushin mL free water flush pre/post each bolus (total of 600 mL/day in flushes; flushes spread throughout the day; will need to adjust flushes prn).   Enteral Nutrition goal to provide: 1625 mL volume (5 containers/day), 2275 kcal, 100 grams protein, 1154 mL free water    Follow Up Plan:   during infusion   Recommend Referral to Other Providers: none at this time but encouraged pt to contact SLP to assist with swallowing post CRT

## 2024-11-04 NOTE — PROGRESS NOTES
Jesus Hdz  tolerated treatment well with no complications.      Jesus Hdz is aware of future appt on 11/11 at 0800.     AVS printed and given to Jesus Hdz:    No (Declined by Jesus Hdz)

## 2024-11-04 NOTE — PROGRESS NOTES
Outreach made to Brian to check in while he is receiving his concurrent chemo/RT. Per brian he is doing ok. He has been exclusively on tube feeds for 1 week and his weight is stable. Nutrition on board. He is still swallowing small sips of water and tea daily.      Radiation Oncology Treatment Assessment  Are you having any side effects from your treatment?  - Yes, nausea (taking compazine), thrush and mucositis. Neck puffiness and is having a CT today to rule out infection. RT being held for 3 days.         Are you having any skin reaction?  - Yes, red open areas on neck and is painful. Dr. Ying aware.      What are you applying to your skin?  -silver sulfadiazine and he was provided dressings/bandages to wrap his neck. He is in the process of placing bandages on his neck but is concerned regarding his CT scheduled for today. I called [facility] and spoke with Patria who stated dressings will not interfere with CT. Patient made aware.     Are you experiencing any fatigue?  -Yes, poor sleep. Given Gabapentin prn by Dr. Paulino. He has used twice but has not helped keep him asleep. He is interested in something else (confirmed I will route my message to Dr. Sheridan team)      Are you having any pain?  -Yes, 7/10 with neck movement    Do you know when your upcoming appointments are?  Yes, he knows he will have his last RT now on 11/19.      Do you have any questions or concerns regarding your treatment plan?  -None at this time.

## 2024-11-05 ENCOUNTER — TELEPHONE (OUTPATIENT)
Facility: HOSPITAL | Age: 69
End: 2024-11-05

## 2024-11-05 ENCOUNTER — APPOINTMENT (OUTPATIENT)
Facility: HOSPITAL | Age: 69
End: 2024-11-05
Attending: RADIOLOGY
Payer: MEDICARE

## 2024-11-05 ENCOUNTER — TELEPHONE (OUTPATIENT)
Age: 69
End: 2024-11-05

## 2024-11-05 DIAGNOSIS — K14.6 TONGUE PAIN: ICD-10-CM

## 2024-11-05 DIAGNOSIS — N32.81 OAB (OVERACTIVE BLADDER): Primary | ICD-10-CM

## 2024-11-05 DIAGNOSIS — Z98.890 STATUS POST RADICAL DISSECTION OF NECK: ICD-10-CM

## 2024-11-05 DIAGNOSIS — R22.1 MASS OF RIGHT SIDE OF NECK: Primary | ICD-10-CM

## 2024-11-05 RX ORDER — LIDOCAINE 50 MG/G
OINTMENT TOPICAL EVERY 4 HOURS PRN
Qty: 240 G | Refills: 11 | Status: SHIPPED | OUTPATIENT
Start: 2024-11-05

## 2024-11-05 RX ORDER — ACETAMINOPHEN 160 MG/5ML
640 LIQUID ORAL EVERY 6 HOURS PRN
Qty: 236 ML | Refills: 11 | Status: SHIPPED | OUTPATIENT
Start: 2024-11-05

## 2024-11-05 RX ORDER — SOLIFENACIN SUCCINATE 5 MG/1
5 TABLET, FILM COATED ORAL DAILY
Qty: 30 TABLET | Refills: 6 | Status: SHIPPED | OUTPATIENT
Start: 2024-11-05

## 2024-11-05 NOTE — TELEPHONE ENCOUNTER
Patient complained of 10/10 pain in right neck. Took Tylenol 1000mg which he crushed and took via PEG tube and pain settled down and he is not in as much pain. He is requesting something liquid for pain. If office could please reach out to patient to discuss.

## 2024-11-05 NOTE — TELEPHONE ENCOUNTER
Call from Jesus. He is complaining of pain in the right side of his neck, 10/10 when he moves his head, in the radiation area. He stated that it started this morning when he changed the dressing on his neck and he cannot get comfortable. He has only Tylenol at home which he cannot swallow. He is asking for a liquid pain medication to help give him some relief.

## 2024-11-05 NOTE — TELEPHONE ENCOUNTER
Spoke to patient. He reports that the pain when he first called in was intense. He reports dressing was changed and did come off easy and he feels it looks a little better.He took 1000mg of Tylenol which he crushed and took via PEG and pain has settled down and he is not in as much pain. He reports he called Palliative care office and he believes he talked to a nurse. RN sent separate message to Palliative care regarding medications. RN also discussed that Dr. Ying reviewed recent CT scan and that it did not show any obvious signs of infection or fluid collection. He was appreciative of same. He will resume Radiation on Thursday. He will await call from Palliative care regarding pain medications.

## 2024-11-06 ENCOUNTER — OFFICE VISIT (OUTPATIENT)
Age: 69
End: 2024-11-06
Payer: MEDICARE

## 2024-11-06 ENCOUNTER — TELEPHONE (OUTPATIENT)
Age: 69
End: 2024-11-06

## 2024-11-06 ENCOUNTER — HOSPITAL ENCOUNTER (OUTPATIENT)
Dept: NON INVASIVE DIAGNOSTICS | Age: 69
Discharge: HOME | End: 2024-11-06
Payer: MEDICARE

## 2024-11-06 ENCOUNTER — APPOINTMENT (OUTPATIENT)
Facility: HOSPITAL | Age: 69
End: 2024-11-06
Attending: RADIOLOGY
Payer: MEDICARE

## 2024-11-06 VITALS
OXYGEN SATURATION: 98 % | HEART RATE: 67 BPM | BODY MASS INDEX: 23.62 KG/M2 | HEIGHT: 70 IN | TEMPERATURE: 97.7 F | RESPIRATION RATE: 16 BRPM | WEIGHT: 165 LBS | DIASTOLIC BLOOD PRESSURE: 84 MMHG | SYSTOLIC BLOOD PRESSURE: 128 MMHG

## 2024-11-06 DIAGNOSIS — E86.0 DEHYDRATION: ICD-10-CM

## 2024-11-06 DIAGNOSIS — C01 MALIGNANT NEOPLASM OF BASE OF TONGUE (HCC): Primary | ICD-10-CM

## 2024-11-06 DIAGNOSIS — R60.0 LOCALIZED EDEMA: ICD-10-CM

## 2024-11-06 DIAGNOSIS — C01 MALIGNANT NEOPLASM OF BASE OF TONGUE (HCC): ICD-10-CM

## 2024-11-06 PROCEDURE — 93971 EXTREMITY STUDY: CPT

## 2024-11-06 PROCEDURE — 99215 OFFICE O/P EST HI 40 MIN: CPT | Performed by: INTERNAL MEDICINE

## 2024-11-06 RX ORDER — FLUCONAZOLE 100 MG/1
100 TABLET ORAL DAILY
Qty: 10 TABLET | Refills: 2 | Status: SHIPPED | OUTPATIENT
Start: 2024-11-06 | End: 2024-11-16

## 2024-11-06 NOTE — TELEPHONE ENCOUNTER
Called patient to advise of STAT VAS Upper Limb scan that is scheduled for today 11/6 at [facility] at 3 pm.  Patient can make appt.

## 2024-11-07 ENCOUNTER — APPOINTMENT (OUTPATIENT)
Facility: HOSPITAL | Age: 69
End: 2024-11-07
Attending: RADIOLOGY
Payer: MEDICARE

## 2024-11-07 PROCEDURE — 93971 EXTREMITY STUDY: CPT | Performed by: SURGERY

## 2024-11-08 ENCOUNTER — HOSPITAL ENCOUNTER (OUTPATIENT)
Dept: INFUSION CENTER | Facility: HOSPITAL | Age: 69
Discharge: HOME | End: 2024-11-08
Attending: INTERNAL MEDICINE

## 2024-11-08 ENCOUNTER — TELEPHONE (OUTPATIENT)
Age: 69
End: 2024-11-08

## 2024-11-08 ENCOUNTER — APPOINTMENT (OUTPATIENT)
Facility: HOSPITAL | Age: 69
End: 2024-11-08
Attending: RADIOLOGY
Payer: MEDICARE

## 2024-11-08 ENCOUNTER — APPOINTMENT (OUTPATIENT)
Dept: LAB | Facility: HOSPITAL | Age: 69
End: 2024-11-08
Payer: MEDICARE

## 2024-11-08 DIAGNOSIS — C01 MALIGNANT NEOPLASM OF BASE OF TONGUE (HCC): ICD-10-CM

## 2024-11-08 LAB
ALBUMIN SERPL BCG-MCNC: 3.9 G/DL (ref 3.5–5)
ALP SERPL-CCNC: 83 U/L (ref 34–104)
ALT SERPL W P-5'-P-CCNC: 16 U/L (ref 7–52)
ANION GAP SERPL CALCULATED.3IONS-SCNC: 7 MMOL/L (ref 4–13)
AST SERPL W P-5'-P-CCNC: 15 U/L (ref 13–39)
BASOPHILS # BLD AUTO: 0.01 THOUSANDS/ÂΜL (ref 0–0.1)
BASOPHILS NFR BLD AUTO: 0 % (ref 0–1)
BILIRUB SERPL-MCNC: 0.46 MG/DL (ref 0.2–1)
BUN SERPL-MCNC: 13 MG/DL (ref 5–25)
CALCIUM SERPL-MCNC: 8.9 MG/DL (ref 8.4–10.2)
CHLORIDE SERPL-SCNC: 101 MMOL/L (ref 96–108)
CO2 SERPL-SCNC: 31 MMOL/L (ref 21–32)
CREAT SERPL-MCNC: 0.8 MG/DL (ref 0.6–1.3)
EOSINOPHIL # BLD AUTO: 0.05 THOUSAND/ÂΜL (ref 0–0.61)
EOSINOPHIL NFR BLD AUTO: 1 % (ref 0–6)
ERYTHROCYTE [DISTWIDTH] IN BLOOD BY AUTOMATED COUNT: 13.8 % (ref 11.6–15.1)
GFR SERPL CREATININE-BSD FRML MDRD: 91 ML/MIN/1.73SQ M
GLUCOSE P FAST SERPL-MCNC: 112 MG/DL (ref 65–99)
HCT VFR BLD AUTO: 40.1 % (ref 36.5–49.3)
HGB BLD-MCNC: 13.2 G/DL (ref 12–17)
IMM GRANULOCYTES # BLD AUTO: 0.01 THOUSAND/UL (ref 0–0.2)
IMM GRANULOCYTES NFR BLD AUTO: 0 % (ref 0–2)
LDH SERPL-CCNC: 112 U/L (ref 140–271)
LYMPHOCYTES # BLD AUTO: 0.33 THOUSANDS/ÂΜL (ref 0.6–4.47)
LYMPHOCYTES NFR BLD AUTO: 9 % (ref 14–44)
MAGNESIUM SERPL-MCNC: 2 MG/DL (ref 1.9–2.7)
MCH RBC QN AUTO: 29.1 PG (ref 26.8–34.3)
MCHC RBC AUTO-ENTMCNC: 32.9 G/DL (ref 31.4–37.4)
MCV RBC AUTO: 89 FL (ref 82–98)
MONOCYTES # BLD AUTO: 0.33 THOUSAND/ÂΜL (ref 0.17–1.22)
MONOCYTES NFR BLD AUTO: 9 % (ref 4–12)
NEUTROPHILS # BLD AUTO: 2.77 THOUSANDS/ÂΜL (ref 1.85–7.62)
NEUTS SEG NFR BLD AUTO: 81 % (ref 43–75)
NRBC BLD AUTO-RTO: 0 /100 WBCS
PLATELET # BLD AUTO: 172 THOUSANDS/UL (ref 149–390)
PMV BLD AUTO: 9.1 FL (ref 8.9–12.7)
POTASSIUM SERPL-SCNC: 4 MMOL/L (ref 3.5–5.3)
PROT SERPL-MCNC: 7 G/DL (ref 6.4–8.4)
RBC # BLD AUTO: 4.53 MILLION/UL (ref 3.88–5.62)
SODIUM SERPL-SCNC: 139 MMOL/L (ref 135–147)
WBC # BLD AUTO: 3.5 THOUSAND/UL (ref 4.31–10.16)

## 2024-11-08 PROCEDURE — 80053 COMPREHEN METABOLIC PANEL: CPT

## 2024-11-08 PROCEDURE — 83615 LACTATE (LD) (LDH) ENZYME: CPT

## 2024-11-08 PROCEDURE — 36415 COLL VENOUS BLD VENIPUNCTURE: CPT

## 2024-11-08 PROCEDURE — 85025 COMPLETE CBC W/AUTO DIFF WBC: CPT

## 2024-11-08 PROCEDURE — 83735 ASSAY OF MAGNESIUM: CPT

## 2024-11-08 NOTE — TELEPHONE ENCOUNTER
Patient calling to speak with Kaye from Dr. Almanzar office.  He has a list of supplies he like to give her.  He's coming to the lab this morning.  He can be reached at [phone]

## 2024-11-08 NOTE — TELEPHONE ENCOUNTER
Spoke to patient. He will stop when he is in lab. RN to have dressing supplies ready for him at that time.

## 2024-11-09 RX ORDER — SODIUM CHLORIDE 9 MG/ML
20 INJECTION, SOLUTION INTRAVENOUS ONCE
Status: CANCELLED | OUTPATIENT
Start: 2024-11-11

## 2024-11-09 RX ORDER — PALONOSETRON 0.05 MG/ML
0.25 INJECTION, SOLUTION INTRAVENOUS ONCE
Status: CANCELLED | OUTPATIENT
Start: 2024-11-11

## 2024-11-10 NOTE — PROGRESS NOTES
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     See Note.     -- Confirmed by positive Pankeratin (CKAE1/3), p40 and normal wild-type expression       on p53 immunostains.    -- p16 immunostain positive (>70% diffuse and strong nuclear and cytoplasmic        staining); high-risk HPV EH pending; result will be reported in an addendum.      B. Lymph Node, Regional Resection, Level 2 lymph nodes, see comments:  - Metastatic squamous cell carcinoma, keratinizing, involving at least two lymph nodes (2/2).     -- Present in 1.0 cm lymph node and additional detached lymph node(s)/fragments.     -- Largest metastatic focus: 0.3 cm; Extranodal extension: Not identified.    -- Confirmed by positive Pankeratin (CKAE1/3) and p40 immunostains.    -- p16 immunostain positive (>70% diffuse and strong nuclear and cytoplasmic        staining).     C. Lymph Node, Regional Resection, Additional level 2 lymph nodes:  - Metastatic squamous cell carcinoma, keratinizing, involving 3 of 4 lymph     nodes/fragments (3/4).     -- Largest metastatic focus: 1 cm; Extranodal extension: Present, extranodal         lymphovascular invasion identified.    -- Confirmed by positive Pankeratin (CKAE1/3) and p40 immunostains.    -- p16 immunostain positive  (>70% diffuse and strong nuclear and cytoplasmic        staining); high-risk HPV EH pending; result will be reported in an addendum. See Note.      D. Lymph Node, Level 3 lymph nodes, lymph adenectomy:  - One lymph node positive for metastatic squamous cell carcinoma, keratinizing (1/1).     -- Largest metastatic focus: 1.1 cm; Extranodal extension: Not identified.    -- Confirmed by positive Pankeratin (CKAE1/3) and p40 immunostains.    -- p16 immunostain positive (>70% diffuse and strong nuclear and cytoplasmic        staining).  - Portion(s) of benign salivary gland with adjacent detached small (< 0.2 cm) portion      of carcinoma.   - Traumatic neuroma.          Comments:   This is an appended report. These results have been appended to a previously preliminary verified report.           7/3/2024 -  Cancer Staged    Staging form: Pharynx - Oropharynx, AJCC 8th Edition  - Clinical stage from 7/3/2024: Stage I (cT1, cN1, cM0, p16+) - Signed by Fady Morrow MD on 7/3/2024  Stage prefix: Initial diagnosis       9/30/2024 -  Chemotherapy    alteplase (CATHFLO), 2 mg, Intracatheter, Every 1 Minute as needed, 6 of 7 cycles  palonosetron (ALOXI), 0.25 mg, Intravenous, Once, 5 of 6 cycles  Administration: 0.25 mg (10/7/2024), 0.25 mg (10/14/2024), 0.25 mg (10/21/2024), 0.25 mg (10/28/2024), 0.25 mg (11/4/2024)  CISplatin (PLATINOL) infusion, 70 mg (original dose 40 mg/m2), Intravenous, Once, 6 of 7 cycles  Dose modification: 70 mg (original dose 40 mg/m2, Cycle 1, Reason: Other (Must fill in a comment), Comment: pharmacy dictates), 30 mg/m2 (original dose 40 mg/m2, Cycle 6, Reason: Anticipated Tolerance), 30 mg/m2 (original dose 40 mg/m2, Cycle 7, Reason: Dose Not Tolerated)  Administration: 70 mg (9/30/2024), 70 mg (10/7/2024), 70 mg (10/14/2024), 70 mg (10/21/2024), 70 mg (10/28/2024), 58.2 mg (11/4/2024)  sodium chloride, 500 mL, Intravenous, Once, 6 of 7 cycles  Administration: 500 mL (9/30/2024), 500 mL  (9/30/2024), 500 mL (10/7/2024), 500 mL (10/7/2024), 500 mL (10/14/2024), 500 mL (10/14/2024), 500 mL (10/21/2024), 500 mL (10/21/2024), 500 mL (10/28/2024), 500 mL (10/28/2024), 500 mL (11/4/2024), 500 mL (11/4/2024)  fosaprepitant (EMEND) IVPB, 150 mg, Intravenous, Once, 6 of 7 cycles  Administration: 150 mg (9/30/2024), 150 mg (10/7/2024), 150 mg (10/14/2024), 150 mg (10/21/2024), 150 mg (10/28/2024), 150 mg (11/4/2024)           Treatment Cisplatin 40 mg/m2 weekly x 7 doses with IMRT     C1 9/30/2024  C2  10/7/2024  C3 10/14/2024  C4 10/21/2024  C5 10/28/2024  C6  11/4/2024     C7   11/11/2024-completed; RT held     Discussion     Te definitive management of SCCHN in terms of oral cavity vs oropharynx vs larynx vs hypopharynx as well as pure tobacco related vs HPV with or without a prior/current smoking history had been surgery/RT.   In the past, the  role of chemotherapy was reserved for metastatic disease using Cisplatin at fairly high doses as well as infusional 5FU x 96 hours.  However there have been many trials which have changed the landscape of how locoregional and metastatic disease are now managed with chemotherapy, CRT or IO        It is unclear in this case, as had prior RBOT Stage 1 HPV driven s/p resection with no high risk features 0/34 nodes positive at the time and no adjuvant therapy     Based on the DL/biopsy there is SCC in the pharynx but location unclear; 6/7 nodes were positive with ASHLEY and thus the below pertains in terms of combined therapy.  The issues is 6 or 7 weeks of treatment and this appears to be a new second primary based on the biology      In general the prognosis for HPV driven SCCHN is better than that of tobacco related disease and in fact the AJCC staging system reflects this.  However when tobacco is a factor with a patient being a long standing smoker that favorable prognosis is no longer applicable;  however this patient smoked a pack a day for years but quit 30 years  ago and so he has more favorable disease     The OS/PFS/FRANCO responses were better in those patients treated on the  study who were HPV positive; induction was followed by CRT and in that study Carboplatin AUC 1.5 only rather than 40 mg/m2 Cisplatin due to perceived toxicities being worse with cisplatin after induction therapy.  That was the case for this patient who got Cisplatin        However,  CRT per RTOG 91-11 as become SOC.  In the modern era, weekly Cisplatin 40 mg/m2 is used for 7 weeks rather than the high dose Cisplatin in that trial with similar efficacy.  Other radiosensitizers include weekly Carboplatin/Taxol.  Based on the side effect profile of Cisplatin, een in lower doses, renal clearance is an issue.  Both Carboplatin and Cisplatin are renally cleared but Carboplatin is dosed based on renal function and may be more tolerable.  Other agents used as radiosensitizers include Cetuximab per the Radha study.       Side effects, and  toxicities of chemotherapy alone and in combination with radiation had been discussed at [place].  The side effects specific to radiation include loss of taste, xerostomia, mucositis, dysphagia , nausea, radiation induced dermatis and fatigue.  Fatigue is also common with chemotherapy and is cumulative.  Copious ropey secretions were discussed as well as nutrition.  In terms of the effects of Cisplatin or Carboplatin/Taxol weekly alone and in combination with radiation are neutropenia/leucopenia, anemia, thrombocytopenia, nausea, GERD, mucositis, fungal infections, renal insufficiency/dehydration, electrolyte imbalances including platinum induced SIADH, neuropathy, hearing loss, minor hair loss.  Taxol can also cause an infusion reaction /premedications are needed.  As chemotherapy is being used as a radiosensitizer, the effects of radiation are magnified.       Nutrition and hydration are concerning in the combined modality setting and we discussed the use of a PEG.   Based on both internal scatter of radiation and with extensive radiation there is potential for fibrosis/strictures as well as mucositis, fungal infections, being hypermetabolic with swallowing issues.  Repletion as the caloric needs increase is improved via a PEG as well as IV fluids.  Nutrition generally follows these patients.       There are also chronic issues which can occur; the secretions generally improve in 3-6 months, taste returns within 1-2 months, xerostomia may be a chronic issue, fibrosis, lymphedema, thyroid issues with checks every 3 months after RT completion, length of PEG use of about 3-6 months.     Restaging would be 3 months after therapy with CT PET and then every 3 months with CT neck/chest in the first year, eery 4-6 months thereafter.  HPV driven disease tends to have a latent distant mets period.       PDL1 status as well as TMB, MMR, MSI should be ascertained via [facility] for future reverence      Thus the recommendation would be weekly Cisplatin 40 mg/m2 weekly with RT to 70 Gy and thus 7 weeks      Assessment / Plan:     Recurrent right level 2 node s/p partial glossectomy/pharyngectomy 7/31/2029 with no adjuvant CRT 0/34 nodes at the time and no high risk features     PET with no primary recurrence/ nodes only     MRI face/neck/orbit     Discussion regarding CRT with weekly Cisplatin 40 mg/m2 for 6-7 weeks     PEG placement     Dental evaluation     Nutrition evaluation      9/18/2024      Patient seen by Dr Morrow in Rad Onc; simulation done     Will be treated UB     PEG to be placed-has agreed to it     Nutrition follow up     Speech pathology/swallowing assessment      Tentative plan to start treatment 30 Sept 2024       10/2/2024     Tolerated C1 Cisplatin     No issues other than flushing-took decadron out of premed cocktail.  Usually no infusion reactions with Cisplatin but with Carboplatin     No PEG, weight is 173     Using supplements despite telling patient that herbal  "remedies can cause issues with both chemo and RT.  He is \"holding\" these agents when gets chemotherapy but even then can affect outcome/formation of DNA adducts     Would again recommend not using these agents     10/9/2024     Week 2/7      Already has grade 1/early 2 confluence in the hard palate     Tongue coated unclear if there is a thrush component as well     Magic mouthwash, salt water/baking soda rinses for mucositis-may consider decadron rinse if no better/worse next week     Nystatin swish/spit/swallow for thrush     No erythema/neck dermatitis from RT     Did have some nausea from chemotherapy, mostly related to constipation so taking compazine for relief     IVF to start additionally this Friday and weekly through duration treatment     PEG to be placed 10/10/2024; will need close nutrition follow up     Weight 170 pounds      Concerned with degree of mucositis in week 2     Secretions, minimal at this point      Labs acceptable       10/30/2024     Week 5/7     Has increased mucositis posterior oropharynx     Coated tongue/thrush-Nystatin S/S     Started using PEG 5 days ago; nutrition following      Weight 176 pounds was 165     Skin with grade 1 desquamation-mometasone started/aquaphor     Refusing IVF; /74, does not appear dehydrated by renal function      Week 6/7     Significant grade 3 skin desquamation right > left; has wrap in place, RT held since Monday; we were not told or would have held chemotherapy which he received     Silvadene, aquaphor, may need wound care although unable to thoroughly assess     Seeing Rad Onc today regarding restarting     States is improving slowly, painful     Has thrush on oral tongue-stopped using nystatin rinse; will do diflucan     Grade 2 mucositis     PEG dependent, 4 ans per day, not eating anything orally, able to drink water     Weight 155 at home, states stable; 165 here     Had IV infiltrate with what appears to be superficial phlebitis     Doppler " done later today confirming no DVT and superficial thromobphlebitis which he was told to place warm compresses, elevation Tylenol for pain     IVF later this week      11/13/2024 week 7/7    Received C7 Cisplatin without communication that RT was held-would have held chemo    Skin is improved, no worse from chemotherapy    Still grade 3 desquamation with healing    Mucositis improving, has thrush oral tongue and remains on Nystatin swish/spit    Eating eggs but PEG dependent, getting 4 cans with fluids     Will complete RT late next week      Follow up     2 weeks            History:   7/3/2024     69 year old with HTN with prior RBOT cancer;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 S/p partial glossectomy and partial pharyngectomy on 07/31/2019, pathology negative for malignancy. S/p MRND right neck 09/04/2019 returned 0/34 nodes. Staged as T1N0 M0       PET scan was done 12/03/19 which showed mild asymmetry at the tongue base, likely post operative. Otherwise no distant metastasis. Prior imaging from June 2019 was clear with no evidence of disease.      There were no high risk features to speak of and he did not receive any adjuvant therapies.       He then did well in follow-up until approximately 3 to 4 months ago when he noticed a palpable lump on the right neck.  Subsequent imaging with CT and PET/CT revealed multiple hypermetabolic lymph nodes in the right neck but no obvious primary recurrence, contralateral or distant disease.  On 6/19/2024 he was taken to the operating room for direct laryngoscopy as well as repeat right neck dissection.  7 lymph nodes were removed, 6 of which were positive for metastatic squamous cell carcinoma, keratinizing, p16 positive, with extranodal extension identified.  The largest lymph node measured 1.1 cm.  On the pathology report there is a specimen labeled right pharynx which also contained squamous cell carcinoma.  Looking at the operative report, they  "describe a small palpable prominence in the \"left pharynx near base of tongue.\"  This is in contradiction to the pathology report which described the specimen as from the right pharynx.            Ct neck 04/24/2024      New rounded lymph node in the palpable region of interest superficial to the sternocleidomastoid muscle measuring 9 x 6 mm. Differential diagnosis should include recurrent disease versus reactive lymphadenopathy.     Small cluster of right level 2B cervical lymph nodes also noted as described above with otherwise no suspicious adenopathy identified in the remainder of the neck.     Status post right-sided partial glossectomy without definite nodular enhancement in the operative bed.     05/06/2024 FNAB under US  Lymph Node, right level 2 (ThinPrep, smears and cell block preparations ):  Conclusive evidence of malignancy.  Carcinoma with keratinization, compatible with known squamous cell carcinoma     PET scan 05/22/2024      1. Scattered FDG-avid right cervical chain lymph nodes compatible with disease recurrence.  2. No suspicious uptake in the oropharynx or hypopharynx.  3. No findings for hypermetabolic metastasis to the chest, abdomen or pelvis.  \  No prior XRT or chemotherapy.      The patient is seeing Rad Onc later today     He is very pleased with the RND healing.  We had a long discussion as to second primary vs recurrence; treatment with CRT either 6 or 7 weeks.  As this is likely a second primary, would do 7 weeks but again, not definitively shown on PET.  Will get MRI face/neck/orbit to further assess.  He has been doing well post op.  Weight stable 169 pounds.  Has altered diet, eating healthier.  Has no issues, denies any SOB/KOHLI, CP change in bowel/bladder, blood stool/urine         Interval History:  9/18/2024     Doing well; waiting to start treatment 30 Sept 2024.  Risks/benefits and toxicities again explained to the patient with informed consent signed.  Sent zofran 8 mg ODT q 8 " prn and compazine 10 mg q 6-8 prn to pharmacy.  No other active issues.       Since last visit PET scan done 9/11/2024    HEAD/NECK:  Increasing focus of radiotracer uptake at the right upper cervical chain, level 2 region, SUV max of 5.5, previously 3.6. This measures 7 mm short axis, image 38 series 17, stable from prior MRI. Based on the configuration on prior MRI, it is possible   this could represent a parotid lesion arising from the deep lobe of the parotid gland though an intraparotid lymph node or cervical chain lymph node is not excluded.     Otherwise resolution of previously seen right upper cervical chain foci.     No FDG avid lymph nodes in the left neck.     Small focus of FDG uptake in the right peritonsillar region, SUV max of 4.2, previously 4.4, image 33 series 2600, may correspond to the 7 mm heterogeneously enhancing lesion seen on prior MRI examination. Question residual disease. However this appears   asymmetrically decreased compared to what is likely normal physiologic left tonsillar activity, SUV max of 5.5. Cannot exclude that this is diminished physiologic activity and the known right pharyngeal lesion is not seen on PET.     CT images: No additional significant findings.     CHEST:  No FDG avid soft tissue lesions are seen.     CT images: Scattered coronary artery calcifications.     ABDOMEN:  No FDG avid soft tissue lesions are seen.              Interval History:  10/2/2024     Doing well with treatment but had C1 30 Sept and this is day 3 of radiation.  He has started on herbal supplements despite advise against this.  Again told not acceptable.  Had issues with flushing but no other Cisplatin related effects.      Labs  9/30/2024     Na 138 K 3.5 Cr 0.79 ALT/AST 16/16/ Ca 9 Mg 2.1  WBC 4.4 Hgb 15.4 MCV 86 plts 210 ANC [id number]     Baseline TSH 2.245               Interval History:  10/9/2024 week 2/7     Has grade 1/early 2 confluent mucositis hard palate, extending to soft  bilateral  As above not using salt water/baking soda rinses or magic mouthwash-given recipe and prescription for magic mouthwash which can be swallowed or spit out.,  Has coated tongue, concern with thrush so will start Nystatin as well.  Discussed other rinses if not helpful.  Seeing palliative care for pain control-consider gabapentin      Has had issues with nausea and constipation from zofran-using compazine with relief.  No GERD.  Decreased oral intake due to mucositis.  PEG being placed 10/10/2024     Labs 10/4/2024 with na 136 K 4.0 Cr 0.75 Ca 9.0 ALT/AST 17/18 TB 0.59 down from 1.36 last week  WBC 4.9 Hgb 15.5 MCV 87 plts 224 ANC [id number]      Interval History: week 5/7  10/30/2024     Patient with prior RBOT cancer;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 S/p partial glossectomy and partial pharyngectomy on 07/31/2019, pathology negative for malignancy. S/p MRND right neck 09/04/2019 returned 0/34 nodes. Staged as T1N0 NO prior adjuvant therapy     Noted recurrent right neck disease/ new pharynx, recurrent RBOT     Now getting CRT weekly x 7weeks with 70 Gy IMRT     Skin with grade 1 desquamation right >left.  Using aquaphor mometasone cream.  Started with PEG, swallowing more difficult.  Has thrush, grade 1 mucositis posterior oropharynx.  Can swallow water but solids more difficult.  Nutrition following.  Is getting free water orally and via PEG.  Refusing IVF.  Weight 176 pounds BP stable.       Labs  1025/2024     Na 140 K 4.3 Cr 0.76 Ca 9.0 ALT/AST 23/17 Mg 20  WBC 3.8 Hgb 14 MCV 87 plts 171 ANC [id number]            Interval History: week 6/7     As above grade 3/4 skin desquamation, grade 1-2 mucositis, thrush oral tongue, doing poorly.  PEG dependent at this point using 4 cans feeds; nutrition following  RT held past 2-3 days but we were not informed as his skin was not like this last week; had erythema only not blistering.  May need wound care for further management.  Will  continue with IVF this week as excessive fluid loss through skin.  Silvadene/aquaphor mometasone Diflucan for thrush as not using nystatin.  Encouraged continue use of rinses, swallowing water.  May consider holding chemo based on extent of skin issues;       Labs 11/1/2024 Na 134 K 4.5 Cr 0.81 Ca 8.8 ALT/AST 19/17 TB 0.49 Mg 1.9  WBC 4.66 Hgb 13.7 MCV 88 plts 171 ANC [id number]            Interval History: 11/13/2024    Slowly improving; RT held for the remainder of last week x at least 4 days.  Seeing RT later this am.  Did not hold chemo as no communication that RT was held as it should have been.  Skin appears improving with current regimen on with desquamation but underlying granulation tissue forming.  Mucositis better as well.  PEG dependent, 4 cans, fluids  Getting IVF.  Still concerned with superficial thrombophlebitis right arm; doppler negative for DVT    Labs 11/8/2024 Na 1339 K 4.0 Cr 0.8 Mg 2.0 ALT/AST 16/15 Ca 8.8  WBC 3.5 Hgb 13.2 MCV 89 plts 172 ANC [id number]          REVIEW OF SYSTEMS: as above   Please note that a 14-point review of systems was performed to include Constitutional, HEENT, Respiratory, CVS, GI, , Musculoskeletal, Integumentary, Neurologic, Rheumatologic, Endocrinologic, Psychiatric, Lymphatic, and Hematologic/Oncologic systems were reviewed and are negative unless otherwise stated in HPI. Positive and negative findings pertinent to this evaluation are incorporated into the history of present illness.      ECOG PS: 1-2    PROBLEM LIST:  Patient Active Problem List   Diagnosis    Mass of right side of neck    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)    Status post radical dissection of neck    Tongue pain    Dysphagia    Hypertension    Protein-calorie malnutrition (HCC)    Chemotherapy-induced nausea    Dehydration       Past Medical History:   has a past medical history of Facial basal cell cancer, GERD (gastroesophageal reflux disease), Hypertension, Throat cancer (HCC), and  Vitamin D deficiency.    PAST SURGICAL HISTORY:   has a past surgical history that includes Rhinoplasty; Colonoscopy (N/A, 03/22/2016); Phyllis tooth extraction; pr laryngoscopy direct operative w/biopsy (N/A, 07/17/2019); TRANSORAL ROBOTIC SURGERY (TORS) (N/A, 07/31/2019); pr cervical lymphadec modified radical neck dsj (Right, 09/04/2019); pr laparoscopy surg cholecystectomy (N/A, 10/30/2019); Upper gastrointestinal endoscopy; Cholecystectomy; pr cysto insertion transprostatic implant single (N/A, 04/14/2023); Sinus surgery (1994); US guided lymph node biopsy right (5/6/2024); pr laryngoscopy direct operative w/biopsy (N/A, 6/19/2024); and Radical neck dissection (Right, 6/19/2024).    CURRENT MEDICATIONS  Current Outpatient Medications   Medication Sig Dispense Refill    acetaminophen (TYLENOL) 160 mg/5 mL liquid Take 20 mL (640 mg total) by mouth every 6 (six) hours as needed for mild pain or moderate pain 236 mL 11    Allergy Relief 25 MG/10ML oral liquid       amLODIPine (NORVASC) 5 mg tablet Take 5 mg by mouth daily at bedtime       amoxicillin-clavulanate (AUGMENTIN) 875-125 mg per tablet TAKE TWO TABLETS BY MOUTH NOW, THEN TAKE ONE TABLET BY MOUTH EVERY 12 HOURS UNTIL FINISHED WITH FOOD (Patient not taking: Reported on 10/14/2024)      atovaquone-proguanil (MALARONE) 250-100 mg Take 1 tablet by mouth daily      Cholecalciferol (Vitamin D3) 125 MCG (5000 UT) TABS every 24 hours      Cholecalciferol (Vitamin D3) LIQD Use      cyclobenzaprine (FLEXERIL) 5 mg tablet Take 1 tablet (5 mg total) by mouth 3 (three) times a day as needed for muscle spasms 50 tablet 0    diphenhydramine, lidocaine, Al/Mg hydroxide, simethicone (Magic Mouthwash) SUSP Swish and swallow 10 mL every 4 (four) hours as needed for mouth pain or discomfort 500 mL 4    fluconazole (DIFLUCAN) 100 mg tablet Take 1 tablet (100 mg total) by mouth daily for 10 days 10 tablet 2    Gabapentin (NEURONTIN) 300 mg/6mL solution Take 5 mL (250 mg total)  by mouth daily at bedtime as needed (pain, insomnia) May reduce dose for daytime grogginess (Patient not taking: Reported on 10/24/2024) 470 mL 0    lidocaine (XYLOCAINE) 5 % ointment Apply topically every 4 (four) hours as needed for mild pain (painful area on neck) 240 g 11    lisinopril (ZESTRIL) 10 mg tablet Take 10 mg by mouth daily at bedtime       Misc Natural Products (ADRENAL PO) 1 orally as directed      mometasone (ELOCON) 0.1 % cream Apply topically daily Apply thin layer to neck twice daily (not within 4 hrs of radiation) 45 g 1    nystatin (MYCOSTATIN) 500,000 units/5 mL suspension Apply 5 mL (500,000 Units total) to the mouth or throat 4 (four) times a day 400 mL 3    ondansetron (ZOFRAN-ODT) 8 mg disintegrating tablet Take 1 tablet (8 mg total) by mouth every 8 (eight) hours as needed for nausea or vomiting 30 tablet 3    pantoprazole (PROTONIX) 40 mg tablet Take 1 tablet (40 mg total) by mouth daily 30 tablet 3    Probiotic Product (PROBIOTIC PO) as directed Orally (Patient not taking: Reported on 10/24/2024)      prochlorperazine (COMPAZINE) 10 mg tablet Take 1 tablet (10 mg total) by mouth every 6 (six) hours as needed for nausea or vomiting 30 tablet 3    silver sulfadiazine (SILVADENE,SSD) 1 % cream Apply topically daily 50 g 2    solifenacin (VESICARE) 5 mg tablet Take 1 tablet (5 mg total) by mouth daily 30 tablet 6     No current facility-administered medications for this visit.     [unfilled]    SOCIAL HISTORY:   reports that he has never smoked. He has never used smokeless tobacco. He reports that he does not currently use alcohol after a past usage of about 5.0 standard drinks of alcohol per week. He reports that he does not currently use drugs.     FAMILY HISTORY:  family history includes Cancer in his mother; Diabetes in his mother; Heart failure in his father; Seizures in his father.     ALLERGIES:  is allergic to shellfish allergy - food allergy and bee venom.      Physical Exam:  Vital  Signs:   Visit Vitals  Smoking Status Never     There is no height or weight on file to calculate BMI.  There is no height or weight on file to calculate BSA.    GEN: Alert, awake oriented x3, in no acute distress  HEENT- No pallor, icterus, cyanosis, no oral mucosal lesions, thrush on oral tongue   Right neck desquamation, granulation tissue forming,   Heart- normal S1 S2, regular rate and rhythm, No murmur, rubs.   Lungs- clear breathing sound bilateral.   Abdomen- soft, Non tender, bowel sounds present  Extremities- No cyanosis, clubbing, edema  Neuro- No focal neurological deficit    Labs:  Lab Results   Component Value Date    WBC 3.50 (L) 11/08/2024    HGB 13.2 11/08/2024    HCT 40.1 11/08/2024    MCV 89 11/08/2024     11/08/2024     Lab Results   Component Value Date     03/03/2016    SODIUM 139 11/08/2024    K 4.0 11/08/2024     11/08/2024    CO2 31 11/08/2024    AGAP 7 11/08/2024    BUN 13 11/08/2024    CREATININE 0.80 11/08/2024    GLUC 95 11/01/2024    GLUF 112 (H) 11/08/2024    CALCIUM 8.9 11/08/2024    AST 15 11/08/2024    ALT 16 11/08/2024    ALKPHOS 83 11/08/2024    PROT 6.7 03/03/2016    TP 7.0 11/08/2024    BILITOT 0.6 03/03/2016    TBILI 0.46 11/08/2024    EGFR 91 11/08/2024           I spent 40 minutes on chart review, face to face counseling time, coordination of care and documentation.    Magali Talbot MD PhD         detailed exam conjunctiva clear/PERRL

## 2024-11-11 ENCOUNTER — APPOINTMENT (OUTPATIENT)
Facility: HOSPITAL | Age: 69
End: 2024-11-11
Attending: RADIOLOGY
Payer: MEDICARE

## 2024-11-11 ENCOUNTER — HOSPITAL ENCOUNTER (OUTPATIENT)
Dept: INFUSION CENTER | Facility: HOSPITAL | Age: 69
Discharge: HOME | End: 2024-11-11
Attending: INTERNAL MEDICINE
Payer: MEDICARE

## 2024-11-11 ENCOUNTER — NUTRITION (OUTPATIENT)
Dept: NUTRITION | Facility: HOSPITAL | Age: 69
End: 2024-11-11

## 2024-11-11 VITALS
BODY MASS INDEX: 23.61 KG/M2 | WEIGHT: 164.9 LBS | DIASTOLIC BLOOD PRESSURE: 91 MMHG | HEIGHT: 70 IN | RESPIRATION RATE: 18 BRPM | OXYGEN SATURATION: 99 % | HEART RATE: 104 BPM | SYSTOLIC BLOOD PRESSURE: 150 MMHG | TEMPERATURE: 97.2 F

## 2024-11-11 DIAGNOSIS — Z71.3 NUTRITIONAL COUNSELING: Primary | ICD-10-CM

## 2024-11-11 DIAGNOSIS — C01 MALIGNANT NEOPLASM OF BASE OF TONGUE (HCC): Primary | ICD-10-CM

## 2024-11-11 PROCEDURE — 96361 HYDRATE IV INFUSION ADD-ON: CPT

## 2024-11-11 PROCEDURE — 96413 CHEMO IV INFUSION 1 HR: CPT

## 2024-11-11 PROCEDURE — 96367 TX/PROPH/DG ADDL SEQ IV INF: CPT

## 2024-11-11 PROCEDURE — 96375 TX/PRO/DX INJ NEW DRUG ADDON: CPT

## 2024-11-11 RX ORDER — PALONOSETRON 0.05 MG/ML
0.25 INJECTION, SOLUTION INTRAVENOUS ONCE
Status: COMPLETED | OUTPATIENT
Start: 2024-11-11 | End: 2024-11-11

## 2024-11-11 RX ORDER — SODIUM CHLORIDE 9 MG/ML
20 INJECTION, SOLUTION INTRAVENOUS ONCE
Status: COMPLETED | OUTPATIENT
Start: 2024-11-11 | End: 2024-11-11

## 2024-11-11 RX ADMIN — FOSAPREPITANT 150 MG: 150 INJECTION, POWDER, LYOPHILIZED, FOR SOLUTION INTRAVENOUS at 09:17

## 2024-11-11 RX ADMIN — PALONOSETRON HYDROCHLORIDE 0.25 MG: 0.25 INJECTION INTRAVENOUS at 08:38

## 2024-11-11 RX ADMIN — SODIUM CHLORIDE 500 ML: 0.9 INJECTION, SOLUTION INTRAVENOUS at 11:02

## 2024-11-11 RX ADMIN — SODIUM CHLORIDE 20 ML/HR: 0.9 INJECTION, SOLUTION INTRAVENOUS at 08:34

## 2024-11-11 RX ADMIN — CISPLATIN 58.2 MG: 1 INJECTION, SOLUTION INTRAVENOUS at 09:58

## 2024-11-11 RX ADMIN — SODIUM CHLORIDE 500 ML: 0.9 INJECTION, SOLUTION INTRAVENOUS at 08:33

## 2024-11-11 NOTE — TELEPHONE ENCOUNTER
Patient calling to speak with Kaye from Dr. Almanzar office. He has a list of supplies he like to give her.  He can be reached at [phone]

## 2024-11-11 NOTE — PROGRESS NOTES
Outpatient Oncology Nutrition Consultation   Type of Consult: Follow Up  Care Location: [facility]    Reason for referral: Received notification by  Dr. Talbot  on 9/18 that pt has triggered for oncology nutrition care (reason for referral: HNC dx and TF).    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     See Note.     -- Confirmed by positive Pankeratin (CKAE1/3), p40 and normal wild-type expression       on p53 immunostains.    -- p16 immunostain positive (>70% diffuse and strong nuclear and cytoplasmic        staining); high-risk HPV EH pending; result will be reported in an addendum.      B. Lymph Node, Regional Resection, Level 2 lymph nodes, see comments:  - Metastatic squamous cell carcinoma, keratinizing, involving at least two lymph nodes (2/2).     -- Present in 1.0 cm lymph node and additional detached lymph node(s)/fragments.     -- Largest metastatic focus: 0.3 cm; Extranodal extension: Not identified.    -- Confirmed by positive Pankeratin (CKAE1/3) and p40 immunostains.    -- p16 immunostain positive (>70% diffuse and strong nuclear and cytoplasmic        staining).     C. Lymph Node, Regional Resection, Additional level 2 lymph nodes:  - Metastatic squamous cell carcinoma, keratinizing, involving 3 of 4 lymph     nodes/fragments (3/4).     -- Largest metastatic focus: 1 cm; Extranodal extension: Present, extranodal         lymphovascular invasion  identified.    -- Confirmed by positive Pankeratin (CKAE1/3) and p40 immunostains.    -- p16 immunostain positive (>70% diffuse and strong nuclear and cytoplasmic        staining); high-risk HPV EH pending; result will be reported in an addendum. See Note.      D. Lymph Node, Level 3 lymph nodes, lymph adenectomy:  - One lymph node positive for metastatic squamous cell carcinoma, keratinizing (1/1).     -- Largest metastatic focus: 1.1 cm; Extranodal extension: Not identified.    -- Confirmed by positive Pankeratin (CKAE1/3) and p40 immunostains.    -- p16 immunostain positive (>70% diffuse and strong nuclear and cytoplasmic        staining).  - Portion(s) of benign salivary gland with adjacent detached small (< 0.2 cm) portion      of carcinoma.   - Traumatic neuroma.          Comments:   This is an appended report. These results have been appended to a previously preliminary verified report.           7/3/2024 -  Cancer Staged    Staging form: Pharynx - Oropharynx, AJCC 8th Edition  - Clinical stage from 7/3/2024: Stage I (cT1, cN1, cM0, p16+) - Signed by Fady Morrow MD on 7/3/2024  Stage prefix: Initial diagnosis       9/30/2024 -  Chemotherapy    alteplase (CATHFLO), 2 mg, Intracatheter, Every 1 Minute as needed, 7 of 7 cycles  palonosetron (ALOXI), 0.25 mg, Intravenous, Once, 6 of 6 cycles  Administration: 0.25 mg (10/7/2024), 0.25 mg (10/14/2024), 0.25 mg (10/21/2024), 0.25 mg (10/28/2024), 0.25 mg (11/4/2024)  CISplatin (PLATINOL) infusion, 70 mg (original dose 40 mg/m2), Intravenous, Once, 7 of 7 cycles  Dose modification: 70 mg (original dose 40 mg/m2, Cycle 1, Reason: Other (Must fill in a comment), Comment: pharmacy dictates), 30 mg/m2 (original dose 40 mg/m2, Cycle 6, Reason: Anticipated Tolerance), 30 mg/m2 (original dose 40 mg/m2, Cycle 7, Reason: Dose Not Tolerated)  Administration: 70 mg (9/30/2024), 70 mg (10/7/2024), 70 mg (10/14/2024), 70 mg (10/21/2024), 70 mg (10/28/2024), 58.2 mg  (11/4/2024)  sodium chloride, 500 mL, Intravenous, Once, 7 of 7 cycles  Administration: 500 mL (9/30/2024), 500 mL (9/30/2024), 500 mL (10/7/2024), 500 mL (10/7/2024), 500 mL (10/14/2024), 500 mL (10/14/2024), 500 mL (10/21/2024), 500 mL (10/21/2024), 500 mL (10/28/2024), 500 mL (10/28/2024), 500 mL (11/4/2024), 500 mL (11/4/2024)  fosaprepitant (EMEND) IVPB, 150 mg, Intravenous, Once, 7 of 7 cycles  Administration: 150 mg (9/30/2024), 150 mg (10/7/2024), 150 mg (10/14/2024), 150 mg (10/21/2024), 150 mg (10/28/2024), 150 mg (11/4/2024)       Past Medical & Surgical Hx:   Patient Active Problem List   Diagnosis    Mass of right side of neck    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)    Status post radical dissection of neck    Tongue pain    Dysphagia    Hypertension    Protein-calorie malnutrition (HCC)    Chemotherapy-induced nausea    Dehydration     Past Medical History:   Diagnosis Date    Facial basal cell cancer     GERD (gastroesophageal reflux disease)     diet controlled    Hypertension     Throat cancer (HCC)     Vitamin D deficiency      Past Surgical History:   Procedure Laterality Date    CHOLECYSTECTOMY      COLONOSCOPY N/A 03/22/2016    Procedure: COLONOSCOPY;  Surgeon: Daren Montana MD;  Location: [facility];  Service:     MN CERVICAL LYMPHADEC MODIFIED RADICAL NECK DSJ Right 09/04/2019    Procedure: MODIFIED RADICAL NECK DISSECTION;  Surgeon: Fady Montana MD;  [facility];  Service: ENT    MN CYSTO INSERTION TRANSPROSTATIC IMPLANT SINGLE N/A 04/14/2023    Procedure: CYSTOSCOPY WITH INSERTION UROLIFT;  Surgeon: Rubin Alfonso MD;  [facility];  Service: Urology    MN LAPAROSCOPY SURG CHOLECYSTECTOMY N/A 10/30/2019    Procedure: CHOLECYSTECTOMY LAPAROSCOPIC;  Surgeon: Christiano Cornejo MD;  [facility];  Service: General    MN LARYNGOSCOPY DIRECT OPERATIVE W/BIOPSY N/A 07/17/2019    Procedure: MICROLARYNGOSCOPY DIRECT WITH BIOPSY OF VALLECULAR MASS;  Surgeon:  "Amol Cordova MD;  [facility];  Service: ENT    SC LARYNGOSCOPY DIRECT OPERATIVE W/BIOPSY N/A 6/19/2024    Procedure: DIRECT LARYNGOSCOPY WITH BIOPSY WITH RIGHT NECK DISSECTION LEVEL TWO AND THREE;  Surgeon: Fady Montana MD;  [facility];  Service: ENT    RADICAL NECK DISSECTION Right 6/19/2024    Procedure: DISSECTION NECK RADICAL;  Surgeon: Fady Montana MD;  [facility];  Service: ENT    RHINOPLASTY      SINUS SURGERY  1994    TRANSORAL ROBOTIC SURGERY (TORS) N/A 07/31/2019    Procedure: ROBOTICALLY ASSISTED PARTIAL GLOSSECTOMY, PARTIAL PHARYNGECTOMY;  Surgeon: Fady Montana MD;  [facility];  Service: ENT    UPPER GASTROINTESTINAL ENDOSCOPY      US GUIDED LYMPH NODE BIOPSY RIGHT  5/6/2024    WISDOM TOOTH EXTRACTION         Review of Medications:   Vitamins, Supplements and Herbals: Taking variety of herbal supplements by naturopathic/ Ayurvedic doctor:   Genistein, Artemisinin, Apigenin, VitD3, Amla (Vit C), CoQ10, Frankincense, Angiostop(Sea Cucumber), Artecin, C-statin, PauD Arco, Black Cumin/Black seed oil. Alternating with: [place], Curcumin, Indole 3 Carbinol, Quercetin, Resveratrol, Vascustatin, Cronaxal (Benegene), Lycopene, Agaricus Murill + IV: Kaplan or Immunity 15   + Immune support tea, Herbal detox tea, Sinus care Jam, Acid balance tincture  He takes these Wednesday-Saturday. Reviewed w/ patient in detail that herbal supplements can interact with treatment and have negative side effects.  [place] [facility] [place] for further information on safety/effectiveness/interactions of supplements.  Pt holding supplements at this time due to swallowing difficulty 10/28      Current Outpatient Medications:     acetaminophen (TYLENOL) 160 mg/5 mL liquid, Take 20 mL (640 mg total) by mouth every 6 (six) hours as needed for mild pain or moderate pain, Disp: 236 mL, Rfl: 11    Allergy Relief 25 MG/10ML oral liquid, , Disp: , Rfl:     amLODIPine " (NORVASC) 5 mg tablet, Take 5 mg by mouth daily at bedtime , Disp: , Rfl:     amoxicillin-clavulanate (AUGMENTIN) 875-125 mg per tablet, TAKE TWO TABLETS BY MOUTH NOW, THEN TAKE ONE TABLET BY MOUTH EVERY 12 HOURS UNTIL FINISHED WITH FOOD (Patient not taking: Reported on 10/14/2024), Disp: , Rfl:     atovaquone-proguanil (MALARONE) 250-100 mg, Take 1 tablet by mouth daily, Disp: , Rfl:     Cholecalciferol (Vitamin D3) 125 MCG (5000 UT) TABS, every 24 hours, Disp: , Rfl:     Cholecalciferol (Vitamin D3) LIQD, Use, Disp: , Rfl:     cyclobenzaprine (FLEXERIL) 5 mg tablet, Take 1 tablet (5 mg total) by mouth 3 (three) times a day as needed for muscle spasms, Disp: 50 tablet, Rfl: 0    diphenhydramine, lidocaine, Al/Mg hydroxide, simethicone (Magic Mouthwash) SUSP, Swish and swallow 10 mL every 4 (four) hours as needed for mouth pain or discomfort, Disp: 500 mL, Rfl: 4    fluconazole (DIFLUCAN) 100 mg tablet, Take 1 tablet (100 mg total) by mouth daily for 10 days, Disp: 10 tablet, Rfl: 2    Gabapentin (NEURONTIN) 300 mg/6mL solution, Take 5 mL (250 mg total) by mouth daily at bedtime as needed (pain, insomnia) May reduce dose for daytime grogginess (Patient not taking: Reported on 10/24/2024), Disp: 470 mL, Rfl: 0    lidocaine (XYLOCAINE) 5 % ointment, Apply topically every 4 (four) hours as needed for mild pain (painful area on neck), Disp: 240 g, Rfl: 11    lisinopril (ZESTRIL) 10 mg tablet, Take 10 mg by mouth daily at bedtime , Disp: , Rfl:     Misc Natural Products (ADRENAL PO), 1 orally as directed, Disp: , Rfl:     mometasone (ELOCON) 0.1 % cream, Apply topically daily Apply thin layer to neck twice daily (not within 4 hrs of radiation), Disp: 45 g, Rfl: 1    nystatin (MYCOSTATIN) 500,000 units/5 mL suspension, Apply 5 mL (500,000 Units total) to the mouth or throat 4 (four) times a day, Disp: 400 mL, Rfl: 3    ondansetron (ZOFRAN-ODT) 8 mg disintegrating tablet, Take 1 tablet (8 mg total) by mouth every 8 (eight)  "hours as needed for nausea or vomiting, Disp: 30 tablet, Rfl: 3    pantoprazole (PROTONIX) 40 mg tablet, Take 1 tablet (40 mg total) by mouth daily, Disp: 30 tablet, Rfl: 3    Probiotic Product (PROBIOTIC PO), as directed Orally (Patient not taking: Reported on 10/24/2024), Disp: , Rfl:     prochlorperazine (COMPAZINE) 10 mg tablet, Take 1 tablet (10 mg total) by mouth every 6 (six) hours as needed for nausea or vomiting, Disp: 30 tablet, Rfl: 3    silver sulfadiazine (SILVADENE,SSD) 1 % cream, Apply topically daily, Disp: 50 g, Rfl: 2    solifenacin (VESICARE) 5 mg tablet, Take 1 tablet (5 mg total) by mouth daily, Disp: 30 tablet, Rfl: 6  No current facility-administered medications for this visit.    Facility-Administered Medications Ordered in Other Visits:     alteplase (CATHFLO) injection 2 mg, 2 mg, Intracatheter, Q1MIN PRN, Magali Talbot MD    Most Recent Lab Results:   Lab Results   Component Value Date    WBC 3.50 (L) 11/08/2024    NEUTROABS 2.77 11/08/2024    CHOL 246 (H) 03/03/2016    TRIG 226 (H) 03/03/2016    HDL 44 03/03/2016    ALT 16 11/08/2024    AST 15 11/08/2024    ALB 3.9 11/08/2024     03/03/2016    SODIUM 139 11/08/2024    SODIUM 134 (L) 11/01/2024    K 4.0 11/08/2024    K 4.5 11/01/2024     11/08/2024    BUN 13 11/08/2024    BUN 18 11/01/2024    CREATININE 0.80 11/08/2024    CREATININE 0.81 11/01/2024    EGFR 91 11/08/2024    TSH 1.55 08/30/2022    POCGLU 100 09/11/2024    GLUF 112 (H) 11/08/2024    GLUC 95 11/01/2024    HGBA1C 5.7 (H) 04/20/2023    HGBA1C 5.4 10/10/2019    CALCIUM 8.9 11/08/2024    MG 2.0 11/08/2024       Anthropometric Measurements:   Height: 70\"  Ht Readings from Last 1 Encounters:   11/11/24 5' 10\" (1.778 m)     Wt Readings from Last 20 Encounters:   11/11/24 74.8 kg (164 lb 14.5 oz)   11/06/24 74.8 kg (165 lb)   11/04/24 75 kg (165 lb 5.5 oz)   10/31/24 79.8 kg (176 lb)   10/30/24 79.8 kg (176 lb)   10/28/24 75.1 kg (165 lb 9.1 oz)   10/24/24 76.2 kg (168 " lb)   10/21/24 76.4 kg (168 lb 6.9 oz)   10/15/24 75.2 kg (165 lb 12.6 oz)   10/14/24 75.8 kg (167 lb 1.7 oz)   10/09/24 77.1 kg (170 lb)   10/07/24 76.4 kg (168 lb 6.9 oz)   10/02/24 78.5 kg (173 lb)   09/30/24 76.8 kg (169 lb 6.4 oz)   09/18/24 75.8 kg (167 lb)   07/16/24 76.2 kg (168 lb 0.4 oz)   07/03/24 76.7 kg (169 lb)   06/26/24 77.1 kg (170 lb)   06/19/24 77.1 kg (170 lb)   06/13/24 82.1 kg (181 lb)       Weight History:   Usual Weight: 165-170#  [employer]: 10/7: 169#, 10/15: 167.6#,  (10/21) 166.4#, 167.4 (10/24), (10/31): 166#  Home Scale: 157# (10/16), 155# (10/28/2024), 11/4: 156#    Oncology Nutrition-Anthropometrics      [facility] [place] from 11[facility]4/2024 in [facility]   Patient [age] (years): 69 years 69 years   Patient (male) height (in): 70 in 70 in   Current weight (lbs): 164.9 lbs 165.4 lbs   Current weight to be used for anthropometric calculations (kg) 75 kg 75.2 kg   BMI: 23.7 23.7   IBW male 166 lb 166 lb   IBW (kg) male 75.5 kg 75.5 kg   IBW % (male) 99.3 % 99.6 %   Adjusted BW (male): 165.7 lbs 165.9 lbs   Adjusted BW in kg (male): 75.3 kg 75.4 kg   % weight change after 1 week: -0.2 % -0.1 %   Weight change after 1 week (lbs) -0.4 lbs -0.2 lbs   % weight change after 1 month: -1.3 % -4.4 %   Weight change after 1 month (lbs) -2.1 lbs -7.6 lbs            Nutrition-Focused Physical Findings: none observed    Food/Nutrition-Related History & Client/Social History:    Current Nutrition Impact Symptoms:  [x] Nausea - mild, improving [x] Reduced Appetite  [] Acid Reflux    [] Vomiting  [] Unintended Wt Loss  [] Malabsorption    [] Diarrhea  [] Unintended Wt Gain  [] Dumping Syndrome    [] Constipation  [x] Thick Mucous/Secretions  -mild [] Abdominal Pain    [] Dysgeusia (Altered Taste) intact [] Xerostomia (Dry Mouth)  [] Gas    [] Dysosmia (Altered Smell)  [x] Thrush  diflucan [] Difficulty  Chewing    [] Oral Mucositis (Sore Mouth) [x] Fatigue  [] Hyperglycemia   Lab Results   Component Value Date    HGBA1C 5.7 (H) 2023      [] Odynophagia   [] Esophagitis  [] Other: starting to experience neuropathy   [x] Dysphagia  [] Early Satiety  [] No Problems Eating      Food Allergies & Intolerances: yes: mild shellfish - fingers swell up    Current Diet: Tube Feeding  Current Nutrition Intake: Unchanged from last visit   Appetite: Fair , Poor  Nutrition Route: PEG  Oral Care: brushes daily, gargle with salt/baking soda before meals  Full mobility of tongue; continues with exercises from prior SLP  No sensitives of hot/cold or spicy. Has trouble with bread and melon at times.  Activity level: Usual ADL's    Diet recall:  Lentil soup  Plans to try scrambled eggs     24 hr EN Recall:  DME: [employer]  Access Type: PEG  Formula: [employer] 1.4  Method: Bolus  Regimen: 11oz (325 mL) 4 times per day of [employer] via PEG (gravity syringe method to administer boluses; 1 container infusing over ~15-20 minutes).  Flush: 60 mL free water flush pre and 60 mL free water flush post each bolus ( 120mL x 4 boluses = 480 mL) with several 12oz/350mL water flushes in between feedings.  EN providin mL volume (4 cartons/day), 1820 kcal (80% est needs), 80g protein (89% est needs), 923 mL free water   Total 60-70oz daily     Beverages: water sips throughout the day to help maintain swallow function    Supplements:   None    Oncology Nutrition-Estimated Needs      Flowsheet Row Nutrition from 10/16/2024 in [facility] Nutrition from 2024 in [facility]   Weight type used Actual weight Actual weight   Weight in kilograms (kg) used for estimated needs 75.4 kg 77 kg   Energy needs formula:  30-35 kcal/kg 30-35 kcal/kg   Energy needs based on 30 kcal/k kcal 2310 kcal   Energy needs based on 35 kcal/k kcal 2695 kcal   Protein needs formula:  1.2-1.5 g/kg 1.2-1.5 g/kg   Protein needs based on 1.2 g/k g 92 g   Protein needs based on 1.5 g/kg 113 g 116 g   Fluid needs formula: 30-35 mL/kg 30-35 mL/kg   Fluid needs based on 30 mL/kg 2265 mL 2304 mL   Fluid needs in ounces 77 oz 78 oz   Fluid needs based on 35 mL/kg 2643 mL 2688 mL   Fluid needs in ounces 89 oz 91 oz             Discussion & Intervention:   Jesus was evaluated today for an RD follow up regarding HNC and enteral nutrition.  Jesus is currently undergoing tx for HNCx .  RD met with patient  during infusion today. Pt is on his 7th week of HNC concurrent CRT. Pt did not get RT last week due to skin issues. Pt reports time off from RT actually was helpful and was able to eat soup last night. He is still using PEG for primary nutrition and gets in 4 cartons [employer] daily. He has maintained his weight. He has not required addition IVF and reports getting in 80oz fluid daily, mostly via PEG. Pt denies new/acute nutrition changes since last visit. We discussed expectations for weaning TF once symptoms improve.    Reviewed 24 hour recall, which revealed an adequate enteral intake, and discussed ways to increase kcal, protein, and fluid intakes and optimize nutrient intake.  Also reviewed the importance of wt management throughout the tx process and the role of a high kcal/ high protein diet and enteral nutrition in managing wt and overall health.  Based on today's assessment, discussion included: MNT for: Enteral nutrition, how to modify foods for anticipated nutrition impact symptoms pt may experience during CA tx, practicing proper oral care, adequate hydration & fluid choices, sipping on calorie containing beverages (examples include: adding whole milk or cream to coffee, oral nutrition supplements, juice, electrolyte replacement beverages, milk, etc.), practicing proper feeding tube use & care, adherence to and compliance with enteral nutrition plan of care, and individualized  enteral nutrition recommendations & plan: Sam[employer] 1.4 via PEG 4-5 cartons per day    Moving forward, Jesus was encouraged to continue current enteral nutrition plan of care   Materials Provided:  N/A  All questions and concerns addressed during today’s visit.  Jesus has RD contact information.    Nutrition Diagnosis:   Increased Nutrient Needs (kcal & pro) related to increased demand for nutrients and disease state as evidenced by cancer dx and pt undergoing tx for cancer.  Swallowing Difficulty related to diagnosis/treatment related causes as evidenced by need for enteral nutrition to meet estimated needs.  Monitoring & Evaluation:   Goals:   weight maintenance/stabilization  adequate nutrition impact symptom management  pt to meet >/=75% estimated nutrition needs daily    Progress Towards Goals: Progressing    Nutrition Rx & Recommendations:  Diet: Enteral Nutrition as primary source of nutrition  Alter food choices and eating patterns to accommodate changing needs.  Avoid spicy, acidic, sharp/hard/crunchy foods & carbonated beverages as needed.  Follow proper oral care; Try baking soda/salt water rinse recipe (mix 3/4 tsp salt + 1 tsp baking soda + 1 qt water; rinse with plain water after using) in Eating Hints book (pg 18).  Brush your teeth before/after meals & before bed.  For thick mucous: make sure you are staying well hydrated (>64 oz/day), try swishing and spitting with plain carbonated water (unless you have a sore mouth), talk to your doctor about trying Mucinex.  Weigh yourself regularly. If you notice weight loss, make an effort to increase your daily food/calorie intake. If you continue to notice loss after these efforts, reach out to your dietitian to establish a plan to stabilize weight.  Continue to eat by mouth, as appropriate/tolerated, as much as possible.  Your syringes are each 60 mL or 2 fl oz.  Always flush your feeding tube with 60 mL room-temp water 1-2 times daily while  feeding tube is not in use.  Feeding tube plan:  TF Goal: 1 container (325 mL) 4x per day of [employer] 1.4 via PEG (push syringe or gravity syringe method to administer boluses; 1 container to infuse over ~15-20 minutes).   Increase to 5x/day if wt declines on current regimen   Water Flushin mL free water flush pre/post each bolus (total of 600 mL/day in flushes; flushes spread throughout the day; will need to adjust flushes prn).   Enteral Nutrition goal to provide: 1625 mL volume (5 containers/day), 2275 kcal, 100 grams protein, 1154 mL free water    Follow Up Plan:   after RT   Recommend Referral to Other Providers: none at this time but encouraged pt to contact SLP to assist with swallowing post CRT

## 2024-11-11 NOTE — PATIENT INSTRUCTIONS
Nutrition Rx & Recommendations:  Diet: Enteral Nutrition as primary source of nutrition  Alter food choices and eating patterns to accommodate changing needs.  Avoid spicy, acidic, sharp/hard/crunchy foods & carbonated beverages as needed.  Follow proper oral care; Try baking soda/salt water rinse recipe (mix 3/4 tsp salt + 1 tsp baking soda + 1 qt water; rinse with plain water after using) in Eating Hints book (pg 18).  Brush your teeth before/after meals & before bed.  For thick mucous: make sure you are staying well hydrated (>64 oz/day), try swishing and spitting with plain carbonated water (unless you have a sore mouth), talk to your doctor about trying Mucinex.  Weigh yourself regularly. If you notice weight loss, make an effort to increase your daily food/calorie intake. If you continue to notice loss after these efforts, reach out to your dietitian to establish a plan to stabilize weight.  Continue to eat by mouth, as appropriate/tolerated, as much as possible.  Your syringes are each 60 mL or 2 fl oz.  Always flush your feeding tube with 60 mL room-temp water 1-2 times daily while feeding tube is not in use.  Feeding tube plan:  TF Goal: 1 container (325 mL) 4x per day of [employer] 1.4 via PEG (push syringe or gravity syringe method to administer boluses; 1 container to infuse over ~15-20 minutes).   Increase to 5x/day if wt declines on current regimen   Water Flushin mL free water flush pre/post each bolus (total of 600 mL/day in flushes; flushes spread throughout the day; will need to adjust flushes prn).   Enteral Nutrition goal to provide: 1625 mL volume (5 containers/day), 2275 kcal, 100 grams protein, 1154 mL free water    Follow Up Plan:  after RT   Recommend Referral to Other Providers: none at this time but encouraged pt to contact SLP to assist with swallowing post CRT

## 2024-11-11 NOTE — PROGRESS NOTES
Jesus Hdz  tolerated treatment well with no complications.  Today was his final chemotherapy.     Jesus Hdz is aware of future appt on 11/15/24 at 1330.     AVS printed and given to Jesus Hdz:    No (Declined by Jesus Hdz)

## 2024-11-12 ENCOUNTER — APPOINTMENT (OUTPATIENT)
Facility: HOSPITAL | Age: 69
End: 2024-11-12
Attending: RADIOLOGY
Payer: MEDICARE

## 2024-11-12 ENCOUNTER — APPOINTMENT (OUTPATIENT)
Facility: HOSPITAL | Age: 69
End: 2024-11-12
Payer: MEDICARE

## 2024-11-12 PROCEDURE — 77300 RADIATION THERAPY DOSE PLAN: CPT | Performed by: RADIOLOGY

## 2024-11-12 PROCEDURE — 77338 DESIGN MLC DEVICE FOR IMRT: CPT | Performed by: RADIOLOGY

## 2024-11-12 NOTE — TELEPHONE ENCOUNTER
Returned patient's call. He has enough dressing supplies to last him until he comes in tomorrow. He will  more of what he needs at that time.

## 2024-11-13 ENCOUNTER — APPOINTMENT (OUTPATIENT)
Facility: HOSPITAL | Age: 69
End: 2024-11-13
Payer: MEDICARE

## 2024-11-13 ENCOUNTER — OFFICE VISIT (OUTPATIENT)
Age: 69
End: 2024-11-13
Payer: MEDICARE

## 2024-11-13 ENCOUNTER — APPOINTMENT (OUTPATIENT)
Facility: HOSPITAL | Age: 69
End: 2024-11-13
Attending: RADIOLOGY
Payer: MEDICARE

## 2024-11-13 VITALS
BODY MASS INDEX: 23.62 KG/M2 | SYSTOLIC BLOOD PRESSURE: 118 MMHG | TEMPERATURE: 97.9 F | OXYGEN SATURATION: 98 % | WEIGHT: 165 LBS | HEART RATE: 98 BPM | HEIGHT: 70 IN | DIASTOLIC BLOOD PRESSURE: 74 MMHG | RESPIRATION RATE: 16 BRPM

## 2024-11-13 DIAGNOSIS — K14.6 TONGUE PAIN: ICD-10-CM

## 2024-11-13 DIAGNOSIS — R22.1 MASS OF RIGHT SIDE OF NECK: ICD-10-CM

## 2024-11-13 DIAGNOSIS — C01 MALIGNANT NEOPLASM OF BASE OF TONGUE (HCC): Primary | ICD-10-CM

## 2024-11-13 PROCEDURE — 99215 OFFICE O/P EST HI 40 MIN: CPT | Performed by: INTERNAL MEDICINE

## 2024-11-14 ENCOUNTER — APPOINTMENT (OUTPATIENT)
Facility: HOSPITAL | Age: 69
End: 2024-11-14
Attending: RADIOLOGY
Payer: MEDICARE

## 2024-11-14 PROCEDURE — 77336 RADIATION PHYSICS CONSULT: CPT | Performed by: RADIOLOGY

## 2024-11-14 PROCEDURE — 77386 HB NTSTY MODUL RAD TX DLVR CPLX: CPT | Performed by: RADIOLOGY

## 2024-11-14 PROCEDURE — 77014 CHG CT GUIDANCE RADIATION THERAPY FLDS PLACEMENT: CPT | Performed by: RADIOLOGY

## 2024-11-15 ENCOUNTER — APPOINTMENT (OUTPATIENT)
Facility: HOSPITAL | Age: 69
End: 2024-11-15
Payer: MEDICARE

## 2024-11-15 ENCOUNTER — HOSPITAL ENCOUNTER (OUTPATIENT)
Dept: INFUSION CENTER | Facility: HOSPITAL | Age: 69
Discharge: HOME | End: 2024-11-15
Attending: INTERNAL MEDICINE

## 2024-11-15 ENCOUNTER — APPOINTMENT (OUTPATIENT)
Facility: HOSPITAL | Age: 69
End: 2024-11-15
Attending: RADIOLOGY
Payer: MEDICARE

## 2024-11-15 PROCEDURE — 77014 CHG CT GUIDANCE RADIATION THERAPY FLDS PLACEMENT: CPT | Performed by: RADIOLOGY

## 2024-11-15 PROCEDURE — 77427 RADIATION TX MANAGEMENT X5: CPT | Performed by: RADIOLOGY

## 2024-11-15 PROCEDURE — 77386 HB NTSTY MODUL RAD TX DLVR CPLX: CPT | Performed by: RADIOLOGY

## 2024-11-18 ENCOUNTER — APPOINTMENT (OUTPATIENT)
Facility: HOSPITAL | Age: 69
End: 2024-11-18
Attending: RADIOLOGY
Payer: MEDICARE

## 2024-11-18 ENCOUNTER — NUTRITION (OUTPATIENT)
Dept: NUTRITION | Facility: HOSPITAL | Age: 69
End: 2024-11-18

## 2024-11-18 DIAGNOSIS — Z71.3 NUTRITIONAL COUNSELING: Primary | ICD-10-CM

## 2024-11-18 PROCEDURE — 77386 HB NTSTY MODUL RAD TX DLVR CPLX: CPT | Performed by: RADIOLOGY

## 2024-11-18 PROCEDURE — 77014 CHG CT GUIDANCE RADIATION THERAPY FLDS PLACEMENT: CPT | Performed by: RADIOLOGY

## 2024-11-18 NOTE — PATIENT INSTRUCTIONS
Nutrition Rx & Recommendations:  Diet: Enteral Nutrition as primary source of nutrition  Alter food choices and eating patterns to accommodate changing needs.  Avoid spicy, acidic, sharp/hard/crunchy foods & carbonated beverages as needed.  Follow proper oral care; Try baking soda/salt water rinse recipe (mix 3/4 tsp salt + 1 tsp baking soda + 1 qt water; rinse with plain water after using) in Eating Hints book (pg 18).  Brush your teeth before/after meals & before bed.  For thick mucous: make sure you are staying well hydrated (>64 oz/day), try swishing and spitting with plain carbonated water (unless you have a sore mouth), talk to your doctor about trying Mucinex.  Weigh yourself regularly. If you notice weight loss, make an effort to increase your daily food/calorie intake. If you continue to notice loss after these efforts, reach out to your dietitian to establish a plan to stabilize weight.  Continue to eat by mouth, as appropriate/tolerated, as much as possible.  Your syringes are each 60 mL or 2 fl oz.  Always flush your feeding tube with 60 mL room-temp water 1-2 times daily while feeding tube is not in use.  When weaning off tube feeding, keep in mind that 1 carton (8 oz) of [employer] 1.5 has 355 calories, 15 grams protein, and 180 mL water.     Continue feeding tube plan:  TF Goal: 1 container (325 mL) 4x per day of [employer] 1.4 via PEG (push syringe or gravity syringe method to administer boluses; 1 container to infuse over ~15-20 minutes).   Increase to 5x/day if wt declines on current regimen   Water Flushin mL free water flush pre/post each bolus (total of 600 mL/day in flushes; flushes spread throughout the day; will need to adjust flushes prn).   Enteral Nutrition goal to provide: 1625 mL volume (5 containers/day), 2275 kcal, 100 grams protein, 1154 mL free water    Follow Up Plan: 3 weeks. Pt will call RD to coordinate with MD f/u   Recommend Referral to Other Providers: none at this time but  encouraged pt to contact SLP to assist with swallowing post CRT

## 2024-11-18 NOTE — PROGRESS NOTES
Outpatient Oncology Nutrition Consultation   Type of Consult: Follow Up  Care Location: Office Visit    Reason for referral: Received notification by  Dr. Talbot  on 9/18 that pt has triggered for oncology nutrition care (reason for referral: HNC dx and TF).    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     See Note.     -- Confirmed by positive Pankeratin (CKAE1/3), p40 and normal wild-type expression       on p53 immunostains.    -- p16 immunostain positive (>70% diffuse and strong nuclear and cytoplasmic        staining); high-risk HPV EH pending; result will be reported in an addendum.      B. Lymph Node, Regional Resection, Level 2 lymph nodes, see comments:  - Metastatic squamous cell carcinoma, keratinizing, involving at least two lymph nodes (2/2).     -- Present in 1.0 cm lymph node and additional detached lymph node(s)/fragments.     -- Largest metastatic focus: 0.3 cm; Extranodal extension: Not identified.    -- Confirmed by positive Pankeratin (CKAE1/3) and p40 immunostains.    -- p16 immunostain positive (>70% diffuse and strong nuclear and cytoplasmic        staining).     C. Lymph Node, Regional Resection, Additional level 2 lymph nodes:  - Metastatic squamous cell carcinoma, keratinizing, involving 3 of 4 lymph     nodes/fragments (3/4).     -- Largest metastatic focus: 1 cm; Extranodal extension: Present, extranodal          lymphovascular invasion identified.    -- Confirmed by positive Pankeratin (CKAE1/3) and p40 immunostains.    -- p16 immunostain positive (>70% diffuse and strong nuclear and cytoplasmic        staining); high-risk HPV EH pending; result will be reported in an addendum. See Note.      D. Lymph Node, Level 3 lymph nodes, lymph adenectomy:  - One lymph node positive for metastatic squamous cell carcinoma, keratinizing (1/1).     -- Largest metastatic focus: 1.1 cm; Extranodal extension: Not identified.    -- Confirmed by positive Pankeratin (CKAE1/3) and p40 immunostains.    -- p16 immunostain positive (>70% diffuse and strong nuclear and cytoplasmic        staining).  - Portion(s) of benign salivary gland with adjacent detached small (< 0.2 cm) portion      of carcinoma.   - Traumatic neuroma.          Comments:   This is an appended report. These results have been appended to a previously preliminary verified report.           7/3/2024 -  Cancer Staged    Staging form: Pharynx - Oropharynx, AJCC 8th Edition  - Clinical stage from 7/3/2024: Stage I (cT1, cN1, cM0, p16+) - Signed by Fady Morrow MD on 7/3/2024  Stage prefix: Initial diagnosis       9/30/2024 - 11/11/2024 Chemotherapy    alteplase (CATHFLO), 2 mg, Intracatheter, Every 1 Minute as needed, 7 of 7 cycles  palonosetron (ALOXI), 0.25 mg, Intravenous, Once, 6 of 6 cycles  Administration: 0.25 mg (10/7/2024), 0.25 mg (10/14/2024), 0.25 mg (10/21/2024), 0.25 mg (10/28/2024), 0.25 mg (11/4/2024), 0.25 mg (11/11/2024)  CISplatin (PLATINOL) infusion, 70 mg (original dose 40 mg/m2), Intravenous, Once, 7 of 7 cycles  Dose modification: 70 mg (original dose 40 mg/m2, Cycle 1, Reason: Other (Must fill in a comment), Comment: pharmacy dictates), 30 mg/m2 (original dose 40 mg/m2, Cycle 6, Reason: Anticipated Tolerance), 30 mg/m2 (original dose 40 mg/m2, Cycle 7, Reason: Dose Not Tolerated)  Administration: 70 mg (9/30/2024), 70 mg (10/7/2024), 70 mg (10/14/2024),  70 mg (10/21/2024), 70 mg (10/28/2024), 58.2 mg (11/4/2024), 58.2 mg (11/11/2024)  sodium chloride, 500 mL, Intravenous, Once, 7 of 7 cycles  Administration: 500 mL (9/30/2024), 500 mL (9/30/2024), 500 mL (10/7/2024), 500 mL (10/7/2024), 500 mL (10/14/2024), 500 mL (10/14/2024), 500 mL (10/21/2024), 500 mL (10/21/2024), 500 mL (10/28/2024), 500 mL (10/28/2024), 500 mL (11/4/2024), 500 mL (11/4/2024), 500 mL (11/11/2024), 500 mL (11/11/2024)  fosaprepitant (EMEND) IVPB, 150 mg, Intravenous, Once, 7 of 7 cycles  Administration: 150 mg (9/30/2024), 150 mg (10/7/2024), 150 mg (10/14/2024), 150 mg (10/21/2024), 150 mg (10/28/2024), 150 mg (11/4/2024), 150 mg (11/11/2024)       Past Medical & Surgical Hx:   Patient Active Problem List   Diagnosis    Mass of right side of neck    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)    Status post radical dissection of neck    Tongue pain    Dysphagia    Hypertension    Protein-calorie malnutrition (HCC)    Chemotherapy-induced nausea    Dehydration     Past Medical History:   Diagnosis Date    Facial basal cell cancer     GERD (gastroesophageal reflux disease)     diet controlled    Hypertension     Throat cancer (HCC)     Vitamin D deficiency      Past Surgical History:   Procedure Laterality Date    CHOLECYSTECTOMY      COLONOSCOPY N/A 03/22/2016    Procedure: COLONOSCOPY;  Surgeon: Daren Montana MD;  Location: [facility] GI LAB;  Service:     MS CERVICAL LYMPHADEC MODIFIED RADICAL NECK DSJ Right 09/04/2019    Procedure: MODIFIED RADICAL NECK DISSECTION;  Surgeon: Fady Montana MD;  [facility];  Service: ENT    MS CYSTO INSERTION TRANSPROSTATIC IMPLANT SINGLE N/A 04/14/2023    Procedure: CYSTOSCOPY WITH INSERTION UROLIFT;  Surgeon: Rubin Alfonso MD;  [facility];  Service: Urology    MS LAPAROSCOPY SURG CHOLECYSTECTOMY N/A 10/30/2019    Procedure: CHOLECYSTECTOMY LAPAROSCOPIC;  Surgeon: Christiano Cornejo MD;  Location: [facility];  Service: General    MS  "LARYNGOSCOPY DIRECT OPERATIVE W/BIOPSY N/A 07/17/2019    Procedure: MICROLARYNGOSCOPY DIRECT WITH BIOPSY OF VALLECULAR MASS;  Surgeon: Amol Cordova MD;  [facility];  Service: ENT    MO LARYNGOSCOPY DIRECT OPERATIVE W/BIOPSY N/A 6/19/2024    Procedure: DIRECT LARYNGOSCOPY WITH BIOPSY WITH RIGHT NECK DISSECTION LEVEL TWO AND THREE;  Surgeon: Fady Montana MD;  [facility];  Service: ENT    RADICAL NECK DISSECTION Right 6/19/2024    Procedure: DISSECTION NECK RADICAL;  Surgeon: Fady Montana MD;  [facility];  Service: ENT    RHINOPLASTY      SINUS SURGERY  1994    TRANSORAL ROBOTIC SURGERY (TORS) N/A 07/31/2019    Procedure: ROBOTICALLY ASSISTED PARTIAL GLOSSECTOMY, PARTIAL PHARYNGECTOMY;  Surgeon: Fady Montana MD;  [facility];  Service: ENT    UPPER GASTROINTESTINAL ENDOSCOPY      US GUIDED LYMPH NODE BIOPSY RIGHT  5/6/2024    WISDOM TOOTH EXTRACTION         Review of Medications:   Vitamins, Supplements and Herbals: Taking variety of herbal supplements by naturopathic/ Ayurvedic doctor:   Genistein, Artemisinin, Apigenin, VitD3, Amla (Vit C), CoQ10, Frankincense, Angiostop(Sea Cucumber), Artecin, C-statin, PauD Arco, Black Cumin/Black seed oil. Alternating with: [place], Curcumin, Indole 3 Carbinol, Quercetin, Resveratrol, Vascustatin, Cronaxal (Benegene), Lycopene, Agaricus Murill + IV: Kaplan or Immunity 15   + Immune support tea, Herbal detox tea, Sinus care Jam, Acid balance tincture  He takes these Wednesday-Saturday. Reviewed w/ patient in detail that herbal supplements can interact with treatment and have negative side effects.  Refered pt to [facility] \"About Herbs\" website for further information on safety/effectiveness/interactions of supplements.  Pt holding supplements at this time due to swallowing difficulty 10/28      Current Outpatient Medications:     acetaminophen (TYLENOL) 160 mg/5 mL liquid, Take 20 mL (640 mg total) by mouth every 6 (six) hours as " needed for mild pain or moderate pain, Disp: 236 mL, Rfl: 11    Allergy Relief 25 MG/10ML oral liquid, , Disp: , Rfl:     amLODIPine (NORVASC) 5 mg tablet, Take 5 mg by mouth daily at bedtime , Disp: , Rfl:     amoxicillin-clavulanate (AUGMENTIN) 875-125 mg per tablet, TAKE TWO TABLETS BY MOUTH NOW, THEN TAKE ONE TABLET BY MOUTH EVERY 12 HOURS UNTIL FINISHED WITH FOOD (Patient not taking: Reported on 10/14/2024), Disp: , Rfl:     atovaquone-proguanil (MALARONE) 250-100 mg, Take 1 tablet by mouth daily, Disp: , Rfl:     Cholecalciferol (Vitamin D3) 125 MCG (5000 UT) TABS, every 24 hours, Disp: , Rfl:     Cholecalciferol (Vitamin D3) LIQD, Use, Disp: , Rfl:     cyclobenzaprine (FLEXERIL) 5 mg tablet, Take 1 tablet (5 mg total) by mouth 3 (three) times a day as needed for muscle spasms, Disp: 50 tablet, Rfl: 0    diphenhydramine, lidocaine, Al/Mg hydroxide, simethicone (Magic Mouthwash) SUSP, Swish and swallow 10 mL every 4 (four) hours as needed for mouth pain or discomfort, Disp: 500 mL, Rfl: 4    Gabapentin (NEURONTIN) 300 mg/6mL solution, Take 5 mL (250 mg total) by mouth daily at bedtime as needed (pain, insomnia) May reduce dose for daytime grogginess (Patient not taking: Reported on 11/13/2024), Disp: 470 mL, Rfl: 0    lidocaine (XYLOCAINE) 5 % ointment, Apply topically every 4 (four) hours as needed for mild pain (painful area on neck), Disp: 240 g, Rfl: 11    lisinopril (ZESTRIL) 10 mg tablet, Take 10 mg by mouth daily at bedtime , Disp: , Rfl:     Misc Natural Products (ADRENAL PO), 1 orally as directed, Disp: , Rfl:     mometasone (ELOCON) 0.1 % cream, Apply topically daily Apply thin layer to neck twice daily (not within 4 hrs of radiation), Disp: 45 g, Rfl: 1    nystatin (MYCOSTATIN) 500,000 units/5 mL suspension, Apply 5 mL (500,000 Units total) to the mouth or throat 4 (four) times a day, Disp: 400 mL, Rfl: 3    ondansetron (ZOFRAN-ODT) 8 mg disintegrating tablet, Take 1 tablet (8 mg total) by mouth  "every 8 (eight) hours as needed for nausea or vomiting, Disp: 30 tablet, Rfl: 3    pantoprazole (PROTONIX) 40 mg tablet, Take 1 tablet (40 mg total) by mouth daily, Disp: 30 tablet, Rfl: 3    Probiotic Product (PROBIOTIC PO), as directed Orally (Patient not taking: Reported on 10/24/2024), Disp: , Rfl:     prochlorperazine (COMPAZINE) 10 mg tablet, Take 1 tablet (10 mg total) by mouth every 6 (six) hours as needed for nausea or vomiting, Disp: 30 tablet, Rfl: 3    silver sulfadiazine (SILVADENE,SSD) 1 % cream, Apply topically daily, Disp: 50 g, Rfl: 2    solifenacin (VESICARE) 5 mg tablet, Take 1 tablet (5 mg total) by mouth daily, Disp: 30 tablet, Rfl: 6    Most Recent Lab Results:   Lab Results   Component Value Date    WBC 3.50 (L) 11/08/2024    NEUTROABS 2.77 11/08/2024    CHOL 246 (H) 03/03/2016    TRIG 226 (H) 03/03/2016    HDL 44 03/03/2016    ALT 16 11/08/2024    AST 15 11/08/2024    ALB 3.9 11/08/2024     03/03/2016    SODIUM 139 11/08/2024    SODIUM 134 (L) 11/01/2024    K 4.0 11/08/2024    K 4.5 11/01/2024     11/08/2024    BUN 13 11/08/2024    BUN 18 11/01/2024    CREATININE 0.80 11/08/2024    CREATININE 0.81 11/01/2024    EGFR 91 11/08/2024    TSH 1.55 08/30/2022    POCGLU 100 09/11/2024    GLUF 112 (H) 11/08/2024    GLUC 95 11/01/2024    HGBA1C 5.7 (H) 04/20/2023    HGBA1C 5.4 10/10/2019    CALCIUM 8.9 11/08/2024    MG 2.0 11/08/2024       Anthropometric Measurements:   Height: 70\"  Ht Readings from Last 1 Encounters:   11/13/24 5' 10\" (1.778 m)     Wt Readings from Last 20 Encounters:   11/13/24 74.8 kg (165 lb)   11/11/24 74.8 kg (164 lb 14.5 oz)   11/06/24 74.8 kg (165 lb)   11/04/24 75 kg (165 lb 5.5 oz)   10/31/24 79.8 kg (176 lb)   10/30/24 79.8 kg (176 lb)   10/28/24 75.1 kg (165 lb 9.1 oz)   10/24/24 76.2 kg (168 lb)   10/21/24 76.4 kg (168 lb 6.9 oz)   10/15/24 75.2 kg (165 lb 12.6 oz)   10/14/24 75.8 kg (167 lb 1.7 oz)   10/09/24 77.1 kg (170 lb)   10/07/24 76.4 kg (168 lb 6.9 oz) "   10/02/24 78.5 kg (173 lb)   09/30/24 76.8 kg (169 lb 6.4 oz)   09/18/24 75.8 kg (167 lb)   07/16/24 76.2 kg (168 lb 0.4 oz)   07/03/24 76.7 kg (169 lb)   06/26/24 77.1 kg (170 lb)   06/19/24 77.1 kg (170 lb)       Weight History:   Usual Weight: 165-170#  Varian: 10/7: 169#, 10/15: 167.6#,  (10/21) 166.4#, 167.4 (10/24), (10/31): 166# (11/18): 164.5#  Home Scale: 157# (10/16/24), 155# (10/28/24), (11/4/24): 156#, (11/18/24): 153#    Oncology Nutrition-Anthropometrics      [facility]11/2024 in [facility] On[facility] Dietitian [facility]   Patient [age] (years): [age] years   Patient (male) height (in): 70 in 70 in   Current weight (lbs): 165 lbs 164.9 lbs   Current weight to be used for anthropometric calculations (kg) 75 kg 75 kg   BMI: 23.7 23.7   IBW male 166 lb 166 lb   IBW (kg) male 75.5 kg 75.5 kg   IBW % (male) 99.4 % 99.3 %   Adjusted BW (male): 165.8 lbs 165.7 lbs   Adjusted BW in kg (male): 75.4 kg 75.3 kg   % weight change after 1 week: 0 % -0.2 %   Weight change after 1 week (lbs) 0 lbs -0.4 lbs   % weight change after 1 month: -0.5 % -1.3 %   Weight change after 1 month (lbs) -0.8 lbs -2.1 lbs            Nutrition-Focused Physical Findings: none observed    Food/Nutrition-Related History & Client/Social History:    Current Nutrition Impact Symptoms:  [x] Nausea - mild [x] Reduced Appetite  [] Acid Reflux    [] Vomiting  [] Unintended Wt Loss  [] Malabsorption    [] Diarrhea  [] Unintended Wt Gain  [] Dumping Syndrome    [] Constipation  [x] Thick Mucous/Secretions  -mild [] Abdominal Pain    [x] Dysgeusia (Altered Taste) overly sweet [] Xerostomia (Dry Mouth)  [] Gas    [] Dysosmia (Altered Smell)  [x] Thrush  improving [] Difficulty Chewing    [x] Oral Mucositis (Sore Mouth) improving [x] Fatigue  [] Hyperglycemia   Lab Results   Component Value Date    HGBA1C 5.7 (H) 04/20/2023      [] Odynophagia   []  Esophagitis  [x] Other: neuropathy- taking B6, Magnesium, NAC   [x] Dysphagia -small improvement [] Early Satiety  [] No Problems Eating      Food Allergies & Intolerances: yes: mild shellfish - fingers swell up    Current Diet: Tube Feeding (primary)  Current Nutrition Intake: Unchanged from last visit   Appetite: Fair , Poor  Nutrition Route: PO and PEG (small amounts of PO)  Oral Care: brushes daily, gargle with salt/baking soda before meals  Full mobility of tongue; continues with exercises from prior SLP  No sensitives of hot/cold or spicy. Has trouble with bread and melon at times.  Activity level: Usual ADL's    Diet recall:  1 cup salmon, mashed potatoes, peas  Fried egg over soft toast  80% fluid by mouth     24 hr EN Recall:  DME: [employer]  Access Type: PEG  Formula: [employer] 1.4  Method: Bolus  Regimen: 11oz (325 mL) 4 times per day of [employer] via PEG (gravity syringe method to administer boluses; 1 container infusing over ~15-20 minutes).  Flush: 60 mL free water flush pre and 60 mL free water flush post each bolus (120mL x 4 boluses = 480 mL)   EN providin mL volume (4 cartons/day), 1820 kcal (80% est needs), 80g protein (89% est needs), 923 mL free water   Total 60-70oz daily     Beverages: water (~40oz by mouth)    Supplements:   None    Oncology Nutrition-Estimated Needs      Flowsheet Row Nutrition from 10/16/2024 in [facility] Oncology Dietitian [facility] Nutrition from 2024 in [facility] Oncology Dietitian [facility]   Weight type used Actual weight Actual weight   Weight in kilograms (kg) used for estimated needs 75.4 kg 77 kg   Energy needs formula:  30-35 kcal/kg 30-35 kcal/kg   Energy needs based on 30 kcal/k kcal 2310 kcal   Energy needs based on 35 kcal/k kcal 2695 kcal   Protein needs formula: 1.2-1.5 g/kg 1.2-1.5 g/kg   Protein needs based on 1.2 g/k g 92 g   Protein needs based on 1.5 g/kg 113 g 116 g   Fluid needs formula: 30-35 mL/kg 30-35  mL/kg   Fluid needs based on 30 mL/kg 2265 mL 2304 mL   Fluid needs in ounces 77 oz 78 oz   Fluid needs based on 35 mL/kg 2643 mL 2688 mL   Fluid needs in ounces 89 oz 91 oz             Discussion & Intervention:   Jesus was evaluated today for an RD follow up regarding HNC and enteral nutrition.  Jesus is currently undergoing tx for HNCx .  RD met with patient in office prior to RT today. Pt has one more week of RT. He is doing well. He has been eating small amounts of soft food but continues to rely on PEG for primary source of nutrition. He has switched to using PEG for water flushes and trying to get majority of fluid intake by mouth. He does not have an appetite and taste buds are off. He reports having unpleasant sweet taste in mouth. He continues to utilize mouth rinses and brush teeth regularly. We discussed expectations for recovery and TF weaning. RD will continue to follow closely.    Reviewed 24 hour recall, which revealed an adequate enteral intake, and discussed ways to increase kcal, protein, and fluid intakes and optimize nutrient intake.  Also reviewed the importance of wt management throughout the tx process and the role of a high kcal/ high protein diet and enteral nutrition in managing wt and overall health.  Based on today's assessment, discussion included: MNT for: Enteral nutrition, how to modify foods for anticipated nutrition impact symptoms pt may experience during CA tx, practicing proper oral care, adequate hydration & fluid choices, sipping on calorie containing beverages (examples include: adding whole milk or cream to coffee, oral nutrition supplements, juice, electrolyte replacement beverages, milk, etc.), utilizing oral nutrition supplements, practicing proper feeding tube use & care, adherence to and compliance with enteral nutrition plan of care, and individualized enteral nutrition recommendations & plan: [employer] 1.4 via PEG 4-5 cartons per day    Moving forward,  Jesus was encouraged to continue current enteral nutrition plan of care   Materials Provided:  N/A  All questions and concerns addressed during today’s visit.  Jesus has RD contact information.    Nutrition Diagnosis:   Increased Nutrient Needs (kcal & pro) related to increased demand for nutrients and disease state as evidenced by cancer dx and pt undergoing tx for cancer.  Swallowing Difficulty related to diagnosis/treatment related causes as evidenced by need for enteral nutrition to meet estimated needs.  Monitoring & Evaluation:   Goals:   weight maintenance/stabilization  adequate nutrition impact symptom management  pt to meet >/=75% estimated nutrition needs daily    Progress Towards Goals: Progressing    Nutrition Rx & Recommendations:  Diet: Enteral Nutrition as primary source of nutrition  Alter food choices and eating patterns to accommodate changing needs.  Avoid spicy, acidic, sharp/hard/crunchy foods & carbonated beverages as needed.  Follow proper oral care; Try baking soda/salt water rinse recipe (mix 3/4 tsp salt + 1 tsp baking soda + 1 qt water; rinse with plain water after using) in Eating Hints book (pg 18).  Brush your teeth before/after meals & before bed.  For thick mucous: make sure you are staying well hydrated (>64 oz/day), try swishing and spitting with plain carbonated water (unless you have a sore mouth), talk to your doctor about trying Mucinex.  Weigh yourself regularly. If you notice weight loss, make an effort to increase your daily food/calorie intake. If you continue to notice loss after these efforts, reach out to your dietitian to establish a plan to stabilize weight.  Continue to eat by mouth, as appropriate/tolerated, as much as possible.  Your syringes are each 60 mL or 2 fl oz.  Always flush your feeding tube with 60 mL room-temp water 1-2 times daily while feeding tube is not in use.  When weaning off tube feeding, keep in mind that 1 carton (8 oz) of Jev[employer] 1.5  has 355 calories, 15 grams protein, and 180 mL water.     Continue feeding tube plan:  TF Goal: 1 container (325 mL) 4x per day of [employer] 1.4 via PEG (push syringe or gravity syringe method to administer boluses; 1 container to infuse over ~15-20 minutes).   Increase to 5x/day if wt declines on current regimen   Water Flushin mL free water flush pre/post each bolus (total of 600 mL/day in flushes; flushes spread throughout the day; will need to adjust flushes prn).   Enteral Nutrition goal to provide: 1625 mL volume (5 containers/day), 2275 kcal, 100 grams protein, 1154 mL free water    Follow Up Plan:  3 weeks. Pt will call RD to coordinate with MD f/u   Recommend Referral to Other Providers: none at this time but encouraged pt to contact SLP to assist with swallowing post CRT

## 2024-11-19 ENCOUNTER — APPOINTMENT (OUTPATIENT)
Facility: HOSPITAL | Age: 69
End: 2024-11-19
Attending: RADIOLOGY
Payer: MEDICARE

## 2024-11-19 ENCOUNTER — APPOINTMENT (OUTPATIENT)
Dept: LAB | Facility: HOSPITAL | Age: 69
End: 2024-11-19
Payer: MEDICARE

## 2024-11-19 DIAGNOSIS — C01 MALIGNANT NEOPLASM OF BASE OF TONGUE (HCC): ICD-10-CM

## 2024-11-19 LAB
ALBUMIN SERPL BCG-MCNC: 4.1 G/DL (ref 3.5–5)
ALP SERPL-CCNC: 69 U/L (ref 34–104)
ALT SERPL W P-5'-P-CCNC: 14 U/L (ref 7–52)
ANION GAP SERPL CALCULATED.3IONS-SCNC: 5 MMOL/L (ref 4–13)
AST SERPL W P-5'-P-CCNC: 15 U/L (ref 13–39)
BASOPHILS # BLD AUTO: 0.01 THOUSANDS/ÂΜL (ref 0–0.1)
BASOPHILS NFR BLD AUTO: 0 % (ref 0–1)
BILIRUB SERPL-MCNC: 0.39 MG/DL (ref 0.2–1)
BUN SERPL-MCNC: 13 MG/DL (ref 5–25)
CALCIUM SERPL-MCNC: 9 MG/DL (ref 8.4–10.2)
CHLORIDE SERPL-SCNC: 103 MMOL/L (ref 96–108)
CO2 SERPL-SCNC: 31 MMOL/L (ref 21–32)
CREAT SERPL-MCNC: 0.8 MG/DL (ref 0.6–1.3)
EOSINOPHIL # BLD AUTO: 0.05 THOUSAND/ÂΜL (ref 0–0.61)
EOSINOPHIL NFR BLD AUTO: 2 % (ref 0–6)
ERYTHROCYTE [DISTWIDTH] IN BLOOD BY AUTOMATED COUNT: 15.1 % (ref 11.6–15.1)
GFR SERPL CREATININE-BSD FRML MDRD: 91 ML/MIN/1.73SQ M
GLUCOSE P FAST SERPL-MCNC: 78 MG/DL (ref 65–99)
HCT VFR BLD AUTO: 39.7 % (ref 36.5–49.3)
HGB BLD-MCNC: 13.1 G/DL (ref 12–17)
IMM GRANULOCYTES # BLD AUTO: 0.02 THOUSAND/UL (ref 0–0.2)
IMM GRANULOCYTES NFR BLD AUTO: 1 % (ref 0–2)
LDH SERPL-CCNC: 107 U/L (ref 140–271)
LYMPHOCYTES # BLD AUTO: 0.32 THOUSANDS/ÂΜL (ref 0.6–4.47)
LYMPHOCYTES NFR BLD AUTO: 13 % (ref 14–44)
MAGNESIUM SERPL-MCNC: 1.9 MG/DL (ref 1.9–2.7)
MCH RBC QN AUTO: 29.7 PG (ref 26.8–34.3)
MCHC RBC AUTO-ENTMCNC: 33 G/DL (ref 31.4–37.4)
MCV RBC AUTO: 90 FL (ref 82–98)
MONOCYTES # BLD AUTO: 0.39 THOUSAND/ÂΜL (ref 0.17–1.22)
MONOCYTES NFR BLD AUTO: 16 % (ref 4–12)
NEUTROPHILS # BLD AUTO: 1.71 THOUSANDS/ÂΜL (ref 1.85–7.62)
NEUTS SEG NFR BLD AUTO: 68 % (ref 43–75)
NRBC BLD AUTO-RTO: 0 /100 WBCS
PLATELET # BLD AUTO: 209 THOUSANDS/UL (ref 149–390)
PMV BLD AUTO: 9 FL (ref 8.9–12.7)
POTASSIUM SERPL-SCNC: 4.3 MMOL/L (ref 3.5–5.3)
PROT SERPL-MCNC: 7 G/DL (ref 6.4–8.4)
RBC # BLD AUTO: 4.41 MILLION/UL (ref 3.88–5.62)
SODIUM SERPL-SCNC: 139 MMOL/L (ref 135–147)
WBC # BLD AUTO: 2.5 THOUSAND/UL (ref 4.31–10.16)

## 2024-11-19 PROCEDURE — 80053 COMPREHEN METABOLIC PANEL: CPT

## 2024-11-19 PROCEDURE — 36415 COLL VENOUS BLD VENIPUNCTURE: CPT

## 2024-11-19 PROCEDURE — 83735 ASSAY OF MAGNESIUM: CPT

## 2024-11-19 PROCEDURE — 77014 CHG CT GUIDANCE RADIATION THERAPY FLDS PLACEMENT: CPT | Performed by: RADIOLOGY

## 2024-11-19 PROCEDURE — 83615 LACTATE (LD) (LDH) ENZYME: CPT

## 2024-11-19 PROCEDURE — 77386 HB NTSTY MODUL RAD TX DLVR CPLX: CPT | Performed by: RADIOLOGY

## 2024-11-19 PROCEDURE — 85025 COMPLETE CBC W/AUTO DIFF WBC: CPT

## 2024-11-20 ENCOUNTER — TELEPHONE (OUTPATIENT)
Age: 69
End: 2024-11-20

## 2024-11-20 ENCOUNTER — APPOINTMENT (OUTPATIENT)
Facility: HOSPITAL | Age: 69
End: 2024-11-20
Attending: RADIOLOGY
Payer: MEDICARE

## 2024-11-20 PROCEDURE — 77386 HB NTSTY MODUL RAD TX DLVR CPLX: CPT | Performed by: RADIOLOGY

## 2024-11-20 PROCEDURE — 77014 CHG CT GUIDANCE RADIATION THERAPY FLDS PLACEMENT: CPT | Performed by: RADIOLOGY

## 2024-11-20 NOTE — TELEPHONE ENCOUNTER
Received a message from nurse at radiation that patient would like to cancel his hydration appointments.    Called and spoke to Brian. He confirmed that he would like to cancel remaining hydration appointments because he does not feel like he needs them at this time. He states that he is getting in around 80 oz of water a day and is urinating well. He did complain of some nausea, which he has not regularly been taking anti-nausea medication for. He has also been having some constipation, so he wasn't taking the zofran because he didn't want to make it worse. I recommended keeping up with the compazine to see if that helps his nausea, as well as taking daily miralax to aid with the constipation. I told Brian we can always schedule hydration appointments for him in the future as well if he decides he needs it. We will follow up with how he is feeling at his appointment with Dr. Talbot on 11/27. Brian verbalized understanding.    Spoke to with Grace Hernandez at infusion to cancel hydration appointments.

## 2024-11-21 ENCOUNTER — APPOINTMENT (OUTPATIENT)
Facility: HOSPITAL | Age: 69
End: 2024-11-21
Attending: RADIOLOGY
Payer: MEDICARE

## 2024-11-21 ENCOUNTER — TELEMEDICINE (OUTPATIENT)
Dept: PALLIATIVE MEDICINE | Facility: CLINIC | Age: 69
End: 2024-11-21

## 2024-11-21 ENCOUNTER — APPOINTMENT (OUTPATIENT)
Facility: HOSPITAL | Age: 69
End: 2024-11-21
Payer: MEDICARE

## 2024-11-21 DIAGNOSIS — C01 MALIGNANT NEOPLASM OF BASE OF TONGUE (HCC): Primary | ICD-10-CM

## 2024-11-21 PROCEDURE — 77386 HB NTSTY MODUL RAD TX DLVR CPLX: CPT | Performed by: RADIOLOGY

## 2024-11-21 PROCEDURE — 77336 RADIATION PHYSICS CONSULT: CPT | Performed by: RADIOLOGY

## 2024-11-21 PROCEDURE — 77014 CHG CT GUIDANCE RADIATION THERAPY FLDS PLACEMENT: CPT | Performed by: RADIOLOGY

## 2024-11-21 NOTE — PROGRESS NOTES
Virtual Regular Visit  Name: Jesus Hdz      : 1955      MRN: [id number]  Encounter Provider: Erwin Paulino MD  Encounter Date: 2024   Encounter department: [facility]      Verification of patient location:  Patient is located at Home in the following state in which I hold an active license [place] :  Assessment & Plan      Narrative rationale for today's treatments:  - Pt would prefer naturopathic approach to care, where possible and effective  - Pt has had possible flushing reaction to steroids; these are withheld at this time  - Pt encouraged to manage symptoms and nutrition aggressively, in order to be able to complete treatments and achieve best evidence-based outcomes              = Pt will continue to intermittently fast on or near chemo days.  - Return to clinic: PRN      PDMP Review: I have reviewed the patient's controlled substance dispensing history in the Prescription Drug Monitoring Program in compliance with the [facility] regulations before prescribing any controlled substances.    Encounter provider Erwin Paulino MD    The patient was identified by name and date of birth. Jesus Hdz was informed that this is a telemedicine visit and that the visit is being conducted through the Epic Embedded platform. He agrees to proceed..  My office door was closed. No one else was in the room.  He acknowledged consent and understanding of privacy and security of the video platform. The patient has agreed to participate and understands they can discontinue the visit at any time.    Patient is aware this is a billable service.     History of Present Illness     Jesus Hdz is a 69 y.o. male who presents as a referral from Dr. 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  Pt also follows with Dr. Talbot of Med/Onc.      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    Swallowing slowly improving, with use of feeding tube to defend wt.  He has markedly dysgeusia / hypogeusia.    He is trying to cultivate an attitude of gratitude to help himself thru his current rough patch.      He continues to wish to avoid pharmacotherapy where possible, including opioids at this time.       Prior to our initial consult on 10/15/24, pt has undergone partial glossectomy and pharyngectomy.  He is rec'd to start on adjuvant chemorads now, after placement of PEG for nutrition.  For better or worse, treatment started before PEG placement, and this has been placed.    Today in office, he is now noting globus sensation and some mild/mod odynophagia, just since Sunday.  He has been concerned that he should not continue with XRT as he is noting worsening symptoms.    He does have his feeding tube in place, and has been flushing it to keep it clean, but he has not been using it for nutrition nor meds as of yet.    Importantly, he has a holistic DO provider that is supporting him in naturopathic approach to complement his formal chemorads.  For the most part, this is cinnamon and connor and a few other plant based compounds.  He is additionally engaging in a planned fast (500 Cl) on the day PRIOR to chemo infusions.    Additionally, he has been struggling rather mightily to have restorative sleep.  Recently, his sleep (which is usually fragmented, but with low latency), has been very shallow and non-restorative.    Today in office, we reviewed that we have many options to improve his symptom control, and that our evidence base suggests that he should do his best to complete all treatments as scheduled / advised.  Complementary therapies might best function when added to usual therapies.     Past medical, surgical, social, and family histories are reviewed and pertinent updates and considerations are included in the above narrative.      Objective    There were no vitals taken for this visit.    Physical Exam  Constitutional:       General: He is not in acute distress.     Appearance: He is ill-appearing. He is not toxic-appearing or diaphoretic.   HENT:      Head: Normocephalic and atraumatic.      Right Ear: External ear normal.      Left Ear: External ear normal.      Nose: No congestion.      Mouth/Throat:      Mouth: Mucous membranes are dry.   Eyes:      General:         Right eye: No discharge.         Left eye: No discharge.      Conjunctiva/sclera: Conjunctivae normal.      Pupils: Pupils are equal, round, and reactive to light.   Neck:      Trachea: No tracheal deviation.   Pulmonary:      Effort: Pulmonary effort is normal. No respiratory distress.      Breath sounds: No stridor.   Skin:     General: Skin is dry.      Coloration: Skin is not pale.      Findings: No erythema or rash.   Neurological:      General: No focal deficit present.      Mental Status: He is alert and oriented to person, place, and time. Mental status is at baseline.      Cranial Nerves: No cranial nerve deficit.   Psychiatric:         Mood and Affect: Mood normal.         Behavior: Behavior normal.         Thought Content: Thought content normal.         Judgment: Judgment normal.         Visit Time  Total Visit Duration: I have spent a total time of 25+ minutes in caring for this patient on the day of the visit/encounter including: chart review; symptom pursuit; supportive listening; anticipatory guidance. .

## 2024-11-22 ENCOUNTER — APPOINTMENT (OUTPATIENT)
Facility: HOSPITAL | Age: 69
End: 2024-11-22
Payer: MEDICARE

## 2024-11-22 ENCOUNTER — PATIENT MESSAGE (OUTPATIENT)
Dept: PALLIATIVE MEDICINE | Facility: CLINIC | Age: 69
End: 2024-11-22

## 2024-11-22 ENCOUNTER — HOSPITAL ENCOUNTER (OUTPATIENT)
Dept: INFUSION CENTER | Facility: HOSPITAL | Age: 69
End: 2024-11-22
Attending: INTERNAL MEDICINE

## 2024-11-22 ENCOUNTER — NURSE TRIAGE (OUTPATIENT)
Age: 69
End: 2024-11-22

## 2024-11-22 PROCEDURE — 77386 HB NTSTY MODUL RAD TX DLVR CPLX: CPT | Performed by: RADIOLOGY

## 2024-11-22 PROCEDURE — 77014 CHG CT GUIDANCE RADIATION THERAPY FLDS PLACEMENT: CPT | Performed by: RADIOLOGY

## 2024-11-22 NOTE — TELEPHONE ENCOUNTER
"Pt reporting forearm pain and hardness since IV infiltrations during chemo four weeks ago  UEV completed 11/6/24  Pt spoke with oncologist who advised to keep elevated and warm compresses for three weeks ago  Pt denies any redness or hot to touch  Pt asking for an appt. Scheduled for 1/6/25    Reason for Disposition   MILD pain (e.g., does not interfere with normal activities) and present > 7 days    Answer Assessment - Initial Assessment Questions  1. ONSET: \"When did the pain start?\"      Four weeks ago, three weeks hard, discomfort is mild  2. LOCATION: \"Where is the pain located?\"      Right fore arm wrist to elbow  3. PAIN: \"How bad is the pain?\" (Scale 0-10; or none, mild, moderate, severe)      mild  4. WORK OR EXERCISE: \"Has there been any recent work or exercise that involved this part of the body?\"      Chemo infusion 7-8 weeks ago that finished 2 weeks ago  5. CAUSE: \"What do you think is causing the arm pain?\"      See test results  6. OTHER SYMPTOMS: \"Do you have any other symptoms?\" (e.g., neck pain, swelling, rash, fever, numbness, weakness)      denies    Protocols used: Arm Pain-Adult-[place]    "

## 2024-11-24 ENCOUNTER — APPOINTMENT (OUTPATIENT)
Facility: HOSPITAL | Age: 69
End: 2024-11-24
Attending: RADIOLOGY
Payer: MEDICARE

## 2024-11-24 PROCEDURE — 77014 CHG CT GUIDANCE RADIATION THERAPY FLDS PLACEMENT: CPT | Performed by: RADIOLOGY

## 2024-11-24 PROCEDURE — 77386 HB NTSTY MODUL RAD TX DLVR CPLX: CPT | Performed by: RADIOLOGY

## 2024-11-25 ENCOUNTER — APPOINTMENT (OUTPATIENT)
Facility: HOSPITAL | Age: 69
End: 2024-11-25
Payer: MEDICARE

## 2024-11-25 ENCOUNTER — APPOINTMENT (OUTPATIENT)
Facility: HOSPITAL | Age: 69
End: 2024-11-25
Attending: RADIOLOGY
Payer: MEDICARE

## 2024-11-25 PROCEDURE — 77336 RADIATION PHYSICS CONSULT: CPT | Performed by: RADIOLOGY

## 2024-11-25 PROCEDURE — 77386 HB NTSTY MODUL RAD TX DLVR CPLX: CPT | Performed by: RADIOLOGY

## 2024-11-25 PROCEDURE — 77014 CHG CT GUIDANCE RADIATION THERAPY FLDS PLACEMENT: CPT | Performed by: RADIOLOGY

## 2024-11-25 NOTE — PROGRESS NOTES
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     See Note.     -- Confirmed by positive Pankeratin (CKAE1/3), p40 and normal wild-type expression       on p53 immunostains.    -- p16 immunostain positive (>70% diffuse and strong nuclear and cytoplasmic        staining); high-risk HPV EH pending; result will be reported in an addendum.      B. Lymph Node, Regional Resection, Level 2 lymph nodes, see comments:  - Metastatic squamous cell carcinoma, keratinizing, involving at least two lymph nodes (2/2).     -- Present in 1.0 cm lymph node and additional detached lymph node(s)/fragments.     -- Largest metastatic focus: 0.3 cm; Extranodal extension: Not identified.    -- Confirmed by positive Pankeratin (CKAE1/3) and p40 immunostains.    -- p16 immunostain positive (>70% diffuse and strong nuclear and cytoplasmic        staining).     C. Lymph Node, Regional Resection, Additional level 2 lymph nodes:  - Metastatic squamous cell carcinoma, keratinizing, involving 3 of 4 lymph     nodes/fragments (3/4).     -- Largest metastatic focus: 1 cm; Extranodal extension: Present, extranodal         lymphovascular invasion identified.    -- Confirmed by positive Pankeratin (CKAE1/3) and p40 immunostains.    -- p16  immunostain positive (>70% diffuse and strong nuclear and cytoplasmic        staining); high-risk HPV EH pending; result will be reported in an addendum. See Note.      D. Lymph Node, Level 3 lymph nodes, lymph adenectomy:  - One lymph node positive for metastatic squamous cell carcinoma, keratinizing (1/1).     -- Largest metastatic focus: 1.1 cm; Extranodal extension: Not identified.    -- Confirmed by positive Pankeratin (CKAE1/3) and p40 immunostains.    -- p16 immunostain positive (>70% diffuse and strong nuclear and cytoplasmic        staining).  - Portion(s) of benign salivary gland with adjacent detached small (< 0.2 cm) portion      of carcinoma.   - Traumatic neuroma.          Comments:   This is an appended report. These results have been appended to a previously preliminary verified report.           7/3/2024 -  Cancer Staged    Staging form: Pharynx - Oropharynx, AJCC 8th Edition  - Clinical stage from 7/3/2024: Stage I (cT1, cN1, cM0, p16+) - Signed by Fady Morrow MD on 7/3/2024  Stage prefix: Initial diagnosis       9/30/2024 - 11/11/2024 Chemotherapy    alteplase (CATHFLO), 2 mg, Intracatheter, Every 1 Minute as needed, 7 of 7 cycles  palonosetron (ALOXI), 0.25 mg, Intravenous, Once, 6 of 6 cycles  Administration: 0.25 mg (10/7/2024), 0.25 mg (10/14/2024), 0.25 mg (10/21/2024), 0.25 mg (10/28/2024), 0.25 mg (11/4/2024), 0.25 mg (11/11/2024)  CISplatin (PLATINOL) infusion, 70 mg (original dose 40 mg/m2), Intravenous, Once, 7 of 7 cycles  Dose modification: 70 mg (original dose 40 mg/m2, Cycle 1, Reason: Other (Must fill in a comment), Comment: pharmacy dictates), 30 mg/m2 (original dose 40 mg/m2, Cycle 6, Reason: Anticipated Tolerance), 30 mg/m2 (original dose 40 mg/m2, Cycle 7, Reason: Dose Not Tolerated)  Administration: 70 mg (9/30/2024), 70 mg (10/7/2024), 70 mg (10/14/2024), 70 mg (10/21/2024), 70 mg (10/28/2024), 58.2 mg (11/4/2024), 58.2 mg (11/11/2024)  sodium chloride, 500 mL,  Intravenous, Once, 7 of 7 cycles  Administration: 500 mL (9/30/2024), 500 mL (9/30/2024), 500 mL (10/7/2024), 500 mL (10/7/2024), 500 mL (10/14/2024), 500 mL (10/14/2024), 500 mL (10/21/2024), 500 mL (10/21/2024), 500 mL (10/28/2024), 500 mL (10/28/2024), 500 mL (11/4/2024), 500 mL (11/4/2024), 500 mL (11/11/2024), 500 mL (11/11/2024)  fosaprepitant (EMEND) IVPB, 150 mg, Intravenous, Once, 7 of 7 cycles  Administration: 150 mg (9/30/2024), 150 mg (10/7/2024), 150 mg (10/14/2024), 150 mg (10/21/2024), 150 mg (10/28/2024), 150 mg (11/4/2024), 150 mg (11/11/2024)       Treatment Cisplatin 40 mg/m2 weekly x 7 doses with IMRT     C1 9/30/2024  C2  10/7/2024  C3 10/14/2024  C4 10/21/2024  C5 10/28/2024  C6  11/4/2024     C7   11/11/2024-completed; RT held     Completed RT 25 Nov 2024    Discussion     Te definitive management of SCCHN in terms of oral cavity vs oropharynx vs larynx vs hypopharynx as well as pure tobacco related vs HPV with or without a prior/current smoking history had been surgery/RT.   In the past, the  role of chemotherapy was reserved for metastatic disease using Cisplatin at fairly high doses as well as infusional 5FU x 96 hours.  However there have been many trials which have changed the landscape of how locoregional and metastatic disease are now managed with chemotherapy, CRT or IO        It is unclear in this case, as had prior RBOT Stage 1 HPV driven s/p resection with no high risk features 0/34 nodes positive at the time and no adjuvant therapy     Based on the DL/biopsy there is SCC in the pharynx but location unclear; 6/7 nodes were positive with ASHLEY and thus the below pertains in terms of combined therapy.  The issues is 6 or 7 weeks of treatment and this appears to be a new second primary based on the biology      In general the prognosis for HPV driven SCCHN is better than that of tobacco related disease and in fact the AJCC staging system reflects this.  However when tobacco is a factor  with a patient being a long standing smoker that favorable prognosis is no longer applicable;  however this patient smoked a pack a day for years but quit 30 years ago and so he has more favorable disease     The OS/PFS/FRANCO responses were better in those patients treated on the  study who were HPV positive; induction was followed by CRT and in that study Carboplatin AUC 1.5 only rather than 40 mg/m2 Cisplatin due to perceived toxicities being worse with cisplatin after induction therapy.  That was the case for this patient who got Cisplatin        However,  CRT per RTOG 91-11 as become SOC.  In the modern era, weekly Cisplatin 40 mg/m2 is used for 7 weeks rather than the high dose Cisplatin in that trial with similar efficacy.  Other radiosensitizers include weekly Carboplatin/Taxol.  Based on the side effect profile of Cisplatin, een in lower doses, renal clearance is an issue.  Both Carboplatin and Cisplatin are renally cleared but Carboplatin is dosed based on renal function and may be more tolerable.  Other agents used as radiosensitizers include Cetuximab per the Garcia study.       Side effects, and  toxicities of chemotherapy alone and in combination with radiation had been discussed at [facility].  The side effects specific to radiation include loss of taste, xerostomia, mucositis, dysphagia , nausea, radiation induced dermatis and fatigue.  Fatigue is also common with chemotherapy and is cumulative.  Copious ropey secretions were discussed as well as nutrition.  In terms of the effects of Cisplatin or Carboplatin/Taxol weekly alone and in combination with radiation are neutropenia/leucopenia, anemia, thrombocytopenia, nausea, GERD, mucositis, fungal infections, renal insufficiency/dehydration, electrolyte imbalances including platinum induced SIADH, neuropathy, hearing loss, minor hair loss.  Taxol can also cause an infusion reaction /premedications are needed.  As chemotherapy is being used as a  radiosensitizer, the effects of radiation are magnified.       Nutrition and hydration are concerning in the combined modality setting and we discussed the use of a PEG.  Based on both internal scatter of radiation and with extensive radiation there is potential for fibrosis/strictures as well as mucositis, fungal infections, being hypermetabolic with swallowing issues.  Repletion as the caloric needs increase is improved via a PEG as well as IV fluids.  Nutrition generally follows these patients.       There are also chronic issues which can occur; the secretions generally improve in 3-6 months, taste returns within 1-2 months, xerostomia may be a chronic issue, fibrosis, lymphedema, thyroid issues with checks every 3 months after RT completion, length of PEG use of about 3-6 months.     Restaging would be 3 months after therapy with CT PET and then every 3 months with CT neck/chest in the first year, eery 4-6 months thereafter.  HPV driven disease tends to have a latent distant mets period.       PDL1 status as well as TMB, MMR, MSI should be ascertained via [facility] for future reverence      Thus the recommendation would be weekly Cisplatin 40 mg/m2 weekly with RT to 70 Gy and thus 7 weeks      Assessment / Plan:     Recurrent right level 2 node s/p partial glossectomy/pharyngectomy 7/31/2029 with no adjuvant CRT 0/34 nodes at the time and no high risk features     PET with no primary recurrence/ nodes only     MRI face/neck/orbit     Discussion regarding CRT with weekly Cisplatin 40 mg/m2 for 6-7 weeks     PEG placement     Dental evaluation     Nutrition evaluation      9/18/2024      Patient seen by Dr Morrow in Rad Onc; simulation done     Will be treated UB     PEG to be placed-has agreed to it     Nutrition follow up     Speech pathology/swallowing assessment      Tentative plan to start treatment 30 Sept 2024       10/2/2024     Tolerated C1 Cisplatin     No issues other than flushing-took decadron out of  "premed cocktail.  Usually no infusion reactions with Cisplatin but with Carboplatin     No PEG, weight is 173     Using supplements despite telling patient that herbal remedies can cause issues with both chemo and RT.  He is \"holding\" these agents when gets chemotherapy but even then can affect outcome/formation of DNA adducts     Would again recommend not using these agents     10/9/2024     Week 2/7      Already has grade 1/early 2 confluence in the hard palate     Tongue coated unclear if there is a thrush component as well     Magic mouthwash, salt water/baking soda rinses for mucositis-may consider decadron rinse if no better/worse next week     Nystatin swish/spit/swallow for thrush     No erythema/neck dermatitis from RT     Did have some nausea from chemotherapy, mostly related to constipation so taking compazine for relief     IVF to start additionally this Friday and weekly through duration treatment     PEG to be placed 10/10/2024; will need close nutrition follow up     Weight 170 pounds      Concerned with degree of mucositis in week 2     Secretions, minimal at this point      Labs acceptable       10/30/2024     Week 5/7     Has increased mucositis posterior oropharynx     Coated tongue/thrush-Nystatin S/S     Started using PEG 5 days ago; nutrition following      Weight 176 pounds was 165     Skin with grade 1 desquamation-mometasone started/aquaphor     Refusing IVF; /74, does not appear dehydrated by renal function      Week 6/7     Significant grade 3 skin desquamation right > left; has wrap in place, RT held since Monday; we were not told or would have held chemotherapy which he received     Silvadene, aquaphor, may need wound care although unable to thoroughly assess     Seeing Rad Onc today regarding restarting     States is improving slowly, painful     Has thrush on oral tongue-stopped using nystatin rinse; will do diflucan     Grade 2 mucositis     PEG dependent, 4 ans per day, not " eating anything orally, able to drink water     Weight 155 at home, states stable; 165 here     Had IV infiltrate with what appears to be superficial phlebitis     Doppler done later today confirming no DVT and superficial thromobphlebitis which he was told to place warm compresses, elevation Tylenol for pain     IVF later this week       11/13/2024 week 7/7     Received C7 Cisplatin without communication that RT was held-would have held chemo     Skin is improved, no worse from chemotherapy     Still grade 3 desquamation with healing     Mucositis improving, has thrush oral tongue and remains on Nystatin swish/spit     Eating eggs but PEG dependent, getting 4 cans with fluids      Will complete RT late next week     11/27/2024    Skin improved, has duoderm/vaseline strips    Restarted and completed RT alone 25 Nov 2024     Swallowing is an issues; PEG dependent but having issues with swallowing muscles as giving himself water via PEG and sips only orally     Will have him see speech pathology, probably will need swallowing study    Was seen by ENT and had NPL but this isn't functional test but to assess tumor response.    Has not had recent labs-will be repeated in 1-2 weeks         Follow up     2 weeks            History:   7/3/2024     69 year old with HTN with prior RBOT cancer;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 S/p partial glossectomy and partial pharyngectomy on 07/31/2019, pathology negative for malignancy. S/p MRND right neck 09/04/2019 returned 0/34 nodes. Staged as T1N0 M0       PET scan was done 12/03/19 which showed mild asymmetry at the tongue base, likely post operative. Otherwise no distant metastasis. Prior imaging from June 2019 was clear with no evidence of disease.      There were no high risk features to speak of and he did not receive any adjuvant therapies.       He then did well in follow-up until approximately 3 to 4 months ago when he noticed a palpable lump on the  "right neck.  Subsequent imaging with CT and PET/CT revealed multiple hypermetabolic lymph nodes in the right neck but no obvious primary recurrence, contralateral or distant disease.  On 6/19/2024 he was taken to the operating room for direct laryngoscopy as well as repeat right neck dissection.  7 lymph nodes were removed, 6 of which were positive for metastatic squamous cell carcinoma, keratinizing, p16 positive, with extranodal extension identified.  The largest lymph node measured 1.1 cm.  On the pathology report there is a specimen labeled right pharynx which also contained squamous cell carcinoma.  Looking at the operative report, they describe a small palpable prominence in the \"left pharynx near base of tongue.\"  This is in contradiction to the pathology report which described the specimen as from the right pharynx.            Ct neck 04/24/2024      New rounded lymph node in the palpable region of interest superficial to the sternocleidomastoid muscle measuring 9 x 6 mm. Differential diagnosis should include recurrent disease versus reactive lymphadenopathy.     Small cluster of right level 2B cervical lymph nodes also noted as described above with otherwise no suspicious adenopathy identified in the remainder of the neck.     Status post right-sided partial glossectomy without definite nodular enhancement in the operative bed.     05/06/2024 FNAB under US  Lymph Node, right level 2 (ThinPrep, smears and cell block preparations ):  Conclusive evidence of malignancy.  Carcinoma with keratinization, compatible with known squamous cell carcinoma     PET scan 05/22/2024      1. Scattered FDG-avid right cervical chain lymph nodes compatible with disease recurrence.  2. No suspicious uptake in the oropharynx or hypopharynx.  3. No findings for hypermetabolic metastasis to the chest, abdomen or pelvis.  \  No prior XRT or chemotherapy.      The patient is seeing Rad Onc later today     He is very pleased with the " RND healing.  We had a long discussion as to second primary vs recurrence; treatment with CRT either 6 or 7 weeks.  As this is likely a second primary, would do 7 weeks but again, not definitively shown on PET.  Will get MRI face/neck/orbit to further assess.  He has been doing well post op.  Weight stable 169 pounds.  Has altered diet, eating healthier.  Has no issues, denies any SOB/KOHLI, CP change in bowel/bladder, blood stool/urine         Interval History:  9/18/2024     Doing well; waiting to start treatment 30 Sept 2024.  Risks/benefits and toxicities again explained to the patient with informed consent signed.  Sent zofran 8 mg ODT q 8 prn and compazine 10 mg q 6-8 prn to pharmacy.  No other active issues.       Since last visit PET scan done 9/11/2024    HEAD/NECK:  Increasing focus of radiotracer uptake at the right upper cervical chain, level 2 region, SUV max of 5.5, previously 3.6. This measures 7 mm short axis, image 38 series 17, stable from prior MRI. Based on the configuration on prior MRI, it is possible   this could represent a parotid lesion arising from the deep lobe of the parotid gland though an intraparotid lymph node or cervical chain lymph node is not excluded.     Otherwise resolution of previously seen right upper cervical chain foci.     No FDG avid lymph nodes in the left neck.     Small focus of FDG uptake in the right peritonsillar region, SUV max of 4.2, previously 4.4, image 33 series 2600, may correspond to the 7 mm heterogeneously enhancing lesion seen on prior MRI examination. Question residual disease. However this appears   asymmetrically decreased compared to what is likely normal physiologic left tonsillar activity, SUV max of 5.5. Cannot exclude that this is diminished physiologic activity and the known right pharyngeal lesion is not seen on PET.     CT images: No additional significant findings.     CHEST:  No FDG avid soft tissue lesions are seen.     CT images: Scattered  coronary artery calcifications.     ABDOMEN:  No FDG avid soft tissue lesions are seen.              Interval History:  10/2/2024     Doing well with treatment but had C1 30 Sept and this is day 3 of radiation.  He has started on herbal supplements despite advise against this.  Again told not acceptable.  Had issues with flushing but no other Cisplatin related effects.      Labs  9/30/2024     Na 138 K 3.5 Cr 0.79 ALT/AST 16/16/ Ca 9 Mg 2.1  WBC 4.4 Hgb 15.4 MCV 86 plts 210 ANC [id number]     Baseline TSH 2.245               Interval History:  10/9/2024 week 2/7     Has grade 1/early 2 confluent mucositis hard palate, extending to soft bilateral  As above not using salt water/baking soda rinses or magic mouthwash-given recipe and prescription for magic mouthwash which can be swallowed or spit out.,  Has coated tongue, concern with thrush so will start Nystatin as well.  Discussed other rinses if not helpful.  Seeing palliative care for pain control-consider gabapentin      Has had issues with nausea and constipation from zofran-using compazine with relief.  No GERD.  Decreased oral intake due to mucositis.  PEG being placed 10/10/2024     Labs 10/4/2024 with na 136 K 4.0 Cr 0.75 Ca 9.0 ALT/AST 17/18 TB 0.59 down from 1.36 last week  WBC 4.9 Hgb 15.5 MCV 87 plts 224 ANC [id number]      Interval History: week 5/7  10/30/2024     Patient with prior RBOT cancer;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 S/p partial glossectomy and partial pharyngectomy on 07/31/2019, pathology negative for malignancy. S/p MRND right neck 09/04/2019 returned 0/34 nodes. Staged as T1N0 NO prior adjuvant therapy     Noted recurrent right neck disease/ new pharynx, recurrent RBOT     Now getting CRT weekly x 7weeks with 70 Gy IMRT     Skin with grade 1 desquamation right >left.  Using aquaphor mometasone cream.  Started with PEG, swallowing more difficult.  Has thrush, grade 1 mucositis posterior oropharynx.  Can swallow  water but solids more difficult.  Nutrition following.  Is getting free water orally and via PEG.  Refusing IVF.  Weight 176 pounds BP stable.       Labs  1025/2024     Na 140 K 4.3 Cr 0.76 Ca 9.0 ALT/AST 23/17 Mg 20  WBC 3.8 Hgb 14 MCV 87 plts 171 ANC [id number]            Interval History: week 6/7     As above grade 3/4 skin desquamation, grade 1-2 mucositis, thrush oral tongue, doing poorly.  PEG dependent at this point using 4 cans feeds; nutrition following  RT held past 2-3 days but we were not informed as his skin was not like this last week; had erythema only not blistering.  May need wound care for further management.  Will continue with IVF this week as excessive fluid loss through skin.  Silvadene/aquaphor mometasone Diflucan for thrush as not using nystatin.  Encouraged continue use of rinses, swallowing water.  May consider holding chemo based on extent of skin issues;       Labs 11/1/2024 Na 134 K 4.5 Cr 0.81 Ca 8.8 ALT/AST 19/17 TB 0.49 Mg 1.9  WBC 4.66 Hgb 13.7 MCV 88 plts 171 ANC [id number]            Interval History: 11/13/2024     Slowly improving; RT held for the remainder of last week x at least 4 days.  Seeing RT later this am.  Did not hold chemo as no communication that RT was held as it should have been.  Skin appears improving with current regimen on with desquamation but underlying granulation tissue forming.  Mucositis better as well.  PEG dependent, 4 cans, fluids  Getting IVF.  Still concerned with superficial thrombophlebitis right arm; doppler negative for DVT     Labs 11/8/2024 Na 1339 K 4.0 Cr 0.8 Mg 2.0 ALT/AST 16/15 Ca 8.8  WBC 3.5 Hgb 13.2 MCV 89 plts 172 ANC [id number]         Interval History: 11/27/2024    As above, skin is healing.  Had RT break, was able to eat, starting to taste and then restarted RT which he completed 25 Nov 2024 but now with lack of taste again.  Will try zinc as data to suggest stimulates taste buds. Last chemotherapy was 11/11/2024 as week 7.    Skin is improving but having swallowing issues as above.  Concern with muscle atrophy from only swallowing his own saliva/sips of water.  He was encouraged to attempt more water but is reluctant.  Has a very coated tongue, needs more aggressive mouth care.      Labs 11/19/2024 with Na 139 K 4.3 Cr 0.8 ALT/AST 14/15 TB 0.39 Mg 1.9    WBC 2.5 Hgb 13.1 MCV 90 plts 209          REVIEW OF SYSTEMS: as above   Please note that a 14-point review of systems was performed to include Constitutional, HEENT, Respiratory, CVS, GI, , Musculoskeletal, Integumentary, Neurologic, Rheumatologic, Endocrinologic, Psychiatric, Lymphatic, and Hematologic/Oncologic systems were reviewed and are negative unless otherwise stated in HPI. Positive and negative findings pertinent to this evaluation are incorporated into the history of present illness.      ECOG PS: 1    PROBLEM LIST:  Patient Active Problem List   Diagnosis    Mass of right side of neck    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)    Status post radical dissection of neck    Tongue pain    Dysphagia    Hypertension    Protein-calorie malnutrition (HCC)    Chemotherapy-induced nausea    Dehydration       Past Medical History:   has a past medical history of Facial basal cell cancer, GERD (gastroesophageal reflux disease), Hypertension, Throat cancer (HCC), and Vitamin D deficiency.    PAST SURGICAL HISTORY:   has a past surgical history that includes Rhinoplasty; Colonoscopy (N/A, 03/22/2016); [place] tooth extraction; pr laryngoscopy direct operative w/biopsy (N/A, 07/17/2019); TRANSORAL ROBOTIC SURGERY (TORS) (N/A, 07/31/2019); pr cervical lymphadec modified radical neck dsj (Right, 09/04/2019); pr laparoscopy surg cholecystectomy (N/A, 10/30/2019); Upper gastrointestinal endoscopy; Cholecystectomy; pr cysto insertion transprostatic implant single (N/A, 04/14/2023); Sinus surgery (1994); US guided lymph node biopsy right (5/6/2024); pr laryngoscopy direct operative  w/biopsy (N/A, 6/19/2024); and Radical neck dissection (Right, 6/19/2024).    CURRENT MEDICATIONS  Current Outpatient Medications   Medication Sig Dispense Refill    acetaminophen (TYLENOL) 160 mg/5 mL liquid Take 20 mL (640 mg total) by mouth every 6 (six) hours as needed for mild pain or moderate pain 236 mL 11    Allergy Relief 25 MG/10ML oral liquid       amLODIPine (NORVASC) 5 mg tablet Take 5 mg by mouth daily at bedtime       atovaquone-proguanil (MALARONE) 250-100 mg Take 1 tablet by mouth daily      Cholecalciferol (Vitamin D3) 125 MCG (5000 UT) TABS every 24 hours      Cholecalciferol (Vitamin D3) LIQD Use      cyclobenzaprine (FLEXERIL) 5 mg tablet Take 1 tablet (5 mg total) by mouth 3 (three) times a day as needed for muscle spasms 50 tablet 0    diphenhydramine, lidocaine, Al/Mg hydroxide, simethicone (Magic Mouthwash) SUSP Swish and swallow 10 mL every 4 (four) hours as needed for mouth pain or discomfort 500 mL 4    lidocaine (XYLOCAINE) 5 % ointment Apply topically every 4 (four) hours as needed for mild pain (painful area on neck) 240 g 11    lisinopril (ZESTRIL) 10 mg tablet Take 10 mg by mouth daily at bedtime       Misc Natural Products (ADRENAL PO) 1 orally as directed      mometasone (ELOCON) 0.1 % cream Apply topically daily Apply thin layer to neck twice daily (not within 4 hrs of radiation) 45 g 1    nystatin (MYCOSTATIN) 500,000 units/5 mL suspension Apply 5 mL (500,000 Units total) to the mouth or throat 4 (four) times a day 400 mL 3    ondansetron (ZOFRAN-ODT) 8 mg disintegrating tablet Take 1 tablet (8 mg total) by mouth every 8 (eight) hours as needed for nausea or vomiting 30 tablet 3    pantoprazole (PROTONIX) 40 mg tablet Take 1 tablet (40 mg total) by mouth daily 30 tablet 3    prochlorperazine (COMPAZINE) 10 mg tablet Take 1 tablet (10 mg total) by mouth every 6 (six) hours as needed for nausea or vomiting 30 tablet 3    silver sulfadiazine (SILVADENE,SSD) 1 % cream Apply topically  daily 50 g 2    solifenacin (VESICARE) 5 mg tablet Take 1 tablet (5 mg total) by mouth daily 30 tablet 6     No current facility-administered medications for this visit.     [unfilled]    SOCIAL HISTORY:   reports that he has never smoked. He has never used smokeless tobacco. He reports that he does not currently use alcohol after a past usage of about 5.0 standard drinks of alcohol per week. He reports that he does not currently use drugs.     FAMILY HISTORY:  family history includes Cancer in his mother; Diabetes in his mother; Heart failure in his father; Seizures in his father.     ALLERGIES:  is allergic to shellfish allergy - food allergy and bee venom.      Physical Exam:  Vital Signs:   Visit Vitals  Smoking Status Never     There is no height or weight on file to calculate BMI.  There is no height or weight on file to calculate BSA.    GEN: Alert, awake oriented x3, in no acute distress  HEENT- No pallor, icterus, cyanosis, no oral mucosal lesions, coated tonuge, 3 cm trismus,  could not see posterior oropharynx, heavily coated tongue   Neck right neck with duoderm/vasoline strips   LAD - no palpable cervical, clavicle, axillary, inguinal LAD  Heart- normal S1 S2, regular rate and rhythm, No murmur, rubs.   Lungs- clear breathing sound bilateral.   Abdomen- soft, Non tender, bowel sounds present  Extremities- No cyanosis, clubbing, edema  Neuro- No focal neurological deficit    Labs:  Lab Results   Component Value Date    WBC 2.50 (L) 11/19/2024    HGB 13.1 11/19/2024    HCT 39.7 11/19/2024    MCV 90 11/19/2024     11/19/2024     Lab Results   Component Value Date     03/03/2016    SODIUM 139 11/19/2024    K 4.3 11/19/2024     11/19/2024    CO2 31 11/19/2024    AGAP 5 11/19/2024    BUN 13 11/19/2024    CREATININE 0.80 11/19/2024    GLUC 95 11/01/2024    GLUF 78 11/19/2024    CALCIUM 9.0 11/19/2024    AST 15 11/19/2024    ALT 14 11/19/2024    ALKPHOS 69 11/19/2024    PROT 6.7 03/03/2016     TP 7.0 11/19/2024    BILITOT 0.6 03/03/2016    TBILI 0.39 11/19/2024    EGFR 91 11/19/2024           I spent 30  minutes on chart review, face to face counseling time, coordination of care and documentation.    Magali Talbot MD PhD

## 2024-11-26 ENCOUNTER — APPOINTMENT (OUTPATIENT)
Facility: HOSPITAL | Age: 69
End: 2024-11-26
Attending: RADIOLOGY
Payer: MEDICARE

## 2024-11-27 ENCOUNTER — OFFICE VISIT (OUTPATIENT)
Age: 69
End: 2024-11-27
Payer: MEDICARE

## 2024-11-27 VITALS
BODY MASS INDEX: 23.34 KG/M2 | WEIGHT: 163 LBS | SYSTOLIC BLOOD PRESSURE: 140 MMHG | HEIGHT: 70 IN | DIASTOLIC BLOOD PRESSURE: 89 MMHG | OXYGEN SATURATION: 97 % | HEART RATE: 75 BPM | TEMPERATURE: 98.1 F | RESPIRATION RATE: 16 BRPM

## 2024-11-27 DIAGNOSIS — C01 MALIGNANT NEOPLASM OF BASE OF TONGUE (HCC): Primary | ICD-10-CM

## 2024-11-27 PROCEDURE — 99214 OFFICE O/P EST MOD 30 MIN: CPT | Performed by: INTERNAL MEDICINE

## 2024-12-02 ENCOUNTER — TELEPHONE (OUTPATIENT)
Age: 69
End: 2024-12-02

## 2024-12-02 ENCOUNTER — OFFICE VISIT (OUTPATIENT)
Dept: VASCULAR SURGERY | Facility: CLINIC | Age: 69
End: 2024-12-02
Payer: MEDICARE

## 2024-12-02 VITALS
DIASTOLIC BLOOD PRESSURE: 82 MMHG | HEIGHT: 70 IN | HEART RATE: 97 BPM | RESPIRATION RATE: 18 BRPM | BODY MASS INDEX: 23.34 KG/M2 | WEIGHT: 163 LBS | SYSTOLIC BLOOD PRESSURE: 126 MMHG | OXYGEN SATURATION: 98 %

## 2024-12-02 DIAGNOSIS — I80.8 SUPERFICIAL THROMBOPHLEBITIS OF RIGHT UPPER EXTREMITY: Primary | ICD-10-CM

## 2024-12-02 PROBLEM — I80.9 SUPERFICIAL THROMBOPHLEBITIS: Status: ACTIVE | Noted: 2024-12-02

## 2024-12-02 PROCEDURE — 99204 OFFICE O/P NEW MOD 45 MIN: CPT | Performed by: SURGERY

## 2024-12-02 NOTE — TELEPHONE ENCOUNTER
Pt called wanting to confirm his appointment today and if the VAS duplex results are in his chart.     Confirmed appointment today at 11:30 and advised that the results are in his chart.

## 2024-12-02 NOTE — ASSESSMENT & PLAN NOTE
cHemotherapy induced superficial thrombophlebitis of right forearm vein along the dorsal aspect.  There is no overlying erythema.  It is not tender.  There is no edema.  On exam there is an easily palpable cord representing the thrombosed superficial vein.  There is no indication for any surgical intervention.  Patient may follow-up on an as-needed basis.

## 2024-12-02 NOTE — PROGRESS NOTES
"Name: Jesus Hdz      : 1955      MRN: [id number]  Encounter Provider: Fiorella Fam DO  Encounter Date: 2024   Encounter department: [facility]  :  Assessment & Plan        History of Present Illness     HPI  Jesus Hdz is a 69 y.o. male who presents to the office for evaluation of a right forearm vein (dorsal aspect) superficial thrombophlebitis secondary to chemotherapy infiltration    Patient is new to our practice and was referred by PCP. Pt had a UEV on 24. Pt had a LUE \"collapsed vein\" about 8-9 weeks ago. Pt stated that the vein got hard about 1 week after that. Pt used warm compresses and elevated his arm. Pt states that the vein still has hardness.     History obtained from: patient    Review of Systems   Constitutional: Negative.    HENT: Negative.     Eyes: Negative.    Respiratory: Negative.     Cardiovascular: Negative.    Gastrointestinal: Negative.    Endocrine: Negative.    Genitourinary: Negative.    Musculoskeletal: Negative.    Skin: Negative.    Allergic/Immunologic: Negative.    Neurological: Negative.    Hematological: Negative.    Psychiatric/Behavioral: Negative.       Past Medical History   Past Medical History:   Diagnosis Date    Facial basal cell cancer     GERD (gastroesophageal reflux disease)     diet controlled    Hypertension     Throat cancer (HCC)     Vitamin D deficiency      Past Surgical History:   Procedure Laterality Date    CHOLECYSTECTOMY      COLONOSCOPY N/A 2016    Procedure: COLONOSCOPY;  Surgeon: Daren Montana MD;  Location: [facility];  Service:     KY CERVICAL LYMPHADEC MODIFIED RADICAL NECK DSJ Right 2019    Procedure: MODIFIED RADICAL NECK DISSECTION;  Surgeon: Fady Montana MD;  [facility];  Service: ENT    KY CYSTO INSERTION TRANSPROSTATIC IMPLANT SINGLE N/A 2023    Procedure: CYSTOSCOPY WITH INSERTION UROLIFT;  Surgeon: Rubin Alfonso MD;  [facility];  " Service: Urology    NH LAPAROSCOPY SURG CHOLECYSTECTOMY N/A 10/30/2019    Procedure: CHOLECYSTECTOMY LAPAROSCOPIC;  Surgeon: Christiano Cornejo MD;  Location: [facility];  Service: General    NH LARYNGOSCOPY DIRECT OPERATIVE W/BIOPSY N/A 07/17/2019    Procedure: MICROLARYNGOSCOPY DIRECT WITH BIOPSY OF VALLECULAR MASS;  Surgeon: Amol Cordova MD;  [facility];  Service: ENT    NH LARYNGOSCOPY DIRECT OPERATIVE W/BIOPSY N/A 6/19/2024    Procedure: DIRECT LARYNGOSCOPY WITH BIOPSY WITH RIGHT NECK DISSECTION LEVEL TWO AND THREE;  Surgeon: Fady Montana MD;  [facility];  Service: ENT    RADICAL NECK DISSECTION Right 6/19/2024    Procedure: DISSECTION NECK RADICAL;  Surgeon: Fady Montana MD;  [facility];  Service: ENT    RHINOPLASTY      SINUS SURGERY  1994    TRANSORAL ROBOTIC SURGERY (TORS) N/A 07/31/2019    Procedure: ROBOTICALLY ASSISTED PARTIAL GLOSSECTOMY, PARTIAL PHARYNGECTOMY;  Surgeon: Fady Montana MD;  [facility];  Service: ENT    UPPER GASTROINTESTINAL ENDOSCOPY      US GUIDED LYMPH NODE BIOPSY RIGHT  5/6/2024    WISDOM TOOTH EXTRACTION       Family History   Problem Relation Age of Onset    Cancer Mother     Diabetes Mother     Heart failure Father     Seizures Father       reports that he has never smoked. He has never used smokeless tobacco. He reports that he does not currently use alcohol after a past usage of about 5.0 standard drinks of alcohol per week. He reports that he does not currently use drugs.  Current Outpatient Medications on File Prior to Visit   Medication Sig Dispense Refill    amLODIPine (NORVASC) 5 mg tablet Take 5 mg by mouth daily at bedtime       diphenhydramine, lidocaine, Al/Mg hydroxide, simethicone (Magic Mouthwash) SUSP Swish and swallow 10 mL every 4 (four) hours as needed for mouth pain or discomfort 500 mL 4    lisinopril (ZESTRIL) 10 mg tablet Take 10 mg by mouth daily at bedtime       mometasone (ELOCON) 0.1 % cream Apply topically  daily Apply thin layer to neck twice daily (not within 4 hrs of radiation) 45 g 1    solifenacin (VESICARE) 5 mg tablet Take 1 tablet (5 mg total) by mouth daily 30 tablet 6    acetaminophen (TYLENOL) 160 mg/5 mL liquid Take 20 mL (640 mg total) by mouth every 6 (six) hours as needed for mild pain or moderate pain (Patient not taking: Reported on 12/2/2024) 236 mL 11    Allergy Relief 25 MG/10ML oral liquid  (Patient not taking: Reported on 12/2/2024)      atovaquone-proguanil (MALARONE) 250-100 mg Take 1 tablet by mouth daily (Patient not taking: Reported on 12/2/2024)      Cholecalciferol (Vitamin D3) 125 MCG (5000 UT) TABS every 24 hours (Patient not taking: Reported on 12/2/2024)      Cholecalciferol (Vitamin D3) LIQD Use (Patient not taking: Reported on 12/2/2024)      cyclobenzaprine (FLEXERIL) 5 mg tablet Take 1 tablet (5 mg total) by mouth 3 (three) times a day as needed for muscle spasms 50 tablet 0    lidocaine (XYLOCAINE) 5 % ointment Apply topically every 4 (four) hours as needed for mild pain (painful area on neck) (Patient not taking: Reported on 12/2/2024) 240 g 11    Misc Natural Products (ADRENAL PO) 1 orally as directed (Patient not taking: Reported on 12/2/2024)      nystatin (MYCOSTATIN) 500,000 units/5 mL suspension Apply 5 mL (500,000 Units total) to the mouth or throat 4 (four) times a day (Patient not taking: Reported on 12/2/2024) 400 mL 3    ondansetron (ZOFRAN-ODT) 8 mg disintegrating tablet Take 1 tablet (8 mg total) by mouth every 8 (eight) hours as needed for nausea or vomiting (Patient not taking: Reported on 12/2/2024) 30 tablet 3    pantoprazole (PROTONIX) 40 mg tablet Take 1 tablet (40 mg total) by mouth daily 30 tablet 3    prochlorperazine (COMPAZINE) 10 mg tablet Take 1 tablet (10 mg total) by mouth every 6 (six) hours as needed for nausea or vomiting (Patient not taking: Reported on 12/2/2024) 30 tablet 3    silver sulfadiazine (SILVADENE,SSD) 1 % cream Apply topically daily  (Patient not taking: Reported on 12/2/2024) 50 g 2     No current facility-administered medications on file prior to visit.     Allergies   Allergen Reactions    Shellfish Allergy - Food Allergy Edema     HANDS, LIPS    Bee Venom Swelling     AT BITE SITE      Current Outpatient Medications on File Prior to Visit   Medication Sig Dispense Refill    amLODIPine (NORVASC) 5 mg tablet Take 5 mg by mouth daily at bedtime       diphenhydramine, lidocaine, Al/Mg hydroxide, simethicone (Magic Mouthwash) SUSP Swish and swallow 10 mL every 4 (four) hours as needed for mouth pain or discomfort 500 mL 4    lisinopril (ZESTRIL) 10 mg tablet Take 10 mg by mouth daily at bedtime       mometasone (ELOCON) 0.1 % cream Apply topically daily Apply thin layer to neck twice daily (not within 4 hrs of radiation) 45 g 1    solifenacin (VESICARE) 5 mg tablet Take 1 tablet (5 mg total) by mouth daily 30 tablet 6    acetaminophen (TYLENOL) 160 mg/5 mL liquid Take 20 mL (640 mg total) by mouth every 6 (six) hours as needed for mild pain or moderate pain (Patient not taking: Reported on 12/2/2024) 236 mL 11    Allergy Relief 25 MG/10ML oral liquid  (Patient not taking: Reported on 12/2/2024)      atovaquone-proguanil (MALARONE) 250-100 mg Take 1 tablet by mouth daily (Patient not taking: Reported on 12/2/2024)      Cholecalciferol (Vitamin D3) 125 MCG (5000 UT) TABS every 24 hours (Patient not taking: Reported on 12/2/2024)      Cholecalciferol (Vitamin D3) LIQD Use (Patient not taking: Reported on 12/2/2024)      cyclobenzaprine (FLEXERIL) 5 mg tablet Take 1 tablet (5 mg total) by mouth 3 (three) times a day as needed for muscle spasms 50 tablet 0    lidocaine (XYLOCAINE) 5 % ointment Apply topically every 4 (four) hours as needed for mild pain (painful area on neck) (Patient not taking: Reported on 12/2/2024) 240 g 11    Misc Natural Products (ADRENAL PO) 1 orally as directed (Patient not taking: Reported on 12/2/2024)      nystatin  (MYCOSTATIN) 500,000 units/5 mL suspension Apply 5 mL (500,000 Units total) to the mouth or throat 4 (four) times a day (Patient not taking: Reported on 12/2/2024) 400 mL 3    ondansetron (ZOFRAN-ODT) 8 mg disintegrating tablet Take 1 tablet (8 mg total) by mouth every 8 (eight) hours as needed for nausea or vomiting (Patient not taking: Reported on 12/2/2024) 30 tablet 3    pantoprazole (PROTONIX) 40 mg tablet Take 1 tablet (40 mg total) by mouth daily 30 tablet 3    prochlorperazine (COMPAZINE) 10 mg tablet Take 1 tablet (10 mg total) by mouth every 6 (six) hours as needed for nausea or vomiting (Patient not taking: Reported on 12/2/2024) 30 tablet 3    silver sulfadiazine (SILVADENE,SSD) 1 % cream Apply topically daily (Patient not taking: Reported on 12/2/2024) 50 g 2     No current facility-administered medications on file prior to visit.      Social History     Tobacco Use    Smoking status: Never    Smokeless tobacco: Never   Vaping Use    Vaping status: Never Used   Substance and Sexual Activity    Alcohol use: Not Currently     Alcohol/week: 5.0 standard drinks of alcohol     Types: 5 Cans of beer per week     Comment: on occ    Drug use: Not Currently    Sexual activity: Yes     Partners: Female     Birth control/protection: Male Sterilization        Objective   There were no vitals taken for this visit.     Physical Exam  Constitutional:       General: He is not in acute distress.     Appearance: He is not toxic-appearing.   HENT:      Head: Normocephalic and atraumatic.   Pulmonary:      Effort: Pulmonary effort is normal.   Skin:     General: Skin is warm and dry.      Comments: Palpable cord along the dorsal aspect of the right forearm representing the thrombosed superficial vein.  There is no erythema.  Nontender.  There is no forearm edema.   Neurological:      Mental Status: He is alert.   Psychiatric:         Mood and Affect: Mood normal.         Behavior: Behavior normal.         Thought Content:  Thought content normal.         Judgment: Judgment normal.         Administrative Statements   I have spent a total time of 30 minutes in caring for this patient on the day of the visit/encounter including Diagnostic results, Prognosis, Risks and benefits of tx options, Instructions for management, Patient and family education, Importance of tx compliance, Risk factor reductions, Impressions, Counseling / Coordination of care, Documenting in the medical record, Reviewing / ordering tests, medicine, procedures  , and Obtaining or reviewing history  .

## 2024-12-04 ENCOUNTER — APPOINTMENT (OUTPATIENT)
Dept: LAB | Facility: CLINIC | Age: 69
End: 2024-12-04
Payer: MEDICARE

## 2024-12-04 DIAGNOSIS — C01 MALIGNANT NEOPLASM OF BASE OF TONGUE (HCC): ICD-10-CM

## 2024-12-04 LAB
BASOPHILS # BLD AUTO: 0.01 THOUSANDS/ÂΜL (ref 0–0.1)
BASOPHILS NFR BLD AUTO: 0 % (ref 0–1)
EOSINOPHIL # BLD AUTO: 0.06 THOUSAND/ÂΜL (ref 0–0.61)
EOSINOPHIL NFR BLD AUTO: 2 % (ref 0–6)
ERYTHROCYTE [DISTWIDTH] IN BLOOD BY AUTOMATED COUNT: 15.4 % (ref 11.6–15.1)
HCT VFR BLD AUTO: 39.5 % (ref 36.5–49.3)
HGB BLD-MCNC: 13.3 G/DL (ref 12–17)
IMM GRANULOCYTES # BLD AUTO: 0.01 THOUSAND/UL (ref 0–0.2)
IMM GRANULOCYTES NFR BLD AUTO: 0 % (ref 0–2)
LDH SERPL-CCNC: 117 U/L (ref 140–271)
LYMPHOCYTES # BLD AUTO: 0.54 THOUSANDS/ÂΜL (ref 0.6–4.47)
LYMPHOCYTES NFR BLD AUTO: 14 % (ref 14–44)
MAGNESIUM SERPL-MCNC: 2 MG/DL (ref 1.9–2.7)
MCH RBC QN AUTO: 30.2 PG (ref 26.8–34.3)
MCHC RBC AUTO-ENTMCNC: 33.7 G/DL (ref 31.4–37.4)
MCV RBC AUTO: 90 FL (ref 82–98)
MONOCYTES # BLD AUTO: 0.51 THOUSAND/ÂΜL (ref 0.17–1.22)
MONOCYTES NFR BLD AUTO: 13 % (ref 4–12)
NEUTROPHILS # BLD AUTO: 2.68 THOUSANDS/ÂΜL (ref 1.85–7.62)
NEUTS SEG NFR BLD AUTO: 71 % (ref 43–75)
NRBC BLD AUTO-RTO: 0 /100 WBCS
PLATELET # BLD AUTO: 197 THOUSANDS/UL (ref 149–390)
PMV BLD AUTO: 9.9 FL (ref 8.9–12.7)
RBC # BLD AUTO: 4.4 MILLION/UL (ref 3.88–5.62)
WBC # BLD AUTO: 3.81 THOUSAND/UL (ref 4.31–10.16)

## 2024-12-04 PROCEDURE — 36415 COLL VENOUS BLD VENIPUNCTURE: CPT

## 2024-12-04 PROCEDURE — 83735 ASSAY OF MAGNESIUM: CPT

## 2024-12-04 PROCEDURE — 83615 LACTATE (LD) (LDH) ENZYME: CPT

## 2024-12-04 PROCEDURE — 85025 COMPLETE CBC W/AUTO DIFF WBC: CPT

## 2024-12-05 ENCOUNTER — TELEPHONE (OUTPATIENT)
Dept: HEMATOLOGY ONCOLOGY | Facility: CLINIC | Age: 69
End: 2024-12-05

## 2024-12-05 NOTE — TELEPHONE ENCOUNTER
Left msg for patient to return my call  His WBC is improving. Most recent 3.81 and ANC - WNL  Will encourage patient practice good hand washing / try to avoid anyone he knows is sick at the gathering.

## 2024-12-06 ENCOUNTER — HOSPITAL ENCOUNTER (OUTPATIENT)
Dept: INFUSION CENTER | Facility: HOSPITAL | Age: 69
End: 2024-12-06
Attending: INTERNAL MEDICINE

## 2024-12-06 NOTE — PROGRESS NOTES
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  He underwent partial glossectomy and partial pharyngectomy on 19. 0 out of 34 lymph nodes identified and there were no high risk features. He did not receive any adjuvant therapy and continued follow up. Subsequent CT and PET/CT revealed multiple hypermetabolic lymph nodes in the right neck but no obvious primary recurrence. Repeat laryngoscopy and right neck dissection completed 24 with 6 out of 7 lymph nodes positive for metastatic squamous cell carcinoma. He completed chemoradiation on 24. He returns today for 2 week follow up.       24 Dr. Talbot  Skin improved, completed RT alone 24.  Swallowing an issue-PEG dependent but having issues with swallowing muscles. Will have him see SLP.   Repeat labs  Follow up in 2 weeks      Upcomin24 Dr. Talbot after Radiation follow up  24 Nutrition     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)   2019 Initial Diagnosis    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)     2024 Biopsy    Final Diagnosis   A. 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     See Note.     -- Confirmed by positive Pankeratin (CKAE1/3), p40 and normal wild-type expression       on p53 immunostains.    -- p16 immunostain positive (>70% diffuse and strong nuclear and cytoplasmic         staining); high-risk HPV EH pending; result will be reported in an addendum.      B. Lymph Node, Regional Resection, Level 2 lymph nodes, see comments:  - Metastatic squamous cell carcinoma, keratinizing, involving at least two lymph nodes (2/2).     -- Present in 1.0 cm lymph node and additional detached lymph node(s)/fragments.     -- Largest metastatic focus: 0.3 cm; Extranodal extension: Not identified.    -- Confirmed by positive Pankeratin (CKAE1/3) and p40 immunostains.    -- p16 immunostain positive (>70% diffuse and strong nuclear and cytoplasmic        staining).     C. Lymph Node, Regional Resection, Additional level 2 lymph nodes:  - Metastatic squamous cell carcinoma, keratinizing, involving 3 of 4 lymph     nodes/fragments (3/4).     -- Largest metastatic focus: 1 cm; Extranodal extension: Present, extranodal         lymphovascular invasion identified.    -- Confirmed by positive Pankeratin (CKAE1/3) and p40 immunostains.    -- p16 immunostain positive (>70% diffuse and strong nuclear and cytoplasmic        staining); high-risk HPV EH pending; result will be reported in an addendum. See Note.      D. Lymph Node, Level 3 lymph nodes, lymph adenectomy:  - One lymph node positive for metastatic squamous cell carcinoma, keratinizing (1/1).     -- Largest metastatic focus: 1.1 cm; Extranodal extension: Not identified.    -- Confirmed by positive Pankeratin (CKAE1/3) and p40 immunostains.    -- p16 immunostain positive (>70% diffuse and strong nuclear and cytoplasmic        staining).  - Portion(s) of benign salivary gland with adjacent detached small (< 0.2 cm) portion      of carcinoma.   - Traumatic neuroma.          Comments:   This is an appended report. These results have been appended to a previously preliminary verified report.           7/3/2024 -  Cancer Staged    Staging form: Pharynx - Oropharynx, AJCC 8th Edition  - Clinical stage from 7/3/2024: Stage I (cT1, cN1, cM0, p16+) - Signed by Fady  MD Odalys on 7/3/2024  Stage prefix: Initial diagnosis       9/30/2024 - 11/11/2024 Chemotherapy    alteplase (CATHFLO), 2 mg, Intracatheter, Every 1 Minute as needed, 7 of 7 cycles  palonosetron (ALOXI), 0.25 mg, Intravenous, Once, 6 of 6 cycles  Administration: 0.25 mg (10/7/2024), 0.25 mg (10/14/2024), 0.25 mg (10/21/2024), 0.25 mg (10/28/2024), 0.25 mg (11/4/2024), 0.25 mg (11/11/2024)  CISplatin (PLATINOL) infusion, 70 mg (original dose 40 mg/m2), Intravenous, Once, 7 of 7 cycles  Dose modification: 70 mg (original dose 40 mg/m2, Cycle 1, Reason: Other (Must fill in a comment), Comment: pharmacy dictates), 30 mg/m2 (original dose 40 mg/m2, Cycle 6, Reason: Anticipated Tolerance), 30 mg/m2 (original dose 40 mg/m2, Cycle 7, Reason: Dose Not Tolerated)  Administration: 70 mg (9/30/2024), 70 mg (10/7/2024), 70 mg (10/14/2024), 70 mg (10/21/2024), 70 mg (10/28/2024), 58.2 mg (11/4/2024), 58.2 mg (11/11/2024)  sodium chloride, 500 mL, Intravenous, Once, 7 of 7 cycles  Administration: 500 mL (9/30/2024), 500 mL (9/30/2024), 500 mL (10/7/2024), 500 mL (10/7/2024), 500 mL (10/14/2024), 500 mL (10/14/2024), 500 mL (10/21/2024), 500 mL (10/21/2024), 500 mL (10/28/2024), 500 mL (10/28/2024), 500 mL (11/4/2024), 500 mL (11/4/2024), 500 mL (11/11/2024), 500 mL (11/11/2024)  fosaprepitant (EMEND) IVPB, 150 mg, Intravenous, Once, 7 of 7 cycles  Administration: 150 mg (9/30/2024), 150 mg (10/7/2024), 150 mg (10/14/2024), 150 mg (10/21/2024), 150 mg (10/28/2024), 150 mg (11/4/2024), 150 mg (11/11/2024)     9/30/2024 - 11/25/2024 Radiation    Treatment:  Course: C1    Plan ID Energy Fractions Dose per Fraction (cGy) Dose Correction (cGy) Total Dose Delivered (cGy) Elapsed Days   Orophary_Neck 6X 24 / 30 212 0 5,088 32   Rev CD R Neck 6X 3 / 3 212 0 636 3   Rev OrophNeck 6X 6 / 6 212 0 1,272 7      Treatment dates:  C1: 9/30/2024 - 11/25/2024               Treatment Cisplatin 40 mg/m2 weekly x 7 doses with IMRT     C1  9/30/2024  C2  10/7/2024  C3 10/14/2024  C4 10/21/2024  C5 10/28/2024  C6  11/4/2024     C7   11/11/2024-completed; RT held      Completed RT 25 Nov 2024    Discussion     Te definitive management of SCCHN in terms of oral cavity vs oropharynx vs larynx vs hypopharynx as well as pure tobacco related vs HPV with or without a prior/current smoking history had been surgery/RT.   In the past, the  role of chemotherapy was reserved for metastatic disease using Cisplatin at fairly high doses as well as infusional 5FU x 96 hours.  However there have been many trials which have changed the landscape of how locoregional and metastatic disease are now managed with chemotherapy, CRT or IO        It is unclear in this case, as had prior RBOT Stage 1 HPV driven s/p resection with no high risk features 0/34 nodes positive at the time and no adjuvant therapy     Based on the DL/biopsy there is SCC in the pharynx but location unclear; 6/7 nodes were positive with ASHLEY and thus the below pertains in terms of combined therapy.  The issues is 6 or 7 weeks of treatment and this appears to be a new second primary based on the biology      In general the prognosis for HPV driven SCCHN is better than that of tobacco related disease and in fact the AJCC staging system reflects this.  However when tobacco is a factor with a patient being a long standing smoker that favorable prognosis is no longer applicable;  however this patient smoked a pack a day for years but quit 30 years ago and so he has more favorable disease     The OS/PFS/FRANCO responses were better in those patients treated on the  study who were HPV positive; induction was followed by CRT and in that study Carboplatin AUC 1.5 only rather than 40 mg/m2 Cisplatin due to perceived toxicities being worse with cisplatin after induction therapy.  That was the case for this patient who got Cisplatin        However,  CRT per RTOG 91-11 as become SOC.  In the modern era, weekly  Cisplatin 40 mg/m2 is used for 7 weeks rather than the high dose Cisplatin in that trial with similar efficacy.  Other radiosensitizers include weekly Carboplatin/Taxol.  Based on the side effect profile of Cisplatin, een in lower doses, renal clearance is an issue.  Both Carboplatin and Cisplatin are renally cleared but Carboplatin is dosed based on renal function and may be more tolerable.  Other agents used as radiosensitizers include Cetuximab per the Garcia study.       Side effects, and  toxicities of chemotherapy alone and in combination with radiation had been discussed at [facility].  The side effects specific to radiation include loss of taste, xerostomia, mucositis, dysphagia , nausea, radiation induced dermatis and fatigue.  Fatigue is also common with chemotherapy and is cumulative.  Copious ropey secretions were discussed as well as nutrition.  In terms of the effects of Cisplatin or Carboplatin/Taxol weekly alone and in combination with radiation are neutropenia/leucopenia, anemia, thrombocytopenia, nausea, GERD, mucositis, fungal infections, renal insufficiency/dehydration, electrolyte imbalances including platinum induced SIADH, neuropathy, hearing loss, minor hair loss.  Taxol can also cause an infusion reaction /premedications are needed.  As chemotherapy is being used as a radiosensitizer, the effects of radiation are magnified.       Nutrition and hydration are concerning in the combined modality setting and we discussed the use of a PEG.  Based on both internal scatter of radiation and with extensive radiation there is potential for fibrosis/strictures as well as mucositis, fungal infections, being hypermetabolic with swallowing issues.  Repletion as the caloric needs increase is improved via a PEG as well as IV fluids.  Nutrition generally follows these patients.       There are also chronic issues which can occur; the secretions generally improve in 3-6 months, taste returns within 1-2 months,  "xerostomia may be a chronic issue, fibrosis, lymphedema, thyroid issues with checks every 3 months after RT completion, length of PEG use of about 3-6 months.     Restaging would be 3 months after therapy with CT PET and then every 3 months with CT neck/chest in the first year, eery 4-6 months thereafter.  HPV driven disease tends to have a latent distant mets period.       PDL1 status as well as TMB, MMR, MSI should be ascertained via [facility] for future reverence      Thus the recommendation would be weekly Cisplatin 40 mg/m2 weekly with RT to 70 Gy and thus 7 weeks      Assessment / Plan:     Recurrent right level 2 node s/p partial glossectomy/pharyngectomy 7/31/2029 with no adjuvant CRT 0/34 nodes at the time and no high risk features     PET with no primary recurrence/ nodes only     MRI face/neck/orbit     Discussion regarding CRT with weekly Cisplatin 40 mg/m2 for 6-7 weeks     PEG placement     Dental evaluation     Nutrition evaluation      9/18/2024      Patient seen by Dr Morrow in Rad Onc; simulation done     Will be treated UB     PEG to be placed-has agreed to it     Nutrition follow up     Speech pathology/swallowing assessment      Tentative plan to start treatment 30 Sept 2024       10/2/2024     Tolerated C1 Cisplatin     No issues other than flushing-took decadron out of premed cocktail.  Usually no infusion reactions with Cisplatin but with Carboplatin     No PEG, weight is 173     Using supplements despite telling patient that herbal remedies can cause issues with both chemo and RT.  He is \"holding\" these agents when gets chemotherapy but even then can affect outcome/formation of DNA adducts     Would again recommend not using these agents     10/9/2024     Week 2/7      Already has grade 1/early 2 confluence in the hard palate     Tongue coated unclear if there is a thrush component as well     Magic mouthwash, salt water/baking soda rinses for mucositis-may consider decadron rinse if no " better/worse next week     Nystatin swish/spit/swallow for thrush     No erythema/neck dermatitis from RT     Did have some nausea from chemotherapy, mostly related to constipation so taking compazine for relief     IVF to start additionally this Friday and weekly through duration treatment     PEG to be placed 10/10/2024; will need close nutrition follow up     Weight 170 pounds      Concerned with degree of mucositis in week 2     Secretions, minimal at this point      Labs acceptable       10/30/2024     Week 5/7     Has increased mucositis posterior oropharynx     Coated tongue/thrush-Nystatin S/S     Started using PEG 5 days ago; nutrition following      Weight 176 pounds was 165     Skin with grade 1 desquamation-mometasone started/aquaphor     Refusing IVF; /74, does not appear dehydrated by renal function      Week 6/7     Significant grade 3 skin desquamation right > left; has wrap in place, RT held since Monday; we were not told or would have held chemotherapy which he received     Silvadene, aquaphor, may need wound care although unable to thoroughly assess     Seeing Rad Onc today regarding restarting     States is improving slowly, painful     Has thrush on oral tongue-stopped using nystatin rinse; will do diflucan     Grade 2 mucositis     PEG dependent, 4 ans per day, not eating anything orally, able to drink water     Weight 155 at home, states stable; 165 here     Had IV infiltrate with what appears to be superficial phlebitis     Doppler done later today confirming no DVT and superficial thromobphlebitis which he was told to place warm compresses, elevation Tylenol for pain     IVF later this week       11/13/2024 week 7/7     Received C7 Cisplatin without communication that RT was held-would have held chemo     Skin is improved, no worse from chemotherapy     Still grade 3 desquamation with healing     Mucositis improving, has thrush oral tongue and remains on Nystatin swish/spit     Eating  eggs but PEG dependent, getting 4 cans with fluids      Will complete RT late next week      11/27/2024     Skin improved, has duoderm/vaseline strips     Restarted and completed RT alone 25 Nov 2024      Swallowing is an issues; PEG dependent but having issues with swallowing muscles as giving himself water via PEG and sips only orally      Will have him see speech pathology, probably will need swallowing study     Was seen by ENT and had NPL but this isn't functional test but to assess tumor response.     Has not had recent labs-will be repeated in 1-2 weeks    12/11/2024    Much improved, skin healed, mucositis healed, eating orally     No PEG use in past 8 days, weight stable 165 pounds    Will consider removing PEG in next week    Saw Rad Onc    Continues with lack of taste (but able to taste sweet) as well as xerostomia    Did discuss alternates which he also discussed with Rad Onc for xerostomia; does not want to try salagen and did mention acupuncture but will try biotene, zinc products if zinc level ok (more for taste) although still early as less than one month seen finished RT (25th Nov 2024)     PET scan late Feb 2025    Needs to see ENT/Dr [place] for NPL exam    Will need TFTs at 3 month [facility]-will repeat labs in one month and then 3 months after treatment including TFTs          Follow up     1 month           History:   7/3/2024     69 year old with HTN with prior RBOT cancer;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 S/p partial glossectomy and partial pharyngectomy on 07/31/2019, pathology negative for malignancy. S/p MRND right neck 09/04/2019 returned 0/34 nodes. Staged as T1N0 M0       PET scan was done 12/03/19 which showed mild asymmetry at the tongue base, likely post operative. Otherwise no distant metastasis. Prior imaging from June 2019 was clear with no evidence of disease.      There were no high risk features to speak of and he did not receive any adjuvant therapies.       He  "then did well in follow-up until approximately 3 to 4 months ago when he noticed a palpable lump on the right neck.  Subsequent imaging with CT and PET/CT revealed multiple hypermetabolic lymph nodes in the right neck but no obvious primary recurrence, contralateral or distant disease.  On 6/19/2024 he was taken to the operating room for direct laryngoscopy as well as repeat right neck dissection.  7 lymph nodes were removed, 6 of which were positive for metastatic squamous cell carcinoma, keratinizing, p16 positive, with extranodal extension identified.  The largest lymph node measured 1.1 cm.  On the pathology report there is a specimen labeled right pharynx which also contained squamous cell carcinoma.  Looking at the operative report, they describe a small palpable prominence in the \"left pharynx near base of tongue.\"  This is in contradiction to the pathology report which described the specimen as from the right pharynx.            Ct neck 04/24/2024      New rounded lymph node in the palpable region of interest superficial to the sternocleidomastoid muscle measuring 9 x 6 mm. Differential diagnosis should include recurrent disease versus reactive lymphadenopathy.     Small cluster of right level 2B cervical lymph nodes also noted as described above with otherwise no suspicious adenopathy identified in the remainder of the neck.     Status post right-sided partial glossectomy without definite nodular enhancement in the operative bed.     05/06/2024 FNAB under US  Lymph Node, right level 2 (ThinPrep, smears and cell block preparations ):  Conclusive evidence of malignancy.  Carcinoma with keratinization, compatible with known squamous cell carcinoma     PET scan 05/22/2024      1. Scattered FDG-avid right cervical chain lymph nodes compatible with disease recurrence.  2. No suspicious uptake in the oropharynx or hypopharynx.  3. No findings for hypermetabolic metastasis to the chest, abdomen or pelvis.  \  No " prior XRT or chemotherapy.      The patient is seeing Rad Onc later today     He is very pleased with the RND healing.  We had a long discussion as to second primary vs recurrence; treatment with CRT either 6 or 7 weeks.  As this is likely a second primary, would do 7 weeks but again, not definitively shown on PET.  Will get MRI face/neck/orbit to further assess.  He has been doing well post op.  Weight stable 169 pounds.  Has altered diet, eating healthier.  Has no issues, denies any SOB/KOHLI, CP change in bowel/bladder, blood stool/urine         Interval History:  9/18/2024     Doing well; waiting to start treatment 30 Sept 2024.  Risks/benefits and toxicities again explained to the patient with informed consent signed.  Sent zofran 8 mg ODT q 8 prn and compazine 10 mg q 6-8 prn to pharmacy.  No other active issues.       Since last visit PET scan done 9/11/2024    HEAD/NECK:  Increasing focus of radiotracer uptake at the right upper cervical chain, level 2 region, SUV max of 5.5, previously 3.6. This measures 7 mm short axis, image 38 series 17, stable from prior MRI. Based on the configuration on prior MRI, it is possible   this could represent a parotid lesion arising from the deep lobe of the parotid gland though an intraparotid lymph node or cervical chain lymph node is not excluded.     Otherwise resolution of previously seen right upper cervical chain foci.     No FDG avid lymph nodes in the left neck.     Small focus of FDG uptake in the right peritonsillar region, SUV max of 4.2, previously 4.4, image 33 series 2600, may correspond to the 7 mm heterogeneously enhancing lesion seen on prior MRI examination. Question residual disease. However this appears   asymmetrically decreased compared to what is likely normal physiologic left tonsillar activity, SUV max of 5.5. Cannot exclude that this is diminished physiologic activity and the known right pharyngeal lesion is not seen on PET.     CT images: No  additional significant findings.     CHEST:  No FDG avid soft tissue lesions are seen.     CT images: Scattered coronary artery calcifications.     ABDOMEN:  No FDG avid soft tissue lesions are seen.              Interval History:  10/2/2024     Doing well with treatment but had C1 30 Sept and this is day 3 of radiation.  He has started on herbal supplements despite advise against this.  Again told not acceptable.  Had issues with flushing but no other Cisplatin related effects.      Labs  9/30/2024     Na 138 K 3.5 Cr 0.79 ALT/AST 16/16/ Ca 9 Mg 2.1  WBC 4.4 Hgb 15.4 MCV 86 plts 210 ANC [id number]     Baseline TSH 2.245               Interval History:  10/9/2024 week 2/7     Has grade 1/early 2 confluent mucositis hard palate, extending to soft bilateral  As above not using salt water/baking soda rinses or magic mouthwash-given recipe and prescription for magic mouthwash which can be swallowed or spit out.,  Has coated tongue, concern with thrush so will start Nystatin as well.  Discussed other rinses if not helpful.  Seeing palliative care for pain control-consider gabapentin      Has had issues with nausea and constipation from zofran-using compazine with relief.  No GERD.  Decreased oral intake due to mucositis.  PEG being placed 10/10/2024     Labs 10/4/2024 with na 136 K 4.0 Cr 0.75 Ca 9.0 ALT/AST 17/18 TB 0.59 down from 1.36 last week  WBC 4.9 Hgb 15.5 MCV 87 plts 224 ANC [id number]      Interval History: week 5/7  10/30/2024     Patient with prior RBOT cancer;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 S/p partial glossectomy and partial pharyngectomy on 07/31/2019, pathology negative for malignancy. S/p MRND right neck 09/04/2019 returned 0/34 nodes. Staged as T1N0 NO prior adjuvant therapy     Noted recurrent right neck disease/ new pharynx, recurrent RBOT     Now getting CRT weekly x 7weeks with 70 Gy IMRT     Skin with grade 1 desquamation right >left.  Using aquaphor mometasone cream.   Started with PEG, swallowing more difficult.  Has thrush, grade 1 mucositis posterior oropharynx.  Can swallow water but solids more difficult.  Nutrition following.  Is getting free water orally and via PEG.  Refusing IVF.  Weight 176 pounds BP stable.       Labs  1025/2024     Na 140 K 4.3 Cr 0.76 Ca 9.0 ALT/AST 23/17 Mg 20  WBC 3.8 Hgb 14 MCV 87 plts 171 ANC [id number]            Interval History: week 6/7     As above grade 3/4 skin desquamation, grade 1-2 mucositis, thrush oral tongue, doing poorly.  PEG dependent at this point using 4 cans feeds; nutrition following  RT held past 2-3 days but we were not informed as his skin was not like this last week; had erythema only not blistering.  May need wound care for further management.  Will continue with IVF this week as excessive fluid loss through skin.  Silvadene/aquaphor mometasone Diflucan for thrush as not using nystatin.  Encouraged continue use of rinses, swallowing water.  May consider holding chemo based on extent of skin issues;       Labs 11/1/2024 Na 134 K 4.5 Cr 0.81 Ca 8.8 ALT/AST 19/17 TB 0.49 Mg 1.9  WBC 4.66 Hgb 13.7 MCV 88 plts 171 ANC [id number]            Interval History: 11/13/2024     Slowly improving; RT held for the remainder of last week x at least 4 days.  Seeing RT later this am.  Did not hold chemo as no communication that RT was held as it should have been.  Skin appears improving with current regimen on with desquamation but underlying granulation tissue forming.  Mucositis better as well.  PEG dependent, 4 cans, fluids  Getting IVF.  Still concerned with superficial thrombophlebitis right arm; doppler negative for DVT     Labs 11/8/2024 Na 1339 K 4.0 Cr 0.8 Mg 2.0 ALT/AST 16/15 Ca 8.8  WBC 3.5 Hgb 13.2 MCV 89 plts 172 ANC [id number]         Interval History: 11/27/2024     As above, skin is healing.  Had RT break, was able to eat, starting to taste and then restarted RT which he completed 25 Nov 2024 but now with lack of taste  again.  Will try zinc as data to suggest stimulates taste buds. Last chemotherapy was 11/11/2024 as week 7.   Skin is improving but having swallowing issues as above.  Concern with muscle atrophy from only swallowing his own saliva/sips of water.  He was encouraged to attempt more water but is reluctant.  Has a very coated tongue, needs more aggressive mouth care.       Labs 11/19/2024 with Na 139 K 4.3 Cr 0.8 ALT/AST 14/15 TB 0.39 Mg 1.9    WBC 2.5 Hgb 13.1 MCV 90 plts 209          Interval History 12/11/2024    Doing better as above; see discussion in A/P    Labs last done 19 Nov 2024 as above    Will consider removing PEG next week as eating orally including breads, not using PEG except to flush.           REVIEW OF SYSTEMS: as above   Please note that a 14-point review of systems was performed to include Constitutional, HEENT, Respiratory, CVS, GI, , Musculoskeletal, Integumentary, Neurologic, Rheumatologic, Endocrinologic, Psychiatric, Lymphatic, and Hematologic/Oncologic systems were reviewed and are negative unless otherwise stated in HPI. Positive and negative findings pertinent to this evaluation are incorporated into the history of present illness.      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    PAST SURGICAL HISTORY:   has a past surgical history that includes Rhinoplasty; Colonoscopy (N/A, 03/22/2016); Douglas tooth extraction; pr laryngoscopy direct operative w/biopsy (N/A, 07/17/2019); TRANSORAL ROBOTIC  SURGERY (TORS) (N/A, 07/31/2019); pr cervical lymphadec modified radical neck dsj (Right, 09/04/2019); pr laparoscopy surg cholecystectomy (N/A, 10/30/2019); Upper gastrointestinal endoscopy; Cholecystectomy; pr cysto insertion transprostatic implant single (N/A, 04/14/2023); Sinus surgery (1994); US guided lymph node biopsy right (5/6/2024); pr laryngoscopy direct operative w/biopsy (N/A, 6/19/2024); and Radical neck dissection (Right, 6/19/2024).    CURRENT MEDICATIONS  Current Outpatient Medications   Medication Sig Dispense Refill    acetaminophen (TYLENOL) 160 mg/5 mL liquid Take 20 mL (640 mg total) by mouth every 6 (six) hours as needed for mild pain or moderate pain (Patient not taking: Reported on 12/2/2024) 236 mL 11    Allergy Relief 25 MG/10ML oral liquid  (Patient not taking: Reported on 12/2/2024)      amLODIPine (NORVASC) 5 mg tablet Take 5 mg by mouth daily at bedtime       atovaquone-proguanil (MALARONE) 250-100 mg Take 1 tablet by mouth daily (Patient not taking: Reported on 12/2/2024)      Cholecalciferol (Vitamin D3) 125 MCG (5000 UT) TABS every 24 hours (Patient not taking: Reported on 12/2/2024)      Cholecalciferol (Vitamin D3) LIQD Use (Patient not taking: Reported on 12/2/2024)      cyclobenzaprine (FLEXERIL) 5 mg tablet Take 1 tablet (5 mg total) by mouth 3 (three) times a day as needed for muscle spasms 50 tablet 0    diphenhydramine, lidocaine, Al/Mg hydroxide, simethicone (Magic Mouthwash) SUSP Swish and swallow 10 mL every 4 (four) hours as needed for mouth pain or discomfort 500 mL 4    lidocaine (XYLOCAINE) 5 % ointment Apply topically every 4 (four) hours as needed for mild pain (painful area on neck) (Patient not taking: Reported on 12/2/2024) 240 g 11    lisinopril (ZESTRIL) 10 mg tablet Take 10 mg by mouth daily at bedtime       Misc Natural Products (ADRENAL PO) 1 orally as directed (Patient not taking: Reported on 12/2/2024)      mometasone (ELOCON) 0.1 % cream Apply topically  daily Apply thin layer to neck twice daily (not within 4 hrs of radiation) 45 g 1    nystatin (MYCOSTATIN) 500,000 units/5 mL suspension Apply 5 mL (500,000 Units total) to the mouth or throat 4 (four) times a day (Patient not taking: Reported on 12/2/2024) 400 mL 3    ondansetron (ZOFRAN-ODT) 8 mg disintegrating tablet Take 1 tablet (8 mg total) by mouth every 8 (eight) hours as needed for nausea or vomiting (Patient not taking: Reported on 12/2/2024) 30 tablet 3    pantoprazole (PROTONIX) 40 mg tablet Take 1 tablet (40 mg total) by mouth daily 30 tablet 3    prochlorperazine (COMPAZINE) 10 mg tablet Take 1 tablet (10 mg total) by mouth every 6 (six) hours as needed for nausea or vomiting (Patient not taking: Reported on 12/2/2024) 30 tablet 3    silver sulfadiazine (SILVADENE,SSD) 1 % cream Apply topically daily (Patient not taking: Reported on 12/2/2024) 50 g 2    solifenacin (VESICARE) 5 mg tablet Take 1 tablet (5 mg total) by mouth daily 30 tablet 6     No current facility-administered medications for this visit.     @[email or web address]@    SOCIAL HISTORY:   reports that he has never smoked. He has never used smokeless tobacco. He reports that he does not currently use alcohol after a past usage of about 5.0 standard drinks of alcohol per week. He reports that he does not currently use drugs.     FAMILY HISTORY:  family history includes Cancer in his mother; Diabetes in his mother; Heart failure in his father; Seizures in his father.     ALLERGIES:  is allergic to shellfish allergy - food allergy and bee venom.      Physical Exam:  Vital Signs:   Visit Vitals  Smoking Status Never     There is no height or weight on file to calculate BMI.  There is no height or weight on file to calculate BSA.    GEN: Alert, awake oriented x3, in no acute distress  HEENT- No pallor, icterus, cyanosis, no oral mucosal lesions,   LAD - no palpable cervical, clavicle, axillary, inguinal LAD  Heart- normal S1 S2, regular rate and rhythm, No  murmur, rubs.   Lungs- clear breathing sound bilateral.   Abdomen- soft, Non tender, bowel sounds present  Extremities- No cyanosis, clubbing, edema  Neuro- No focal neurological deficit    Labs:  Lab Results   Component Value Date    WBC 3.81 (L) 12/04/2024    HGB 13.3 12/04/2024    HCT 39.5 12/04/2024    MCV 90 12/04/2024     12/04/2024     Lab Results   Component Value Date     03/03/2016    SODIUM 139 11/19/2024    K 4.3 11/19/2024     11/19/2024    CO2 31 11/19/2024    AGAP 5 11/19/2024    BUN 13 11/19/2024    CREATININE 0.80 11/19/2024    GLUC 95 11/01/2024    GLUF 78 11/19/2024    CALCIUM 9.0 11/19/2024    AST 15 11/19/2024    ALT 14 11/19/2024    ALKPHOS 69 11/19/2024    PROT 6.7 03/03/2016    TP 7.0 11/19/2024    BILITOT 0.6 03/03/2016    TBILI 0.39 11/19/2024    EGFR 91 11/19/2024           I spent 40 minutes on chart review, face to face counseling time, coordination of care and documentation.    Magali Talbot MD PhD

## 2024-12-06 NOTE — TELEPHONE ENCOUNTER
"Patient returning call from [facility] regarding recent labs.    Relayed the following information:  \"His WBC is improving. Most recent 3.81 and ANC - WNL  Will encourage patient practice good hand washing / try to avoid anyone he knows is sick at the gathering.\"    Clean your hands frequently.  Try to avoid crowded places and contact with people who are sick.  Do not share food, drink cups, utensils or other personal items, such as toothbrushes.    He states the gathering will be a small room with about 20 people. He states previously that he wanted to go mostly to eat the food and see if there was anything he could tolerate but now that he is weaning himself off the tube feeding and eating on his own he does not feel he needs to go tomorrow and risk infection in case anyone might be sick, as it is a lot of people in a small room.    He states he will skip the dinner party tomorrow and will attend his office visit with Dr. Talbot on 12/11.  "

## 2024-12-11 ENCOUNTER — OFFICE VISIT (OUTPATIENT)
Age: 69
End: 2024-12-11
Payer: MEDICARE

## 2024-12-11 ENCOUNTER — OFFICE VISIT (OUTPATIENT)
Facility: HOSPITAL | Age: 69
End: 2024-12-11
Attending: RADIOLOGY

## 2024-12-11 ENCOUNTER — NUTRITION (OUTPATIENT)
Dept: NUTRITION | Facility: HOSPITAL | Age: 69
End: 2024-12-11

## 2024-12-11 VITALS
DIASTOLIC BLOOD PRESSURE: 80 MMHG | TEMPERATURE: 96.8 F | WEIGHT: 164 LBS | BODY MASS INDEX: 23.53 KG/M2 | RESPIRATION RATE: 18 BRPM | OXYGEN SATURATION: 99 % | SYSTOLIC BLOOD PRESSURE: 132 MMHG | HEART RATE: 64 BPM

## 2024-12-11 VITALS
OXYGEN SATURATION: 97 % | DIASTOLIC BLOOD PRESSURE: 82 MMHG | WEIGHT: 164.2 LBS | SYSTOLIC BLOOD PRESSURE: 132 MMHG | HEART RATE: 87 BPM | TEMPERATURE: 98.2 F | HEIGHT: 70 IN | RESPIRATION RATE: 16 BRPM | BODY MASS INDEX: 23.51 KG/M2

## 2024-12-11 DIAGNOSIS — Z71.3 NUTRITIONAL COUNSELING: Primary | ICD-10-CM

## 2024-12-11 DIAGNOSIS — C01 MALIGNANT NEOPLASM OF BASE OF TONGUE (HCC): ICD-10-CM

## 2024-12-11 DIAGNOSIS — C77.0 MALIGNANT NEOPLASM METASTATIC TO LYMPH NODE OF NECK (HCC): Primary | ICD-10-CM

## 2024-12-11 DIAGNOSIS — C01 MALIGNANT NEOPLASM OF BASE OF TONGUE (HCC): Primary | ICD-10-CM

## 2024-12-11 DIAGNOSIS — R73.01 IMPAIRED FASTING GLUCOSE: ICD-10-CM

## 2024-12-11 PROCEDURE — 99024 POSTOP FOLLOW-UP VISIT: CPT | Performed by: RADIOLOGY

## 2024-12-11 PROCEDURE — 99215 OFFICE O/P EST HI 40 MIN: CPT | Performed by: INTERNAL MEDICINE

## 2024-12-11 NOTE — PATIENT INSTRUCTIONS
Nutrition Rx & Recommendations:  Diet: High Kcal, High Protein, Nutrient-dense  Small, frequent meals/snacks may be easier to tolerate than 3 large daily meals.  Aim for 5-6 small meals per day.  Include protein at all meals/snacks.  Include a variety of foods (as tolerated/allowed by diet).  Incorporate physical activity as able/allowed.  Stay hydrated by sipping fluids of choice/tolerance throughout the day.  Liquid nutrition may be better tolerated than solids at times.  Alter food choices and eating patterns to accommodate changing needs.  Refer to Eating Hints book for other meal/snack ideas and symptom management.  Avoid spicy, acidic, sharp/hard/crunchy foods & carbonated beverages as needed.  For lack of taste: Practice good oral care. Blend fresh fruits into shakes, ice cream or yogurt.  Eat frozen fruits: grapes, chopped cantaloupe, watermelon.  Select fresh vegetables (may be more appealing than canned/frozen).  Add extra flavor to foods: experiment with spices, salt & sweeteners, extra oil/butter, acidic foods; marinate meats (see pages 29-30 in your Eating Hints book).  For dry mouth: sip water throughout the day; try very sweet (or tart, as tolerated) drinks; chew gum or suck on hard candy, popsicles, & ice chips; eat easy to swallow foods (such as pureed cooked foods or soups); moisten food with sauce/gravy/salad dressing; do not drink beer, wine, or any type of alcohol; keep lips moist with lip balm; avoid tobacco products & second-hand smoke; try Biotene as needed (see pages 17-18 in your Eating Hints book).  Follow proper oral care; Try baking soda/salt water rinse recipe (mix 3/4 tsp salt + 1 tsp baking soda + 1 qt water; rinse with pain water after using) in Eating Hints book (pg 18).  Brush your teeth before/after meals & before bed.  Weigh yourself regularly. If you notice weight loss, make an effort to increase your daily food/calorie intake. If you continue to notice loss after these efforts,  reach out to your dietitian to establish a plan to stabilize weight.  For dry mouth: can try coconut oil, xylitol, and added moisture to foods  Flush tube with 60mL water 1-2x/day while not in use.     Follow Up Plan: Brief visit x1 week for progress on oral intake  Recommend Referral to Other Providers: none at this time

## 2024-12-11 NOTE — PROGRESS NOTES
Outpatient Oncology Nutrition Consultation   Type of Consult: Follow Up  Care Location: Office Visit    Reason for referral: Received notification by  Dr. Talbot  on 9/18 that pt has triggered for oncology nutrition care (reason for referral: HNC dx and TF).    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     See Note.     -- Confirmed by positive Pankeratin (CKAE1/3), p40 and normal wild-type expression       on p53 immunostains.    -- p16 immunostain positive (>70% diffuse and strong nuclear and cytoplasmic        staining); high-risk HPV EH pending; result will be reported in an addendum.      B. Lymph Node, Regional Resection, Level 2 lymph nodes, see comments:  - Metastatic squamous cell carcinoma, keratinizing, involving at least two lymph nodes (2/2).     -- Present in 1.0 cm lymph node and additional detached lymph node(s)/fragments.     -- Largest metastatic focus: 0.3 cm; Extranodal extension: Not identified.    -- Confirmed by positive Pankeratin (CKAE1/3) and p40 immunostains.    -- p16 immunostain positive (>70% diffuse and strong nuclear and cytoplasmic        staining).     C. Lymph Node, Regional Resection, Additional level 2 lymph nodes:  - Metastatic squamous cell carcinoma, keratinizing, involving 3 of 4 lymph     nodes/fragments (3/4).     -- Largest metastatic focus: 1 cm; Extranodal extension: Present, extranodal          lymphovascular invasion identified.    -- Confirmed by positive Pankeratin (CKAE1/3) and p40 immunostains.    -- p16 immunostain positive (>70% diffuse and strong nuclear and cytoplasmic        staining); high-risk HPV EH pending; result will be reported in an addendum. See Note.      D. Lymph Node, Level 3 lymph nodes, lymph adenectomy:  - One lymph node positive for metastatic squamous cell carcinoma, keratinizing (1/1).     -- Largest metastatic focus: 1.1 cm; Extranodal extension: Not identified.    -- Confirmed by positive Pankeratin (CKAE1/3) and p40 immunostains.    -- p16 immunostain positive (>70% diffuse and strong nuclear and cytoplasmic        staining).  - Portion(s) of benign salivary gland with adjacent detached small (< 0.2 cm) portion      of carcinoma.   - Traumatic neuroma.          Comments:   This is an appended report. These results have been appended to a previously preliminary verified report.           7/3/2024 -  Cancer Staged    Staging form: Pharynx - Oropharynx, AJCC 8th Edition  - Clinical stage from 7/3/2024: Stage I (cT1, cN1, cM0, p16+) - Signed by Fady Morrow MD on 7/3/2024  Stage prefix: Initial diagnosis       9/30/2024 - 11/11/2024 Chemotherapy    alteplase (CATHFLO), 2 mg, Intracatheter, Every 1 Minute as needed, 7 of 7 cycles  palonosetron (ALOXI), 0.25 mg, Intravenous, Once, 6 of 6 cycles  Administration: 0.25 mg (10/7/2024), 0.25 mg (10/14/2024), 0.25 mg (10/21/2024), 0.25 mg (10/28/2024), 0.25 mg (11/4/2024), 0.25 mg (11/11/2024)  CISplatin (PLATINOL) infusion, 70 mg (original dose 40 mg/m2), Intravenous, Once, 7 of 7 cycles  Dose modification: 70 mg (original dose 40 mg/m2, Cycle 1, Reason: Other (Must fill in a comment), Comment: pharmacy dictates), 30 mg/m2 (original dose 40 mg/m2, Cycle 6, Reason: Anticipated Tolerance), 30 mg/m2 (original dose 40 mg/m2, Cycle 7, Reason: Dose Not Tolerated)  Administration: 70 mg (9/30/2024), 70 mg (10/7/2024), 70 mg (10/14/2024),  70 mg (10/21/2024), 70 mg (10/28/2024), 58.2 mg (11/4/2024), 58.2 mg (11/11/2024)  sodium chloride, 500 mL, Intravenous, Once, 7 of 7 cycles  Administration: 500 mL (9/30/2024), 500 mL (9/30/2024), 500 mL (10/7/2024), 500 mL (10/7/2024), 500 mL (10/14/2024), 500 mL (10/14/2024), 500 mL (10/21/2024), 500 mL (10/21/2024), 500 mL (10/28/2024), 500 mL (10/28/2024), 500 mL (11/4/2024), 500 mL (11/4/2024), 500 mL (11/11/2024), 500 mL (11/11/2024)  fosaprepitant (EMEND) IVPB, 150 mg, Intravenous, Once, 7 of 7 cycles  Administration: 150 mg (9/30/2024), 150 mg (10/7/2024), 150 mg (10/14/2024), 150 mg (10/21/2024), 150 mg (10/28/2024), 150 mg (11/4/2024), 150 mg (11/11/2024)     9/30/2024 - 11/25/2024 Radiation    Treatment:  Course: C1    Plan ID Energy Fractions Dose per Fraction (cGy) Dose Correction (cGy) Total Dose Delivered (cGy) Elapsed Days   Orophary_Neck 6X 24 / 30 212 0 5,088 32   Rev CD R Neck 6X 3 / 3 212 0 636 3   Rev OrophNeck 6X 6 / 6 212 0 1,272 7      Treatment dates:  C1: 9/30/2024 - 11/25/2024           Past Medical & Surgical Hx:   Patient Active Problem List   Diagnosis    Mass of right side of neck    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)    Status post radical dissection of neck    Tongue pain    Dysphagia    Hypertension    Protein-calorie malnutrition (HCC)    Chemotherapy-induced nausea    Dehydration    Superficial thrombophlebitis     Past Medical History:   Diagnosis Date    Facial basal cell cancer     GERD (gastroesophageal reflux disease)     diet controlled    Hypertension     Throat cancer (HCC)     Vitamin D deficiency      Past Surgical History:   Procedure Laterality Date    CHOLECYSTECTOMY      COLONOSCOPY N/A 03/22/2016    Procedure: COLONOSCOPY;  Surgeon: Daren Montana MD;  Location: [facility] GI LAB;  Service:     NM CERVICAL LYMPHADEC MODIFIED RADICAL NECK DSJ Right 09/04/2019    Procedure: MODIFIED RADICAL NECK DISSECTION;  Surgeon: Fady Montana MD;  [facility];   Service: ENT    DE CYSTO INSERTION TRANSPROSTATIC IMPLANT SINGLE N/A 04/14/2023    Procedure: CYSTOSCOPY WITH INSERTION UROLIFT;  Surgeon: Rubin Alfonso MD;  [facility];  Service: Urology    DE LAPAROSCOPY SURG CHOLECYSTECTOMY N/A 10/30/2019    Procedure: CHOLECYSTECTOMY LAPAROSCOPIC;  Surgeon: Christiano Cornejo MD;  Location: [facility];  Service: General    DE LARYNGOSCOPY DIRECT OPERATIVE W/BIOPSY N/A 07/17/2019    Procedure: MICROLARYNGOSCOPY DIRECT WITH BIOPSY OF VALLECULAR MASS;  Surgeon: Amol Cordova MD;  [facility];  Service: ENT    DE LARYNGOSCOPY DIRECT OPERATIVE W/BIOPSY N/A 6/19/2024    Procedure: DIRECT LARYNGOSCOPY WITH BIOPSY WITH RIGHT NECK DISSECTION LEVEL TWO AND THREE;  Surgeon: Fady Montana MD;  [facility];  Service: ENT    RADICAL NECK DISSECTION Right 6/19/2024    Procedure: DISSECTION NECK RADICAL;  Surgeon: Fady Montana MD;  [facility];  Service: ENT    RHINOPLASTY      SINUS SURGERY  1994    TRANSORAL ROBOTIC SURGERY (TORS) N/A 07/31/2019    Procedure: ROBOTICALLY ASSISTED PARTIAL GLOSSECTOMY, PARTIAL PHARYNGECTOMY;  Surgeon: Fady Montana MD;  [facility];  Service: ENT    UPPER GASTROINTESTINAL ENDOSCOPY      US GUIDED LYMPH NODE BIOPSY RIGHT  5/6/2024    WISDOM TOOTH EXTRACTION         Review of Medications:   Vitamins, Supplements and Herbals: Taking variety of herbal supplements by naturopathic/ Ayurvedic doctor:   Genistein, Artemisinin, Apigenin, VitD3, Amla (Vit C), CoQ10, Frankincense, Angiostop(Sea Cucumber), Artecin, C-statin, PauD Arco, Black Cumin/Black seed oil. Alternating with: [place], Curcumin, Indole 3 Carbinol, Quercetin, Resveratrol, Vascustatin, Cronaxal (Benegene), Lycopene, Agaricus Murill + IV: Kaplan or Immunity 15   + Immune support tea, Herbal detox tea, Sinus care Jam, Acid balance tincture  He takes these Wednesday-Saturday. Reviewed w/ patient in detail that herbal supplements can interact  "with treatment and have negative side effects.  Refered pt to [facility] \"About Herbs\" website for further information on safety/effectiveness/interactions of supplements.  Pt not taking supplements at this time but plans to restart 12/11/2024      Current Outpatient Medications:     amLODIPine (NORVASC) 5 mg tablet, Take 5 mg by mouth daily at bedtime , Disp: , Rfl:     Cholecalciferol (Vitamin D3) LIQD, Use, Disp: , Rfl:     lisinopril (ZESTRIL) 10 mg tablet, Take 10 mg by mouth daily at bedtime , Disp: , Rfl:     prochlorperazine (COMPAZINE) 10 mg tablet, Take 1 tablet (10 mg total) by mouth every 6 (six) hours as needed for nausea or vomiting (Patient not taking: Reported on 12/2/2024), Disp: 30 tablet, Rfl: 3    silver sulfadiazine (SILVADENE,SSD) 1 % cream, Apply topically daily (Patient not taking: Reported on 12/11/2024), Disp: 50 g, Rfl: 2    solifenacin (VESICARE) 5 mg tablet, Take 1 tablet (5 mg total) by mouth daily (Patient not taking: Reported on 12/11/2024), Disp: 30 tablet, Rfl: 6    Most Recent Lab Results:   Lab Results   Component Value Date    WBC 3.81 (L) 12/04/2024    NEUTROABS 2.68 12/04/2024    CHOL 246 (H) 03/03/2016    TRIG 226 (H) 03/03/2016    HDL 44 03/03/2016    ALT 14 11/19/2024    AST 15 11/19/2024    ALB 4.1 11/19/2024     03/03/2016    SODIUM 139 11/19/2024    SODIUM 139 11/08/2024    K 4.3 11/19/2024    K 4.0 11/08/2024     11/19/2024    BUN 13 11/19/2024    BUN 13 11/08/2024    CREATININE 0.80 11/19/2024    CREATININE 0.80 11/08/2024    EGFR 91 11/19/2024    TSH 1.55 08/30/2022    POCGLU 100 09/11/2024    GLUF 78 11/19/2024    GLUF 112 (H) 11/08/2024    GLUC 95 11/01/2024    HGBA1C 5.7 (H) 04/20/2023    HGBA1C 5.4 10/10/2019    CALCIUM 9.0 11/19/2024    MG 2.0 12/04/2024       Anthropometric Measurements:   Height: 70\"  Ht Readings from Last 1 Encounters:   12/11/24 5' 10\" (1.778 m)     Wt Readings from Last 20 Encounters:   12/11/24 74.5 kg (164 lb 3.2 oz)   12/11/24 74.4 " kg (164 lb)   12/02/24 73.9 kg (163 lb)   11/27/24 73.9 kg (163 lb)   11/13/24 74.8 kg (165 lb)   11/11/24 74.8 kg (164 lb 14.5 oz)   11/06/24 74.8 kg (165 lb)   11/04/24 75 kg (165 lb 5.5 oz)   10/31/24 79.8 kg (176 lb)   10/30/24 79.8 kg (176 lb)   10/28/24 75.1 kg (165 lb 9.1 oz)   10/24/24 76.2 kg (168 lb)   10/21/24 76.4 kg (168 lb 6.9 oz)   10/15/24 75.2 kg (165 lb 12.6 oz)   10/14/24 75.8 kg (167 lb 1.7 oz)   10/09/24 77.1 kg (170 lb)   10/07/24 76.4 kg (168 lb 6.9 oz)   10/02/24 78.5 kg (173 lb)   09/30/24 76.8 kg (169 lb 6.4 oz)   09/18/24 75.8 kg (167 lb)       Weight History:   Usual Weight: 165-170#  Varian: 10/7: 169#, 10/15: 167.6#,  (10/21) 166.4#, 167.4 (10/24), (10/31): 166# (11/18): 164.5#  Home Scale: 157# (10/16/24), 155# (10/28/24), (11/4/24): 156#, (11/18/24): 153#    Oncology Nutrition-Anthropometrics      Flowsheet Row Nutrition from 12/11/2024 in [facility] Nutrition from 11/18/2024 in [facility]   Patient [age] (years): 69 years 69 years   Patient (male) height (in): 70 in 70 in   Current weight (lbs): 164 lbs 165 lbs   Current weight to be used for anthropometric calculations (kg) 74.5 kg 75 kg   BMI: 23.5 23.7   IBW male 166 lb 166 lb   IBW (kg) male 75.5 kg 75.5 kg   IBW % (male) 98.8 % 99.4 %   Adjusted BW (male): 165.5 lbs 165.8 lbs   Adjusted BW in kg (male): 75.2 kg 75.4 kg   % weight change after 1 week: 0.6 % 0 %   Weight change after 1 week (lbs) 1 lbs 0 lbs   % weight change after 1 month: -0.5 % -0.5 %   Weight change after 1 month (lbs) -0.9 lbs -0.8 lbs   % weight change after 3 months: -1.8 % --   Weight change after 3 months (lbs) -3 lbs --            Nutrition-Focused Physical Findings: none observed    Food/Nutrition-Related History & Client/Social History:    Current Nutrition Impact Symptoms:  [] Nausea  [] Reduced Appetite  [] Acid Reflux    [] Vomiting  [] Unintended Wt Loss  [] Malabsorption    [] Diarrhea   [] Unintended Wt Gain  [] Dumping Syndrome    [] Constipation  [] Thick Mucous/Secretions  [] Abdominal Pain    [x] Dysgeusia (Altered Taste)  [x] Xerostomia (Dry Mouth)  [] Gas    [] Dysosmia (Altered Smell)  [] Thrush   [] Difficulty Chewing    [] Oral Mucositis (Sore Mouth) improving [] Fatigue  [] Hyperglycemia   Lab Results   Component Value Date    HGBA1C 5.7 (H) 2023      [] Odynophagia   [] Esophagitis  [x] Other: neuropathy- taking B6, Magnesium, NAC   [] Dysphagia  [] Early Satiety  [] No Problems Eating      Food Allergies & Intolerances: yes: mild shellfish - fingers swell up    Current Diet: High Calorie/High Protein   Current Nutrition Intake: Increased since last visit   Appetite: Fair   Nutrition Route: PO   Oral Care: brushes daily, gargle with salt/baking soda before meals  Full mobility of tongue; continues with exercises from prior SLP  Activity level: Usual ADL's    Diet recall:  Eggs, bread, avocado  Grains, vegetables, salmon  Tried apple & peanut butter- didn't taste great  Few bites mint alex chip ice cream  Protein shake (homemade)    24 hr EN Recall:  PEG in place  Has not used x1 week     Beverages: water 60-80oz    Supplements:   None    Oncology Nutrition-Estimated Needs      Flowsheet Row Nutrition from 2024 in [facility] Diet[facility] Nutrition from 10/16/2024 in [facility]   Weight type used Actual weight Actual weight   Weight in kilograms (kg) used for estimated needs 74.5 kg --   Weight in kilograms (kg) used for estimated needs -- 75.4 kg   Energy needs formula:  25-30 kcal/kg 30-35 kcal/kg   Energy needs based on 25 kcal/k kcal --   Energy needs based on 30 kcal/k kcal --   Energy needs based on 30 kcal/kg: -- 2261 kcal   Energy needs based on 35 kcal/kg: -- 2638 kcal   Protein needs formula: 1.2-1.5 g/kg 1.2-1.5 g/kg   Protein needs based on 1.2 g/k g 90 g   Protein needs based on 1.5 g/kg 112 g 113  g   Fluid needs formula: 25-30 mL/kg 30-35 mL/kg   Fluid needs based on 25 mL/kg 1875 mL --   Fluid needs in ounces 63 oz --   Fluid needs based on 30 mL/kg 2250 mL --   Fluid needs in ounces 76 oz --   Fluid needs based on 30 mL/kg -- 2265 mL   Fluid needs in ounces -- 77 oz   Fluid needs based on 35 mL/kg -- 2643 mL   Fluid needs in ounces -- 89 oz         Would aim for higher end of Kcal range d/t recovering from Tx    Discussion & Intervention:   Jesus was evaluated today for an RD follow up regarding HNC and enteral nutrition.  Jesus has completed tx for HNC .  RD met with patient in office today with wife. Pt has transitioned away from feeding tube and is taking all of nutrition orally at this time. He has not used his feeding tube in 1 week and has maintained his weight. He weighs daily. He denies dysphagia. He still has lingering dry mouth and dull taste. He cannot taste salt and dislikes sweet, but tart foods more appealing I.e cranberry. We reviewed ideas. He is eating a well balanced diet despite these obstacles. He is very eager to get tube removed. I encouraged to go another week without using tube to ensure steady weight but feel that patient is ready for feeding tube removal as long as team on board. Reinforced prioritizing protein and utilizing soft foods/oral supplements as needed.    Reviewed 24 hour recall, which revealed an adequate po intake, and discussed ways to optimize nutrient intake to promote healing.  Also reviewed the importance of wt management throughout the tx process and the role of a high kcal/ high protein diet in managing wt and overall health.  Based on today's assessment, discussion included: MNT for: weight maintenance, balanced diet, TF wean, practicing proper oral care, a high kcal/protein diet & food choices to include at all meals & snacks (Examples of high kcal foods: cheese, full-fat dairy products, nut butter, plant-based fats, coconut oil/milk, avocado,  butter, cream soup, etc. Examples of high protein foods: eggs, chicken, fish, beans/legumes, nuts/nut butters, bone broth, etc.) , choosing a variety of colorful, plant-based foods to support a cancer preventative diet, fortifying foods for added kcal and protein (examples include: adding cheese to foods such as eggs, mashed potatoes, casseroles, etc.; Making oatmeal with whole milk rather than water; Making fortified mashed potatoes with cream, butter, dry milk powder, plain Greek yogurt, and cheese.), adequate hydration & fluid choices, sipping on calorie containing beverages (examples include: adding whole milk or cream to coffee, oral nutrition supplements, juice, electrolyte replacement beverages, milk, etc.), utilizing oral nutrition supplements, and adding extra fat to foods while cooking such as butter, plant-based oil, coconut oil/milk, avocado, nut butters, etc.   Moving forward, Jesus will continue current nutrition plan of care and will practice MNT for nutrition impact sx management   Materials Provided:  N/A  All questions and concerns addressed during today’s visit.  Jesus has RD contact information.    Nutrition Diagnosis:   Increased Nutrient Needs (kcal & pro) related to increased demand for nutrients and disease state as evidenced by recovering from cancer tx.  Altered GI Function related to HNC treatment as evidenced by Dysgeusia (Altered Taste) and Xerostomia (Dry Mouth).  Monitoring & Evaluation:   Goals:   weight maintenance/stabilization  adequate nutrition impact symptom management  pt to meet >/=75% estimated nutrition needs daily    Progress Towards Goals: Progressing    Nutrition Rx & Recommendations:  Diet: High Kcal, High Protein, Nutrient-dense  Small, frequent meals/snacks may be easier to tolerate than 3 large daily meals.  Aim for 5-6 small meals per day.  Include protein at all meals/snacks.  Include a variety of foods (as tolerated/allowed by diet).  Incorporate  physical activity as able/allowed.  Stay hydrated by sipping fluids of choice/tolerance throughout the day.  Liquid nutrition may be better tolerated than solids at times.  Alter food choices and eating patterns to accommodate changing needs.  Refer to Eating Hints book for other meal/snack ideas and symptom management.  Avoid spicy, acidic, sharp/hard/crunchy foods & carbonated beverages as needed.  For lack of taste: Practice good oral care. Blend fresh fruits into shakes, ice cream or yogurt.  Eat frozen fruits: grapes, chopped cantaloupe, watermelon.  Select fresh vegetables (may be more appealing than canned/frozen).  Add extra flavor to foods: experiment with spices, salt & sweeteners, extra oil/butter, acidic foods; marinate meats (see pages 29-30 in your Eating Hints book).  For dry mouth: sip water throughout the day; try very sweet (or tart, as tolerated) drinks; chew gum or suck on hard candy, popsicles, & ice chips; eat easy to swallow foods (such as pureed cooked foods or soups); moisten food with sauce/gravy/salad dressing; do not drink beer, wine, or any type of alcohol; keep lips moist with lip balm; avoid tobacco products & second-hand smoke; try Biotene as needed (see pages 17-18 in your Eating Hints book).  Follow proper oral care; Try baking soda/salt water rinse recipe (mix 3/4 tsp salt + 1 tsp baking soda + 1 qt water; rinse with pain water after using) in Eating Hints book (pg 18).  Brush your teeth before/after meals & before bed.  Weigh yourself regularly. If you notice weight loss, make an effort to increase your daily food/calorie intake. If you continue to notice loss after these efforts, reach out to your dietitian to establish a plan to stabilize weight.  For dry mouth: can try coconut oil, xylitol, and added moisture to foods  Flush tube with 60mL water 1-2x/day while not in use.     Follow Up Plan:  Brief visit x1 week for progress on oral intake  Recommend Referral to Other  Providers: none at this time

## 2024-12-11 NOTE — PROGRESS NOTES
Jesus ALFRED Page 1955 is a 69 y.o. 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  He underwent partial glossectomy and partial pharyngectomy on 19. 0 out of 34 lymph nodes identified and there were no high risk features. He did not receive any adjuvant therapy and continued follow up. Subsequent CT and PET/CT revealed multiple hypermetabolic lymph nodes in the right neck but no obvious primary recurrence. Repeat laryngoscopy and right neck dissection completed 24 with 6 out of 7 lymph nodes positive for metastatic squamous cell carcinoma. He completed chemoradiation on 24. He returns today for 2 week follow up.       24 Dr. Talbot  Skin improved, completed RT alone 24.  Swallowing an issue-PEG dependent but having issues with swallowing muscles. Will have him see SLP.   Repeat labs  Follow up in 2 weeks      Upcomin24 Dr. Talbot after Radiation follow up  24 Nutrition    Follow up visit     Oncology History   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)   2019 Initial Diagnosis    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)     2024 Biopsy    Final Diagnosis   A. 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     See Note.     -- Confirmed by positive Pankeratin (CKAE1/3), p40 and normal wild-type expression       on p53 immunostains.    -- p16 immunostain positive (>70% diffuse and strong nuclear and cytoplasmic        staining); high-risk HPV EH pending; result will be reported in an addendum.      B. Lymph Node, Regional Resection, Level 2 lymph nodes, see comments:  - Metastatic squamous cell carcinoma, keratinizing, involving at least two lymph nodes (2/2).     -- Present in 1.0 cm lymph node and additional detached lymph node(s)/fragments.     -- Largest metastatic focus: 0.3 cm; Extranodal extension: Not  identified.    -- Confirmed by positive Pankeratin (CKAE1/3) and p40 immunostains.    -- p16 immunostain positive (>70% diffuse and strong nuclear and cytoplasmic        staining).     C. Lymph Node, Regional Resection, Additional level 2 lymph nodes:  - Metastatic squamous cell carcinoma, keratinizing, involving 3 of 4 lymph     nodes/fragments (3/4).     -- Largest metastatic focus: 1 cm; Extranodal extension: Present, extranodal         lymphovascular invasion identified.    -- Confirmed by positive Pankeratin (CKAE1/3) and p40 immunostains.    -- p16 immunostain positive (>70% diffuse and strong nuclear and cytoplasmic        staining); high-risk HPV EH pending; result will be reported in an addendum. See Note.      D. Lymph Node, Level 3 lymph nodes, lymph adenectomy:  - One lymph node positive for metastatic squamous cell carcinoma, keratinizing (1/1).     -- Largest metastatic focus: 1.1 cm; Extranodal extension: Not identified.    -- Confirmed by positive Pankeratin (CKAE1/3) and p40 immunostains.    -- p16 immunostain positive (>70% diffuse and strong nuclear and cytoplasmic        staining).  - Portion(s) of benign salivary gland with adjacent detached small (< 0.2 cm) portion      of carcinoma.   - Traumatic neuroma.          Comments:   This is an appended report. These results have been appended to a previously preliminary verified report.           7/3/2024 -  Cancer Staged    Staging form: Pharynx - Oropharynx, AJCC 8th Edition  - Clinical stage from 7/3/2024: Stage I (cT1, cN1, cM0, p16+) - Signed by Fady Morrow MD on 7/3/2024  Stage prefix: Initial diagnosis       9/30/2024 - 11/11/2024 Chemotherapy    alteplase (CATHFLO), 2 mg, Intracatheter, Every 1 Minute as needed, 7 of 7 cycles  palonosetron (ALOXI), 0.25 mg, Intravenous, Once, 6 of 6 cycles  Administration: 0.25 mg (10/7/2024), 0.25 mg (10/14/2024), 0.25 mg (10/21/2024), 0.25 mg (10/28/2024), 0.25 mg (11/4/2024), 0.25 mg  (11/11/2024)  CISplatin (PLATINOL) infusion, 70 mg (original dose 40 mg/m2), Intravenous, Once, 7 of 7 cycles  Dose modification: 70 mg (original dose 40 mg/m2, Cycle 1, Reason: Other (Must fill in a comment), Comment: pharmacy dictates), 30 mg/m2 (original dose 40 mg/m2, Cycle 6, Reason: Anticipated Tolerance), 30 mg/m2 (original dose 40 mg/m2, Cycle 7, Reason: Dose Not Tolerated)  Administration: 70 mg (9/30/2024), 70 mg (10/7/2024), 70 mg (10/14/2024), 70 mg (10/21/2024), 70 mg (10/28/2024), 58.2 mg (11/4/2024), 58.2 mg (11/11/2024)  sodium chloride, 500 mL, Intravenous, Once, 7 of 7 cycles  Administration: 500 mL (9/30/2024), 500 mL (9/30/2024), 500 mL (10/7/2024), 500 mL (10/7/2024), 500 mL (10/14/2024), 500 mL (10/14/2024), 500 mL (10/21/2024), 500 mL (10/21/2024), 500 mL (10/28/2024), 500 mL (10/28/2024), 500 mL (11/4/2024), 500 mL (11/4/2024), 500 mL (11/11/2024), 500 mL (11/11/2024)  fosaprepitant (EMEND) IVPB, 150 mg, Intravenous, Once, 7 of 7 cycles  Administration: 150 mg (9/30/2024), 150 mg (10/7/2024), 150 mg (10/14/2024), 150 mg (10/21/2024), 150 mg (10/28/2024), 150 mg (11/4/2024), 150 mg (11/11/2024)     9/30/2024 - 11/25/2024 Radiation    Treatment:  Course: C1    Plan ID Energy Fractions Dose per Fraction (cGy) Dose Correction (cGy) Total Dose Delivered (cGy) Elapsed Days   Orophary_Neck 6X 24 / 30 212 0 5,088 32   Rev CD R Neck 6X 3 / 3 212 0 636 3   Rev OrophNeck 6X 6 / 6 212 0 1,272 7      Treatment dates:  C1: 9/30/2024 - 11/25/2024             Review of Systems:  Review of Systems   Constitutional:  Negative for appetite change (able to eat and drink. still has loss of taste) and fatigue.        Has not used PEG tube for feedings in over a week. Continues with water flushes.    HENT: Negative.  Negative for sore throat, trouble swallowing and voice change.    Eyes: Negative.    Respiratory: Negative.     Cardiovascular: Negative.    Gastrointestinal:  Positive for constipation (occasional).         PEG tube in place   Endocrine: Negative.    Genitourinary:  Positive for frequency.   Musculoskeletal: Negative.    Skin: Negative.         Skin healed. Itches and feels tight.    Allergic/Immunologic: Negative.    Neurological:  Positive for light-headedness (mild yesterday).   Hematological: Negative.    Psychiatric/Behavioral: Negative.           Health Maintenance   Topic Date Due    Hepatitis C Screening  Never done    Medicare Annual Wellness Visit (AWV)  Never done    Pneumococcal Vaccine: 65+ Years (1 of 2 - PCV) Never done    RSV Vaccine Age 60+ Years (1 - Risk 60-74 years 1-dose series) Never done    Zoster Vaccine (1 of 2) 09/05/2017    PT PLAN OF CARE  11/30/2018    OT PLAN OF CARE  09/24/2021    Influenza Vaccine (1) 09/01/2024    COVID-19 Vaccine (6 - 2024-25 season) 09/01/2024    Depression Screening  07/03/2025    Fall Risk  07/29/2025    BMI: Adult  12/02/2025    Colorectal Cancer Screening  03/22/2026    RSV Vaccine age 0-20 Months  Aged Out    HIB Vaccine  Aged Out    IPV Vaccine  Aged Out    Hepatitis A Vaccine  Aged Out    Meningococcal ACWY Vaccine  Aged Out    HPV Vaccine  Aged Out     Patient Active Problem List   Diagnosis    Mass of right side of neck    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)    Status post radical dissection of neck    Tongue pain    Dysphagia    Hypertension    Protein-calorie malnutrition (HCC)    Chemotherapy-induced nausea    Dehydration    Superficial thrombophlebitis     Past Medical History:   Diagnosis Date    Facial basal cell cancer     GERD (gastroesophageal reflux disease)     diet controlled    Hypertension     Throat cancer (HCC)     Vitamin D deficiency      Past Surgical History:   Procedure Laterality Date    CHOLECYSTECTOMY      COLONOSCOPY N/A 03/22/2016    Procedure: COLONOSCOPY;  Surgeon: Daren Montana MD;  Location: [facility];  Service:     DC CERVICAL LYMPHADEC MODIFIED RADICAL NECK DSJ Right 09/04/2019    Procedure: MODIFIED RADICAL  NECK DISSECTION;  Surgeon: Fady Montana MD;  Location[facility];  Service: ENT    MD CYSTO INSERTION TRANSPROSTATIC IMPLANT SINGLE N/A 04/14/2023    Procedure: CYSTOSCOPY WITH INSERTION UROLIFT;  Surgeon: Rubin Alfonso MD;  [facility];  Service: Urology    MD LAPAROSCOPY SURG CHOLECYSTECTOMY N/A 10/30/2019    Procedure: CHOLECYSTECTOMY LAPAROSCOPIC;  Surgeon: Christiano Cornejo MD;  Location: [facility];  Service: General    MD LARYNGOSCOPY DIRECT OPERATIVE W/BIOPSY N/A 07/17/2019    Procedure: MICROLARYNGOSCOPY DIRECT WITH BIOPSY OF VALLECULAR MASS;  Surgeon: Amol Cordova MD;  Location[facility];  Service: ENT    MD LARYNGOSCOPY DIRECT OPERATIVE W/BIOPSY N/A 6/19/2024    Procedure: DIRECT LARYNGOSCOPY WITH BIOPSY WITH RIGHT NECK DISSECTION LEVEL TWO AND THREE;  Surgeon: Fady Montana MD;  [facility];  Service: ENT    RADICAL NECK DISSECTION Right 6/19/2024    Procedure: DISSECTION NECK RADICAL;  Surgeon: Fady Montana MD;  [facility];  Service: ENT    RHINOPLASTY      SINUS SURGERY  1994    TRANSORAL ROBOTIC SURGERY (TORS) N/A 07/31/2019    Procedure: ROBOTICALLY ASSISTED PARTIAL GLOSSECTOMY, PARTIAL PHARYNGECTOMY;  Surgeon: Fady Montana MD;  [facility];  Service: ENT    UPPER GASTROINTESTINAL ENDOSCOPY      US GUIDED LYMPH NODE BIOPSY RIGHT  5/6/2024    WISDOM TOOTH EXTRACTION       Family History   Problem Relation Age of Onset    Cancer Mother     Diabetes Mother     Heart failure Father     Seizures Father      Social History     Socioeconomic History    Marital status: /Civil Union     Spouse name: Not on file    Number of children: Not on file    Years of education: Not on file    Highest education level: Not on file   Occupational History    Not on file   Tobacco Use    Smoking status: Never    Smokeless tobacco: Never   Vaping Use    Vaping status: Never Used   Substance and Sexual Activity    Alcohol use: Not Currently     Alcohol/week:  5.0 standard drinks of alcohol     Types: 5 Cans of beer per week     Comment: on occ    Drug use: Not Currently    Sexual activity: Yes     Partners: Female     Birth control/protection: Male Sterilization   Other Topics Concern    Not on file   Social History Narrative    Not on file     Social Drivers of Health     Financial Resource Strain: Not on file   Food Insecurity: No Food Insecurity (6/19/2024)    Nursing - Inadequate Food Risk Classification     Worried About Running Out of Food in the Last Year: Never true     Ran Out of Food in the Last Year: Never true     Ran Out of Food in the Last Year: Not on file   Transportation Needs: No Transportation Needs (6/19/2024)    PRAPARE - Transportation     Lack of Transportation (Medical): No     Lack of Transportation (Non-Medical): No   Physical Activity: Not on file   Stress: Not on file   Social Connections: Not on file   Intimate Partner Violence: Not on file   Housing Stability: Low Risk  (6/19/2024)    Housing Stability Vital Sign     Unable to Pay for Housing in the Last Year: No     Number of Times Moved in the Last Year: 1     Homeless in the Last Year: No       Current Outpatient Medications:     amLODIPine (NORVASC) 5 mg tablet, Take 5 mg by mouth daily at bedtime , Disp: , Rfl:     Cholecalciferol (Vitamin D3) LIQD, Use, Disp: , Rfl:     lisinopril (ZESTRIL) 10 mg tablet, Take 10 mg by mouth daily at bedtime , Disp: , Rfl:     prochlorperazine (COMPAZINE) 10 mg tablet, Take 1 tablet (10 mg total) by mouth every 6 (six) hours as needed for nausea or vomiting (Patient not taking: Reported on 12/2/2024), Disp: 30 tablet, Rfl: 3    silver sulfadiazine (SILVADENE,SSD) 1 % cream, Apply topically daily (Patient not taking: Reported on 12/2/2024), Disp: 50 g, Rfl: 2    solifenacin (VESICARE) 5 mg tablet, Take 1 tablet (5 mg total) by mouth daily (Patient not taking: Reported on 12/11/2024), Disp: 30 tablet, Rfl: 6  Allergies   Allergen Reactions    Shellfish  Allergy - Food Allergy Edema     HANDS, LIPS    Bee Venom Swelling     AT BITE SITE     Vitals:    12/11/24 0805   BP: 132/80   Pulse: 64   Resp: 18   Temp: (!) 96.8 °F (36 °C)   SpO2: 99%   Weight: 74.4 kg (164 lb)      Pain Score: 0-No pain

## 2024-12-11 NOTE — PROGRESS NOTES
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  He underwent modified right neck dissection (0/34 lymph nodes).  He later presented with multiple hypermetabolic lymph nodes in the right neck without obvious primary site.  He underwent repeat right neck dissection, 7 lymph nodes removed, 6 positive for metastatic disease with extranodal extension.  A specimen in the right pharynx also contained squamous cell carcinoma.  He was evaluated by Dr. 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  Treatment was completed on 24.      He has recovered from treatment related side effects.  Skin is healed. He has mild xerostomia.  He is not using PEG tube for feeding and maintaining his weight.  Going forward, he will return in 2.5 months with PET/CT.  He will re-establish ENT follow up and has ongoing follow up with medical oncology.  He will also continue follow up with his dentist.  Skin care and oral hygiene reviewed.  PEG to be removed once maintaining weight for 2 weeks.  Orders:    NM PET CT skull base to mid thigh; Future        History of Present Illness   Chief Complaint   Patient presents with    Follow-up   As above.    Skin is healed, he denies pain.  He is maintaining his weight and denies difficulty swallowing. He is not using PEG tube for feedings.  He is without additional acute concerns.    Oncology History   Oncology History   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)   2019 Initial Diagnosis    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)      6/19/2024 Biopsy    Final Diagnosis   A. 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     See Note.     -- Confirmed by positive Pankeratin (CKAE1/3), p40 and normal wild-type expression       on p53 immunostains.    -- p16 immunostain positive (>70% diffuse and strong nuclear and cytoplasmic        staining); high-risk HPV EH pending; result will be reported in an addendum.      B. Lymph Node, Regional Resection, Level 2 lymph nodes, see comments:  - Metastatic squamous cell carcinoma, keratinizing, involving at least two lymph nodes (2/2).     -- Present in 1.0 cm lymph node and additional detached lymph node(s)/fragments.     -- Largest metastatic focus: 0.3 cm; Extranodal extension: Not identified.    -- Confirmed by positive Pankeratin (CKAE1/3) and p40 immunostains.    -- p16 immunostain positive (>70% diffuse and strong nuclear and cytoplasmic        staining).     C. Lymph Node, Regional Resection, Additional level 2 lymph nodes:  - Metastatic squamous cell carcinoma, keratinizing, involving 3 of 4 lymph     nodes/fragments (3/4).     -- Largest metastatic focus: 1 cm; Extranodal extension: Present, extranodal         lymphovascular invasion identified.    -- Confirmed by positive Pankeratin (CKAE1/3) and p40 immunostains.    -- p16 immunostain positive (>70% diffuse and strong nuclear and cytoplasmic        staining); high-risk HPV EH pending; result will be reported in an addendum. See Note.      D. Lymph Node, Level 3 lymph nodes, lymph adenectomy:  - One lymph node positive for metastatic squamous cell carcinoma, keratinizing (1/1).     -- Largest metastatic focus: 1.1 cm; Extranodal extension: Not identified.    -- Confirmed by positive Pankeratin (CKAE1/3) and p40 immunostains.    -- p16 immunostain positive (>70% diffuse and strong nuclear and cytoplasmic        staining).  - Portion(s) of benign salivary gland with adjacent  detached small (< 0.2 cm) portion      of carcinoma.   - Traumatic neuroma.          Comments:   This is an appended report. These results have been appended to a previously preliminary verified report.           7/3/2024 -  Cancer Staged    Staging form: Pharynx - Oropharynx, AJCC 8th Edition  - Clinical stage from 7/3/2024: Stage I (cT1, cN1, cM0, p16+) - Signed by Fady Morrow MD on 7/3/2024  Stage prefix: Initial diagnosis       9/30/2024 - 11/11/2024 Chemotherapy    alteplase (CATHFLO), 2 mg, Intracatheter, Every 1 Minute as needed, 7 of 7 cycles  palonosetron (ALOXI), 0.25 mg, Intravenous, Once, 6 of 6 cycles  Administration: 0.25 mg (10/7/2024), 0.25 mg (10/14/2024), 0.25 mg (10/21/2024), 0.25 mg (10/28/2024), 0.25 mg (11/4/2024), 0.25 mg (11/11/2024)  CISplatin (PLATINOL) infusion, 70 mg (original dose 40 mg/m2), Intravenous, Once, 7 of 7 cycles  Dose modification: 70 mg (original dose 40 mg/m2, Cycle 1, Reason: Other (Must fill in a comment), Comment: pharmacy dictates), 30 mg/m2 (original dose 40 mg/m2, Cycle 6, Reason: Anticipated Tolerance), 30 mg/m2 (original dose 40 mg/m2, Cycle 7, Reason: Dose Not Tolerated)  Administration: 70 mg (9/30/2024), 70 mg (10/7/2024), 70 mg (10/14/2024), 70 mg (10/21/2024), 70 mg (10/28/2024), 58.2 mg (11/4/2024), 58.2 mg (11/11/2024)  sodium chloride, 500 mL, Intravenous, Once, 7 of 7 cycles  Administration: 500 mL (9/30/2024), 500 mL (9/30/2024), 500 mL (10/7/2024), 500 mL (10/7/2024), 500 mL (10/14/2024), 500 mL (10/14/2024), 500 mL (10/21/2024), 500 mL (10/21/2024), 500 mL (10/28/2024), 500 mL (10/28/2024), 500 mL (11/4/2024), 500 mL (11/4/2024), 500 mL (11/11/2024), 500 mL (11/11/2024)  fosaprepitant (EMEND) IVPB, 150 mg, Intravenous, Once, 7 of 7 cycles  Administration: 150 mg (9/30/2024), 150 mg (10/7/2024), 150 mg (10/14/2024), 150 mg (10/21/2024), 150 mg (10/28/2024), 150 mg (11/4/2024), 150 mg (11/11/2024)     9/30/2024 - 11/25/2024 Radiation    Treatment:   Course: C1    Plan ID Energy Fractions Dose per Fraction (cGy) Dose Correction (cGy) Total Dose Delivered (cGy) Elapsed Days   Orophary_Neck 6X 24 / 30 212 0 5,088 32   Rev CD R Neck 6X 3 / 3 212 0 636 3   Rev OrophNeck 6X 6 / 6 212 0 1,272 7      Treatment dates:  C1: 9/30/2024 - 11/25/2024            Review of Systems Refer to nursing note.    Current Outpatient Medications on File Prior to Visit   Medication Sig Dispense Refill    amLODIPine (NORVASC) 5 mg tablet Take 5 mg by mouth daily at bedtime       Cholecalciferol (Vitamin D3) LIQD Use      lisinopril (ZESTRIL) 10 mg tablet Take 10 mg by mouth daily at bedtime       prochlorperazine (COMPAZINE) 10 mg tablet Take 1 tablet (10 mg total) by mouth every 6 (six) hours as needed for nausea or vomiting (Patient not taking: Reported on 12/2/2024) 30 tablet 3    silver sulfadiazine (SILVADENE,SSD) 1 % cream Apply topically daily (Patient not taking: Reported on 12/2/2024) 50 g 2    solifenacin (VESICARE) 5 mg tablet Take 1 tablet (5 mg total) by mouth daily (Patient not taking: Reported on 12/11/2024) 30 tablet 6    [DISCONTINUED] acetaminophen (TYLENOL) 160 mg/5 mL liquid Take 20 mL (640 mg total) by mouth every 6 (six) hours as needed for mild pain or moderate pain (Patient not taking: Reported on 12/2/2024) 236 mL 11    [DISCONTINUED] Allergy Relief 25 MG/10ML oral liquid  (Patient not taking: Reported on 12/2/2024)      [DISCONTINUED] atovaquone-proguanil (MALARONE) 250-100 mg Take 1 tablet by mouth daily (Patient not taking: Reported on 12/2/2024)      [DISCONTINUED] Cholecalciferol (Vitamin D3) 125 MCG (5000 UT) TABS every 24 hours (Patient not taking: Reported on 12/2/2024)      [DISCONTINUED] cyclobenzaprine (FLEXERIL) 5 mg tablet Take 1 tablet (5 mg total) by mouth 3 (three) times a day as needed for muscle spasms 50 tablet 0    [DISCONTINUED] diphenhydramine, lidocaine, Al/Mg hydroxide, simethicone (Magic Mouthwash) SUSP Swish and swallow 10 mL every 4  (four) hours as needed for mouth pain or discomfort 500 mL 4    [DISCONTINUED] lidocaine (XYLOCAINE) 5 % ointment Apply topically every 4 (four) hours as needed for mild pain (painful area on neck) (Patient not taking: Reported on 12/2/2024) 240 g 11    [DISCONTINUED] Misc Natural Products (ADRENAL PO) 1 orally as directed (Patient not taking: Reported on 12/2/2024)      [DISCONTINUED] mometasone (ELOCON) 0.1 % cream Apply topically daily Apply thin layer to neck twice daily (not within 4 hrs of radiation) 45 g 1    [DISCONTINUED] nystatin (MYCOSTATIN) 500,000 units/5 mL suspension Apply 5 mL (500,000 Units total) to the mouth or throat 4 (four) times a day (Patient not taking: Reported on 12/2/2024) 400 mL 3    [DISCONTINUED] ondansetron (ZOFRAN-ODT) 8 mg disintegrating tablet Take 1 tablet (8 mg total) by mouth every 8 (eight) hours as needed for nausea or vomiting (Patient not taking: Reported on 12/2/2024) 30 tablet 3    [DISCONTINUED] pantoprazole (PROTONIX) 40 mg tablet Take 1 tablet (40 mg total) by mouth daily 30 tablet 3     No current facility-administered medications on file prior to visit.          Objective   /80   Pulse 64   Temp (!) 96.8 °F (36 °C)   Resp 18   Wt 74.4 kg (164 lb)   SpO2 99%   BMI 23.53 kg/m²     Pain Screening:  Pain Score: 0-No pain  ECOG  0  Physical Exam  HENT:      Head: Normocephalic and atraumatic.      Mouth/Throat:      Mouth: Mucous membranes are dry.      Pharynx: Oropharynx is clear.      Comments: Tongue freely mobile, no exposed bone  Eyes:      General: No scleral icterus.     Extraocular Movements: Extraocular movements intact.   Cardiovascular:      Rate and Rhythm: Normal rate.   Pulmonary:      Effort: Pulmonary effort is normal.   Abdominal:      General: Abdomen is flat. There is no distension.      Comments: PEG in place   Musculoskeletal:         General: Normal range of motion.   Lymphadenopathy:      Cervical: No cervical adenopathy.   Skin:      General: Skin is warm and dry.   Neurological:      General: No focal deficit present.      Mental Status: He is alert.   Psychiatric:         Mood and Affect: Mood normal.          Administrative Statements   I have spent a total time of 15 minutes in caring for this patient on the day of the visit/encounter including

## 2024-12-12 ENCOUNTER — TELEPHONE (OUTPATIENT)
Age: 69
End: 2024-12-12

## 2024-12-12 DIAGNOSIS — C01 MALIGNANT NEOPLASM OF BASE OF TONGUE (HCC): Primary | ICD-10-CM

## 2024-12-12 DIAGNOSIS — R73.01 IMPAIRED FASTING GLUCOSE: ICD-10-CM

## 2024-12-12 NOTE — TELEPHONE ENCOUNTER
Spoke with pt and made appointment with provider to have tube removed in office in three weeks. Pt verified he is not on blood thinner and will remain NPO 4 hours prior.

## 2024-12-12 NOTE — TELEPHONE ENCOUNTER
Called and spoke to Brian. He was worried about his white count being too low to be able to go out to dinner. I let him know that his white count is a little low, but not low enough that he has to avoid going out to public places. I recommended that he avoids anyone that he knows is sick, but, otherwise, he is okay to go out to dinner. He would still like to get his CBC checked tomorrow for peace of mind, as well as his hemoglobin A1C and zinc.  I will place those orders. He is aware he will still needs labs in one month prior to his next appointment with Dr. Talbot.

## 2024-12-13 ENCOUNTER — PATIENT OUTREACH (OUTPATIENT)
Dept: HEMATOLOGY ONCOLOGY | Facility: CLINIC | Age: 69
End: 2024-12-13

## 2024-12-13 NOTE — PROGRESS NOTES
I reached out to Brian now that he has completed his radiation treatments to reassess for any barriers to follow up care and offer support as needed. We reviewed the items below.    When was your last radiation treatment? 11/25/2024    Do you have your FU appointments scheduled with your Oncology team?  Yes    Do you know when you need to go for FU imaging?  yes    Do you know if/when you need to go for FU labs?  yes    Do you need assistance with scheduling any of these items? No, already scheduled     Will you require transportation assistance for these appointments?  no    Do you know who to call if you need any assistance in the future?  yes      Barriers noted previously: Nausea, thrush and mucositis. Neck puffiness and red open areas on neck with associated pain.     Current barriers and interventions provided: Brian denies any barriers to his care since finishing radiation. He has been off of his tube feed for 1 week now and is tolerating oral intake. He has his PEG removal scheduled.  Skin on neck is healing since finishing RT.       He has my direct contact information and I welcome them to reach out if they have any further needs.       Future Appointments   Date Time Provider Department Center   1/6/2025 11:30 AM ADRIANNA Juarez [facility]   1/29/2025  8:00 AM Magali Talbot MD HEM ONC  P[facility]On[facility]   3/3/2025  9:00 AM [facility] PET RM 4 [facility]   3/10/2025  8:00 AM Alexey Ying MD [facility]

## 2024-12-14 ENCOUNTER — APPOINTMENT (OUTPATIENT)
Dept: LAB | Facility: HOSPITAL | Age: 69
End: 2024-12-14
Payer: MEDICARE

## 2024-12-14 DIAGNOSIS — R73.01 IMPAIRED FASTING GLUCOSE: ICD-10-CM

## 2024-12-14 DIAGNOSIS — C01 MALIGNANT NEOPLASM OF BASE OF TONGUE (HCC): ICD-10-CM

## 2024-12-14 LAB
ALBUMIN SERPL BCG-MCNC: 4.3 G/DL (ref 3.5–5)
ALP SERPL-CCNC: 55 U/L (ref 34–104)
ALT SERPL W P-5'-P-CCNC: 21 U/L (ref 7–52)
ANION GAP SERPL CALCULATED.3IONS-SCNC: 5 MMOL/L (ref 4–13)
AST SERPL W P-5'-P-CCNC: 22 U/L (ref 13–39)
BASOPHILS # BLD AUTO: 0.01 THOUSANDS/ÂΜL (ref 0–0.1)
BASOPHILS NFR BLD AUTO: 0 % (ref 0–1)
BILIRUB SERPL-MCNC: 0.63 MG/DL (ref 0.2–1)
BUN SERPL-MCNC: 13 MG/DL (ref 5–25)
CALCIUM SERPL-MCNC: 8.9 MG/DL (ref 8.4–10.2)
CHLORIDE SERPL-SCNC: 105 MMOL/L (ref 96–108)
CO2 SERPL-SCNC: 30 MMOL/L (ref 21–32)
CREAT SERPL-MCNC: 0.74 MG/DL (ref 0.6–1.3)
EOSINOPHIL # BLD AUTO: 0.13 THOUSAND/ÂΜL (ref 0–0.61)
EOSINOPHIL NFR BLD AUTO: 4 % (ref 0–6)
ERYTHROCYTE [DISTWIDTH] IN BLOOD BY AUTOMATED COUNT: 15.1 % (ref 11.6–15.1)
EST. AVERAGE GLUCOSE BLD GHB EST-MCNC: 111 MG/DL
GFR SERPL CREATININE-BSD FRML MDRD: 94 ML/MIN/1.73SQ M
GLUCOSE P FAST SERPL-MCNC: 90 MG/DL (ref 65–99)
HBA1C MFR BLD: 5.5 %
HCT VFR BLD AUTO: 41.9 % (ref 36.5–49.3)
HGB BLD-MCNC: 13.7 G/DL (ref 12–17)
IMM GRANULOCYTES # BLD AUTO: 0.01 THOUSAND/UL (ref 0–0.2)
IMM GRANULOCYTES NFR BLD AUTO: 0 % (ref 0–2)
LDH SERPL-CCNC: 128 U/L (ref 140–271)
LYMPHOCYTES # BLD AUTO: 0.57 THOUSANDS/ÂΜL (ref 0.6–4.47)
LYMPHOCYTES NFR BLD AUTO: 17 % (ref 14–44)
MAGNESIUM SERPL-MCNC: 2 MG/DL (ref 1.9–2.7)
MCH RBC QN AUTO: 29.7 PG (ref 26.8–34.3)
MCHC RBC AUTO-ENTMCNC: 32.7 G/DL (ref 31.4–37.4)
MCV RBC AUTO: 91 FL (ref 82–98)
MONOCYTES # BLD AUTO: 0.33 THOUSAND/ÂΜL (ref 0.17–1.22)
MONOCYTES NFR BLD AUTO: 10 % (ref 4–12)
NEUTROPHILS # BLD AUTO: 2.31 THOUSANDS/ÂΜL (ref 1.85–7.62)
NEUTS SEG NFR BLD AUTO: 69 % (ref 43–75)
NRBC BLD AUTO-RTO: 0 /100 WBCS
PLATELET # BLD AUTO: 173 THOUSANDS/UL (ref 149–390)
PMV BLD AUTO: 9.5 FL (ref 8.9–12.7)
POTASSIUM SERPL-SCNC: 3.7 MMOL/L (ref 3.5–5.3)
PROT SERPL-MCNC: 7.3 G/DL (ref 6.4–8.4)
RBC # BLD AUTO: 4.61 MILLION/UL (ref 3.88–5.62)
SODIUM SERPL-SCNC: 140 MMOL/L (ref 135–147)
WBC # BLD AUTO: 3.36 THOUSAND/UL (ref 4.31–10.16)

## 2024-12-14 PROCEDURE — 80053 COMPREHEN METABOLIC PANEL: CPT

## 2024-12-14 PROCEDURE — 83735 ASSAY OF MAGNESIUM: CPT

## 2024-12-14 PROCEDURE — 83036 HEMOGLOBIN GLYCOSYLATED A1C: CPT

## 2024-12-14 PROCEDURE — 84630 ASSAY OF ZINC: CPT

## 2024-12-14 PROCEDURE — 83615 LACTATE (LD) (LDH) ENZYME: CPT

## 2024-12-14 PROCEDURE — 85025 COMPLETE CBC W/AUTO DIFF WBC: CPT

## 2024-12-14 PROCEDURE — 36415 COLL VENOUS BLD VENIPUNCTURE: CPT

## 2024-12-17 LAB — ZINC SERPL-MCNC: 88 UG/DL (ref 44–115)

## 2025-01-06 ENCOUNTER — OFFICE VISIT (OUTPATIENT)
Age: 70
End: 2025-01-06
Payer: MEDICARE

## 2025-01-06 VITALS
SYSTOLIC BLOOD PRESSURE: 122 MMHG | BODY MASS INDEX: 23.05 KG/M2 | TEMPERATURE: 96.2 F | OXYGEN SATURATION: 98 % | DIASTOLIC BLOOD PRESSURE: 78 MMHG | HEIGHT: 70 IN | WEIGHT: 161 LBS | HEART RATE: 83 BPM

## 2025-01-06 DIAGNOSIS — Z43.1 PEG (PERCUTANEOUS ENDOSCOPIC GASTROSTOMY) ADJUSTMENT/REPLACEMENT/REMOVAL (HCC): Primary | ICD-10-CM

## 2025-01-06 PROCEDURE — PBNCHG PB NO CHARGE PLACEHOLDER: Performed by: DIETITIAN, REGISTERED

## 2025-01-06 PROCEDURE — 43762 RPLC GTUBE NO REVJ TRC: CPT | Performed by: DIETITIAN, REGISTERED

## 2025-01-06 NOTE — PROGRESS NOTES
Name: Jesus Hdz      : 1955      MRN: [id number]  Encounter Provider: Sal José PA-C  Encounter Date: 2025   Encounter department: [facility]  :  Assessment & Plan  PEG (percutaneous endoscopic gastrostomy) adjustment/replacement/removal (HCC)  69 y.o. 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  Patient is feeling great.  He has not used his PEG tube for ~5 weeks now.  His taste is coming back and he is enjoying exploring foods.  His weight is stable ~160-165 lb.  He denies any GI concerns including abdominal pain, nausea, vomiting, issues with bowel habits.    Orders:  •  Peg tube change  -PEG tube removed easily without any immediate complications in office today.  Patient tolerated procedure well.  PEG site was covered with gauze and patient was educated that gastrostomy tends to close on its own within 24 hours.  OK to eat/drink as normal.  -Patient can follow up as needed.      History of Present Illness   HPI  Jesus Hdz is a 69 y.o. 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    Reviewed recent oncology note on 2024.  At that time, patient had been eating well and not using PEG x 8 days.  Weight stable ~165 lb.    Patient is feeling great.  He has not used his PEG tube for ~5 weeks now.  His taste is coming back and he is enjoying exploring foods.  His weight is stable ~160-165 lb.  He denies any GI concerns including abdominal pain, nausea, vomiting, issues with bowel habits.  He is here to have his tube removed.        Review of Systems   All other systems reviewed and are negative.         Objective   /78 (BP Location: Left arm,  "Patient Position: Sitting, Cuff Size: Standard)   Pulse 83   Temp (!) 96.2 °F (35.7 °C) (Tympanic)   Ht 5' 10\" (1.778 m)   Wt 73 kg (161 lb)   SpO2 98%   BMI 23.10 kg/m²      Physical Exam  HENT:      Head: Normocephalic and atraumatic.   Eyes:      Conjunctiva/sclera: Conjunctivae normal.   Pulmonary:      Effort: Pulmonary effort is normal.   Skin:     Coloration: Skin is not jaundiced or pale.   Neurological:      General: No focal deficit present.   Psychiatric:         Mood and Affect: Mood normal.         Behavior: Behavior normal.           Peg tube change     Date/Time  1/6/2025 11:30 AM     Performed by  Sal José PA-C   Authorized by  Sal José PA-C     Universal Protocol   Consent: Verbal consent obtained.  Consent given by: patient  Patient consent: the patient's understanding of the procedure matches consent given     Procedure Details   Procedure Notes: PEG tube removed with scant amount of bleeding.  Pressure was held to achieve hemostasis.  Gastrostomy site was dressed with gauze.  Patient tolerance: patient tolerated the procedure well with no immediate complications               "

## 2025-01-07 ENCOUNTER — TELEPHONE (OUTPATIENT)
Age: 70
End: 2025-01-07

## 2025-01-07 NOTE — TELEPHONE ENCOUNTER
Called and left a message that due to a change in the providers morning schedule appt for 1/29 has been moved from 8 am to 1240 pm on the same date(1/29)  Left [employer] if time does not work for patient.

## 2025-02-16 NOTE — ASSESSMENT & PLAN NOTE
Malnutrition Findings:                               BMI Findings:           There is no height or weight on file to calculate BMI.     Summary/Recommendations

## 2025-02-16 NOTE — ASSESSMENT & PLAN NOTE
recurrent vs new primary RBOT/pharnyx     Recurrent right level 2 node s/p partial glossectomy/pharyngectomy 7/31/2029 with no adjuvant CRT 0/34 nodes at the time and no high risk features       On 6/19/2024 he was taken to the operating room for direct laryngoscopy as well as repeat right neck dissection.  7 lymph nodes were removed, 6 of which were positive for metastatic squamous cell carcinoma, keratinizing, p16 positive, with extranodal extension identified.  The largest lymph node measured 1.1 cm.  On the pathology report there is a specimen labeled right pharynx which also contained squamous cell carcinoma     PET scan 05/22/2024      1. Scattered FDG-avid right cervical chain lymph nodes compatible with disease recurrence.  2. No suspicious uptake in the oropharynx or hypopharynx.  3. No findings for hypermetabolic metastasis to the chest, abdomen or pelvis.  \  No prior XRT or chemotherapy.     Treatment Cisplatin 40 mg/m2 weekly x 7 doses with IMRT     C1 9/30/2024  C2  10/7/2024  C3 10/14/2024  C4 10/21/2024  C5 10/28/2024  C6  11/4/2024     C7   11/11/2024-completed; RT held      Completed RT 25 Nov 2024 2/19/2025     Weight improved 160 pounds; PEG removed    Grade 3 radiation dermatitis resolved-discussed skin protection as going to [place]     Restaging 3/3/305    Labs did not include TFTs as he was supposed to have been seen 1 month ago and then after PET scan where TFTs would be done at 3 month carolyne    Labs 2/17/2025 with Na 141 K 4.4 Cr 0.74 Ca 9.5 ALT/AST 16/17 TB 0.43  Mg 2.1 WBC 4/8 Hgb 13.2 MCV 90 plts 186

## 2025-02-16 NOTE — PROGRESS NOTES
Name: Jesus Hdz      : 1955      MRN: [id number]  Encounter Provider: Magali Talbot MD  Encounter Date: 2025   Encounter department: [facility]AT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  7 lymph nodes were removed, 6 of which were positive for metastatic squamous cell carcinoma, keratinizing, p16 positive, with extranodal extension identified.  The largest lymph node measured 1.1 cm.  On the pathology report there is a specimen labeled right pharynx which also contained squamous cell carcinoma     PET scan 2024      1. Scattered FDG-avid right cervical chain lymph nodes compatible with disease recurrence.  2. No suspicious uptake in the oropharynx or hypopharynx.  3. No findings for hypermetabolic metastasis to the chest, abdomen or pelvis.  \  No prior XRT or chemotherapy.     Treatment Cisplatin 40 mg/m2 weekly x 7 doses with IMRT     C1 2024  C2  10/7/2024  C3 10/14/2024  C4 10/21/2024  C5 10/28/2024  C6  2024     C7   2024-completed; RT held      Completed RT 2025     Weight improved 160 pounds; PEG removed    Grade 3 radiation dermatitis resolved-discussed skin protection as going to [place]     Restaging 3/3/305    Labs did not include TFTs as he was supposed to have been seen 1 month ago and then after PET scan where TFTs would be done at 3 month carolyne    Labs 2025 with Na 141 K 4.4 Cr 0.74 Ca 9.5 ALT/AST  TB 0.43  Mg 2.1 WBC  Hgb 13.2 MCV 90 plts 186        Mass of right side of neck  prior RBOT cancer;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 S/p partial glossectomy and partial pharyngectomy on 07/31/2019, pathology negative for malignancy. S/p MRND right neck 09/04/2019 returned 0/34 nodes. Staged as T1N0 M0       PET scan was done 12/03/19 which showed mild asymmetry at the tongue base, likely post operative. Otherwise no distant metastasis. Prior imaging from June 2019 was clear with no evidence of disease.         Restaging PET 3/3/2025-    Summary/Recommendations    PET scan 3/3/2025    Needs NPL exam suggested see ENT in April 2025    Rad Onc after PET scan in March 2025    Will see in 3 months with 3 month follow up scan CT neck/chest with labs including TFTs     Follow up 3 months       History of Present Illness   No chief complaint on file.    History:   7/3/2024     69 year old with HTN with prior RBOT cancer;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 S/p partial glossectomy and partial pharyngectomy on 07/31/2019, pathology negative for malignancy. S/p MRND right neck 09/04/2019 returned 0/34 nodes. Staged as T1N0 M0       PET scan was done 12/03/19 which showed mild asymmetry at the tongue base, likely post operative. Otherwise no distant metastasis. Prior imaging from June 2019 was clear with no evidence of disease.      There were no high risk features to speak of and he did not receive any adjuvant therapies.       He then did well in follow-up until approximately 3 to 4 months ago when he noticed a palpable lump on the right neck.  Subsequent imaging with CT and PET/CT revealed multiple hypermetabolic lymph nodes in the right neck but no obvious primary recurrence, contralateral or distant disease.  On 6/19/2024 he was taken to the operating room for direct laryngoscopy as well as repeat right neck dissection.  7 lymph nodes were removed, 6 of which were positive for metastatic squamous cell carcinoma, keratinizing, p16 positive, with extranodal extension identified.  The largest lymph node measured 1.1  "cm.  On the pathology report there is a specimen labeled right pharynx which also contained squamous cell carcinoma.  Looking at the operative report, they describe a small palpable prominence in the \"left pharynx near base of tongue.\"  This is in contradiction to the pathology report which described the specimen as from the right pharynx.            Ct neck 04/24/2024      New rounded lymph node in the palpable region of interest superficial to the sternocleidomastoid muscle measuring 9 x 6 mm. Differential diagnosis should include recurrent disease versus reactive lymphadenopathy.     Small cluster of right level 2B cervical lymph nodes also noted as described above with otherwise no suspicious adenopathy identified in the remainder of the neck.     Status post right-sided partial glossectomy without definite nodular enhancement in the operative bed.     05/06/2024 FNAB under US  Lymph Node, right level 2 (ThinPrep, smears and cell block preparations ):  Conclusive evidence of malignancy.  Carcinoma with keratinization, compatible with known squamous cell carcinoma     PET scan 05/22/2024      1. Scattered FDG-avid right cervical chain lymph nodes compatible with disease recurrence.  2. No suspicious uptake in the oropharynx or hypopharynx.  3. No findings for hypermetabolic metastasis to the chest, abdomen or pelvis.  \  No prior XRT or chemotherapy.      The patient is seeing Rad Onc later today     He is very pleased with the RND healing.  We had a long discussion as to second primary vs recurrence; treatment with CRT either 6 or 7 weeks.  As this is likely a second primary, would do 7 weeks but again, not definitively shown on PET.  Will get MRI face/neck/orbit to further assess.  He has been doing well post op.  Weight stable 169 pounds.  Has altered diet, eating healthier.  Has no issues, denies any SOB/KOHLI, CP change in bowel/bladder, blood stool/urine       Interval History 12/11/2024     Doing better as " above; see discussion in A/P     Labs last done 19 Nov 2024 as above     Will consider removing PEG next week as eating orally including breads, not using PEG except to flush.      2/19/2025    Was supposed to have been seen in Jan; felt didn't need appointment.  PEG removed weight stable 160 orally.  Skin well healed and did discuss need for significantskin protection from sun as going to [place].  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     See Note.     -- Confirmed by positive Pankeratin (CKAE1/3), p40 and normal wild-type expression       on p53 immunostains.    -- p16 immunostain positive (>70% diffuse and strong nuclear and cytoplasmic        staining); high-risk HPV EH pending; result will be reported in an addendum.      B. Lymph Node, Regional Resection, Level 2 lymph nodes, see comments:  - Metastatic squamous cell carcinoma, keratinizing, involving at least two lymph nodes (2/2).     -- Present in 1.0 cm lymph node and additional detached lymph node(s)/fragments.     -- Largest metastatic focus: 0.3 cm; Extranodal extension: Not identified.    -- Confirmed by positive Pankeratin (CKAE1/3) and p40 immunostains.    -- p16 immunostain positive (>70% diffuse and strong nuclear and cytoplasmic        staining).     C. Lymph Node, Regional Resection, Additional level 2 lymph nodes:  - Metastatic  squamous cell carcinoma, keratinizing, involving 3 of 4 lymph     nodes/fragments (3/4).     -- Largest metastatic focus: 1 cm; Extranodal extension: Present, extranodal         lymphovascular invasion identified.    -- Confirmed by positive Pankeratin (CKAE1/3) and p40 immunostains.    -- p16 immunostain positive (>70% diffuse and strong nuclear and cytoplasmic        staining); high-risk HPV EH pending; result will be reported in an addendum. See Note.      D. Lymph Node, Level 3 lymph nodes, lymph adenectomy:  - One lymph node positive for metastatic squamous cell carcinoma, keratinizing (1/1).     -- Largest metastatic focus: 1.1 cm; Extranodal extension: Not identified.    -- Confirmed by positive Pankeratin (CKAE1/3) and p40 immunostains.    -- p16 immunostain positive (>70% diffuse and strong nuclear and cytoplasmic        staining).  - Portion(s) of benign salivary gland with adjacent detached small (< 0.2 cm) portion      of carcinoma.   - Traumatic neuroma.          Comments:   This is an appended report. These results have been appended to a previously preliminary verified report.           7/3/2024 -  Cancer Staged    Staging form: Pharynx - Oropharynx, AJCC 8th Edition  - Clinical stage from 7/3/2024: Stage I (cT1, cN1, cM0, p16+) - Signed by Fady Morrow MD on 7/3/2024  Stage prefix: Initial diagnosis       9/30/2024 - 11/11/2024 Chemotherapy    alteplase (CATHFLO), 2 mg, Intracatheter, Every 1 Minute as needed, 7 of 7 cycles  palonosetron (ALOXI), 0.25 mg, Intravenous, Once, 6 of 6 cycles  Administration: 0.25 mg (10/7/2024), 0.25 mg (10/14/2024), 0.25 mg (10/21/2024), 0.25 mg (10/28/2024), 0.25 mg (11/4/2024), 0.25 mg (11/11/2024)  CISplatin (PLATINOL) infusion, 70 mg (original dose 40 mg/m2), Intravenous, Once, 7 of 7 cycles  Dose modification: 70 mg (original dose 40 mg/m2, Cycle 1, Reason: Other (Must fill in a comment), Comment: pharmacy dictates), 30 mg/m2 (original dose 40 mg/m2, Cycle 6,  Reason: Anticipated Tolerance), 30 mg/m2 (original dose 40 mg/m2, Cycle 7, Reason: Dose Not Tolerated)  Administration: 70 mg (9/30/2024), 70 mg (10/7/2024), 70 mg (10/14/2024), 70 mg (10/21/2024), 70 mg (10/28/2024), 58.2 mg (11/4/2024), 58.2 mg (11/11/2024)  sodium chloride, 500 mL, Intravenous, Once, 7 of 7 cycles  Administration: 500 mL (9/30/2024), 500 mL (9/30/2024), 500 mL (10/7/2024), 500 mL (10/7/2024), 500 mL (10/14/2024), 500 mL (10/14/2024), 500 mL (10/21/2024), 500 mL (10/21/2024), 500 mL (10/28/2024), 500 mL (10/28/2024), 500 mL (11/4/2024), 500 mL (11/4/2024), 500 mL (11/11/2024), 500 mL (11/11/2024)  fosaprepitant (EMEND) IVPB, 150 mg, Intravenous, Once, 7 of 7 cycles  Administration: 150 mg (9/30/2024), 150 mg (10/7/2024), 150 mg (10/14/2024), 150 mg (10/21/2024), 150 mg (10/28/2024), 150 mg (11/4/2024), 150 mg (11/11/2024)     9/30/2024 - 11/25/2024 Radiation    Treatment:  Course: C1    Plan ID Energy Fractions Dose per Fraction (cGy) Dose Correction (cGy) Total Dose Delivered (cGy) Elapsed Days   Orophary_Neck 6X 24 / 30 212 0 5,088 32   Rev CD R Neck 6X 3 / 3 212 0 636 3   Rev OrophNeck 6X 6 / 6 212 0 1,272 7      Treatment dates:  C1: 9/30/2024 - 11/25/2024            Pertinent Medical History   02/15/25: Discussion     Te definitive management of SCCHN in terms of oral cavity vs oropharynx vs larynx vs hypopharynx as well as pure tobacco related vs HPV with or without a prior/current smoking history had been surgery/RT.   In the past, the  role of chemotherapy was reserved for metastatic disease using Cisplatin at fairly high doses as well as infusional 5FU x 96 hours.  However there have been many trials which have changed the landscape of how locoregional and metastatic disease are now managed with chemotherapy, CRT or IO        It is unclear in this case, as had prior RBOT Stage 1 HPV driven s/p resection with no high risk features 0/34 nodes positive at the time and no adjuvant therapy      Based on the DL/biopsy there is SCC in the pharynx but location unclear; 6/7 nodes were positive with ASHLEY and thus the below pertains in terms of combined therapy.  The issues is 6 or 7 weeks of treatment and this appears to be a new second primary based on the biology      In general the prognosis for HPV driven SCCHN is better than that of tobacco related disease and in fact the AJCC staging system reflects this.  However when tobacco is a factor with a patient being a long standing smoker that favorable prognosis is no longer applicable;  however this patient smoked a pack a day for years but quit 30 years ago and so he has more favorable disease     The OS/PFS/FRANCO responses were better in those patients treated on the  study who were HPV positive; induction was followed by CRT and in that study Carboplatin AUC 1.5 only rather than 40 mg/m2 Cisplatin due to perceived toxicities being worse with cisplatin after induction therapy.  That was the case for this patient who got Cisplatin        However,  CRT per RTOG 91-11 as become SOC.  In the modern era, weekly Cisplatin 40 mg/m2 is used for 7 weeks rather than the high dose Cisplatin in that trial with similar efficacy.  Other radiosensitizers include weekly Carboplatin/Taxol.  Based on the side effect profile of Cisplatin, een in lower doses, renal clearance is an issue.  Both Carboplatin and Cisplatin are renally cleared but Carboplatin is dosed based on renal function and may be more tolerable.  Other agents used as radiosensitizers include Cetuximab per the Garcia study.       Side effects, and  toxicities of chemotherapy alone and in combination with radiation had been discussed at [place].  The side effects specific to radiation include loss of taste, xerostomia, mucositis, dysphagia , nausea, radiation induced dermatis and fatigue.  Fatigue is also common with chemotherapy and is cumulative.  Copious ropey secretions were discussed as well as nutrition.   In terms of the effects of Cisplatin or Carboplatin/Taxol weekly alone and in combination with radiation are neutropenia/leucopenia, anemia, thrombocytopenia, nausea, GERD, mucositis, fungal infections, renal insufficiency/dehydration, electrolyte imbalances including platinum induced SIADH, neuropathy, hearing loss, minor hair loss.  Taxol can also cause an infusion reaction /premedications are needed.  As chemotherapy is being used as a radiosensitizer, the effects of radiation are magnified.       Nutrition and hydration are concerning in the combined modality setting and we discussed the use of a PEG.  Based on both internal scatter of radiation and with extensive radiation there is potential for fibrosis/strictures as well as mucositis, fungal infections, being hypermetabolic with swallowing issues.  Repletion as the caloric needs increase is improved via a PEG as well as IV fluids.  Nutrition generally follows these patients.       There are also chronic issues which can occur; the secretions generally improve in 3-6 months, taste returns within 1-2 months, xerostomia may be a chronic issue, fibrosis, lymphedema, thyroid issues with checks every 3 months after RT completion, length of PEG use of about 3-6 months.     Restaging would be 3 months after therapy with CT PET and then every 3 months with CT neck/chest in the first year, eery 4-6 months thereafter.  HPV driven disease tends to have a latent distant mets period.       PDL1 status as well as TMB, MMR, MSI should be ascertained via [facility] for future reverence      Thus the recommendation would be weekly Cisplatin 40 mg/m2 weekly with RT to 70 Gy and thus 7 weeks     Review of Systems   Constitutional:  Positive for fatigue.   HENT: Negative.     Eyes: Negative.    Respiratory: Negative.     Cardiovascular: Negative.    Gastrointestinal: Negative.    Genitourinary: Negative.    Musculoskeletal: Negative.    Neurological: Negative.    Hematological:  Negative.    Psychiatric/Behavioral: Negative.             Objective   There were no vitals taken for this visit.    Pain Screening:     ECOG   0  Physical Exam  Vitals reviewed.   Constitutional:       Appearance: Normal appearance.   HENT:      Head: Normocephalic.      Nose: Nose normal.      Mouth/Throat:      Mouth: Mucous membranes are moist.      Comments: No mucositis, thrush, deviation tongue left   Eyes:      Pupils: Pupils are equal, round, and reactive to light.   Cardiovascular:      Rate and Rhythm: Normal rate.      Pulses: Normal pulses.   Pulmonary:      Effort: Pulmonary effort is normal.   Abdominal:      General: Bowel sounds are normal.   Musculoskeletal:         General: Normal range of motion.   Skin:     General: Skin is warm.   Neurological:      General: No focal deficit present.      Mental Status: He is alert.   Psychiatric:         Mood and Affect: Mood normal.         Labs: I have reviewed the following labs:  Lab Results   Component Value Date/Time    WBC 3.36 (L) 12/14/2024 08:35 AM    RBC 4.61 12/14/2024 08:35 AM    Hemoglobin 13.7 12/14/2024 08:35 AM    Hematocrit 41.9 12/14/2024 08:35 AM    MCV 91 12/14/2024 08:35 AM    MCH 29.7 12/14/2024 08:35 AM    RDW 15.1 12/14/2024 08:35 AM    Platelets 173 12/14/2024 08:35 AM    Segmented % 69 12/14/2024 08:35 AM    Lymphocytes % 17 12/14/2024 08:35 AM    Monocytes % 10 12/14/2024 08:35 AM    Eosinophils Relative 4 12/14/2024 08:35 AM    Basophils Relative 0 12/14/2024 08:35 AM    Immature Grans % 0 12/14/2024 08:35 AM    Absolute Neutrophils 2.31 12/14/2024 08:35 AM     Lab Results   Component Value Date/Time    Potassium 3.7 12/14/2024 08:35 AM    Chloride 105 12/14/2024 08:35 AM    CO2 30 12/14/2024 08:35 AM    BUN 13 12/14/2024 08:35 AM    Creatinine 0.74 12/14/2024 08:35 AM    Glucose, Fasting 90 12/14/2024 08:35 AM    Calcium 8.9 12/14/2024 08:35 AM    AST 22 12/14/2024 08:35 AM    ALT 21 12/14/2024 08:35 AM    Alkaline Phosphatase 55  12/14/2024 08:35 AM    Total Protein 7.3 12/14/2024 08:35 AM    Albumin 4.3 12/14/2024 08:35 AM    Total Bilirubin 0.63 12/14/2024 08:35 AM    eGFR 94 12/14/2024 08:35 AM             Administrative Statements   I have spent a total time of 40 minutes in caring for this patient on the day of the visit/encounter including Prognosis, Impressions, Counseling / Coordination of care, Documenting in the medical record, Reviewing/placing orders in the medical record (including tests, medications, and/or procedures), and Obtaining or reviewing history  .

## 2025-02-16 NOTE — ASSESSMENT & PLAN NOTE
prior RBOT cancer;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 S/p partial glossectomy and partial pharyngectomy on 07/31/2019, pathology negative for malignancy. S/p MRND right neck 09/04/2019 returned 0/34 nodes. Staged as T1N0 M0       PET scan was done 12/03/19 which showed mild asymmetry at the tongue base, likely post operative. Otherwise no distant metastasis. Prior imaging from June 2019 was clear with no evidence of disease.         Restaging PET 3/3/2025-    Summary/Recommendations    PET scan 3/3/2025    Needs NPL exam suggested see ENT in April 2025    Rad Onc after PET scan in March 2025    Will see in 3 months with 3 month follow up scan CT neck/chest with labs including TFTs     Follow up 3 months

## 2025-02-17 ENCOUNTER — APPOINTMENT (OUTPATIENT)
Dept: LAB | Facility: CLINIC | Age: 70
End: 2025-02-17
Payer: MEDICARE

## 2025-02-17 ENCOUNTER — TELEPHONE (OUTPATIENT)
Age: 70
End: 2025-02-17

## 2025-02-17 DIAGNOSIS — C01 MALIGNANT NEOPLASM OF BASE OF TONGUE (HCC): Primary | ICD-10-CM

## 2025-02-17 DIAGNOSIS — C01 MALIGNANT NEOPLASM OF BASE OF TONGUE (HCC): ICD-10-CM

## 2025-02-17 LAB
ALBUMIN SERPL BCG-MCNC: 4.2 G/DL (ref 3.5–5)
ALP SERPL-CCNC: 57 U/L (ref 34–104)
ALT SERPL W P-5'-P-CCNC: 16 U/L (ref 7–52)
ANION GAP SERPL CALCULATED.3IONS-SCNC: 9 MMOL/L (ref 4–13)
AST SERPL W P-5'-P-CCNC: 17 U/L (ref 13–39)
BASOPHILS # BLD AUTO: 0.01 THOUSANDS/ΜL (ref 0–0.1)
BASOPHILS NFR BLD AUTO: 0 % (ref 0–1)
BILIRUB SERPL-MCNC: 0.43 MG/DL (ref 0.2–1)
BUN SERPL-MCNC: 14 MG/DL (ref 5–25)
CALCIUM SERPL-MCNC: 9.5 MG/DL (ref 8.4–10.2)
CHLORIDE SERPL-SCNC: 103 MMOL/L (ref 96–108)
CO2 SERPL-SCNC: 29 MMOL/L (ref 21–32)
CREAT SERPL-MCNC: 0.74 MG/DL (ref 0.6–1.3)
EOSINOPHIL # BLD AUTO: 0.34 THOUSAND/ΜL (ref 0–0.61)
EOSINOPHIL NFR BLD AUTO: 7 % (ref 0–6)
ERYTHROCYTE [DISTWIDTH] IN BLOOD BY AUTOMATED COUNT: 11.9 % (ref 11.6–15.1)
GFR SERPL CREATININE-BSD FRML MDRD: 94 ML/MIN/1.73SQ M
GLUCOSE SERPL-MCNC: 91 MG/DL (ref 65–140)
HCT VFR BLD AUTO: 40.3 % (ref 36.5–49.3)
HGB BLD-MCNC: 13.2 G/DL (ref 12–17)
IMM GRANULOCYTES # BLD AUTO: 0.01 THOUSAND/UL (ref 0–0.2)
IMM GRANULOCYTES NFR BLD AUTO: 0 % (ref 0–2)
LDH SERPL-CCNC: 119 U/L (ref 140–271)
LYMPHOCYTES # BLD AUTO: 0.64 THOUSANDS/ΜL (ref 0.6–4.47)
LYMPHOCYTES NFR BLD AUTO: 14 % (ref 14–44)
MAGNESIUM SERPL-MCNC: 2.1 MG/DL (ref 1.9–2.7)
MCH RBC QN AUTO: 29.4 PG (ref 26.8–34.3)
MCHC RBC AUTO-ENTMCNC: 32.8 G/DL (ref 31.4–37.4)
MCV RBC AUTO: 90 FL (ref 82–98)
MONOCYTES # BLD AUTO: 0.37 THOUSAND/ΜL (ref 0.17–1.22)
MONOCYTES NFR BLD AUTO: 8 % (ref 4–12)
NEUTROPHILS # BLD AUTO: 3.38 THOUSANDS/ΜL (ref 1.85–7.62)
NEUTS SEG NFR BLD AUTO: 71 % (ref 43–75)
NRBC BLD AUTO-RTO: 0 /100 WBCS
PLATELET # BLD AUTO: 186 THOUSANDS/UL (ref 149–390)
PMV BLD AUTO: 10.4 FL (ref 8.9–12.7)
POTASSIUM SERPL-SCNC: 4.4 MMOL/L (ref 3.5–5.3)
PROT SERPL-MCNC: 6.8 G/DL (ref 6.4–8.4)
RBC # BLD AUTO: 4.49 MILLION/UL (ref 3.88–5.62)
SODIUM SERPL-SCNC: 141 MMOL/L (ref 135–147)
WBC # BLD AUTO: 4.75 THOUSAND/UL (ref 4.31–10.16)

## 2025-02-17 PROCEDURE — 80053 COMPREHEN METABOLIC PANEL: CPT

## 2025-02-17 PROCEDURE — 84630 ASSAY OF ZINC: CPT

## 2025-02-17 PROCEDURE — 83615 LACTATE (LD) (LDH) ENZYME: CPT

## 2025-02-17 PROCEDURE — 36415 COLL VENOUS BLD VENIPUNCTURE: CPT

## 2025-02-17 PROCEDURE — 85025 COMPLETE CBC W/AUTO DIFF WBC: CPT

## 2025-02-17 PROCEDURE — 83735 ASSAY OF MAGNESIUM: CPT

## 2025-02-19 ENCOUNTER — OFFICE VISIT (OUTPATIENT)
Age: 70
End: 2025-02-19
Payer: MEDICARE

## 2025-02-19 VITALS
OXYGEN SATURATION: 98 % | HEIGHT: 70 IN | SYSTOLIC BLOOD PRESSURE: 124 MMHG | BODY MASS INDEX: 22.9 KG/M2 | DIASTOLIC BLOOD PRESSURE: 78 MMHG | RESPIRATION RATE: 16 BRPM | WEIGHT: 160 LBS | HEART RATE: 94 BPM | TEMPERATURE: 97.7 F

## 2025-02-19 DIAGNOSIS — C77.0 MALIGNANT NEOPLASM METASTATIC TO LYMPH NODE OF NECK (HCC): ICD-10-CM

## 2025-02-19 DIAGNOSIS — E44.0 MODERATE PROTEIN-CALORIE MALNUTRITION (HCC): ICD-10-CM

## 2025-02-19 DIAGNOSIS — R22.1 MASS OF RIGHT SIDE OF NECK: ICD-10-CM

## 2025-02-19 DIAGNOSIS — C01 MALIGNANT NEOPLASM OF BASE OF TONGUE (HCC): Primary | ICD-10-CM

## 2025-02-19 DIAGNOSIS — Z85.89 ENCOUNTER FOR FOLLOW-UP SURVEILLANCE OF HEAD AND NECK CANCER: ICD-10-CM

## 2025-02-19 DIAGNOSIS — Z08 ENCOUNTER FOR FOLLOW-UP SURVEILLANCE OF HEAD AND NECK CANCER: ICD-10-CM

## 2025-02-19 PROCEDURE — 99215 OFFICE O/P EST HI 40 MIN: CPT | Performed by: INTERNAL MEDICINE

## 2025-02-19 NOTE — PATIENT INSTRUCTIONS
Dr Ying in march 2025-review PET    Dr Montana in April-for scope (NPL) exam    Repeat CT neck/chest in 3 months so early June (3 months form PET)    Follow up with labs including thyroid function in early June

## 2025-02-20 LAB — ZINC SERPL-MCNC: 67 UG/DL (ref 44–115)

## 2025-02-27 ENCOUNTER — PATIENT MESSAGE (OUTPATIENT)
Dept: UROLOGY | Facility: CLINIC | Age: 70
End: 2025-02-27

## 2025-03-03 ENCOUNTER — HOSPITAL ENCOUNTER (OUTPATIENT)
Dept: RADIOLOGY | Age: 70
Discharge: HOME | End: 2025-03-03
Payer: MEDICARE

## 2025-03-03 DIAGNOSIS — C77.0 MALIGNANT NEOPLASM METASTATIC TO LYMPH NODE OF NECK (HCC): ICD-10-CM

## 2025-03-03 LAB — GLUCOSE SERPL-MCNC: 98 MG/DL (ref 65–140)

## 2025-03-03 PROCEDURE — A9552 F18 FDG: HCPCS

## 2025-03-03 PROCEDURE — 78815 PET IMAGE W/CT SKULL-THIGH: CPT

## 2025-03-03 PROCEDURE — 82948 REAGENT STRIP/BLOOD GLUCOSE: CPT

## 2025-03-05 NOTE — PATIENT COMMUNICATION
Pt called to schedule the cystoscopy with , scheduled for the next available of 9/29/2025 and placed on wait list. Please review if appropriate, pt looking for sooner.

## 2025-03-06 ENCOUNTER — TELEPHONE (OUTPATIENT)
Dept: UROLOGY | Facility: CLINIC | Age: 70
End: 2025-03-06

## 2025-03-06 NOTE — TELEPHONE ENCOUNTER
LVM for pt that he's scheduled at the soonest available and confirmed he was placed on the waitlist. Also let pt know that if he feels he needs to see AP prior he can call us to schedule.    frequent urination at night  (Newest Message First)Samantha Garcia RN to [facility] [place] Clerical       3/5/25  2:49 PM  Next available is appropriate. Can place on waitlist for something sooner. Can also see PA in interim and discuss symptoms and if anything can be done to help  Trang Mei RN to Samantha Garcia RN       3/5/25  2:48 PM  Sooner appt if any?    3/5/25  2:45 PM  Valerie Wong RN routed this conversation to [facility] [place] Clinical    3/5/25  2:40 PM  Erica Giles routed this conversation to [facility] [place] Clinical  Erica MIXON    3/5/25  2:40 PM  Note   important suggestion  This note is not clinically relevant and does not contain any decision making      Summary: Cystoscopy   Pt called to schedule the cystoscopy with , scheduled for the next available of 9/29/2025 and placed on wait list. Please review if appropriate, pt looking for sooner.         Chani King PA-C to Jesus Hdz         2/27/25  2:24 PM  Hi Brian,      The cystoscopy would assess if your Urolift implants are intact as well as if there is any regrowth of your prostate or other pathology such as urethral stricture. If you would like to schedule this, please call the office to schedule the cystoscopy with Dr. Alfonso. If you would like to discuss this and your symptoms further prior, please schedule a follow up visit.     Last read by Jesus Hdz at 2:34PM on 3/5/2025.    2/27/25 10:04 AM  Malia English RN routed this conversation to [facility] Provider  Jesus Hdz to [facility] (supporting Rubin Alfonso MD)         2/27/25  9:16 AM  Hi Dr. Alfonso  The last time I was in I was given something to try to help reduce the  number of times I get up at night to urinate but it didn't help.  I have since tried something else that another doc suggested but it hasn't helped either.  The last several weeks I have been getting up almost every hour.  The weird thing is I am fine during the day for the most part. I had an appointment with you to get scoped but at the time things seemed to have improved but I am wondering if the urolift has perhaps led to a more pinched urethra or is there some other issue that can only be found with a scope.  Please let me know how to proceed.  FYI, I did go through chemo and radiation of my neck and throat for what it is worth.  Thank you,  Brian Hdz  frequent urination at night    From  Chani Kign PA-C To  Jesus Hdz Sent and Delivered  2/27/2025  2:24 PM       Hi Brian,      The cystoscopy would assess if your Urolift implants are intact as well as if there is any regrowth of your prostate or other pathology such as urethral stricture. If you would like to schedule this, please call the office to schedule the cystoscopy with Dr. Alfonso. If you would like to discuss this and your symptoms further prior, please schedule a follow up visit.       Previous Messages    ----- Message -----       From:Jesus Hdz       Sent:2/27/2025  9:16 AM EST         To:Rubin Alfonso MD    Subject:frequent urination at night    Hi Dr. Alfonso  The last time I was in I was given something to try to help reduce the number of times I get up at night to urinate but it didn't help.  I have since tried something else that another doc suggested but it hasn't helped either.  The last several weeks I have been getting up almost every hour.  The weird thing is I am fine during the day for the most part. I had an appointment with you to get scoped but at the time things seemed to have improved but I am wondering if the urolift has perhaps led to a more pinched urethra or is there some other issue that can  only be found with a scope.  Please let me know how to proceed.  FYI, I did go through chemo and radiation of my neck and throat for what it is worth.  Thank you,  Brian Hdz      Encounter Messages    Read Composed From To  Subject   Y 2/27/2025  2:24 PM ADRIANNA Reddy Christopher D  frequent urination at night   Y 2/27/2025  9:16 AM Jesus Hdz [facility] (supporting Rubin Alfonso MD)  frequent urination at night     Recent Visits    Date Provider Department Visit Type Primary Dx   1 year ago Benjamin Quiñones PA-C [facility] Visit Benign prostatic hyperplasia with urinary frequency   1 year ago Benjamin Quiñones PA-C [facility] Visit Benign prostatic hyperplasia with urinary frequency   1 year ago Benjamin Quiñones PA-C [facility] Visit Benign prostatic hyperplasia with urinary frequency     Recent Patient Communication     Last Update Description Specialty     Yesterday frequent urination at night Urology     [facility] Rubin [facility]ino Closed     1 week ago Check in Nutrition     [facility] On[facility] Dietitian [facility] [place] [facility], Generic Provider Open     2 weeks ago 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) Hematology and Oncology     [facility] Closed     2 weeks ago Blood test prior to Wednesday appointment Hematology and Oncology     [facility]

## 2025-03-10 ENCOUNTER — OFFICE VISIT (OUTPATIENT)
Facility: HOSPITAL | Age: 70
End: 2025-03-10
Attending: RADIOLOGY
Payer: MEDICARE

## 2025-03-10 VITALS
HEART RATE: 93 BPM | WEIGHT: 154.8 LBS | DIASTOLIC BLOOD PRESSURE: 84 MMHG | OXYGEN SATURATION: 97 % | BODY MASS INDEX: 22.21 KG/M2 | RESPIRATION RATE: 16 BRPM | SYSTOLIC BLOOD PRESSURE: 130 MMHG

## 2025-03-10 DIAGNOSIS — C01 MALIGNANT NEOPLASM OF BASE OF TONGUE (HCC): Primary | ICD-10-CM

## 2025-03-10 PROCEDURE — 99213 OFFICE O/P EST LOW 20 MIN: CPT | Performed by: RADIOLOGY

## 2025-03-10 PROCEDURE — 99211 OFF/OP EST MAY X REQ PHY/QHP: CPT | Performed by: RADIOLOGY

## 2025-03-10 NOTE — PROGRESS NOTES
Jesus ALFRED Page 1955 is a 70 y.o. 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 He underwent partial glossectomy and partial pharyngectomy on 19. 0 out of 34 lymph nodes identified and there were no high risk features. He did not receive any adjuvant therapy and continued follow up. Subsequent CT and PET/CT revealed multiple hypermetabolic lymph nodes in the right neck but no obvious primary recurrence. Repeat laryngoscopy and right neck dissection completed 24 with 6 out of 7 lymph nodes positive for metastatic squamous cell carcinoma. He completed chemoradiation on 24. He was last seen in Radiation Oncology on 24. He returns today for follow up.     24 Dr. Talbot  Much improved, skin healed. Will consider removing PEG in next week. Weight stable. Not using PEG. PET Scan late 2025. Needs to see ENT for NPL exam.   Follow up 1 month    24 Nutrition  He has not used his feeding tube in 1 week and has maintained his weight. Follow up in 1 week     25 GI  PEG tube removed easily without any immediate complications in office today.  Patient tolerated procedure well.  PEG site was covered with gauze and patient was educated that gastrostomy tends to close on its own within 24 hours.  OK to eat/drink as normal.  -Patient can follow up as needed.      25 Dr. Talbot  PET scan 3/3/2025  Needs NPL exam suggested see ENT in 2025  Rad Onc after PET scan in 2025  Will see in 3 months with 3 month follow up scan CT neck/chest with labs including TFTs   Follow up 3 months       3/3/25 PET CT  IMPRESSION:  1. Findings compatible with response to therapy.  2. Resolution of previously noted FDG avid right peritonsillar focus and the right upper cervical chain lymph node.  3. No new findings for hypermetabolic malignancy.        Upcomin25 ENT  25 CT neck and chest  25   Ant[place]  9/29/25 Urology-cystoscopy    Follow up visit     Oncology History   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)   7/25/2019 Initial Diagnosis    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)     6/19/2024 Biopsy    Final Diagnosis   A. 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     See Note.     -- Confirmed by positive Pankeratin (CKAE1/3), p40 and normal wild-type expression       on p53 immunostains.    -- p16 immunostain positive (>70% diffuse and strong nuclear and cytoplasmic        staining); high-risk HPV EH pending; result will be reported in an addendum.      B. Lymph Node, Regional Resection, Level 2 lymph nodes, see comments:  - Metastatic squamous cell carcinoma, keratinizing, involving at least two lymph nodes (2/2).     -- Present in 1.0 cm lymph node and additional detached lymph node(s)/fragments.     -- Largest metastatic focus: 0.3 cm; Extranodal extension: Not identified.    -- Confirmed by positive Pankeratin (CKAE1/3) and p40 immunostains.    -- p16 immunostain positive (>70% diffuse and strong nuclear and cytoplasmic        staining).     C. Lymph Node, Regional Resection, Additional level 2 lymph nodes:  - Metastatic squamous cell carcinoma, keratinizing, involving 3 of 4 lymph     nodes/fragments (3/4).     -- Largest metastatic focus: 1 cm; Extranodal extension: Present, extranodal         lymphovascular invasion identified.    -- Confirmed by positive Pankeratin (CKAE1/3) and p40 immunostains.    -- p16 immunostain positive (>70% diffuse and strong nuclear and cytoplasmic        staining); high-risk HPV EH pending; result will be reported in an addendum. See Note.      D. Lymph Node, Level 3 lymph nodes, lymph adenectomy:  - One lymph node positive for metastatic squamous cell carcinoma, keratinizing (1/1).     -- Largest metastatic focus: 1.1 cm; Extranodal extension: Not identified.    -- Confirmed by  positive Pankeratin (CKAE1/3) and p40 immunostains.    -- p16 immunostain positive (>70% diffuse and strong nuclear and cytoplasmic        staining).  - Portion(s) of benign salivary gland with adjacent detached small (< 0.2 cm) portion      of carcinoma.   - Traumatic neuroma.          Comments:   This is an appended report. These results have been appended to a previously preliminary verified report.           7/3/2024 -  Cancer Staged    Staging form: Pharynx - Oropharynx, AJCC 8th Edition  - Clinical stage from 7/3/2024: Stage I (cT1, cN1, cM0, p16+) - Signed by Fady Morrow MD on 7/3/2024  Stage prefix: Initial diagnosis       9/30/2024 - 11/11/2024 Chemotherapy    alteplase (CATHFLO), 2 mg, Intracatheter, Every 1 Minute as needed, 7 of 7 cycles  palonosetron (ALOXI), 0.25 mg, Intravenous, Once, 6 of 6 cycles  Administration: 0.25 mg (10/7/2024), 0.25 mg (10/14/2024), 0.25 mg (10/21/2024), 0.25 mg (10/28/2024), 0.25 mg (11/4/2024), 0.25 mg (11/11/2024)  CISplatin (PLATINOL) infusion, 70 mg (original dose 40 mg/m2), Intravenous, Once, 7 of 7 cycles  Dose modification: 70 mg (original dose 40 mg/m2, Cycle 1, Reason: Other (Must fill in a comment), Comment: pharmacy dictates), 30 mg/m2 (original dose 40 mg/m2, Cycle 6, Reason: Anticipated Tolerance), 30 mg/m2 (original dose 40 mg/m2, Cycle 7, Reason: Dose Not Tolerated)  Administration: 70 mg (9/30/2024), 70 mg (10/7/2024), 70 mg (10/14/2024), 70 mg (10/21/2024), 70 mg (10/28/2024), 58.2 mg (11/4/2024), 58.2 mg (11/11/2024)  sodium chloride, 500 mL, Intravenous, Once, 7 of 7 cycles  Administration: 500 mL (9/30/2024), 500 mL (9/30/2024), 500 mL (10/7/2024), 500 mL (10/7/2024), 500 mL (10/14/2024), 500 mL (10/14/2024), 500 mL (10/21/2024), 500 mL (10/21/2024), 500 mL (10/28/2024), 500 mL (10/28/2024), 500 mL (11/4/2024), 500 mL (11/4/2024), 500 mL (11/11/2024), 500 mL (11/11/2024)  fosaprepitant (EMEND) IVPB, 150 mg, Intravenous, Once, 7 of 7  cycles  Administration: 150 mg (9/30/2024), 150 mg (10/7/2024), 150 mg (10/14/2024), 150 mg (10/21/2024), 150 mg (10/28/2024), 150 mg (11/4/2024), 150 mg (11/11/2024)     9/30/2024 - 11/25/2024 Radiation    Treatment:  Course: C1    Plan ID Energy Fractions Dose per Fraction (cGy) Dose Correction (cGy) Total Dose Delivered (cGy) Elapsed Days   Orophary_Neck 6X 24 / 30 212 0 5,088 32   Rev CD R Neck 6X 3 / 3 212 0 636 3   Rev OrophNeck 6X 6 / 6 212 0 1,272 7      Treatment dates:  C1: 9/30/2024 - 11/25/2024             Review of Systems:  Review of Systems   Constitutional:  Negative for appetite change and fatigue.   HENT: Negative.  Negative for sore throat, trouble swallowing and voice change.         Reports that food is still not appetizing. Reports does not eat as much and is not as hungry. Continues with dry mouth   Eyes: Negative.    Respiratory: Negative.     Cardiovascular: Negative.    Gastrointestinal: Negative.    Endocrine: Negative.    Genitourinary:  Positive for frequency (at night. Seeing Urology).   Musculoskeletal: Negative.    Skin: Negative.         Skin healed. /sensitive to touch   Allergic/Immunologic: Negative.    Neurological:  Positive for light-headedness (intermittently).   Hematological: Negative.    Psychiatric/Behavioral: Negative.           Health Maintenance   Topic Date Due    Hepatitis C Screening  Never done    Medicare Annual Wellness Visit (AWV)  Never done    Pneumococcal Vaccine: 65+ Years (1 of 2 - PCV) Never done    RSV Vaccine for Pregnant Patients and Patients Age 60+ Years (1 - Risk 60-74 years 1-dose series) Never done    Zoster Vaccine (1 of 2) 09/05/2017    PT PLAN OF CARE  11/30/2018    OT PLAN OF CARE  09/24/2021    Influenza Vaccine (1) 09/01/2024    COVID-19 Vaccine (6 - 2024-25 season) 09/01/2024    Depression Screening  07/03/2025    Fall Risk  07/29/2025    BMI: Adult  02/19/2026    Colorectal Cancer Screening  03/22/2026    Meningococcal B Vaccine   Aged Out    RSV Vaccine age 0-20 Months  Aged Out    HIB Vaccine  Aged Out    IPV Vaccine  Aged Out    Hepatitis A Vaccine  Aged Out    Meningococcal ACWY Vaccine  Aged Out    HPV Vaccine  Aged Out     Patient Active Problem List   Diagnosis    Mass of right side of neck    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)    Status post radical dissection of neck    Tongue pain    Dysphagia    Hypertension    Protein-calorie malnutrition (HCC)    Chemotherapy-induced nausea    Dehydration    Superficial thrombophlebitis    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)     Past Medical History:   Diagnosis Date    Cancer (HCC)     Facial basal cell cancer     GERD (gastroesophageal reflux disease)     diet controlled    Hypertension     Throat cancer (HCC)     Vitamin D deficiency      Past Surgical History:   Procedure Laterality Date    CHOLECYSTECTOMY      COLONOSCOPY N/A 03/22/2016    Procedure: COLONOSCOPY;  Surgeon: Daren Montana MD;  Location: [facility];  Service:     MS CERVICAL LYMPHADEC MODIFIED RADICAL NECK DSJ Right 09/04/2019    Procedure: MODIFIED RADICAL NECK DISSECTION;  Surgeon: Fady Montana MD;  Location[facility];  Service: ENT    MS CYSTO INSERTION TRANSPROSTATIC IMPLANT SINGLE N/A 04/14/2023    Procedure: CYSTOSCOPY WITH INSERTION UROLIFT;  Surgeon: Rubin Alfonso MD;  [facility];  Service: Urology    MS LAPAROSCOPY SURG CHOLECYSTECTOMY N/A 10/30/2019    Procedure: CHOLECYSTECTOMY LAPAROSCOPIC;  Surgeon: Christiano Cornejo MD;  Location: [facility];  Service: General    MS LARYNGOSCOPY DIRECT OPERATIVE W/BIOPSY N/A 07/17/2019    Procedure: MICROLARYNGOSCOPY DIRECT WITH BIOPSY OF VALLECULAR MASS;  Surgeon: Amol Cordova MD;  [facility];  Service: ENT    MS LARYNGOSCOPY DIRECT OPERATIVE W/BIOPSY N/A 6/19/2024    Procedure: DIRECT LARYNGOSCOPY WITH BIOPSY WITH RIGHT NECK DISSECTION LEVEL TWO AND THREE;  Surgeon: Fady Montana MD;  [facility];   Service: ENT    RADICAL NECK DISSECTION Right 6/19/2024    Procedure: DISSECTION NECK RADICAL;  Surgeon: Fady Montana MD;  [facility];  Service: ENT    RHINOPLASTY      SINUS SURGERY  1994    TRANSORAL ROBOTIC SURGERY (TORS) N/A 07/31/2019    Procedure: ROBOTICALLY ASSISTED PARTIAL GLOSSECTOMY, PARTIAL PHARYNGECTOMY;  Surgeon: Fady Montana MD;  [facility];  Service: ENT    UPPER GASTROINTESTINAL ENDOSCOPY      US GUIDED LYMPH NODE BIOPSY RIGHT  5/6/2024    WISDOM TOOTH EXTRACTION       Family History   Problem Relation Age of Onset    Cancer Mother     Diabetes Mother     Breast cancer Mother         Passed at 93    Heart failure Father     Seizures Father     Heart disease Father         Passed at 84     Social History     Socioeconomic History    Marital status: /Civil Union     Spouse name: Not on file    Number of children: Not on file    Years of education: Not on file    Highest education level: Not on file   Occupational History    Not on file   Tobacco Use    Smoking status: Never    Smokeless tobacco: Never   Vaping Use    Vaping status: Never Used   Substance and Sexual Activity    Alcohol use: Not Currently     Alcohol/week: 5.0 standard drinks of alcohol     Types: 5 Cans of beer per week     Comment: on occ    Drug use: Not Currently    Sexual activity: Not Currently     Partners: Female     Birth control/protection: Male Sterilization   Other Topics Concern    Not on file   Social History Narrative    Not on file     Social Drivers of Health     Financial Resource Strain: Not on file   Food Insecurity: No Food Insecurity (6/19/2024)    Nursing - Inadequate Food Risk Classification     Worried About Running Out of Food in the Last Year: Never true     Ran Out of Food in the Last Year: Never true     Ran Out of Food in the Last Year: Not on file   Transportation Needs: No Transportation Needs (6/19/2024)    PRAPARE - Transportation     Lack of Transportation (Medical): No      Lack of Transportation (Non-Medical): No   Physical Activity: Not on file   Stress: Not on file   Social Connections: Not on file   Intimate Partner Violence: Not on file   Housing Stability: Low Risk  (6/19/2024)    Housing Stability Vital Sign     Unable to Pay for Housing in the Last Year: No     Number of Times Moved in the Last Year: 1     Homeless in the Last Year: No       Current Outpatient Medications:     amLODIPine (NORVASC) 5 mg tablet, Take 5 mg by mouth daily at bedtime , Disp: , Rfl:     Cholecalciferol (Vitamin D3) LIQD, Use, Disp: , Rfl:     lisinopril (ZESTRIL) 10 mg tablet, Take 10 mg by mouth daily at bedtime , Disp: , Rfl:   Allergies   Allergen Reactions    Shellfish Allergy - Food Allergy Edema     HANDS, LIPS    Bee Venom Swelling     AT BITE SITE     Vitals:    03/10/25 0759   BP: 130/84   Pulse: 93   Resp: 16   SpO2: 97%   Weight: 70.2 kg (154 lb 12.8 oz)      Pain Score: 0-No pain

## 2025-03-10 NOTE — PROGRESS NOTES
Follow-up Visit   Name: Jesus Hdz      : 1955      MRN: [id number]  Encounter Provider: Alexey Ying MD  Encounter Date: 3/10/2025   Encounter department: [facility]  :  Assessment & Plan  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)  Mr. 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  He underwent modified right neck dissection (0/34 lymph nodes).  He later presented with multiple hypermetabolic lymph nodes in the right neck without obvious primary site.  He underwent repeat right neck dissection, 7 lymph nodes removed, 6 positive for metastatic disease with extranodal extension.  A specimen in the right pharynx also contained squamous cell carcinoma.  He was evaluated by Dr. 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  Treatment was completed on 24.       He has residual dysgeusia and xerostomia.  He is maintaining weight and PEG was removed.  He denies coughing/choking with meals.  Post-treatment PET CT demonstrates complete clinical response.  Ongoing ENT and medical oncology follow up are arranged.  Next CT imaging is also scheduled.  He will return in 6 months.  He was encouraged to call with questions/concerns in the interim.           History of Present Illness   Mr. Hdz is a 70 year old man with the above history.    24 Dr. Talbot  Much improved, skin healed. Will consider removing PEG in next week. Weight stable. Not using PEG. PET Scan late 2025. Needs to see ENT for NPL exam.   Follow up 1 month     24 Nutrition  He has not used his feeding tube in 1 week and has maintained his weight. Follow up in 1 week      25 GI  PEG tube removed easily without any immediate complications in office today.  Patient tolerated procedure well.  PEG site was covered with gauze and patient  was educated that gastrostomy tends to close on its own within 24 hours.  OK to eat/drink as normal.  -Patient can follow up as needed.        25 Dr. Talbot  PET scan 3/3/2025  Needs NPL exam suggested see ENT in 2025  Rad Onc after PET scan in 2025  Will see in 3 months with 3 month follow up scan CT neck/chest with labs including TFTs   Follow up 3 months         3/3/25 PET CT  IMPRESSION:  1. Findings compatible with response to therapy.  2. Resolution of previously noted FDG avid right peritonsillar focus and the right upper cervical chain lymph node.  3. No new findings for hypermetabolic malignancy.           Upcomin25 ENT  25 CT neck and chest  25 Dr. Talbot  25 Urology-cystoscopy    He has ongoing xerostomia and dry mouth.  He has mild submental edema.  He denies painful/difficult swallowing.  He is without additional acute concerns.    Oncology History   Cancer Staging   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)  Staging form: Pharynx - Oropharynx, AJCC 8th Edition  - Clinical stage from 7/3/2024: Stage I (cT1, cN1, cM0, p16+) - Signed by Fady Morrow MD on 7/3/2024  Stage prefix: Initial diagnosis  Oncology History   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)   2019 Initial Diagnosis    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)     2024 Biopsy    Final Diagnosis   A. 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     See Note.     -- Confirmed by positive Pankeratin (CKAE1/3), p40 and normal wild-type expression       on p53 immunostains.    -- p16 immunostain positive (>70% diffuse and strong nuclear and cytoplasmic        staining); high-risk HPV EH pending; result will be reported in an addendum.      B. Lymph Node, Regional Resection, Level 2 lymph nodes, see comments:  - Metastatic squamous cell carcinoma, keratinizing, involving at least two lymph nodes (2/2).     --  Present in 1.0 cm lymph node and additional detached lymph node(s)/fragments.     -- Largest metastatic focus: 0.3 cm; Extranodal extension: Not identified.    -- Confirmed by positive Pankeratin (CKAE1/3) and p40 immunostains.    -- p16 immunostain positive (>70% diffuse and strong nuclear and cytoplasmic        staining).     C. Lymph Node, Regional Resection, Additional level 2 lymph nodes:  - Metastatic squamous cell carcinoma, keratinizing, involving 3 of 4 lymph     nodes/fragments (3/4).     -- Largest metastatic focus: 1 cm; Extranodal extension: Present, extranodal         lymphovascular invasion identified.    -- Confirmed by positive Pankeratin (CKAE1/3) and p40 immunostains.    -- p16 immunostain positive (>70% diffuse and strong nuclear and cytoplasmic        staining); high-risk HPV EH pending; result will be reported in an addendum. See Note.      D. Lymph Node, Level 3 lymph nodes, lymph adenectomy:  - One lymph node positive for metastatic squamous cell carcinoma, keratinizing (1/1).     -- Largest metastatic focus: 1.1 cm; Extranodal extension: Not identified.    -- Confirmed by positive Pankeratin (CKAE1/3) and p40 immunostains.    -- p16 immunostain positive (>70% diffuse and strong nuclear and cytoplasmic        staining).  - Portion(s) of benign salivary gland with adjacent detached small (< 0.2 cm) portion      of carcinoma.   - Traumatic neuroma.          Comments:   This is an appended report. These results have been appended to a previously preliminary verified report.           7/3/2024 -  Cancer Staged    Staging form: Pharynx - Oropharynx, AJCC 8th Edition  - Clinical stage from 7/3/2024: Stage I (cT1, cN1, cM0, p16+) - Signed by Fady Morrow MD on 7/3/2024  Stage prefix: Initial diagnosis       9/30/2024 - 11/11/2024 Chemotherapy    alteplase (CATHFLO), 2 mg, Intracatheter, Every 1 Minute as needed, 7 of 7 cycles  palonosetron (ALOXI), 0.25 mg, Intravenous, Once, 6 of 6  cycles  Administration: 0.25 mg (10/7/2024), 0.25 mg (10/14/2024), 0.25 mg (10/21/2024), 0.25 mg (10/28/2024), 0.25 mg (11/4/2024), 0.25 mg (11/11/2024)  CISplatin (PLATINOL) infusion, 70 mg (original dose 40 mg/m2), Intravenous, Once, 7 of 7 cycles  Dose modification: 70 mg (original dose 40 mg/m2, Cycle 1, Reason: Other (Must fill in a comment), Comment: pharmacy dictates), 30 mg/m2 (original dose 40 mg/m2, Cycle 6, Reason: Anticipated Tolerance), 30 mg/m2 (original dose 40 mg/m2, Cycle 7, Reason: Dose Not Tolerated)  Administration: 70 mg (9/30/2024), 70 mg (10/7/2024), 70 mg (10/14/2024), 70 mg (10/21/2024), 70 mg (10/28/2024), 58.2 mg (11/4/2024), 58.2 mg (11/11/2024)  sodium chloride, 500 mL, Intravenous, Once, 7 of 7 cycles  Administration: 500 mL (9/30/2024), 500 mL (9/30/2024), 500 mL (10/7/2024), 500 mL (10/7/2024), 500 mL (10/14/2024), 500 mL (10/14/2024), 500 mL (10/21/2024), 500 mL (10/21/2024), 500 mL (10/28/2024), 500 mL (10/28/2024), 500 mL (11/4/2024), 500 mL (11/4/2024), 500 mL (11/11/2024), 500 mL (11/11/2024)  fosaprepitant (EMEND) IVPB, 150 mg, Intravenous, Once, 7 of 7 cycles  Administration: 150 mg (9/30/2024), 150 mg (10/7/2024), 150 mg (10/14/2024), 150 mg (10/21/2024), 150 mg (10/28/2024), 150 mg (11/4/2024), 150 mg (11/11/2024)     9/30/2024 - 11/25/2024 Radiation    Treatment:  Course: C1    Plan ID Energy Fractions Dose per Fraction (cGy) Dose Correction (cGy) Total Dose Delivered (cGy) Elapsed Days   Orophary_Neck 6X 24 / 30 212 0 5,088 32   Rev CD R Neck 6X 3 / 3 212 0 636 3   Rev OrophNeck 6X 6 / 6 212 0 1,272 7      Treatment dates:  C1: 9/30/2024 - 11/25/2024            Review of Systems Refer to nursing note.      Current Outpatient Medications:     amLODIPine (NORVASC) 5 mg tablet, Take 5 mg by mouth daily at bedtime , Disp: , Rfl:     Cholecalciferol (Vitamin D3) LIQD, Use, Disp: , Rfl:     lisinopril (ZESTRIL) 10 mg tablet, Take 10 mg by mouth daily at bedtime , Disp: , Rfl:          Objective   /84   Pulse 93   Resp 16   Wt 70.2 kg (154 lb 12.8 oz)   SpO2 97%   BMI 22.21 kg/m²     Pain Screening:  Pain Score: 0-No pain  ECOG  0  Physical Exam  HENT:      Head: Normocephalic and atraumatic.      Mouth/Throat:      Mouth: Mucous membranes are dry.      Comments: No visible lesions.  Tongue freely mobile.  No exposed bone.  Eyes:      General: No scleral icterus.     Extraocular Movements: Extraocular movements intact.   Neck:      Comments: Mild submental edema  Cardiovascular:      Rate and Rhythm: Normal rate.   Pulmonary:      Effort: Pulmonary effort is normal.   Abdominal:      General: Abdomen is flat. There is no distension.   Musculoskeletal:         General: Normal range of motion.      Cervical back: Normal range of motion.   Lymphadenopathy:      Cervical: No cervical adenopathy.   Skin:     General: Skin is warm and dry.   Neurological:      General: No focal deficit present.      Mental Status: He is alert.   Psychiatric:         Mood and Affect: Mood normal.            Administrative Statements   I have spent a total time of 20 minutes in caring for this patient on the day of the visit/encounter including Diagnostic results, Impressions, Counseling / Coordination of care, Documenting in the medical record, Reviewing/placing orders in the medical record (including tests, medications, and/or procedures), and Obtaining or reviewing history  .

## 2025-03-10 NOTE — ASSESSMENT & PLAN NOTE
Mr. 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  He underwent modified right neck dissection (0/34 lymph nodes).  He later presented with multiple hypermetabolic lymph nodes in the right neck without obvious primary site.  He underwent repeat right neck dissection, 7 lymph nodes removed, 6 positive for metastatic disease with extranodal extension.  A specimen in the right pharynx also contained squamous cell carcinoma.  He was evaluated by Dr. 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  Treatment was completed on 11/25/24.       He has residual dysgeusia and xerostomia.  He is maintaining weight and PEG was removed.  He denies coughing/choking with meals.  Post-treatment PET CT demonstrates complete clinical response.  Ongoing ENT and medical oncology follow up are arranged.  Next CT imaging is also scheduled.  He will return in 6 months.  He was encouraged to call with questions/concerns in the interim.

## 2025-05-12 ENCOUNTER — APPOINTMENT (OUTPATIENT)
Dept: LAB | Facility: CLINIC | Age: 70
End: 2025-05-12
Payer: MEDICARE

## 2025-05-12 DIAGNOSIS — Z00.00 ROUTINE GENERAL MEDICAL EXAMINATION AT A HEALTH CARE FACILITY: ICD-10-CM

## 2025-05-12 DIAGNOSIS — Z12.5 SPECIAL SCREENING FOR MALIGNANT NEOPLASM OF PROSTATE: ICD-10-CM

## 2025-05-12 LAB
25(OH)D3 SERPL-MCNC: 39.5 NG/ML (ref 30–100)
ALBUMIN SERPL BCG-MCNC: 4.1 G/DL (ref 3.5–5)
ALP SERPL-CCNC: 54 U/L (ref 34–104)
ALT SERPL W P-5'-P-CCNC: 16 U/L (ref 7–52)
ANION GAP SERPL CALCULATED.3IONS-SCNC: 5 MMOL/L (ref 4–13)
AST SERPL W P-5'-P-CCNC: 18 U/L (ref 13–39)
BASOPHILS # BLD AUTO: 0.02 THOUSANDS/ÂΜL (ref 0–0.1)
BASOPHILS NFR BLD AUTO: 1 % (ref 0–1)
BILIRUB SERPL-MCNC: 0.71 MG/DL (ref 0.2–1)
BUN SERPL-MCNC: 15 MG/DL (ref 5–25)
CALCIUM SERPL-MCNC: 9.2 MG/DL (ref 8.4–10.2)
CHLORIDE SERPL-SCNC: 106 MMOL/L (ref 96–108)
CHOLEST SERPL-MCNC: 224 MG/DL (ref ?–200)
CO2 SERPL-SCNC: 31 MMOL/L (ref 21–32)
CREAT SERPL-MCNC: 0.74 MG/DL (ref 0.6–1.3)
EOSINOPHIL # BLD AUTO: 0.24 THOUSAND/ÂΜL (ref 0–0.61)
EOSINOPHIL NFR BLD AUTO: 8 % (ref 0–6)
ERYTHROCYTE [DISTWIDTH] IN BLOOD BY AUTOMATED COUNT: 13 % (ref 11.6–15.1)
EST. AVERAGE GLUCOSE BLD GHB EST-MCNC: 105 MG/DL
GFR SERPL CREATININE-BSD FRML MDRD: 94 ML/MIN/1.73SQ M
GLUCOSE P FAST SERPL-MCNC: 106 MG/DL (ref 65–99)
HBA1C MFR BLD: 5.3 %
HCT VFR BLD AUTO: 42.3 % (ref 36.5–49.3)
HDLC SERPL-MCNC: 69 MG/DL
HGB BLD-MCNC: 14 G/DL (ref 12–17)
IMM GRANULOCYTES # BLD AUTO: 0.01 THOUSAND/UL (ref 0–0.2)
IMM GRANULOCYTES NFR BLD AUTO: 0 % (ref 0–2)
INSULIN SERPL-ACNC: 3.01 UIU/ML (ref 1.9–23)
LDLC SERPL CALC-MCNC: 141 MG/DL (ref 0–100)
LYMPHOCYTES # BLD AUTO: 0.59 THOUSANDS/ÂΜL (ref 0.6–4.47)
LYMPHOCYTES NFR BLD AUTO: 19 % (ref 14–44)
MCH RBC QN AUTO: 28.8 PG (ref 26.8–34.3)
MCHC RBC AUTO-ENTMCNC: 33.1 G/DL (ref 31.4–37.4)
MCV RBC AUTO: 87 FL (ref 82–98)
MONOCYTES # BLD AUTO: 0.3 THOUSAND/ÂΜL (ref 0.17–1.22)
MONOCYTES NFR BLD AUTO: 10 % (ref 4–12)
NEUTROPHILS # BLD AUTO: 1.95 THOUSANDS/ÂΜL (ref 1.85–7.62)
NEUTS SEG NFR BLD AUTO: 62 % (ref 43–75)
NONHDLC SERPL-MCNC: 155 MG/DL
NRBC BLD AUTO-RTO: 0 /100 WBCS
PLATELET # BLD AUTO: 202 THOUSANDS/UL (ref 149–390)
PMV BLD AUTO: 10.3 FL (ref 8.9–12.7)
POTASSIUM SERPL-SCNC: 4.2 MMOL/L (ref 3.5–5.3)
PROT SERPL-MCNC: 6.8 G/DL (ref 6.4–8.4)
PSA SERPL-MCNC: 3.07 NG/ML (ref 0–4)
RBC # BLD AUTO: 4.86 MILLION/UL (ref 3.88–5.62)
SODIUM SERPL-SCNC: 142 MMOL/L (ref 135–147)
TRIGL SERPL-MCNC: 68 MG/DL (ref ?–150)
WBC # BLD AUTO: 3.11 THOUSAND/UL (ref 4.31–10.16)

## 2025-05-12 PROCEDURE — 83525 ASSAY OF INSULIN: CPT

## 2025-05-12 PROCEDURE — 82306 VITAMIN D 25 HYDROXY: CPT

## 2025-05-12 PROCEDURE — G0103 PSA SCREENING: HCPCS

## 2025-05-12 PROCEDURE — 81001 URINALYSIS AUTO W/SCOPE: CPT

## 2025-05-12 PROCEDURE — 80053 COMPREHEN METABOLIC PANEL: CPT

## 2025-05-12 PROCEDURE — 83036 HEMOGLOBIN GLYCOSYLATED A1C: CPT

## 2025-05-12 PROCEDURE — 36415 COLL VENOUS BLD VENIPUNCTURE: CPT

## 2025-05-12 PROCEDURE — 80061 LIPID PANEL: CPT

## 2025-05-12 PROCEDURE — 85025 COMPLETE CBC W/AUTO DIFF WBC: CPT

## 2025-05-13 LAB
BACTERIA UR QL AUTO: NORMAL /HPF
BILIRUB UR QL STRIP: NEGATIVE
CLARITY UR: CLEAR
COLOR UR: NORMAL
GLUCOSE UR STRIP-MCNC: NEGATIVE MG/DL
HGB UR QL STRIP.AUTO: NEGATIVE
KETONES UR STRIP-MCNC: NEGATIVE MG/DL
LEUKOCYTE ESTERASE UR QL STRIP: NEGATIVE
NITRITE UR QL STRIP: NEGATIVE
NON-SQ EPI CELLS URNS QL MICRO: NORMAL /HPF
PH UR STRIP.AUTO: 6.5 [PH]
PROT UR STRIP-MCNC: NEGATIVE MG/DL
RBC #/AREA URNS AUTO: NORMAL /HPF
SP GR UR STRIP.AUTO: 1.01 (ref 1–1.03)
UROBILINOGEN UR STRIP-ACNC: <2 MG/DL
WBC #/AREA URNS AUTO: NORMAL /HPF

## 2025-06-09 ENCOUNTER — APPOINTMENT (OUTPATIENT)
Dept: LAB | Facility: CLINIC | Age: 70
End: 2025-06-09
Payer: MEDICARE

## 2025-06-09 ENCOUNTER — APPOINTMENT (OUTPATIENT)
Dept: LAB | Facility: CLINIC | Age: 70
End: 2025-06-09
Attending: FAMILY MEDICINE
Payer: MEDICARE

## 2025-06-09 DIAGNOSIS — A69.20 LYME DISEASE: ICD-10-CM

## 2025-06-09 DIAGNOSIS — I10 ESSENTIAL (PRIMARY) HYPERTENSION: ICD-10-CM

## 2025-06-09 DIAGNOSIS — E78.2 MIXED HYPERLIPIDEMIA: ICD-10-CM

## 2025-06-09 LAB
ALBUMIN SERPL BCG-MCNC: 4 G/DL (ref 3.5–5)
ALP SERPL-CCNC: 45 U/L (ref 34–104)
ALT SERPL W P-5'-P-CCNC: 17 U/L (ref 7–52)
ANION GAP SERPL CALCULATED.3IONS-SCNC: 7 MMOL/L (ref 4–13)
AST SERPL W P-5'-P-CCNC: 18 U/L (ref 13–39)
BASOPHILS # BLD AUTO: 0.01 THOUSANDS/ÂΜL (ref 0–0.1)
BASOPHILS NFR BLD AUTO: 0 % (ref 0–1)
BILIRUB SERPL-MCNC: 0.8 MG/DL (ref 0.2–1)
BUN SERPL-MCNC: 16 MG/DL (ref 5–25)
CALCIUM SERPL-MCNC: 9 MG/DL (ref 8.4–10.2)
CHLORIDE SERPL-SCNC: 105 MMOL/L (ref 96–108)
CO2 SERPL-SCNC: 30 MMOL/L (ref 21–32)
CREAT SERPL-MCNC: 0.73 MG/DL (ref 0.6–1.3)
CRP SERPL QL: <1 MG/L
EOSINOPHIL # BLD AUTO: 0.24 THOUSAND/ÂΜL (ref 0–0.61)
EOSINOPHIL NFR BLD AUTO: 8 % (ref 0–6)
ERYTHROCYTE [DISTWIDTH] IN BLOOD BY AUTOMATED COUNT: 13.3 % (ref 11.6–15.1)
GFR SERPL CREATININE-BSD FRML MDRD: 94 ML/MIN/1.73SQ M
GLUCOSE SERPL-MCNC: 102 MG/DL (ref 65–140)
HCT VFR BLD AUTO: 42.1 % (ref 36.5–49.3)
HGB BLD-MCNC: 13.4 G/DL (ref 12–17)
IMM GRANULOCYTES # BLD AUTO: 0.02 THOUSAND/UL (ref 0–0.2)
IMM GRANULOCYTES NFR BLD AUTO: 1 % (ref 0–2)
LYMPHOCYTES # BLD AUTO: 0.71 THOUSANDS/ÂΜL (ref 0.6–4.47)
LYMPHOCYTES NFR BLD AUTO: 22 % (ref 14–44)
MCH RBC QN AUTO: 28.7 PG (ref 26.8–34.3)
MCHC RBC AUTO-ENTMCNC: 31.8 G/DL (ref 31.4–37.4)
MCV RBC AUTO: 90 FL (ref 82–98)
MONOCYTES # BLD AUTO: 0.32 THOUSAND/ÂΜL (ref 0.17–1.22)
MONOCYTES NFR BLD AUTO: 10 % (ref 4–12)
NEUTROPHILS # BLD AUTO: 1.92 THOUSANDS/ÂΜL (ref 1.85–7.62)
NEUTS SEG NFR BLD AUTO: 59 % (ref 43–75)
NRBC BLD AUTO-RTO: 0 /100 WBCS
PHOSPHATE SERPL-MCNC: 3.2 MG/DL (ref 2.3–4.1)
PLATELET # BLD AUTO: 195 THOUSANDS/UL (ref 149–390)
PMV BLD AUTO: 10.3 FL (ref 8.9–12.7)
POTASSIUM SERPL-SCNC: 4.1 MMOL/L (ref 3.5–5.3)
PROT SERPL-MCNC: 6.4 G/DL (ref 6.4–8.4)
RBC # BLD AUTO: 4.67 MILLION/UL (ref 3.88–5.62)
SODIUM SERPL-SCNC: 142 MMOL/L (ref 135–147)
URATE SERPL-MCNC: 5.2 MG/DL (ref 3.5–8.5)
WBC # BLD AUTO: 3.22 THOUSAND/UL (ref 4.31–10.16)

## 2025-06-09 PROCEDURE — 86140 C-REACTIVE PROTEIN: CPT

## 2025-06-09 PROCEDURE — 85025 COMPLETE CBC W/AUTO DIFF WBC: CPT

## 2025-06-09 PROCEDURE — 84100 ASSAY OF PHOSPHORUS: CPT

## 2025-06-09 PROCEDURE — 84550 ASSAY OF BLOOD/URIC ACID: CPT

## 2025-06-09 PROCEDURE — 80053 COMPREHEN METABOLIC PANEL: CPT

## 2025-06-09 PROCEDURE — 36415 COLL VENOUS BLD VENIPUNCTURE: CPT

## 2025-07-01 ENCOUNTER — APPOINTMENT (OUTPATIENT)
Dept: LAB | Facility: CLINIC | Age: 70
End: 2025-07-01
Payer: MEDICARE

## 2025-07-01 DIAGNOSIS — Z08 ENCOUNTER FOR FOLLOW-UP SURVEILLANCE OF HEAD AND NECK CANCER: ICD-10-CM

## 2025-07-01 DIAGNOSIS — Z85.89 ENCOUNTER FOR FOLLOW-UP SURVEILLANCE OF HEAD AND NECK CANCER: ICD-10-CM

## 2025-07-01 DIAGNOSIS — C01 MALIGNANT NEOPLASM OF BASE OF TONGUE (HCC): ICD-10-CM

## 2025-08-19 ENCOUNTER — TELEPHONE (OUTPATIENT)
Dept: SURGERY | Facility: CLINIC | Age: 70
End: 2025-08-19

## (undated) DEVICE — JP PERF DRN SIL FLT 7MM FULL: Brand: CARDINAL HEALTH

## (undated) DEVICE — NEEDLE 18 G X 1 1/2 SAFETY

## (undated) DEVICE — BIPOLAR CORD DISP

## (undated) DEVICE — ELECTRODE BLADE MOD E-Z CLEAN 2.5IN 6.4CM -0012M

## (undated) DEVICE — JACKSON-PRATT 100CC BULB RESERVOIR: Brand: CARDINAL HEALTH

## (undated) DEVICE — DRAPE SURGIKIT SADDLE BAG

## (undated) DEVICE — SYRINGE 10ML LL

## (undated) DEVICE — TELFA NON-ADHERENT ABSORBENT DRESSING: Brand: TELFA

## (undated) DEVICE — BETHLEHEM UNIVERSAL OUTPATIENT: Brand: CARDINAL HEALTH

## (undated) DEVICE — NEEDLE 25G X 1 1/2

## (undated) DEVICE — SUT VICRYL 3-0 SH 27 IN J416H

## (undated) DEVICE — TISSUE RETRIEVAL SYSTEM: Brand: INZII RETRIEVAL SYSTEM

## (undated) DEVICE — ADHESIVE SKIN HIGH VISCOSITY EXOFIN 1ML

## (undated) DEVICE — GLOVE SRG BIOGEL ECLIPSE 7

## (undated) DEVICE — ROBOT CAMERA DRAPE ARM

## (undated) DEVICE — LIGAMAX 5 MM ENDOSCOPIC MULTIPLE CLIP APPLIER: Brand: LIGAMAX

## (undated) DEVICE — ROBOT DRAPE INST ARM DA VINCI SI

## (undated) DEVICE — JP PERF DRN SIL FLT 10MM FULL: Brand: CARDINAL HEALTH

## (undated) DEVICE — 1840 FOAM BLOCK NEEDLE COUNTER: Brand: DEVON

## (undated) DEVICE — ANTI-FOG SOLUTION WITH FOAM PAD: Brand: DEVON

## (undated) DEVICE — PDS II VLT 0 107CM AG ST3: Brand: ENDOLOOP

## (undated) DEVICE — TROCAR APPPLE 5MM EXTENDED LENGTH

## (undated) DEVICE — ELECTRODE LAP J HOOK SPLIT STEM E-Z CLEAN 33CM -0021S

## (undated) DEVICE — ARISTA AH ABSORBABLE HEMOSTATIC PARTICLES: Brand: ARISTA™ AH

## (undated) DEVICE — SUT SILK 2-0 SH 30 IN K833H

## (undated) DEVICE — 3000CC GUARDIAN II: Brand: GUARDIAN

## (undated) DEVICE — GLOVE SRG BIOGEL ORTHOPEDIC 7

## (undated) DEVICE — CHLORAPREP HI-LITE 26ML ORANGE

## (undated) DEVICE — INTENDED FOR TISSUE SEPARATION, AND OTHER PROCEDURES THAT REQUIRE A SHARP SURGICAL BLADE TO PUNCTURE OR CUT.: Brand: BARD-PARKER SAFETY BLADES SIZE 11, STERILE

## (undated) DEVICE — SCD SEQUENTIAL COMPRESSION COMFORT SLEEVE MEDIUM KNEE LENGTH: Brand: KENDALL SCD

## (undated) DEVICE — PLUMEPEN PRO 10FT

## (undated) DEVICE — LIGHT HANDLE COVER SLEEVE DISP BLUE STELLAR

## (undated) DEVICE — SUT SILK 3-0 18 IN A184H

## (undated) DEVICE — TIBURON SPLIT SHEET: Brand: CONVERTORS

## (undated) DEVICE — NEEDLE 22 G X 1 1/2 SAFETY

## (undated) DEVICE — TUBING SUCTION 5MM X 12 FT

## (undated) DEVICE — STERILE BETHLEHEM T AND A PACK: Brand: CARDINAL HEALTH

## (undated) DEVICE — CAUTERY SPATUAL TIP

## (undated) DEVICE — INTENDED FOR TISSUE SEPARATION, AND OTHER PROCEDURES THAT REQUIRE A SHARP SURGICAL BLADE TO PUNCTURE OR CUT.: Brand: BARD-PARKER SAFETY BLADES SIZE 15, STERILE

## (undated) DEVICE — CHLORAPREP HI-LITE 10.5ML ORANGE

## (undated) DEVICE — MARYLAND BIPOLAR FORCEPS: Brand: ENDOWRIST;DAVINCI SI

## (undated) DEVICE — BASIC SINGLE BASIN 2-LF: Brand: MEDLINE INDUSTRIES, INC.

## (undated) DEVICE — DECANTER: Brand: UNBRANDED

## (undated) DEVICE — PACK PBDS LAP CHOLE RF

## (undated) DEVICE — TROCAR: Brand: KII FIOS FIRST ENTRY

## (undated) DEVICE — DISPOSABLE OR TOWEL: Brand: CARDINAL HEALTH

## (undated) DEVICE — TROCAR: Brand: KII® SLEEVE

## (undated) DEVICE — ADHESIVE SKIN HIGH VISCOSITY EXOFIN MICRO 0.5ML

## (undated) DEVICE — VIAL DECANTER

## (undated) DEVICE — SKIN MARKER DUAL TIP WITH RULER CAP, FLEXIBLE RULER AND LABELS: Brand: DEVON

## (undated) DEVICE — SYRINGE BULB 2 OZ

## (undated) DEVICE — 10FR FRAZIER SUCTION HANDLE: Brand: CARDINAL HEALTH

## (undated) DEVICE — MONOPOLAR CAUTERY: Brand: ENDOWRIST;DAVINCI SI

## (undated) DEVICE — STRAIGHT CATH 10FR

## (undated) DEVICE — UNDYED BRAIDED (POLYGLACTIN 910), SYNTHETIC ABSORBABLE SUTURE: Brand: COATED VICRYL

## (undated) DEVICE — PAD GROUNDING ADULT

## (undated) DEVICE — DRAIN SPONGES,6 PLY: Brand: EXCILON

## (undated) DEVICE — PACK UNIVERSAL NECK

## (undated) DEVICE — SUT MONOCRYL 4-0 PS-2 18 IN Y496G

## (undated) DEVICE — ROBOT CAMERA DRAPE HEAD

## (undated) DEVICE — UTILITY MARKER,BLACK WITH LABELS: Brand: DEVON

## (undated) DEVICE — ANTIBACTERIAL UNDYED BRAIDED (POLYGLACTIN 910), SYNTHETIC ABSORBABLE SUTURE: Brand: COATED VICRYL

## (undated) DEVICE — ASTOUND STANDARD SURGICAL GOWN, XL: Brand: CONVERTORS